# Patient Record
Sex: FEMALE | Race: BLACK OR AFRICAN AMERICAN | NOT HISPANIC OR LATINO | Employment: FULL TIME | URBAN - METROPOLITAN AREA
[De-identification: names, ages, dates, MRNs, and addresses within clinical notes are randomized per-mention and may not be internally consistent; named-entity substitution may affect disease eponyms.]

---

## 2017-01-10 ENCOUNTER — PATIENT MESSAGE (OUTPATIENT)
Dept: FAMILY MEDICINE | Facility: CLINIC | Age: 32
End: 2017-01-10

## 2017-01-19 ENCOUNTER — PATIENT MESSAGE (OUTPATIENT)
Dept: OBSTETRICS AND GYNECOLOGY | Facility: CLINIC | Age: 32
End: 2017-01-19

## 2017-04-25 ENCOUNTER — PATIENT MESSAGE (OUTPATIENT)
Dept: FAMILY MEDICINE | Facility: CLINIC | Age: 32
End: 2017-04-25

## 2017-04-26 DIAGNOSIS — Z23 NEED FOR VACCINATION: Primary | ICD-10-CM

## 2017-04-26 NOTE — TELEPHONE ENCOUNTER
Please enter orders and I will call patient to go over to main campus to the injection clinic. thanks

## 2017-04-27 ENCOUNTER — IMMUNIZATION (OUTPATIENT)
Dept: FAMILY MEDICINE | Facility: CLINIC | Age: 32
End: 2017-04-27
Payer: COMMERCIAL

## 2017-04-27 ENCOUNTER — LAB VISIT (OUTPATIENT)
Dept: LAB | Facility: HOSPITAL | Age: 32
End: 2017-04-27
Attending: FAMILY MEDICINE
Payer: COMMERCIAL

## 2017-04-27 DIAGNOSIS — Z23 NEED FOR VACCINATION: ICD-10-CM

## 2017-04-27 DIAGNOSIS — Z23 NEED FOR VACCINATION: Primary | ICD-10-CM

## 2017-04-27 LAB — HBV SURFACE AB SER-ACNC: NEGATIVE M[IU]/ML

## 2017-04-27 PROCEDURE — 86762 RUBELLA ANTIBODY: CPT

## 2017-04-27 PROCEDURE — 86787 VARICELLA-ZOSTER ANTIBODY: CPT

## 2017-04-27 PROCEDURE — 86735 MUMPS ANTIBODY: CPT

## 2017-04-27 PROCEDURE — 86706 HEP B SURFACE ANTIBODY: CPT

## 2017-04-27 PROCEDURE — 90686 IIV4 VACC NO PRSV 0.5 ML IM: CPT | Mod: S$GLB,,, | Performed by: FAMILY MEDICINE

## 2017-04-27 PROCEDURE — 36415 COLL VENOUS BLD VENIPUNCTURE: CPT | Mod: PO

## 2017-04-27 PROCEDURE — 86765 RUBEOLA ANTIBODY: CPT

## 2017-04-27 PROCEDURE — 90471 IMMUNIZATION ADMIN: CPT | Mod: S$GLB,,, | Performed by: FAMILY MEDICINE

## 2017-04-28 ENCOUNTER — CLINICAL SUPPORT (OUTPATIENT)
Dept: FAMILY MEDICINE | Facility: CLINIC | Age: 32
End: 2017-04-28
Payer: COMMERCIAL

## 2017-04-28 DIAGNOSIS — Z11.1 SCREENING FOR TUBERCULOSIS: Primary | ICD-10-CM

## 2017-04-28 DIAGNOSIS — Z11.1 SCREENING-PULMONARY TB: Primary | ICD-10-CM

## 2017-04-28 LAB
RUBV IGG SER-ACNC: 185 IU/ML
RUBV IGG SER-IMP: REACTIVE

## 2017-04-28 PROCEDURE — 86580 TB INTRADERMAL TEST: CPT | Mod: S$GLB,,, | Performed by: FAMILY MEDICINE

## 2017-05-01 LAB
MUMPS IGG INTERPRETATION: POSITIVE
MUMPS IGG SCREEN: 2.68 ISR
RUBEOLA IGG ANTIBODY: 2.29 ISR
RUBEOLA INTERPRETATION: POSITIVE
TB INDURATION - 48 HR READ: NORMAL MM
TB INDURATION - 72 HR READ: NORMAL MM
TB SKIN TEST - 48 HR READ: NORMAL
TB SKIN TEST - 72 HR READ: NEGATIVE
VARICELLA INTERPRETATION: POSITIVE
VARICELLA ZOSTER IGG: 3.94 ISR

## 2017-05-01 NOTE — PROGRESS NOTES
Two patient identifiers verified. Allergies reviewed.  PPD was placed to right arm per MD order. Patient tolerated injection well: no redness, bleeding, or bruising noted to injection site. Patient instructed to remain in clinic setting for 15 minutes. Patient is here today for PPD read.  PPD read was negative.

## 2017-05-02 ENCOUNTER — TELEPHONE (OUTPATIENT)
Dept: FAMILY MEDICINE | Facility: CLINIC | Age: 32
End: 2017-05-02

## 2017-05-02 ENCOUNTER — CLINICAL SUPPORT (OUTPATIENT)
Dept: INFECTIOUS DISEASES | Facility: CLINIC | Age: 32
End: 2017-05-02
Payer: COMMERCIAL

## 2017-05-02 DIAGNOSIS — Z23 NEED FOR VACCINATION: ICD-10-CM

## 2017-05-02 PROCEDURE — 90746 HEPB VACCINE 3 DOSE ADULT IM: CPT | Mod: S$GLB,,, | Performed by: INTERNAL MEDICINE

## 2017-05-02 PROCEDURE — 99999 PR PBB SHADOW E&M-EST. PATIENT-LVL I: CPT | Mod: PBBFAC,,,

## 2017-05-02 PROCEDURE — 90471 IMMUNIZATION ADMIN: CPT | Mod: S$GLB,,, | Performed by: INTERNAL MEDICINE

## 2017-05-02 NOTE — TELEPHONE ENCOUNTER
Infectious Disease called stating patient was Igg for Rubella and Varicella were both positive. Therefore patient does not need the injections.

## 2017-05-29 ENCOUNTER — CLINICAL SUPPORT (OUTPATIENT)
Dept: INFECTIOUS DISEASES | Facility: CLINIC | Age: 32
End: 2017-05-29
Payer: COMMERCIAL

## 2017-05-29 DIAGNOSIS — Z23 NEED FOR VACCINATION: Primary | ICD-10-CM

## 2017-05-29 PROCEDURE — 90746 HEPB VACCINE 3 DOSE ADULT IM: CPT | Mod: PBBFAC

## 2017-05-29 PROCEDURE — 90471 IMMUNIZATION ADMIN: CPT | Mod: PBBFAC

## 2017-08-21 ENCOUNTER — OFFICE VISIT (OUTPATIENT)
Dept: OBSTETRICS AND GYNECOLOGY | Facility: CLINIC | Age: 32
End: 2017-08-21
Payer: COMMERCIAL

## 2017-08-21 VITALS
DIASTOLIC BLOOD PRESSURE: 70 MMHG | SYSTOLIC BLOOD PRESSURE: 116 MMHG | BODY MASS INDEX: 34.84 KG/M2 | WEIGHT: 216.81 LBS | HEIGHT: 66 IN

## 2017-08-21 DIAGNOSIS — Z01.419 ENCOUNTER FOR GYNECOLOGICAL EXAMINATION: Primary | ICD-10-CM

## 2017-08-21 PROCEDURE — 99395 PREV VISIT EST AGE 18-39: CPT | Mod: S$GLB,,, | Performed by: OBSTETRICS & GYNECOLOGY

## 2017-08-21 PROCEDURE — 99999 PR PBB SHADOW E&M-EST. PATIENT-LVL III: CPT | Mod: PBBFAC,,, | Performed by: OBSTETRICS & GYNECOLOGY

## 2017-08-21 NOTE — PROGRESS NOTES
HPI: Pt is a 31 y.o.  female who presents for routine annual exam. She uses patch for contraception. She does not want STD screening. No complaints.     Pap smear in 2015 NORMAL      ROS:  GENERAL: Feeling well overall. Denies fever or chills.   SKIN: Denies rash or lesions.   HEAD: Denies head injury or headache.   NODES: Denies enlarged lymph nodes.   CHEST: Denies chest pain or shortness of breath.   CARDIOVASCULAR: Denies palpitations or left sided chest pain.   ABDOMEN: No abdominal pain, constipation, diarrhea, nausea, vomiting or rectal bleeding.   URINARY: No dysuria, hematuria, or burning on urination.  REPRODUCTIVE: See HPI.   BREASTS: Denies pain, lumps, or nipple discharge.   HEMATOLOGIC: No easy bruisability or excessive bleeding.   MUSCULOSKELETAL: Denies joint pain or swelling.   NEUROLOGIC: Denies syncope or weakness.   PSYCHIATRIC: Denies depression, anxiety or mood swings.    PE:   APPEARANCE: Well nourished, well developed, Black or  female in no acute distress.  NODES: no cervical, supraclavicular, or inguinal lymphadenopathy  BREASTS: Symmetrical, no skin changes or visible lesions. No palpable masses, nipple discharge or adenopathy bilaterally.  ABDOMEN: Soft. No tenderness or masses. No distention. No hernias palpated. No CVA tenderness.  VULVA: No lesions. Normal external female genitalia.  URETHRAL MEATUS: Normal size and location, no lesions, no prolapse.  URETHRA: No masses, tenderness, or prolapse.  VAGINA: Moist. No lesions or lacerations noted. No abnormal discharge present. No odor present.   CERVIX: No lesions or discharge. No cervical motion tenderness.   UTERUS: Normal size, regular shape, mobile, non-tender.  ADNEXA: No tenderness. No fullness or masses palpated in the adnexal regions.   ANUS PERINEUM: Normal.      Diagnosis:  1. Encounter for gynecological examination        Plan:          Patient was counseled today on the new ACS guidelines for cervical cytology  screening as well as the current recommendations for breast cancer screening. She was counseled to follow up with her PCP for other routine health maintenance. Counseling session lasted approximately 10 minutes, and all her questions were answered.    Follow-up with me in 1 year for routine exam; pap next year.

## 2017-09-20 ENCOUNTER — PATIENT MESSAGE (OUTPATIENT)
Dept: OBSTETRICS AND GYNECOLOGY | Facility: CLINIC | Age: 32
End: 2017-09-20

## 2017-11-14 ENCOUNTER — CLINICAL SUPPORT (OUTPATIENT)
Dept: INFECTIOUS DISEASES | Facility: CLINIC | Age: 32
End: 2017-11-14
Payer: COMMERCIAL

## 2017-11-14 DIAGNOSIS — Z23 NEED FOR VACCINATION: Primary | ICD-10-CM

## 2017-11-14 PROCEDURE — 90471 IMMUNIZATION ADMIN: CPT | Mod: S$GLB,,, | Performed by: INTERNAL MEDICINE

## 2017-11-14 PROCEDURE — 90746 HEPB VACCINE 3 DOSE ADULT IM: CPT | Mod: S$GLB,,, | Performed by: INTERNAL MEDICINE

## 2017-11-14 NOTE — PROGRESS NOTES
Pt received the final dose of her Hepatitis B vaccination. Pt tolerated injection well. Pt left the unit in NAD.

## 2018-01-09 ENCOUNTER — PATIENT MESSAGE (OUTPATIENT)
Dept: OBSTETRICS AND GYNECOLOGY | Facility: CLINIC | Age: 33
End: 2018-01-09

## 2018-01-10 ENCOUNTER — OFFICE VISIT (OUTPATIENT)
Dept: URGENT CARE | Facility: CLINIC | Age: 33
End: 2018-01-10
Payer: COMMERCIAL

## 2018-01-10 VITALS
WEIGHT: 226 LBS | TEMPERATURE: 98 F | HEIGHT: 66 IN | SYSTOLIC BLOOD PRESSURE: 180 MMHG | BODY MASS INDEX: 36.32 KG/M2 | RESPIRATION RATE: 16 BRPM | DIASTOLIC BLOOD PRESSURE: 120 MMHG | OXYGEN SATURATION: 98 % | HEART RATE: 97 BPM

## 2018-01-10 DIAGNOSIS — R42 VERTIGO: ICD-10-CM

## 2018-01-10 DIAGNOSIS — J10.1 INFLUENZA A: ICD-10-CM

## 2018-01-10 DIAGNOSIS — R05.9 COUGH: Primary | ICD-10-CM

## 2018-01-10 DIAGNOSIS — I10 HYPERTENSION, UNSPECIFIED TYPE: ICD-10-CM

## 2018-01-10 LAB
CTP QC/QA: YES
FLUAV AG NPH QL: POSITIVE
FLUBV AG NPH QL: NEGATIVE

## 2018-01-10 PROCEDURE — 87804 INFLUENZA ASSAY W/OPTIC: CPT | Mod: QW,S$GLB,, | Performed by: FAMILY MEDICINE

## 2018-01-10 PROCEDURE — 99203 OFFICE O/P NEW LOW 30 MIN: CPT | Mod: S$GLB,,, | Performed by: FAMILY MEDICINE

## 2018-01-10 RX ORDER — OSELTAMIVIR PHOSPHATE 75 MG/1
75 CAPSULE ORAL 2 TIMES DAILY
Qty: 10 CAPSULE | Refills: 0 | Status: SHIPPED | OUTPATIENT
Start: 2018-01-10 | End: 2018-01-15

## 2018-01-10 RX ORDER — MECLIZINE HYDROCHLORIDE 25 MG/1
25 TABLET ORAL 3 TIMES DAILY PRN
Qty: 60 TABLET | Refills: 0 | Status: SHIPPED | OUTPATIENT
Start: 2018-01-10 | End: 2018-07-10

## 2018-01-10 RX ORDER — NORELGESTROMIN AND ETHINYL ESTRADIOL 35; 150 UG/MG; UG/MG
PATCH TRANSDERMAL
Qty: 3 PATCH | Refills: 11 | Status: SHIPPED | OUTPATIENT
Start: 2018-01-10 | End: 2018-11-26 | Stop reason: SDUPTHER

## 2018-01-10 NOTE — PATIENT INSTRUCTIONS
Influenza (Adult)    Influenza is also called the flu. It is a viral illness that affects the air passages of your lungs. It is different from the common cold. The flu can easily be passed from one to person to another. It may be spread through the air by coughing and sneezing. Or it can be spread by touching the sick person and then touching your own eyes, nose, or mouth.  The flu starts 1 to 3 days after you are exposed to the flu virus. It may last for 1 to 2 weeks but many people feel tired or fatigued for many weeks afterward. You usually dont need to take antibiotics unless you have a complication. This might be an ear or sinus infection or pneumonia.  Symptoms of the flu may be mild or severe. They can include extreme tiredness (wanting to stay in bed all day), chills, fevers, muscle aches, soreness with eye movement, headache, and a dry, hacking cough.  Home care  Follow these guidelines when caring for yourself at home:  · Avoid being around cigarette smoke, whether yours or other peoples.  · Acetaminophen or ibuprofen will help ease your fever, muscle aches, and headache. Dont give aspirin to anyone younger than 18 who has the flu. Aspirin can harm the liver.  · Nausea and loss of appetite are common with the flu. Eat light meals. Drink 6 to 8 glasses of liquids every day. Good choices are water, sport drinks, soft drinks without caffeine, juices, tea, and soup. Extra fluids will also help loosen secretions in your nose and lungs.  · Over-the-counter cold medicines will not make the flu go away faster. But the medicines may help with coughing, sore throat, and congestion in your nose and sinuses. Dont use a decongestant if you have high blood pressure.  · Stay home until your fever has been gone for at least 24 hours without using medicine to reduce fever.  Follow-up care  Follow up with your healthcare provider, or as advised, if you are not getting better over the next week.  If you are age 65 or  older, talk with your provider about getting a pneumococcal vaccine every 5 years. You should also get this vaccine if you have chronic asthma or COPD. All adults should get a flu vaccine every fall. Ask your provider about this.  When to seek medical advice  Call your healthcare provider right away if any of these occur:  · Cough with lots of colored mucus (sputum) or blood in your mucus  · Chest pain, shortness of breath, wheezing, or trouble breathing  · Severe headache, or face, neck, or ear pain  · New rash with fever  · Fever of 100.4°F (38°C) or higher, or as directed by your healthcare provider  · Confusion, behavior change, or seizure  · Severe weakness or dizziness  · You get a new fever or cough after getting better for a few days  Date Last Reviewed: 1/1/2017  © 2664-1745 [employer]. [address] [place] [address]. All rights reserved. This information is not intended as a substitute for professional medical care. Always follow your healthcare professional's instructions.        Dizziness (Vertigo) and Balance Problems: Staying Safe     Replace burned-out lightbulbs to keep your home safe and well lit.   Falls or accidents can lead to pain, broken bones, and fear of future falls. Protect yourself and others by preparing for episodes. Simple steps can help you stay safe at home and wherever you go.  Lighting  Keep all areas well lit. This helps your eyes send the right signals to the brain. It also makes you less likely to trip and fall. If bright lights make symptoms worse, dim the lights or lie in a dark room until the dizziness passes. Then turn the lights back to their normal level.  Tips:  · Keep a flashlight by the bed.  · Place nightlights in bathrooms and hallways.  · Replace burned-out bulbs, or have someone replace them for you.  Preventing falls  To reduce your risk of falling:  · Get out of bed or up from a chair slowly.  · Wear low-heeled shoes that fit properly and  have slip-resistant soles.  · Remove throw rugs. Clear clutter from walkways.  · Use handrails on stairs. Have handrails installed or adjusted if needed.  · Install grab bars in the bathroom. Don't use towel racks for balance.  · Use a shower stool. Also put adhesive strips in the shower or on the tub floor.  Going out  With a little time and preparation, you can get around safely.  Tips:  · Bring a cane or walking aid if needed.  · Give yourself plenty of time in case you start to get dizzy.  · Ask your healthcare provider what type of exercise is safe for your condition.  · Be patient. If an activity such as walking through a crowded shop causes you stress, you may not be ready for it yet.  Driving  If you become dizzy or disoriented while driving, you could hurt yourself and others. That's why it's best to not drive until symptoms have gone away. In some cases, your license may be temporarily held until it's safe for you to drive again.  For safety:  · Ask a friend to drive for you.  · Take public transportation.  · Walk to stores and other places when you can.  Asking for help  Don't be afraid to ask for help running errands, cooking meals, and doing exercise. Whether it's a friend, loved one, neighbor, or stranger on the street, a little help can make a world of difference.   Date Last Reviewed: 11/1/2016  © 9396-1331 [employer]. [address] [place] [address], [place] [address]. All rights reserved. This information is not intended as a substitute for professional medical care. Always follow your healthcare professional's instructions.        Possible Causes of Dizziness or Fainting    Dizziness and fainting can have many causes. Below are some examples of possible causes your healthcare provider will look to rule out.  Benign paroxysmal positional vertigo (BPPV)  BPPV results when calcium crystals inside the inner ear shift into the wrong position. BPPV causes episodes of vertigo (a spinning  sensation). Episodes most often happen when the head is moved in a certain way. This is more common in people 65 and older.   Infection or inflammation  The semicircular canals of the ear may become infected or inflamed. In this case, they can send the wrong balance signals. This can cause vertigo.  Meniere disease  Meniere disease happens when there is too much fluid in the semicircular canals. This can cause vertigo. It also can cause hearing problems and buzzing or ringing in the ears (called tinnitus). You may also have a feeling of pressure or fullness in the ear.  Syncope  Syncope is fainting that happens when the brain doesnt get enough oxygen-rich blood. It can be caused by low heart rate or low blood pressure. This is called vasovagal syncope. It can also be caused by sitting or standing up too quickly. This is called orthostatic hypotension. Syncope may also be due to a heart valve problem, an abnormal heart rhythm, or other heart problems. Dizziness can also happen from stroke, hemorrhage in the brain, or other problems in the brain. Your healthcare provider may do certain tests to rule out these conditions.  Other causes  Other causes include:  · Medicines. Certain medicines can cause dizziness and even fainting. In some cases, stopping a medicine too quickly can lead to withdrawal symptoms, including dizziness and fainting.  · Anxiety. Being anxious can lead to breathing changes, such as hyperventilation. These can lead to dizziness and fainting.  Additional causes for dizziness and fainting also exist. Talk to your healthcare provider for more information.     Date Last Reviewed: 10/6/2015  © 3436-6706 [employer]. [address] [place] [address], [place] [address]. All rights reserved. This information is not intended as a substitute for professional medical care. Always follow your healthcare professional's instructions.

## 2018-01-10 NOTE — PROGRESS NOTES
Subjective:       Patient ID: Joann Banuelos is a 32 y.o. female.    Vitals:  vitals were not taken for this visit.    Chief Complaint: Cough (productive cough since yesterday)    Pt has had a productive cough with congestion and lightheadedness. Pt says she has body aches and chills. Pt has taken mucinex two days ago. Pt has had a history of high blood pressure when she was pregnant and her blood pressure is still elevated from time to time. padmini      Cough   This is a new problem. The current episode started yesterday. The problem has been unchanged. The problem occurs constantly. The cough is productive of sputum. Associated symptoms include headaches, myalgias, nasal congestion, postnasal drip, a sore throat and shortness of breath. Nothing aggravates the symptoms. She has tried OTC cough suppressant for the symptoms. The treatment provided no relief. There is no history of asthma, bronchitis, COPD or pneumonia.     Review of Systems   Constitution: Positive for decreased appetite.   HENT: Positive for congestion, postnasal drip and sore throat.    Respiratory: Positive for cough, shortness of breath and sputum production.    Musculoskeletal: Positive for myalgias.   Gastrointestinal: Positive for nausea. Negative for abdominal pain and vomiting.   Neurological: Positive for disturbances in coordination, dizziness, headaches and loss of balance.       Objective:      Physical Exam    Assessment:       No diagnosis found.    Plan:        There are no diagnoses linked to this encounter.  ***

## 2018-01-10 NOTE — PROGRESS NOTES
"Subjective:       Patient ID: Joann Banuelos is a 32 y.o. female.    Vitals:  height is 5' 6" (1.676 m) and weight is 102.5 kg (226 lb). Her temperature is 98.4 °F (36.9 °C). Her blood pressure is 180/120 (abnormal) and her pulse is 97. Her respiration is 16 and oxygen saturation is 98%.     Chief Complaint: Cough (productive cough since yesterday) and Dizziness (py says the dizziness has been the worst today and began yesterday.)    Pt has had a productive cough with congestion and lightheadedness. Pt says she has body aches and chills. Pt has taken mucinex two days ago.sharan  Pt has had a productive cough with congestion and lightheadedness. Pt says she has body aches and chills. Pt has taken mucinex two days ago. Pt has had a history of high blood pressure when she was pregnant and her blood pressure is still elevated from time to time. Sharan she started this morning feelin light headed and dizzy, and when she moves feels like the room is moving. Has had this before and is worried it is her pressure. She also feels fatigued.      Cough   This is a new problem. The current episode started yesterday. The problem has been unchanged. The problem occurs constantly. The cough is productive of sputum. Associated symptoms include myalgias, nasal congestion, postnasal drip, a sore throat and shortness of breath. Nothing aggravates the symptoms. She has tried OTC cough suppressant for the symptoms. The treatment provided no relief. There is no history of asthma, bronchitis, COPD or pneumonia.     Review of Systems   Constitution: Positive for decreased appetite.   HENT: Positive for congestion, postnasal drip and sore throat.    Respiratory: Positive for cough, shortness of breath and sputum production.    Musculoskeletal: Positive for myalgias.   Gastrointestinal: Positive for nausea. Negative for abdominal pain and vomiting.       Objective:      Physical Exam   Constitutional: She is oriented to person, place, and time. She " appears well-developed and well-nourished. She is cooperative.  Non-toxic appearance. She does not appear ill. No distress.   HENT:   Head: Normocephalic and atraumatic.   Right Ear: Hearing, tympanic membrane, external ear and ear canal normal.   Left Ear: Hearing, tympanic membrane, external ear and ear canal normal.   Nose: Nose normal. No mucosal edema, rhinorrhea or nasal deformity. No epistaxis. Right sinus exhibits no maxillary sinus tenderness and no frontal sinus tenderness. Left sinus exhibits no maxillary sinus tenderness and no frontal sinus tenderness.   Mouth/Throat: Uvula is midline, oropharynx is clear and moist and mucous membranes are normal. No trismus in the jaw. Normal dentition. No uvula swelling. No posterior oropharyngeal erythema.   Eyes: Conjunctivae and lids are normal. No scleral icterus.   Sclera clear bilat   Neck: Trachea normal, full passive range of motion without pain and phonation normal. Neck supple.   Cardiovascular: Normal rate, regular rhythm, normal heart sounds, intact distal pulses and normal pulses.    Pulmonary/Chest: Effort normal and breath sounds normal. No respiratory distress.   Abdominal: Soft. Normal appearance and bowel sounds are normal. She exhibits no distension. There is no tenderness.   Musculoskeletal: Normal range of motion. She exhibits no edema or deformity.   Neurological: She is alert and oriented to person, place, and time. She displays normal reflexes. No cranial nerve deficit (non reproducible vertigo) or sensory deficit. She exhibits normal muscle tone. Coordination normal.   Skin: Skin is warm, dry and intact. She is not diaphoretic. No pallor.   Psychiatric: She has a normal mood and affect. Her speech is normal and behavior is normal. Judgment and thought content normal. Cognition and memory are normal.   Nursing note and vitals reviewed.      Assessment:       1. Cough    2. Influenza A    3. Vertigo    4. Hypertension, unspecified type         Plan:        Cough  -     POCT Influenza A/B    Influenza A  -     oseltamivir (TAMIFLU) 75 MG capsule; Take 1 capsule (75 mg total) by mouth 2 (two) times daily.  Dispense: 10 capsule; Refill: 0    Vertigo  -     meclizine (ANTIVERT) 25 mg tablet; Take 1 tablet (25 mg total) by mouth 3 (three) times daily as needed.  Dispense: 60 tablet; Refill: 0    Hypertension, unspecified type      Recent Results (from the past 48 hour(s))   POCT Influenza A/B    Collection Time: 01/10/18 12:47 PM   Result Value Ref Range    Rapid Influenza A Ag Positive (A) Negative    Rapid Influenza B Ag Negative Negative     Acceptable Yes    ]

## 2018-01-11 ENCOUNTER — OFFICE VISIT (OUTPATIENT)
Dept: INTERNAL MEDICINE | Facility: CLINIC | Age: 33
End: 2018-01-11
Payer: COMMERCIAL

## 2018-01-11 VITALS
HEART RATE: 100 BPM | BODY MASS INDEX: 35.95 KG/M2 | SYSTOLIC BLOOD PRESSURE: 140 MMHG | HEIGHT: 67 IN | WEIGHT: 229.06 LBS | DIASTOLIC BLOOD PRESSURE: 78 MMHG | TEMPERATURE: 99 F

## 2018-01-11 DIAGNOSIS — J11.1 FLU SYNDROME: ICD-10-CM

## 2018-01-11 DIAGNOSIS — R03.0 ELEVATED BLOOD PRESSURE, SITUATIONAL: Primary | ICD-10-CM

## 2018-01-11 PROCEDURE — 99214 OFFICE O/P EST MOD 30 MIN: CPT | Mod: S$GLB,,, | Performed by: INTERNAL MEDICINE

## 2018-01-11 PROCEDURE — 99999 PR PBB SHADOW E&M-EST. PATIENT-LVL III: CPT | Mod: PBBFAC,,, | Performed by: INTERNAL MEDICINE

## 2018-01-11 RX ORDER — LEVOCETIRIZINE DIHYDROCHLORIDE 5 MG/1
5 TABLET, FILM COATED ORAL NIGHTLY
Qty: 30 TABLET | Refills: 3 | Status: SHIPPED | OUTPATIENT
Start: 2018-01-11 | End: 2018-07-10

## 2018-01-11 RX ORDER — CODEINE PHOSPHATE AND GUAIFENESIN 10; 100 MG/5ML; MG/5ML
5 SOLUTION ORAL 3 TIMES DAILY PRN
Qty: 236 ML | Refills: 0 | Status: SHIPPED | OUTPATIENT
Start: 2018-01-11 | End: 2018-01-21

## 2018-01-11 RX ORDER — FLUTICASONE PROPIONATE 50 MCG
2 SPRAY, SUSPENSION (ML) NASAL DAILY
Qty: 16 G | Refills: 2 | Status: SHIPPED | OUTPATIENT
Start: 2018-01-11 | End: 2018-02-10

## 2018-01-11 NOTE — PROGRESS NOTES
Subjective:       Patient ID: Joann Banuelos is a 32 y.o. female.    Chief Complaint: Hypertension (check up per )    HPI   Pt diagnosed with Influenza A yesterday at urgent care is here for evaluation of elevated BP of 180/120 yesterday at the clinic. She has not taken her BP at home. Today it is 140/78. Regarding her flu pt is experiencing persistent cough with sinus drainage, chills and body aches.   Review of Systems   Constitutional: Positive for chills. Negative for activity change, appetite change, diaphoresis, fatigue, fever and unexpected weight change.   HENT: Positive for congestion, postnasal drip, rhinorrhea and sinus pain. Negative for sinus pressure, sneezing, sore throat, trouble swallowing and voice change.    Respiratory: Positive for cough. Negative for shortness of breath and wheezing.    Cardiovascular: Negative for chest pain, palpitations and leg swelling.   Gastrointestinal: Negative for abdominal pain, blood in stool, constipation, diarrhea, nausea and vomiting.   Genitourinary: Negative for dysuria.   Musculoskeletal: Positive for myalgias. Negative for arthralgias.   Skin: Negative for rash and wound.   Allergic/Immunologic: Negative for environmental allergies and food allergies.   Hematological: Negative for adenopathy. Does not bruise/bleed easily.       Objective:      Physical Exam   Constitutional: She is oriented to person, place, and time. She appears well-developed and well-nourished. No distress.   HENT:   Head: Normocephalic and atraumatic.   Right Ear: External ear normal.   Left Ear: External ear normal.   Nose: Mucosal edema and rhinorrhea present.   Mouth/Throat: Oropharynx is clear and moist. No oropharyngeal exudate.   Eyes: Conjunctivae and EOM are normal. Pupils are equal, round, and reactive to light. Right eye exhibits no discharge. Left eye exhibits no discharge. No scleral icterus.   Neck: Neck supple. No JVD present.   Cardiovascular: Normal rate, regular rhythm,  normal heart sounds and intact distal pulses.    Pulmonary/Chest: Effort normal and breath sounds normal. No respiratory distress. She has no wheezes. She has no rales.   Abdominal: Soft. Bowel sounds are normal. There is no tenderness.   Musculoskeletal: She exhibits no edema.   Lymphadenopathy:     She has no cervical adenopathy.   Neurological: She is alert and oriented to person, place, and time.   Skin: Skin is warm and dry. No rash noted. She is not diaphoretic. No pallor.       Assessment:       1. Elevated blood pressure, situational    2. Flu syndrome        Plan:    1. Pt advised to monitor BP and notify PCP if consistently > 140/90   2. Rx Cheratussin AC TID PRN, Xyzal/Flonase

## 2018-02-19 ENCOUNTER — OFFICE VISIT (OUTPATIENT)
Dept: INTERNAL MEDICINE | Facility: CLINIC | Age: 33
End: 2018-02-19
Payer: COMMERCIAL

## 2018-02-19 VITALS
DIASTOLIC BLOOD PRESSURE: 92 MMHG | HEART RATE: 76 BPM | WEIGHT: 239.19 LBS | SYSTOLIC BLOOD PRESSURE: 128 MMHG | RESPIRATION RATE: 18 BRPM | BODY MASS INDEX: 37.54 KG/M2 | HEIGHT: 67 IN | TEMPERATURE: 98 F

## 2018-02-19 DIAGNOSIS — R03.0 ELEVATED BLOOD PRESSURE READING: ICD-10-CM

## 2018-02-19 DIAGNOSIS — E66.9 OBESITY, CLASS II, BMI 35-39.9, NO COMORBIDITY: Primary | ICD-10-CM

## 2018-02-19 PROCEDURE — 99214 OFFICE O/P EST MOD 30 MIN: CPT | Mod: S$GLB,,, | Performed by: INTERNAL MEDICINE

## 2018-02-19 PROCEDURE — 99999 PR PBB SHADOW E&M-EST. PATIENT-LVL III: CPT | Mod: PBBFAC,,, | Performed by: INTERNAL MEDICINE

## 2018-02-19 PROCEDURE — 3008F BODY MASS INDEX DOCD: CPT | Mod: S$GLB,,, | Performed by: INTERNAL MEDICINE

## 2018-02-19 NOTE — PROGRESS NOTES
Subjective:       Patient ID: Joann Banuelos is a 32 y.o. female.    Chief Complaint: Follow-up (BP) and Weight Loss (want info for wt loss program)    HPI   Pt with Obesity is here for f/u regarding elevated BP readings. She has been checking them at home since last visit with the numbers running slight above normal at times. She has started to diet and exercise.   Review of Systems   Constitutional: Positive for activity change. Negative for appetite change, chills, diaphoresis, fatigue, fever and unexpected weight change.   HENT: Negative for hearing loss, postnasal drip, rhinorrhea, sinus pressure, sneezing, sore throat, trouble swallowing and voice change.    Eyes: Negative for discharge and visual disturbance.   Respiratory: Negative for cough, chest tightness, shortness of breath and wheezing.    Cardiovascular: Negative for chest pain, palpitations and leg swelling.   Gastrointestinal: Negative for abdominal pain, blood in stool, constipation, diarrhea, nausea and vomiting.   Endocrine: Negative for polydipsia and polyuria.   Genitourinary: Negative for difficulty urinating, dysuria, hematuria and menstrual problem.   Musculoskeletal: Negative for arthralgias, joint swelling, myalgias and neck pain.   Skin: Negative for rash and wound.   Allergic/Immunologic: Negative for environmental allergies and food allergies.   Neurological: Positive for headaches. Negative for weakness.   Hematological: Negative for adenopathy. Does not bruise/bleed easily.   Psychiatric/Behavioral: Negative for confusion and dysphoric mood.       Objective:      Physical Exam   Constitutional: She is oriented to person, place, and time. She appears well-developed and well-nourished. No distress.   HENT:   Head: Normocephalic and atraumatic.   Eyes: Conjunctivae and EOM are normal. Pupils are equal, round, and reactive to light. Right eye exhibits no discharge. Left eye exhibits no discharge. No scleral icterus.   Neck: Neck supple. No  JVD present.   Cardiovascular: Normal rate, regular rhythm, normal heart sounds and intact distal pulses.    Pulmonary/Chest: Effort normal and breath sounds normal. No respiratory distress. She has no wheezes. She has no rales.   Abdominal: Soft. Bowel sounds are normal. There is no tenderness.   Musculoskeletal: She exhibits no edema.   Lymphadenopathy:     She has no cervical adenopathy.   Neurological: She is alert and oriented to person, place, and time.   Skin: Skin is warm and dry. No rash noted. She is not diaphoretic. No pallor.       Assessment:       1. Obesity, Class II, BMI 35-39.9, no comorbidity    2. Elevated blood pressure reading        Plan:    1. Referral to bariatric medicine       Pt advised on proper diet/exercise for weight loss   2. Low sodium diet and needs to lose weight

## 2018-05-01 ENCOUNTER — OFFICE VISIT (OUTPATIENT)
Dept: INTERNAL MEDICINE | Facility: CLINIC | Age: 33
End: 2018-05-01
Payer: COMMERCIAL

## 2018-05-01 VITALS
SYSTOLIC BLOOD PRESSURE: 117 MMHG | TEMPERATURE: 98 F | WEIGHT: 239.63 LBS | BODY MASS INDEX: 37.61 KG/M2 | RESPIRATION RATE: 18 BRPM | HEIGHT: 67 IN | HEART RATE: 92 BPM | DIASTOLIC BLOOD PRESSURE: 85 MMHG

## 2018-05-01 DIAGNOSIS — B35.9 RINGWORM: Primary | ICD-10-CM

## 2018-05-01 PROCEDURE — 99214 OFFICE O/P EST MOD 30 MIN: CPT | Mod: S$GLB,,, | Performed by: INTERNAL MEDICINE

## 2018-05-01 PROCEDURE — 99999 PR PBB SHADOW E&M-EST. PATIENT-LVL III: CPT | Mod: PBBFAC,,, | Performed by: INTERNAL MEDICINE

## 2018-05-01 RX ORDER — FLUCONAZOLE 150 MG/1
150 TABLET ORAL DAILY
Qty: 3 TABLET | Refills: 0 | Status: SHIPPED | OUTPATIENT
Start: 2018-05-01 | End: 2018-05-02

## 2018-05-01 RX ORDER — CLOTRIMAZOLE AND BETAMETHASONE DIPROPIONATE 10; .64 MG/G; MG/G
CREAM TOPICAL 2 TIMES DAILY
Qty: 45 G | Refills: 0 | Status: SHIPPED | OUTPATIENT
Start: 2018-05-01 | End: 2019-12-04

## 2018-05-01 RX ORDER — KETOCONAZOLE 20 MG/G
CREAM TOPICAL 2 TIMES DAILY
Qty: 30 G | Refills: 0 | Status: SHIPPED | OUTPATIENT
Start: 2018-05-01 | End: 2018-05-01

## 2018-05-01 NOTE — PROGRESS NOTES
Subjective:       Patient ID: Joann Banuelos is a 32 y.o. female.    Chief Complaint: Rash (left cheek and neck)    HPI   Pt here for evaluation of an itchy slightly erythematous rash located on her left neck/cheek area dn left upper arm which has been persistent since then. She has not tried any OTC medications for relief.   Review of Systems   Constitutional: Negative for activity change, appetite change, chills, diaphoresis, fatigue, fever and unexpected weight change.   HENT: Negative for congestion, postnasal drip, rhinorrhea, sinus pressure, sneezing, sore throat, trouble swallowing and voice change.    Eyes: Negative for pain.   Respiratory: Negative for cough, shortness of breath and wheezing.    Cardiovascular: Negative for chest pain, palpitations and leg swelling.   Gastrointestinal: Negative for abdominal pain, blood in stool, constipation, diarrhea, nausea and vomiting.   Genitourinary: Negative for dysuria.   Musculoskeletal: Negative for arthralgias and myalgias.   Skin: Positive for rash. Negative for wound.   Allergic/Immunologic: Negative for environmental allergies and food allergies.   Hematological: Negative for adenopathy. Does not bruise/bleed easily.       Objective:      Physical Exam   Constitutional: She is oriented to person, place, and time. She appears well-developed and well-nourished. No distress.   HENT:   Head: Normocephalic and atraumatic.   Eyes: Conjunctivae and EOM are normal. Pupils are equal, round, and reactive to light. Right eye exhibits no discharge. Left eye exhibits no discharge. No scleral icterus.   Neck: Neck supple. No JVD present.   Cardiovascular: Normal rate, regular rhythm, normal heart sounds and intact distal pulses.    Pulmonary/Chest: Effort normal and breath sounds normal. No respiratory distress. She has no wheezes. She has no rales.   Musculoskeletal: She exhibits no edema.   Lymphadenopathy:     She has no cervical adenopathy.   Neurological: She is alert  and oriented to person, place, and time.   Skin: Skin is warm and dry. Rash noted. She is not diaphoretic. There is erythema. No pallor.            Assessment:       1. Ringworm        Plan:   1. Rx Diflucan and Lotrisone cream BID

## 2018-05-14 ENCOUNTER — INITIAL CONSULT (OUTPATIENT)
Dept: BARIATRICS | Facility: CLINIC | Age: 33
End: 2018-05-14
Payer: COMMERCIAL

## 2018-05-14 ENCOUNTER — PATIENT MESSAGE (OUTPATIENT)
Dept: BARIATRICS | Facility: CLINIC | Age: 33
End: 2018-05-14

## 2018-05-14 VITALS
HEIGHT: 67 IN | SYSTOLIC BLOOD PRESSURE: 130 MMHG | DIASTOLIC BLOOD PRESSURE: 70 MMHG | BODY MASS INDEX: 38.17 KG/M2 | HEART RATE: 92 BPM | WEIGHT: 243.19 LBS

## 2018-05-14 DIAGNOSIS — E66.01 SEVERE OBESITY (BMI 35.0-39.9) WITH COMORBIDITY: Primary | ICD-10-CM

## 2018-05-14 DIAGNOSIS — R03.0 ELEVATED BLOOD PRESSURE READING: ICD-10-CM

## 2018-05-14 PROCEDURE — 99999 PR PBB SHADOW E&M-EST. PATIENT-LVL III: CPT | Mod: PBBFAC,,, | Performed by: INTERNAL MEDICINE

## 2018-05-14 PROCEDURE — 99244 OFF/OP CNSLTJ NEW/EST MOD 40: CPT | Mod: S$GLB,,, | Performed by: INTERNAL MEDICINE

## 2018-05-14 RX ORDER — TOPIRAMATE 50 MG/1
50 CAPSULE, EXTENDED RELEASE ORAL DAILY
Qty: 30 CAPSULE | Refills: 3 | Status: SHIPPED | OUTPATIENT
Start: 2018-05-14 | End: 2018-06-21

## 2018-05-14 NOTE — PATIENT INSTRUCTIONS
Patient was informed that topiramate is used for migraine prevention and seizures. Weight loss is a common side effect that is well documented. She understands this. She was informed of the potential side effects such as serious and possibly fatal rash in which case the medication should be discontinued immediately. Paresthesias, forgetfulness, fatigue, kidney stones, GI symptoms, and changes in lab values such as electrolytes, blood counts and kidney function.      Limit alcohol to 4 drinks per week.         3 meals a day made up of the following:  Unlimited green vegetables, tomatoes, mushrooms, spaghetti squash, cauliflower, meat, poultry, seafood, eggs and hard cheeses.   Milk and plain yogurt  Dressings, seasonings, condiments, etc should have less than 2 g sugars.   Beans (1-1.5 cups) or nuts (1/4 cup) can have 1 x a day.   1-2 servings of citrus fruits, berries, pineapple or melon a day (1/2 cup)  Avoid fried foods    No grains, rice, pasta, potatoes, bread, corn, peas, oatmeal, grits, tortillas, crackers, chips    No soda, sweet tea, juices or lemonade    [email or web address] for recipes. Moderate carb intake      Meal Ideas for Regular Bariatric Diet  *Recipes and products available at www.bariatriceating.com  *You can substitute regular dairy and/or dressings, and whole eggs for egg whites in the ideas below.       Breakfast: (15-20g protein)    - Egg white omelet: 2 egg whites or ½ cup Egg Beaters. (Optional proteins: cheese, shrimp, black beans, chicken, sliced turkey) (Optional veggies: tomatoes, salsa, spinach, mushrooms, onions, green peppers, or small slice avocado)     - Egg and sausage: 1 egg or ¼ cup Egg Beaters (any variety), with 1 pita or 2 links of Turkey sausage or Veggie breakfast sausage ([employer] or Everloop)    - Crust-less breakfast quiche: To make a glass pie dish, mix 4oz part skim Ricotta, 1 cup skim milk, and 2 eggs as your base. Add protein: shredded cheese, sliced lean ham or  turkey, turkey benitez/sausage. Add veggies: tomato, onion, green onion, mushroom, green pepper, spinach, etc.    - Yogurt parfait: Mix 1 - 6oz container Dannon Light N Fit vanilla yogurt, with ¼ cup Kashi Go Lean cereal    - Cottage cheese and fruit: ½ cup part-skim cottage cheese or ricotta cheese topped with fresh fruit or sugar free preserves     - Michaelle Swain's Vanilla Egg custard* (add 2 Tbsp instant coffee granules to make Cappuccino Custard*)    - Hi-Protein café latte (skim milk, decaf coffee, 1 scoop protein powder). Optional to add Sugar free syrup or extract flavoring.    Lunch: (20-30g protein)    - ½ cup Black bean soup (Homemade or Progresso), with ¼ cup shredded low-fat cheese. Top with chopped tomato or fresh salsa.     - Lean deli turkey breast and low-fat sliced cheese, mustard or light jiang to moisten, rolled up together, or wrapped in a Long lettuce leaf    - Chicken salad made from dinner leftovers, moisten with low-fat salad dressing or light jiang. Also try leftover salmon, shrimp, tuna or boiled eggs. Serve ½ cup over dark green salad    - Fat-free canned refried beans, topped with ¼ cup shredded low-fat cheese. Top with chopped tomato or fresh salsa.     - Greek salad: Top mixed greens with 1-2oz grilled chicken, tomatoes, red onions, 2-3 kalamata olives, and sprinkle lightly with feta cheese. Spritz with Balsamic vinegar to taste.     - Crust-less lunch quiche: To make a glass pie dish, mix 4oz part skim Ricotta, 1 cup skim milk, and 2 eggs as your base. Add protein: shredded cheese, sliced lean ham or turkey, shrimp, chicken. Add veggies: tomato, onion, green onion, mushroom, green pepper, spinach, artichoke, broccoli, etc.    - Pizza bake: tomato sauce, low-fat shredded mozzarella and turkey pepperoni or Bahraini benitez. Add any veggies.    - Cucumber crab bites: Spread ¼ cup crab dip (lump crabmeat + light cream cheese and green onions) over sliced cucumber.     - Chicken with light  spinach and artichoke dip*: Puree in : 6oz cooked and drained spinach, 2 cloves garlic, 1 can cannelloni beans, ½ cup chopped green onions, 1 can drained artichoke hearts (not marinated in oil), lemon juice and basil. Mix in 2oz chopped up chicken.    Supper: (20-30g protein)    - Serve grilled fish over dark green salad tossed with low-fat dressing, served with grilled asparagus fitzgerald     - Rotisserie chicken salad: served with sliced strawberries, walnuts, fat-free feta cheese crumbles and 1 tbsp Donaldsons Own Light Raspberry [place]ifton Vinaigrette    - Shrimp cocktail: Dip cold boiled shrimp in homemade low-sugar cocktail sauce (1/2 cup Marciano One Carb ketchup, 2 tbsp horseradish, 1/4 tsp hot sauce, 1 tsp Worcestershire sauce, 1 tbsp freshly-squeezed lemon juice). Serve with dark green salad, walnuts, and crumbled blue cheese drizzled with olive oil and Balsamic vinegar    - Tuna Melt: Spread tuna salad onto 2 thick slices of tomato. Top with low-fat cheese and broil until cheese is melted. May also be made with chicken salad of shrimp salad. [place] with different types of cheeses.    - Homemade low-fat Chili using extra lean ground beef or ground turkey. Top with shredded cheese and salsa as desired. May add dollop fat-free sour cream if desired    - Dinner Omelet with shrimp or chicken and onion, green peppers and chives.    - No noodle lasagna: Use sliced zucchini or eggplant in place of noodles.  Layer with part skim ricotta cheese and low sugar meat sauce (use very lean ground beef or ground turkey).    - Mexican chicken bake: Bake chunks of chicken breast or thigh with taco seasoning, Pace brand enchilada sauce, green onions and low-fat cheese. Serve with ¼ cup black beans or fat free refried beans topped with chopped tomatoes or salsa.    - Karlene frozen meatballs, simmered in Classico Marinara sauce. Different flavors of salsa or spaghetti sauce create different dishes! Sprinkle with  parmesan cheese. Serve with grilled or steamed veggies, or a dark green salad.    - Simmer boneless skinless chicken thigh chunks in Classico Marinara sauce or roasted salsa until tender with chopped onion, bell pepper, garlic, mushrooms, spinach, etc.     - Hamburger, without the bun, dressed the way you like. Served with grilled or steamed veggies.    - Eggplant parmesan: Bake slices of eggplant at 350 degrees for 15 minutes. Layer tomato sauce, sliced eggplant and low-fat mozzarella cheese in a baking dish and cover with foil. Bake 30-40 more minutes or until bubbly. Uncover and bake at 400 degrees for about 15 more minutes, or until top is slightly crisp.    - Fish tacos: grilled/baked white fish, wrapped in Long lettuce leaf, topped with salsa, shredded low-fat cheese, and light coleslaw.    Snacks: (100-200 calories; >5g protein)    - 1 low-fat cheese stick with 8 cherry tomatoes or 1 serving fresh fruit  - 4 thin slices fat-free turkey breast and 1 slice low-fat cheese  - 4 thin slices fat-free honey ham with wedge of melon  - 1/4 cup unsalted nuts with ½ cup fruit  - 6-oz container Dannon Light n Fit vanilla yogurt, topped with 1oz unsalted nuts         - apple, celery or baby carrots spread with 2 Tbsp natural peanut butter or almond butter   - apple slices with 1 oz slice low-fat cheese  - celery, cucumber, bell pepper or baby carrots dipped in ¼ cup hummus bean spread or light spinach and artichoke dip (*recipe in lunch section)  - 100 calorie bag microwave light popcorn with 3 tbsp grated parmesan cheese  - Luis Miguel Links Beef Steak - 14g protein! (similar to beef jerky)  - 2 wedges Laughing Cow - Light Herb & Garlic Cheese with sliced cucumber or green bell pepper  - 1/2 cup low-fat cottage cheese with ¼ cup fruit or ¼ cup salsa  - RTD Protein drinks: Atkins, Low Carb Slim Fast, EAS light, Muscle Milk Light, etc.  - Homemade Protein drinks: GNC Soy95, Isopure, Nectar, UNJURY, Whey Gourmet, etc. Mix 1  scoop powder with 8oz skim/1% milk or light soymilk.  - Protein bars: Atkins, EAS, Pure Protein, Think Thin, Detour, etc. Must have 0-4 grams sugar - Read the label.    Takeout Options: No more than twice/week  Deli - Salads (no pasta or rice), meats, cheeses. Roasted chicken. Lox (salmon)    Mexican - Platters which don't include tortillas, chips, or rice. Go easy on the beans. Example: Fajitas without the tortillas. Ask the  not to bring chips to the table if they are too tempting.    Greek - Meat or fish and vegetable, but no bread or rice. Including hummus, baba ganoush, etc, is OK. Most sit-down Greek restaurants can provide you with cucumber slices for dipping instead of lex bread.    Fast Food (Avoid as much as possible) - Salads (no croutons and limit salad dressing to 2 tbsp), grilled chicken sandwich without the bun and ask for no jiang. Leticias low fat chili or Taco Bell pintos and cheese.    BBQ - The meats are fine if you ask for sauces on the side, but most of the traditional side dishes are loaded with carbs. Larry slaw, baked beans and BBQ sauce are typically made with sugar.    Chinese - Nothing deep-fried, no rice or noodles. Many Chinese sauces have starch and sugar in them, so you'll have to use your judgement. If you find that these sauces trigger cravings, or cause Dumping, you can ask for the sauce to be made without sugar or just use soy sauce.        Eating well to be healthy and lose weight does not have to be hard. It also does not have to be time consuming or expensive. There a lots of ways you can work in healthy choices into your day. Many of these are easy, quick and even family friendly!    Homemade hazelnut au lait  Brew your favorite brand of hazelnut flavored coffee (Community makes a good one). Microwave 1/2 cup of milk that fits your eating plan (whole, skim or sugar-free almond milk can all work). Add half to 1 oz sugar free hazelnut syrup.     Quick and easy breakfast  1-2  boiled eggs or mini-frittatas with a tangerine. The boiled eggs and mini-frittatas can both be made ahead and last for up to 4 days in the refrigerator. Bonus if you portion them out in ready to go containers or zipper bags.     Breakfast Egg Muffins with Benitez and Spinach  Makes 12 muffins  Ingredients    6 eggs  ¼ cup milk  ¼ teaspoon salt  2 cups grated cheddar cheese  3/4 cup spinach, cooked and drained (about 8 oz fresh spinach)  6 benitez slices, cooked, drained of fat, and chopped  1/2 cup grated Parmesan cheese (optional)    Instructions      Preheat oven to 350 degrees. Use a regular 12-cup muffin pan. Spray the muffin pan with non-stick cooking spray.  In a large bowl, beat eggs until smooth. Add milk, salt, Cheddar cheese and mix. Stir spinach, cooked benitez into the egg mixture. Ladle the egg mixture into greased muffin cups ¾ full.  Top each muffin cup with grated Parmesan cheese.  Bake for 25 minutes. Remove from the oven, let the muffins cool for 30 minutes before removing them from the pan.      Be a brown bagger! When you make dinner, plan for an extra helping. When you serve your plate for dinner, serve an additional helping into a container that you can take with you the next day. If you don't have a refrigerator available during the day, an insulated lunch bag and ice packs will help you safely store you lunch.     Cold Brewed Iced tea. Fill a pitcher with 64 oz filtered water. Add either 4 regular tea bags of your choice or a large iced tea bag. Refrigerate over night then remove the tea bags. The tea will not be bitter and is super flavorful. Get creative! Try combinations like green tea and hibiscus tea or black tea with lemon zinger. Add orange or lemon slices for even more flavor.     Snack wisely. Protein filled snacks will fill you up, allowing you to get by with fewer calories. String cheese, pork skins (chicharrones), turkey pepperoni, or celery with cream cheese will all fit the bill.        Ditch the take out. Turkey tacos (with or without a low carb tortilla), burgers (without the bun), or fun stir fries are all quick and easy. The whole family will be happy, and you can save calories and money.      Orange Chicken Stir go with asparagus   Makes 6 servings  Ingredients:    1.5 lbs boneless skinless chicken breast/tenders, diced into 1-inch pieces  1 Tbsp extra virgin olive or avocado oil, divided  2 lb asparagus, end portions trimmed and remainder diced into 1 1/2-inch pieces  1 small yellow onion, sliced into thin strips  8 oz button mushrooms, sliced  1 Tbsp peeled and finely grated fresh rhea  4 cloves garlic, minced  1/2 cup low-sodium chicken broth  Juice of 2 fresh oranges  2 Tbsp low sodium soy sauce  2 Tbsp cornstarch  Sea salt and freshly ground black pepper    Directions:    In a 12-inch non-stick wok, heat 1/2 oil over moderately high heat. Once oil is hot, add diced chicken and season lightly with salt and pepper. Sauté until cooked through, tossing occasionally, about 5-6 minutes.  Place chicken on a large plate and set aside. Return wok, reduce to medium-high heat, add remaining oil.  Once oil is hot, add asparagus, yellow onion and mushrooms, and sauté until tender-crisp, about 4 - 5 minutes, adding in garlic and rhea during the last 1 minute of sautéing.  Meanwhile, in a mixing bowl whisk together chicken broth, orange juice, soy sauce and cornstarch until well blended.  Pour chicken broth mixture into skillet with veggies, season with salt and pepper to taste, and bring mixture to a light boil, stirring constantly. Allow mixture to gently boil, stirring constantly, until thickened, about 1 minute.  Toss chicken into mixture and serve immediately over cauliflower rice or Shirataki noodles.      Skinny Chicken Tortilla Soup  Makes 7 servings    2 teaspoons olive oil  1 cup onion, chopped (about 1 small)  2 cups celery, sliced (about 4 medium stalks)  4 garlic cloves,  minced  4 medium tomatoes, chopped  2 cups water  4 cups low-sodium organic chicken broth  3 cups chopped and/or shredded rotisserie chicken, skinless  2 cups sliced carrots (about 3 medium)  1 teaspoon dried oregano leaves  2 teaspoons chili powder  1 teaspoon garlic powder  2 teaspoons cumin  ½ teaspoon cayenne pepper (add less or omit, if you don't want a spicy soup)  ½ teaspoon sea salt + more to taste  ½ teaspoon pepper + more to taste    Directions:   Put all ingredients into a large crock pot. Cook on low for 5-6 hours.     Optional garnish with chopped avocado, chopped fresh cilantro, crumbled Cotija cheese, sour cream, Greek yogurt, your favorite hot sauce.           Vegan Avocado Banana Chocolate Pudding  Makes 4 servings  Ingredients    1 1/2 ripe avocados  2 ripe bananas  6 tbsp raw cacao powder or unsweetened cocoa powder  2-3 tbsp maple syrup (or calorie free sweetener)  1/4 cup almond milk  Instructions    Blend everything together in a  until the consistency is smooth and velvety. Taste and see if more sweetener is needed and stir to make sure everything is evenly mixed. Blend a second time if needed.  Top with banana slices, raw cacao nibs, almond butter, or any other toppings and enjoy!

## 2018-05-14 NOTE — LETTER
May 14, 2018      Rogelio Moreau, DO  [address] [facility] [place] [address]           [facility] - Bariatric Surgery  [address]  [place] [address]  Phone: [phone]  Fax: [phone]          Patient: Joann Banuelos   MR Number: [id number]   YOB: 1985   Date of Visit: 5/14/2018       Dear Dr. Rogelio Moreau:    Thank you for referring Joann Banuelos to me for evaluation. Attached you will find relevant portions of my assessment and plan of care.    If you have questions, please do not hesitate to call me. I look forward to following Joann Banuelos along with you.    Sincerely,    Radha Shea MD    Enclosure  CC:  No Recipients    If you would like to receive this communication electronically, please contact [email or web address] or [phone] to request more information on [employer] Link access.    For providers and/or their staff who would like to refer a patient to [facility], please contact us through our one-stop-shop provider referral line, [facility], at [phone].    If you feel you have received this communication in error or would no longer like to receive these types of communications, please e-mail [email or web address]

## 2018-05-14 NOTE — LETTER
[facility] - [facility]ric [facility]  [address]  [facility] [address]  Phone: [phone]  Fax: [phone] May 14, 2018      Rogelio Moreau,   [address] [facility] [place] [address]    Patient: Joann Banuelos   MR Number: [id number]   YOB: 1985   Date of Visit: 5/14/2018     Dear Dr. Moreau:    Thank you for referring Joann Banuelos to me for evaluation. Below are the relevant portions of my assessment and plan of care.    ASSESSMENT:    1. Severe obesity (BMI 35.0-39.9) with comorbidity    2. Elevated blood pressure reading      PLAN: 1. Severe obesity (BMI 35.0-39.9) with comorbidity - Patient was informed that Topiramate is used for migraine prevention and seizures. Weight loss is a common side effect that is well documented. She understands this. She was informed of the potential side effects such as serious and possibly fatal rash in which case the medication should be discontinued immediately. Paresthesias, forgetfulness, fatigue, kidney stones, GI symptoms, and changes in lab values such as electrolytes, blood counts and kidney function.    Limit alcohol to 4 drinks per week.     3 meals a day made up of the following:  Unlimited green vegetables, tomatoes, mushrooms, spaghetti squash, cauliflower, meat, poultry, seafood, eggs and hard cheeses.   Milk and plain yogurt  Dressings, seasonings, condiments, etc should have less than 2 g sugars.   Beans (1-1.5 cups) or nuts (1/4 cup) can have 1 x a day.   1-2 servings of citrus fruits, berries, pineapple or melon a day (1/2 cup)  Avoid fried foods     No grains, rice, pasta, potatoes, bread, corn, peas, oatmeal, grits, tortillas, crackers, chips,  No soda, sweet tea, juices or lemonade     [email or web address] for recipes. Moderate carb intake     Meal ideas and spring recipes given.   2. Elevated blood pressure reading - Expect improvement with weight loss. Avoid stimulant anorectics.    If you have questions, please do not  hesitate to call me. I look forward to following Joann along with you.    Sincerely,      Radha Shea MD   Medical Weight Loss   [facility]     OPAL/raegan

## 2018-05-14 NOTE — PROGRESS NOTES
Subjective:       Patient ID: Joann Banuelos is a 32 y.o. female.    Chief Complaint: Consult    CC: Weight    Current attempts at weight loss: New pt to me, referred by Rogelio Moreau, DO  [address] [facility]  [place] [id number] , with Patient Active Problem List:     Obesity, Class II, BMI 35-39.9, no comorbidity     Supervision of other high-risk pregnancy(V23.89)     Elevated blood pressure complicating pregnancy in third trimester, antepartum     Elevated blood pressure     2 years ago had a miscarriage and has not been able to lose weight. Running and weights 3-4 days a week. Tries to eat healthy. Has decreased salt and fried foods 2/2 to BP. Her BP has gone up post=partumin the past, but decreased soon after. Currently using patch for contraception.                Previous diet attempts:CARMEN, marquita, , Bethany,     History of medication for loss: Phentermine. Last taken in Feb. Sates she did not feel it was helping. Had taken it on and off over the years.     Heaviest weight: 243#    Lightest weight: 147#    Goal weight: 170#      Last eye exam:     2 weeks ago.     Provider:    Typical eating patterns:  Works in pulmonary sales. Lives with  and 2 kids. Pt cooks, sometimes her mom cooks.   Breakfast: 2 eggs and turkey or benitez. Belvita cookie or kind bar. Low fat turkey benitez sandwich for SB. Weekends: same.     Lunch: subway- chop salad or sandwich. [employer]- chipolte SW half sandwich and cup of chili. Weekends: barahona salad or sandwich for chikfila.   Dinner: Similar to lunch. Tuna steak with rice and veg. Rot chicken. BBQ chicken with rice and veg. Leftovers.     Snacks: Protein shake-vegan protein with almond milk, honey, kind bar, fruit, cheetos    Beverages: Coffee with creamer or coconut milk and honey. Wine- 4+ glasses/week. Daiquiri on weekends.     Willingness to change:  10/10        BMR: 7009        Review of Systems   Constitutional: Negative for chills and  "fever.   Respiratory: Negative for shortness of breath.         + snores badly. No hx psg.    Cardiovascular: Positive for leg swelling. Negative for chest pain.   Gastrointestinal: Negative for constipation and diarrhea.        + GERD   Genitourinary: Negative for difficulty urinating and menstrual problem.   Musculoskeletal: Positive for arthralgias and back pain.        Knees   Neurological: Positive for dizziness. Negative for light-headedness.   Psychiatric/Behavioral: Negative for dysphoric mood. The patient is not nervous/anxious.        Objective:     /70   Pulse 92   Ht 5' 6.5" (1.689 m)   Wt 110.3 kg (243 lb 2.7 oz)   LMP 04/24/2018 (Exact Date)   BMI 38.66 kg/m²     Physical Exam   Constitutional: She is oriented to person, place, and time. She appears well-developed. No distress.   Obese     HENT:   Head: Normocephalic and atraumatic.   Eyes: EOM are normal. Pupils are equal, round, and reactive to light. No scleral icterus.   Neck: Normal range of motion. Neck supple. No thyromegaly present.   Cardiovascular: Normal rate and normal heart sounds.  Exam reveals no gallop and no friction rub.    No murmur heard.  Pulmonary/Chest: Effort normal and breath sounds normal. No respiratory distress. She has no wheezes.   Abdominal: Soft. Bowel sounds are normal. She exhibits no distension. There is no tenderness.   Musculoskeletal: Normal range of motion. She exhibits no edema.   Neurological: She is alert and oriented to person, place, and time. No cranial nerve deficit.   Skin: Skin is warm and dry. No erythema.   Psychiatric: She has a normal mood and affect. Her behavior is normal. Judgment normal.   Vitals reviewed.      Assessment:       1. Severe obesity (BMI 35.0-39.9) with comorbidity    2. Elevated blood pressure reading        Plan:         1. Severe obesity (BMI 35.0-39.9) with comorbidity  Patient was informed that topiramate is used for migraine prevention and seizures. Weight loss is a " common side effect that is well documented. She understands this. She was informed of the potential side effects such as serious and possibly fatal rash in which case the medication should be discontinued immediately. Paresthesias, forgetfulness, fatigue, kidney stones, GI symptoms, and changes in lab values such as electrolytes, blood counts and kidney function.      Limit alcohol to 4 drinks per week.         3 meals a day made up of the following:  Unlimited green vegetables, tomatoes, mushrooms, spaghetti squash, cauliflower, meat, poultry, seafood, eggs and hard cheeses.   Milk and plain yogurt  Dressings, seasonings, condiments, etc should have less than 2 g sugars.   Beans (1-1.5 cups) or nuts (1/4 cup) can have 1 x a day.   1-2 servings of citrus fruits, berries, pineapple or melon a day (1/2 cup)  Avoid fried foods    No grains, rice, pasta, potatoes, bread, corn, peas, oatmeal, grits, tortillas, crackers, chips    No soda, sweet tea, juices or lemonade    [email or web address] for recipes. Moderate carb intake    Meal ideas and spring recipes given.     2. Elevated blood pressure reading  Expect improvement with weight loss. Avoid stimulant anorectics

## 2018-06-20 ENCOUNTER — PATIENT MESSAGE (OUTPATIENT)
Dept: BARIATRICS | Facility: CLINIC | Age: 33
End: 2018-06-20

## 2018-06-21 ENCOUNTER — PATIENT MESSAGE (OUTPATIENT)
Dept: BARIATRICS | Facility: CLINIC | Age: 33
End: 2018-06-21

## 2018-06-21 RX ORDER — TOPIRAMATE 100 MG/1
100 CAPSULE, EXTENDED RELEASE ORAL DAILY
Qty: 30 CAPSULE | Refills: 3 | Status: SHIPPED | OUTPATIENT
Start: 2018-06-21 | End: 2018-06-22

## 2018-06-22 RX ORDER — TOPIRAMATE 50 MG/1
50 TABLET, FILM COATED ORAL 2 TIMES DAILY
Qty: 60 TABLET | Refills: 2 | Status: SHIPPED | OUTPATIENT
Start: 2018-06-22 | End: 2018-07-23 | Stop reason: SDUPTHER

## 2018-07-10 ENCOUNTER — OFFICE VISIT (OUTPATIENT)
Dept: URGENT CARE | Facility: CLINIC | Age: 33
End: 2018-07-10
Payer: COMMERCIAL

## 2018-07-10 VITALS
TEMPERATURE: 98 F | RESPIRATION RATE: 12 BRPM | DIASTOLIC BLOOD PRESSURE: 92 MMHG | HEART RATE: 89 BPM | SYSTOLIC BLOOD PRESSURE: 148 MMHG | HEIGHT: 66 IN | OXYGEN SATURATION: 99 % | WEIGHT: 235 LBS | BODY MASS INDEX: 37.77 KG/M2

## 2018-07-10 DIAGNOSIS — J06.9 VIRAL URI WITH COUGH: Primary | ICD-10-CM

## 2018-07-10 PROCEDURE — 99214 OFFICE O/P EST MOD 30 MIN: CPT | Mod: 25,S$GLB,, | Performed by: NURSE PRACTITIONER

## 2018-07-10 PROCEDURE — 3008F BODY MASS INDEX DOCD: CPT | Mod: CPTII,S$GLB,, | Performed by: NURSE PRACTITIONER

## 2018-07-10 PROCEDURE — 96372 THER/PROPH/DIAG INJ SC/IM: CPT | Mod: S$GLB,,, | Performed by: EMERGENCY MEDICINE

## 2018-07-10 RX ORDER — CODEINE PHOSPHATE AND GUAIFENESIN 10; 100 MG/5ML; MG/5ML
5 SOLUTION ORAL 3 TIMES DAILY PRN
Qty: 118 ML | Refills: 0 | Status: SHIPPED | OUTPATIENT
Start: 2018-07-10 | End: 2018-07-20

## 2018-07-10 RX ORDER — BETAMETHASONE SODIUM PHOSPHATE AND BETAMETHASONE ACETATE 3; 3 MG/ML; MG/ML
6 INJECTION, SUSPENSION INTRA-ARTICULAR; INTRALESIONAL; INTRAMUSCULAR; SOFT TISSUE
Status: COMPLETED | OUTPATIENT
Start: 2018-07-10 | End: 2018-07-10

## 2018-07-10 RX ORDER — BENZONATATE 100 MG/1
100 CAPSULE ORAL EVERY 6 HOURS PRN
Qty: 30 CAPSULE | Refills: 0 | Status: SHIPPED | OUTPATIENT
Start: 2018-07-10 | End: 2018-11-03 | Stop reason: ALTCHOICE

## 2018-07-10 RX ADMIN — BETAMETHASONE SODIUM PHOSPHATE AND BETAMETHASONE ACETATE 6 MG: 3; 3 INJECTION, SUSPENSION INTRA-ARTICULAR; INTRALESIONAL; INTRAMUSCULAR; SOFT TISSUE at 10:07

## 2018-07-10 NOTE — PATIENT INSTRUCTIONS
You may continue to use Flonase and Claritin daily if they have been helpful.    What Is Acute Bronchitis?  Acute bronchitis is when the airways in your lungs (bronchial tubes) become red and swollen (inflamed). It is usually caused by a viral infection. But it can also occur because of a bacteria or allergen. Symptoms include a cough that produces yellow or greenish mucus and can last for days or sometimes weeks.  Inside healthy lungs    Air travels in and out of the lungs through the airways. The linings of these airways produce sticky mucus. This mucus traps particles that enter the lungs. Tiny structures called cilia then sweep the particles out of the airways.     Healthy airway: Airways are normally open. Air moves in and out easily.      Healthy cilia: Tiny, hairlike cilia sweep mucus and particles up and out of the airways.   Lungs with bronchitis  Bronchitis often occurs with a cold or the flu virus. The airways become inflamed (red and swollen). There is a deep hacking cough from the extra mucus. Other symptoms may include:  · Wheezing  · Chest discomfort  · Shortness of breath  · Mild fever  A second infection, this time due to bacteria, may then occur. And airways irritated by allergens or smoke are more likely to get infected.        Inflamed airway: Inflammation and extra mucus narrow the airway, causing shortness of breath.      Impaired cilia: Extra mucus impairs cilia, causing congestion and wheezing. Smoking makes the problem worse.   Making a diagnosis  A physical exam, health history, and certain tests help your healthcare provider make the diagnosis.  Health history  Your healthcare provider will ask you about your symptoms.  The exam  Your provider listens to your chest for signs of congestion. He or she may also check your ears, nose, and throat.  Possible tests  · A sputum test for bacteria. This requires a sample of mucus from your lungs.  · A nasal or throat swab. This tests to see if  you have a bacterial infection.  · A chest X-ray. This is done if your healthcare provider thinks you have pneumonia.  · Tests to check for an underlying condition. Other tests may be done to check for things such as allergies, asthma, or COPD (chronic obstructive pulmonary disease). You may need to see a specialist for more lung function testing.  Treating a cough  The main treatment for bronchitis is easing symptoms. Avoiding smoke, allergens, and other things that trigger coughing can often help. If the infection is bacterial, you may be given antibiotics. During the illness, it's important to get plenty of sleep. To ease symptoms:  · Dont smoke. Also avoid secondhand smoke.  · Use a humidifier. Or try breathing in steam from a hot shower. This may help loosen mucus.  · Drink a lot of water and juice. They can soothe the throat and may help thin mucus.  · Sit up or use extra pillows when in bed. This helps to lessen coughing and congestion.  · Ask your provider about using medicine. Ask about using cough medicine, pain and fever medicine, or a decongestant.  Antibiotics  Most cases of bronchitis are caused by cold or flu viruses. They dont need antibiotics to treat them, even if your mucus is thick and green or yellow. Antibiotics dont treat viral illness and antibiotics have not been shown to have any benefit in cases of acute bronchitis. Taking antibiotics when they are not needed increases your risk of getting an infection later that is antibiotic-resistant. Antibiotics can also cause severe cases of diarrhea that require other antibiotics to treat.  It is important that you accept your healthcare provider's opinion to not use antibiotics. Your provider will prescribe antibiotics if the infection is caused by bacteria. If they are prescribed:  · Take all of the medicine. Take the medicine until it is used up, even if symptoms have improved. If you dont, the bronchitis may come back.  · Take the medicines  as directed. For instance, some medicines should be taken with food.  · Ask about side effects. Ask your provider or pharmacist what side effects are common, and what to do about them.  Follow-up care  You should see your provider again in 2 to 3 weeks. By this time, symptoms should have improved. An infection that lasts longer may mean you have a more serious problem.  Prevention  · Avoid tobacco smoke. If you smoke, quit. Stay away from smoky places. Ask friends and family not to smoke around you, or in your home or car.  · Get checked for allergies.  · Ask your provider about getting a yearly flu shot. Also ask about pneumococcal or pneumonia shots.  · Wash your hands often. This helps reduce the chance of picking up viruses that cause colds and flu.  Call your healthcare provider if:  · Symptoms worsen, or you have new symptoms  · Breathing problems worsen or  become severe  · Symptoms dont get better within a week, or within 3 days of taking antibiotics   Date Last Reviewed: 2/1/2017  © 7770-3300 [employer]. [address] [place] [address]. All rights reserved. This information is not intended as a substitute for professional medical care. Always follow your healthcare professional's instructions.        Viral Upper Respiratory Illness (Adult)   You have a viral upper respiratory illness (URI), which is another term for the common cold. This illness is contagious during the first few days. It is spread through the air by coughing and sneezing. It may also be spread by direct contact (touching the sick person and then touching your own eyes, nose, or mouth). Frequent handwashing will decrease risk of spread. Most viral illnesses go away within 7 to 10 days with rest and simple home remedies. Sometimes the illness may last for several weeks. Antibiotics will not kill a virus, and they are generally not prescribed for this condition.    Home care  · If symptoms are severe, rest at home  for the first 2 to 3 days. When you resume activity, don't let yourself get too tired.  · Avoid being exposed to cigarette smoke (yours or others).  · You may use acetaminophen or ibuprofen to control pain and fever, unless another medicine was prescribed. (Note: If you have chronic liver or kidney disease, have ever had a stomach ulcer or gastrointestinal bleeding, or are taking blood-thinning medicines, talk with your healthcare provider before using these medicines.) Aspirin should never be given to anyone under 18 years of age who is ill with a viral infection or fever. It may cause severe liver or brain damage.  · Your appetite may be poor, so a light diet is fine. Avoid dehydration by drinking 6 to 8 glasses of fluids per day (water, soft drinks, juices, tea, or soup). Extra fluids will help loosen secretions in the nose and lungs.  · Over-the-counter cold medicines will not shorten the length of time youre sick, but they may be helpful for the following symptoms: cough, sore throat, and nasal and sinus congestion. (Note: Do not use decongestants if you have high blood pressure.)  Follow-up care  Follow up with your healthcare provider, or as advised.  When to seek medical advice  Call your healthcare provider right away if any of these occur:  · Cough with lots of colored sputum (mucus)  · Severe headache; face, neck, or ear pain  · Difficulty swallowing due to throat pain  · Fever of 100.4°F (38°C)  Call 911, or get immediate medical care  Call emergency services right away if any of these occur:  · Chest pain, shortness of breath, wheezing, or difficulty breathing  · Coughing up blood  · Inability to swallow due to throat pain  Date Last Reviewed: 9/13/2015  © 2021-6274 [employer]. [address] [place] [address], [place] [address]. All rights reserved. This information is not intended as a substitute for professional medical care. Always follow your healthcare professional's instructions.

## 2018-07-10 NOTE — PROGRESS NOTES
"Subjective:       Patient ID: Joann Banuelos is a 32 y.o. female.    Vitals:  height is 5' 6" (1.676 m) and weight is 106.6 kg (235 lb). Her oral temperature is 98.1 °F (36.7 °C). Her blood pressure is 148/92 (abnormal) and her pulse is 89. Her respiration is 12 and oxygen saturation is 99%.     Chief Complaint: URI    Pt here for mildly productive cough (worst symptoms), congestion, malaise and chills x 5 days.  She has tried Flonase, Claritin and Mucinex DM to minimal effect.  She denies fever (though endorses chills), N/V/D or known sick contact, though she works in .  She states she is traveling to the [place] in 2 days, and would like something for her cough (requests steroid). I reviewed the Pros/Cons of steroid use and patient still requests.      URI    This is a new problem. The current episode started in the past 7 days. The problem has been gradually worsening. There has been no fever. Associated symptoms include congestion, coughing, headaches, rhinorrhea, sneezing and a sore throat. Pertinent negatives include no abdominal pain, chest pain, diarrhea, dysuria, ear pain, joint pain, joint swelling, nausea, neck pain, plugged ear sensation, rash, sinus pain, swollen glands, vomiting or wheezing. She has tried decongestant and antihistamine for the symptoms. The treatment provided mild relief.     Review of Systems   Constitution: Positive for malaise/fatigue. Negative for chills and fever.   HENT: Positive for congestion, hoarse voice, rhinorrhea, sneezing and sore throat. Negative for ear pain and sinus pain.    Eyes: Negative for discharge and redness.   Cardiovascular: Negative for chest pain, dyspnea on exertion and leg swelling.   Respiratory: Positive for cough and sputum production. Negative for shortness of breath and wheezing.    Skin: Negative for rash.   Musculoskeletal: Negative for joint pain, myalgias and neck pain.   Gastrointestinal: Negative for abdominal pain, " diarrhea, nausea and vomiting.   Genitourinary: Negative for dysuria.   Neurological: Positive for headaches.   All other systems reviewed and are negative.      Objective:      Physical Exam   Constitutional: She is oriented to person, place, and time. She appears well-developed and well-nourished. She is cooperative.  Non-toxic appearance. She does not appear ill. No distress.   HENT:   Head: Normocephalic and atraumatic.   Right Ear: Hearing, tympanic membrane, external ear and ear canal normal.   Left Ear: Hearing, tympanic membrane, external ear and ear canal normal.   Nose: Nose normal. No mucosal edema, rhinorrhea or nasal deformity. No epistaxis. Right sinus exhibits no maxillary sinus tenderness and no frontal sinus tenderness. Left sinus exhibits no maxillary sinus tenderness and no frontal sinus tenderness.   Mouth/Throat: Uvula is midline and mucous membranes are normal. No trismus in the jaw. Normal dentition. No uvula swelling. Posterior oropharyngeal erythema present.   Eyes: Conjunctivae and lids are normal. No scleral icterus.   Sclera clear bilat   Neck: Trachea normal, full passive range of motion without pain and phonation normal. Neck supple.   Cardiovascular: Normal rate, regular rhythm, normal heart sounds, intact distal pulses and normal pulses.    Pulmonary/Chest: Effort normal and breath sounds normal. No respiratory distress. She has no wheezes. She has no rhonchi. She has no rales.   Productive cough during exam   Abdominal: Soft. Normal appearance and bowel sounds are normal. She exhibits no distension. There is no tenderness.   Musculoskeletal: Normal range of motion. She exhibits no edema or deformity.   Neurological: She is alert and oriented to person, place, and time. She exhibits normal muscle tone. Coordination normal.   Skin: Skin is warm, dry and intact. She is not diaphoretic. No pallor.   Psychiatric: She has a normal mood and affect. Her speech is normal and behavior is  normal. Judgment and thought content normal. Cognition and memory are normal.   Nursing note and vitals reviewed.      Assessment:       1. Viral URI with cough        Plan:         Viral URI with cough  -     benzonatate (TESSALON PERLES) 100 MG capsule; Take 1 capsule (100 mg total) by mouth every 6 (six) hours as needed.  Dispense: 30 capsule; Refill: 0  -     betamethasone acetate-betamethasone sodium phosphate injection 6 mg; Inject 1 mL (6 mg total) into the muscle one time.  -     guaifenesin-codeine 100-10 mg/5 ml (CHERATUSSIN AC)  mg/5 mL syrup; Take 5 mLs by mouth 3 (three) times daily as needed.  Dispense: 118 mL; Refill: 0      Patient Instructions         You may continue to use Flonase and Claritin daily if they have been helpful.    What Is Acute Bronchitis?  Acute bronchitis is when the airways in your lungs (bronchial tubes) become red and swollen (inflamed). It is usually caused by a viral infection. But it can also occur because of a bacteria or allergen. Symptoms include a cough that produces yellow or greenish mucus and can last for days or sometimes weeks.  Inside healthy lungs    Air travels in and out of the lungs through the airways. The linings of these airways produce sticky mucus. This mucus traps particles that enter the lungs. Tiny structures called cilia then sweep the particles out of the airways.     Healthy airway: Airways are normally open. Air moves in and out easily.      Healthy cilia: Tiny, hairlike cilia sweep mucus and particles up and out of the airways.   Lungs with bronchitis  Bronchitis often occurs with a cold or the flu virus. The airways become inflamed (red and swollen). There is a deep hacking cough from the extra mucus. Other symptoms may include:  · Wheezing  · Chest discomfort  · Shortness of breath  · Mild fever  A second infection, this time due to bacteria, may then occur. And airways irritated by allergens or smoke are more likely to get infected.         Inflamed airway: Inflammation and extra mucus narrow the airway, causing shortness of breath.      Impaired cilia: Extra mucus impairs cilia, causing congestion and wheezing. Smoking makes the problem worse.   Making a diagnosis  A physical exam, health history, and certain tests help your healthcare provider make the diagnosis.  Health history  Your healthcare provider will ask you about your symptoms.  The exam  Your provider listens to your chest for signs of congestion. He or she may also check your ears, nose, and throat.  Possible tests  · A sputum test for bacteria. This requires a sample of mucus from your lungs.  · A nasal or throat swab. This tests to see if you have a bacterial infection.  · A chest X-ray. This is done if your healthcare provider thinks you have pneumonia.  · Tests to check for an underlying condition. Other tests may be done to check for things such as allergies, asthma, or COPD (chronic obstructive pulmonary disease). You may need to see a specialist for more lung function testing.  Treating a cough  The main treatment for bronchitis is easing symptoms. Avoiding smoke, allergens, and other things that trigger coughing can often help. If the infection is bacterial, you may be given antibiotics. During the illness, it's important to get plenty of sleep. To ease symptoms:  · Dont smoke. Also avoid secondhand smoke.  · Use a humidifier. Or try breathing in steam from a hot shower. This may help loosen mucus.  · Drink a lot of water and juice. They can soothe the throat and may help thin mucus.  · Sit up or use extra pillows when in bed. This helps to lessen coughing and congestion.  · Ask your provider about using medicine. Ask about using cough medicine, pain and fever medicine, or a decongestant.  Antibiotics  Most cases of bronchitis are caused by cold or flu viruses. They dont need antibiotics to treat them, even if your mucus is thick and green or yellow. Antibiotics dont treat  viral illness and antibiotics have not been shown to have any benefit in cases of acute bronchitis. Taking antibiotics when they are not needed increases your risk of getting an infection later that is antibiotic-resistant. Antibiotics can also cause severe cases of diarrhea that require other antibiotics to treat.  It is important that you accept your healthcare provider's opinion to not use antibiotics. Your provider will prescribe antibiotics if the infection is caused by bacteria. If they are prescribed:  · Take all of the medicine. Take the medicine until it is used up, even if symptoms have improved. If you dont, the bronchitis may come back.  · Take the medicines as directed. For instance, some medicines should be taken with food.  · Ask about side effects. Ask your provider or pharmacist what side effects are common, and what to do about them.  Follow-up care  You should see your provider again in 2 to 3 weeks. By this time, symptoms should have improved. An infection that lasts longer may mean you have a more serious problem.  Prevention  · Avoid tobacco smoke. If you smoke, quit. Stay away from smoky places. Ask friends and family not to smoke around you, or in your home or car.  · Get checked for allergies.  · Ask your provider about getting a yearly flu shot. Also ask about pneumococcal or pneumonia shots.  · Wash your hands often. This helps reduce the chance of picking up viruses that cause colds and flu.  Call your healthcare provider if:  · Symptoms worsen, or you have new symptoms  · Breathing problems worsen or  become severe  · Symptoms dont get better within a week, or within 3 days of taking antibiotics   Date Last Reviewed: 2/1/2017  © 4828-4617 [employer], [employer]. [address] [place] [address], [place] [address]. All rights reserved. This information is not intended as a substitute for professional medical care. Always follow your healthcare professional's instructions.        Viral Upper  Respiratory Illness (Adult)   You have a viral upper respiratory illness (URI), which is another term for the common cold. This illness is contagious during the first few days. It is spread through the air by coughing and sneezing. It may also be spread by direct contact (touching the sick person and then touching your own eyes, nose, or mouth). Frequent handwashing will decrease risk of spread. Most viral illnesses go away within 7 to 10 days with rest and simple home remedies. Sometimes the illness may last for several weeks. Antibiotics will not kill a virus, and they are generally not prescribed for this condition.    Home care  · If symptoms are severe, rest at home for the first 2 to 3 days. When you resume activity, don't let yourself get too tired.  · Avoid being exposed to cigarette smoke (yours or others).  · You may use acetaminophen or ibuprofen to control pain and fever, unless another medicine was prescribed. (Note: If you have chronic liver or kidney disease, have ever had a stomach ulcer or gastrointestinal bleeding, or are taking blood-thinning medicines, talk with your healthcare provider before using these medicines.) Aspirin should never be given to anyone under 18 years of age who is ill with a viral infection or fever. It may cause severe liver or brain damage.  · Your appetite may be poor, so a light diet is fine. Avoid dehydration by drinking 6 to 8 glasses of fluids per day (water, soft drinks, juices, tea, or soup). Extra fluids will help loosen secretions in the nose and lungs.  · Over-the-counter cold medicines will not shorten the length of time youre sick, but they may be helpful for the following symptoms: cough, sore throat, and nasal and sinus congestion. (Note: Do not use decongestants if you have high blood pressure.)  Follow-up care  Follow up with your healthcare provider, or as advised.  When to seek medical advice  Call your healthcare provider right away if any of these  occur:  · Cough with lots of colored sputum (mucus)  · Severe headache; face, neck, or ear pain  · Difficulty swallowing due to throat pain  · Fever of 100.4°F (38°C)  Call 911, or get immediate medical care  Call emergency services right away if any of these occur:  · Chest pain, shortness of breath, wheezing, or difficulty breathing  · Coughing up blood  · Inability to swallow due to throat pain  Date Last Reviewed: 9/13/2015 © 2000-2017 [employer]. [address] [place] [address]. All rights reserved. This information is not intended as a substitute for professional medical care. Always follow your healthcare professional's instructions.

## 2018-07-13 ENCOUNTER — TELEPHONE (OUTPATIENT)
Dept: URGENT CARE | Facility: CLINIC | Age: 33
End: 2018-07-13

## 2018-07-19 ENCOUNTER — OFFICE VISIT (OUTPATIENT)
Dept: PODIATRY | Facility: CLINIC | Age: 33
End: 2018-07-19
Payer: COMMERCIAL

## 2018-07-19 ENCOUNTER — HOSPITAL ENCOUNTER (OUTPATIENT)
Dept: RADIOLOGY | Facility: HOSPITAL | Age: 33
Discharge: HOME | End: 2018-07-19
Attending: PODIATRIST
Payer: COMMERCIAL

## 2018-07-19 VITALS
WEIGHT: 239 LBS | HEIGHT: 67 IN | HEART RATE: 70 BPM | DIASTOLIC BLOOD PRESSURE: 88 MMHG | SYSTOLIC BLOOD PRESSURE: 145 MMHG | BODY MASS INDEX: 37.51 KG/M2

## 2018-07-19 DIAGNOSIS — M24.573 EQUINUS CONTRACTURE OF ANKLE: ICD-10-CM

## 2018-07-19 DIAGNOSIS — G89.29 OTHER CHRONIC PAIN: ICD-10-CM

## 2018-07-19 DIAGNOSIS — M79.671 FOOT PAIN, RIGHT: ICD-10-CM

## 2018-07-19 DIAGNOSIS — M72.2 PLANTAR FASCIITIS: Primary | ICD-10-CM

## 2018-07-19 DIAGNOSIS — M72.2 PLANTAR FASCIITIS: ICD-10-CM

## 2018-07-19 PROCEDURE — 73630 X-RAY EXAM OF FOOT: CPT | Mod: 26,LT,, | Performed by: RADIOLOGY

## 2018-07-19 PROCEDURE — 99203 OFFICE O/P NEW LOW 30 MIN: CPT | Mod: 25,S$GLB,, | Performed by: PODIATRIST

## 2018-07-19 PROCEDURE — 3008F BODY MASS INDEX DOCD: CPT | Mod: CPTII,S$GLB,, | Performed by: PODIATRIST

## 2018-07-19 PROCEDURE — 99999 PR PBB SHADOW E&M-EST. PATIENT-LVL III: CPT | Mod: PBBFAC,,, | Performed by: PODIATRIST

## 2018-07-19 PROCEDURE — 73630 X-RAY EXAM OF FOOT: CPT | Mod: TC,LT

## 2018-07-19 PROCEDURE — 29540 STRAPPING ANKLE &/FOOT: CPT | Mod: LT,S$GLB,, | Performed by: PODIATRIST

## 2018-07-19 RX ORDER — DICLOFENAC SODIUM 10 MG/G
2 GEL TOPICAL 4 TIMES DAILY
Qty: 100 G | Refills: 2 | Status: SHIPPED | OUTPATIENT
Start: 2018-07-19 | End: 2018-07-19

## 2018-07-19 RX ORDER — DICLOFENAC SODIUM 10 MG/G
2 GEL TOPICAL 4 TIMES DAILY
Qty: 100 G | Refills: 2 | Status: SHIPPED | OUTPATIENT
Start: 2018-07-19 | End: 2019-12-04

## 2018-07-19 NOTE — PROGRESS NOTES
Subjective:      Patient ID: Joann Banuelos is a 32 y.o. female.    Chief Complaint: Foot Pain (dr conroy/05/01/2018)    Sharp deep pain bottom apex of heel left.  Rapid onset with stingray sting last year, modest improvement then lingering past 6 months, aggravated by increased weight bearing -particularly after rest, shoe gear, pressure.  No previous medical treatment.  OTC pain med not helping.   Denies repeat trauma, surgery left heel.    Review of Systems   Constitution: Negative for chills, diaphoresis, fever, malaise/fatigue and night sweats.   Cardiovascular: Negative for claudication, cyanosis, leg swelling and syncope.   Skin: Negative for color change, dry skin, nail changes, rash, suspicious lesions and unusual hair distribution.   Musculoskeletal: Negative for falls, joint pain, joint swelling, muscle cramps, muscle weakness and stiffness.   Gastrointestinal: Negative for constipation, diarrhea, nausea and vomiting.   Neurological: Negative for brief paralysis, disturbances in coordination, focal weakness, numbness, paresthesias, sensory change and tremors.           Objective:      Physical Exam   Constitutional: She is oriented to person, place, and time. She appears well-developed and well-nourished. She is cooperative. No distress.   Cardiovascular:   Pulses:       Popliteal pulses are 2+ on the right side, and 2+ on the left side.        Dorsalis pedis pulses are 2+ on the right side, and 2+ on the left side.        Posterior tibial pulses are 2+ on the right side, and 2+ on the left side.   Capillary refill 3 seconds all toes/distal feet, all toes/both feet warm to touch.      Negative lymphadenopathy bilateral popliteal fossa and tarsal tunnel.      Negavie lower extremity edema bilateral.     Musculoskeletal:        Right ankle: She exhibits normal range of motion, no swelling, no ecchymosis, no deformity, no laceration and normal pulse. Achilles tendon normal. Achilles tendon exhibits no  pain, no defect and normal Amos's test results.   Sharp deep pain to palpation inferior heel left at medial calcaneal tubercle and posterior inferior heel apex without ecchymosis, erythema, edema, or cardinal signs infection, and no signs of trauma.    Ankle dorsiflexion decreased at <10 degrees bilateral with moderate increase with knee flexion bilateral.    Otherwise, Normal angle, base, station of gait. All ten toes without clubbing, cyanosis, or signs of ischemia.  No pain to palpation bilateral lower extremities.  Range of motion, stability, muscle strength, and muscle tone normal bilateral feet and legs.     Lymphadenopathy:   Negative lymphadenopathy bilateral popliteal fossa and tarsal tunnel.    Negative lymphangitic streaking bilateral feet/ankles/legs.   Neurological: She is alert and oriented to person, place, and time. She has normal strength. She displays no atrophy and no tremor. No sensory deficit. She exhibits normal muscle tone. Gait normal.   Reflex Scores:       Patellar reflexes are 2+ on the right side and 2+ on the left side.       Achilles reflexes are 2+ on the right side and 2+ on the left side.  Negative tinel sign to percussion sural, superficial peroneal, deep peroneal, saphenous, and posterior tibial nerves right and left ankles and feet.     Skin: Skin is warm, dry and intact. Capillary refill takes 2 to 3 seconds. No abrasion, no bruising, no burn, no ecchymosis, no laceration, no lesion and no rash noted. She is not diaphoretic. No cyanosis or erythema. No pallor. Nails show no clubbing.     Skin is normal age and health appropriate color, turgor, texture, and temperature bilateral lower extremities without ulceration, hyperpigmentation, discoloration, masses nodules or cords palpated.  No ecchymosis, erythema, edema, or cardinal signs of infection bilateral lower extremities.     Psychiatric: She has a normal mood and affect.             Assessment:       Encounter Diagnoses    Name Primary?    Plantar fasciitis Yes    Foot pain, right     Equinus contracture of ankle     Other chronic pain          Plan:       Joann was seen today for foot pain.    Diagnoses and all orders for this visit:    Plantar fasciitis  -     X-Ray Foot Complete Left; Future    Foot pain, right  -     X-Ray Foot Complete Left; Future    Equinus contracture of ankle  -     X-Ray Foot Complete Left; Future    Other chronic pain  -     X-Ray Foot Complete Left; Future    Other orders  -     Discontinue: diclofenac sodium 1 % Gel; Apply 2 g topically 4 (four) times daily.  -     diclofenac sodium 1 % Gel; Apply 2 g topically 4 (four) times daily.      I counseled the patient on her conditions, their implications and medical management.        Patient will stretch the tendo achilles complex three times daily as demonstrated in the office.  Literature was dispensed illustrating proper stretching technique.    I applied a plantar rest strapping to the patient's left foot to offload symptomatic area, support the arch, and relieve pain.    Patient will obtain over the counter arch supports and wear them in shoes whenever possible.  Athletic shoes intended for walking or running are usually best.    The patient was advised that NSAID-type medications have two very important potential side effects: gastrointestinal irritation including hemorrhage and renal injuries. She was asked to take the medication with food and to stop if she experiences any GI upset. I asked her to call for vomiting, abdominal pain or black/bloody stools. The patient expresses understanding of these issues and questions were answered.    Discussed conservative treatment with shoes of adequate dimensions, material, and style to alleviate symptoms and delay or prevent surgical intervention.    Rx xrays, voltaren gel.          Follow-up in about 1 month (around 8/19/2018).

## 2018-07-23 RX ORDER — TOPIRAMATE 50 MG/1
50 TABLET, FILM COATED ORAL 2 TIMES DAILY
Qty: 180 TABLET | Refills: 0 | Status: SHIPPED | OUTPATIENT
Start: 2018-07-23 | End: 2018-08-20 | Stop reason: SDUPTHER

## 2018-08-20 ENCOUNTER — OFFICE VISIT (OUTPATIENT)
Dept: BARIATRICS | Facility: CLINIC | Age: 33
End: 2018-08-20
Payer: COMMERCIAL

## 2018-08-20 VITALS
WEIGHT: 235.25 LBS | HEART RATE: 82 BPM | BODY MASS INDEX: 36.92 KG/M2 | SYSTOLIC BLOOD PRESSURE: 110 MMHG | DIASTOLIC BLOOD PRESSURE: 72 MMHG | HEIGHT: 67 IN

## 2018-08-20 PROCEDURE — 99213 OFFICE O/P EST LOW 20 MIN: CPT | Mod: S$GLB,,, | Performed by: INTERNAL MEDICINE

## 2018-08-20 PROCEDURE — 99999 PR PBB SHADOW E&M-EST. PATIENT-LVL III: CPT | Mod: PBBFAC,,, | Performed by: INTERNAL MEDICINE

## 2018-08-20 PROCEDURE — 3008F BODY MASS INDEX DOCD: CPT | Mod: CPTII,S$GLB,, | Performed by: INTERNAL MEDICINE

## 2018-08-20 RX ORDER — TOPIRAMATE 50 MG/1
50 TABLET, FILM COATED ORAL 2 TIMES DAILY
Qty: 180 TABLET | Refills: 0 | Status: SHIPPED | OUTPATIENT
Start: 2018-08-20 | End: 2018-12-17 | Stop reason: SDUPTHER

## 2018-08-20 NOTE — PATIENT INSTRUCTIONS
Patient was informed that topiramate is used for migraine prevention and seizures. Weight loss is a common side effect that is well documented. She understands this. She was informed of the potential side effects such as serious and possibly fatal rash in which case the medication should be discontinued immediately. Paresthesias, forgetfulness, fatigue, kidney stones, GI symptoms, and changes in lab values such as electrolytes, blood counts and kidney function.      Limit alcohol to 4 drinks per week.         3 meals a day made up of the following:  Unlimited green vegetables, tomatoes, mushrooms, spaghetti squash, cauliflower, meat, poultry, seafood, eggs and hard cheeses.   Milk and plain yogurt  Dressings, seasonings, condiments, etc should have less than 2 g sugars.   Beans (1-1.5 cups) or nuts (1/4 cup) can have 1 x a day.   1-2 servings of citrus fruits, berries, pineapple or melon a day (1/2 cup)  Avoid fried foods    No grains, rice, pasta, potatoes, bread, corn, peas, oatmeal, grits, tortillas, crackers, chips    No soda, sweet tea, juices or lemonade    [email or web address] for recipes. Moderate carb intake      Lower Carb Comfort Food Dupes      Skinny Bell Pepper Johnnie Boats  Yields: 18 boats  Servin boats  Calories: 145  Total Fat: 9g  Saturated Fat: 4g  Trans Fat: 0g  Cholesterol: 50mg  Sodium: 293mg  Carbohydrates: 4g  Fiber: 1g  Sugars: 2g  Protein: 13g  SmartPoints: 4     Ingredients   1 pound lean ground turkey   1 teaspoons chili powder   1 teaspoon cumin   1/2 teaspoon black pepper   1/4 teaspoon kosher or sea salt   3/4 cup salsa, no sugar added   1 cup grated cheddar cheese, reduced-fat   3 bell peppers  Directions  Remove seeds, core, and membrane from bell peppers then slice each one into 6 verticle pieces where they dip down. Set sliced bell peppers aside.  Cook ground turkey over medium-high heat, breaking up as it cooks. Cook until the turkey loses it's pink color  and is cooked through. Drain off any fat.  Preheat oven to 375 degrees.  Combine cooked turkey with spices and salsa. Evenly distribute mixture into the bell pepper boats, top with cheese. Bake on a parchment lined baking sheet for 10 minutes or until cheese is melted and peppers are hot.  NOTE: If you prefer much softer bell peppers, add a few tablespoons water to the bottom of a large casserole dish, add filled nachos, cover tightly with foil and bake 15 minutes.  Remove from the oven and add additional toppings, If desired.  Optional ingredients: sliced Jalepeno peppers, diced avocado, fat-free Greek yogurt or sour cream, or sliced green onions.    [place] Chicken Spaghetti Squash  Yields: 4 servings  Calories: 457  Total Fat: 23g  Saturated Fat: 8g  Trans Fat: 0g  Cholesterol: 201mg  Sodium: 1146mg  Carbohydrates: 19g  Fiber: 4g  Sugar: 9g  Protein: 44g  SmartPoints: 13    Ingredients   1 large spaghetti squash   1 small onion, diced   2 medium carrots, diced   2 celery stalks, diced   1 pound cooked chicken, shredded   1/2 cup hot sauce   1/4 cup Homemade Ranch dressing   1/2 teaspoon garlic powder   salt and pepper to taste   1 cup low-fat shredded cheddar cheese   2 eggs   1/4 cup green onion, chopped  Directions  Preheat oven to 400 degrees F and spray a baking sheet with cooking spray.  Slice your spaghetti squash in half lengthwise and scoop out the seeds, then spray the cut side of the squash with a little olive oil cooking spray and place cut side down on the baking pan.  Roast spaghetti squash for 30-45 minutes or until it is tender.  While the squash is cooking, sauté the onion, celery, and carrots until softened and mostly cooked through.  In a large bowl, combine the sauteed vegetables, chicken, hot sauce, ranch dressing, garlic powder, salt, pepper, eggs, and cheese.  Once the squash is cooked through, allow to cool slightly then use a fork to scrape the insides into your  chicken mixture, making sure not to tear the skins.  Make sure that the spaghetti squash is well incorporated with the other ingredients, then divide the mixture between the spaghetti squash halves.  Bake at 350 degrees for 30-35 minutes, or until hot and bubbly.  Remove from the oven and garnish with the green onions and additional ranch or hot sauce if desired.    Lasagna Zucchini Boats  Servings: 8 zucchini boats  Ingredients   Report this ad    4 medium zucchini (2 1/2 lbs), sliced into halves through the length*   1 cup (8.6 oz) part-skim ricotta cheese   1 large egg   1 1/2 Tbsp chopped fresh parsley , plus more for garnish   1 1/4 cups (5 oz) shredded mozzarella cheese   1/2 cup (2 oz) finely shredded parmesan cheese   8 oz 93% lean ground beef or lean ground turkey   4 tsp olive oil , divided   Salt and freshly ground black pepper   1 3/4 cup roasted garlic marinara sauce (low sugar)   1 Tbsp chopped fresh basil , plus more for garnish  Instructions  1. Preheat oven to 400 degrees. Using a spoon, scoop centers from zucchini while leaving a 1/4-inch rim to create boats. Set aside.  2. In a mixing bowl stir together ricotta cheese, egg and 1 1/2 Tbsp of the parsley. Season lightly with salt and pepper. Stir in 1/2 cup of the mozzarella cheese and the parmesan cheese. Set aside.  3. Heat 2 tsp of the olive oil in a large non-stick skillet over medium-high heat. Crumble beef into pan, season with salt and pepper and cook, stirring occasionally and breaking up beef when stirring, until browned (there shouldn't be any excess fat but if you happened to use a fattier beef then just drain excess rendered fat). Stir in marinara sauce and 1 Tbsp of the basil, remove from heat.  4. To assemble boats, brush both sides of of zucchini lightly with remaining 2 tsp olive oil and place in two baking pans (I used a 13 by 9 and a 9 by 9). Divide cheese mixture among zucchini spooning about 2 1/2 Tbsp into each, then  spread cheese mixture into and even layer. Divide sauce among zucchini adding a few heaping spoonfuls to each. Cover baking dishes with foil and place in oven side by side and bake in preheated oven 30 minutes. Remove from oven, sprinkle tops with remaining 3/4 cup mozzarella, return to oven and bake until cheese has melted and zucchini is tender, about 5 minutes. Sprinkle tops with with fresh basil and parsley and serve warm.  5. *Look for zucchini that is wider and more uniform in width. The skinnier zucchini won't fit much filling.  6. Recipe source: [employer]ing Classy    Chicken Avocado Lime Soup  Prep Time: 15 minutes  Cook Time: 20 minutes  Ingredients   Report this ad    1 1/2 lbs boneless skinless chicken breasts*   1 Tbsp olive oil   1 cup chopped green onions (including whites, mince the whites)   2 jalapeños , seeded and minced (leave seeds if you want soup spicy, omit if you don't like heat)   2 cloves garlic , minced   4 (14.5 oz) cans low-sodium chicken broth   2 [place] tomatoes , seeded and diced   1/2 tsp ground cumin   Salt and freshly ground black pepper   1/3 cup chopped cilantro   3 Tbsp fresh lime juice   3 medium avocados , peeled, cored and diced   Tortilla chips , carolina tegan cheese, sour cream for serving (optional)    Instructions  1. In a large pot heat 1 Tbsp olive oil over medium heat. Once hot, add green onions and jalapenos and saute until tender, about 2 minutes, adding garlic during last 30 seconds of sauteing. Add chicken broth, tomatoes, cumin, season with salt and pepper to taste and add chicken breasts. Bring mixture to a boil over medium-high heat. Then reduce heat to medium, cover with lid and allow to cook, stirring occasionally, until chicken has cooked through 10 - 15 minutes (cook time will vary based on thickness of chicken breasts). Reduce burner to warm heat, remove chicken from pan and let rest on a cutting board 5 minutes, then shred chicken and return to  "soup. Stir in cilantro and lime juice. Add avocados to soup just before serving (if you don't plan on serving the soup right away, I would recommend adding the avocados to each bowl individually, about 1/2 an avocado per serving). Serve with tortilla chips, cheese and sour cream if desired.  2. *For thicker chicken breasts, cut breasts in half through the length (thickness) of the breasts, they will cook faster and more evenly.  3. Recipe Source: adapted slightly from [facility]        CAULIFLOWER "MAC" AND CHEESE    INGREDIENTS   1 small head cauliflower cut into small florets about 5-6 cups   1/2 small onion, diced   1 teaspoon olive oil   Kosher salt   Freshly ground black pepper   2 tablespoons parsley   1 teaspoon paprika   2 tablespoons butter   2 tablespoons flour   1 1/4 cups milk, (whole milk or coconut is best)   1/2 teaspoon granulated garlic   1 teaspoon mustard (optional)   1/2 teaspoon paprika   2 1/2 cups grated extra sharp cheddar cheese (reserve 1/2 cup)     PREPARATION  1. Preheat oven to 400°F. Place cauliflower florets on a baking sheet, mix with diced onion and oil, sprinkle with salt and pepper. Roast for 20-25 minutes tossing half way through until browned.  2. Warm the milk in the microwave, so it is just heated through (this helps prevent the cheese sauce from clumping). Heat a medium saucepan over medium med-high heat. Add 2 tablespoons butter and flour then whisk for 2 minutes (until a smooth naye is made), add milk and continue whisking until sauce thickens. Once smooth add salt and pepper and other spices. Then add the cheese reserving 1/2 cup. Mix with spatula and add cauliflower, stir gently. Now add the reserved cheese, mix just enough to evenly distribute.   4. Place mixture into a 8x8 inch greased casserole dish and cover with paprika and parsley. Bake in oven for 20 minutes until toasty and bubbly.      "

## 2018-08-20 NOTE — PROGRESS NOTES
"Subjective:       Patient ID: Joann Banuelos is a 32 y.o. female.    Chief Complaint: Follow-up    Pt here today for follow-up. Has lost 8 lbs. She has been on LCHF diet with topiramate, which we increased to 50 mg BID. She denies SE. Has found the medication really helps. She has cut the carbs and ETOH. . Her BP is totally normal. HAs been getting tx for a foot injury so she can increase her exercise.       Review of Systems   Constitutional: Negative for chills and fever.   Respiratory: Negative for shortness of breath.         + snores badly. No hx psg.    Cardiovascular: Positive for leg swelling. Negative for chest pain.   Gastrointestinal: Negative for constipation and diarrhea.        + GERD   Genitourinary: Negative for difficulty urinating and menstrual problem.   Musculoskeletal: Positive for arthralgias and back pain.        Knees   Neurological: Positive for dizziness. Negative for light-headedness.   Psychiatric/Behavioral: Negative for dysphoric mood. The patient is not nervous/anxious.        Objective:     /72   Pulse 82   Ht 5' 7.2" (1.707 m)   Wt 106.7 kg (235 lb 3.7 oz)   LMP 07/30/2018   BMI 36.62 kg/m²     Physical Exam   Constitutional: She is oriented to person, place, and time. She appears well-developed. No distress.   Obese     HENT:   Head: Normocephalic and atraumatic.   Eyes: EOM are normal. Pupils are equal, round, and reactive to light. No scleral icterus.   Neck: Normal range of motion. Neck supple.   Cardiovascular: Normal rate and normal heart sounds.   Pulmonary/Chest: Effort normal and breath sounds normal.   Musculoskeletal: Normal range of motion. She exhibits no edema.   Neurological: She is alert and oriented to person, place, and time. No cranial nerve deficit.   Skin: Skin is warm and dry. No erythema.   Psychiatric: She has a normal mood and affect. Her behavior is normal. Judgment normal.   Vitals reviewed.      Assessment:       1. Obesity, Class II, BMI " 35-39.9, with comorbidity        Plan:         Joann was seen today for follow-up.    Diagnoses and all orders for this visit:    Obesity, Class II, BMI 35-39.9, with comorbidity        Patient was informed that topiramate is used for migraine prevention and seizures. Weight loss is a common side effect that is well documented. She understands this. She was informed of the potential side effects such as serious and possibly fatal rash in which case the medication should be discontinued immediately. Paresthesias, forgetfulness, fatigue, kidney stones, GI symptoms, and changes in lab values such as electrolytes, blood counts and kidney function.      Limit alcohol to 4 drinks per week.         3 meals a day made up of the following:  Unlimited green vegetables, tomatoes, mushrooms, spaghetti squash, cauliflower, meat, poultry, seafood, eggs and hard cheeses.   Milk and plain yogurt  Dressings, seasonings, condiments, etc should have less than 2 g sugars.   Beans (1-1.5 cups) or nuts (1/4 cup) can have 1 x a day.   1-2 servings of citrus fruits, berries, pineapple or melon a day (1/2 cup)  Avoid fried foods    No grains, rice, pasta, potatoes, bread, corn, peas, oatmeal, grits, tortillas, crackers, chips    No soda, sweet tea, juices or lemonade    [email or web address]! Stuff for recipes. Moderate carb intake    Low carb comfort recipes given.

## 2018-08-27 ENCOUNTER — OFFICE VISIT (OUTPATIENT)
Dept: OBSTETRICS AND GYNECOLOGY | Facility: CLINIC | Age: 33
End: 2018-08-27
Payer: COMMERCIAL

## 2018-08-27 VITALS
SYSTOLIC BLOOD PRESSURE: 122 MMHG | BODY MASS INDEX: 37.37 KG/M2 | DIASTOLIC BLOOD PRESSURE: 70 MMHG | WEIGHT: 238.13 LBS | HEIGHT: 67 IN

## 2018-08-27 DIAGNOSIS — Z12.4 PAP SMEAR FOR CERVICAL CANCER SCREENING: ICD-10-CM

## 2018-08-27 DIAGNOSIS — Z01.419 ENCOUNTER FOR GYNECOLOGICAL EXAMINATION: Primary | ICD-10-CM

## 2018-08-27 PROCEDURE — 88142 CYTOPATH C/V THIN LAYER: CPT

## 2018-08-27 PROCEDURE — 99395 PREV VISIT EST AGE 18-39: CPT | Mod: S$GLB,,, | Performed by: OBSTETRICS & GYNECOLOGY

## 2018-08-27 PROCEDURE — 99999 PR PBB SHADOW E&M-EST. PATIENT-LVL III: CPT | Mod: PBBFAC,,, | Performed by: OBSTETRICS & GYNECOLOGY

## 2018-08-27 NOTE — PROGRESS NOTES
HPI: Pt is a 32 y.o.  female who presents for routine annual exam. She does not want STD screening. No complaints.       ROS:  GENERAL: Feeling well overall. Denies fever or chills.   SKIN: Denies rash or lesions.   HEAD: Denies head injury or headache.   NODES: Denies enlarged lymph nodes.   CHEST: Denies chest pain or shortness of breath.   CARDIOVASCULAR: Denies palpitations or left sided chest pain.   ABDOMEN: No abdominal pain, constipation, diarrhea, nausea, vomiting or rectal bleeding.   URINARY: No dysuria, hematuria, or burning on urination.  REPRODUCTIVE: See HPI.   BREASTS: Denies pain, lumps, or nipple discharge.   HEMATOLOGIC: No easy bruisability or excessive bleeding.   MUSCULOSKELETAL: Denies joint pain or swelling.   NEUROLOGIC: Denies syncope or weakness.   PSYCHIATRIC: Denies depression, anxiety or mood swings.    PE:   APPEARANCE: Well nourished, well developed, Black or  female in no acute distress.  NODES: no cervical, supraclavicular, or inguinal lymphadenopathy  BREASTS: Symmetrical, no skin changes or visible lesions. No palpable masses, nipple discharge or adenopathy bilaterally.  ABDOMEN: Soft. No tenderness or masses. No distention. No hernias palpated. No CVA tenderness.  VULVA: No lesions. Normal external female genitalia.  URETHRAL MEATUS: Normal size and location, no lesions, no prolapse.  URETHRA: No masses, tenderness, or prolapse.  VAGINA: Moist. No lesions or lacerations noted. No abnormal discharge present. No odor present.   CERVIX: No lesions or discharge. No cervical motion tenderness.   UTERUS: Normal size, regular shape, mobile, non-tender.  ADNEXA: No tenderness. No fullness or masses palpated in the adnexal regions.   ANUS PERINEUM: Normal.      Diagnosis:  1. Encounter for gynecological examination    2. Pap smear for cervical cancer screening        Plan:     Orders Placed This Encounter    Liquid-based pap smear, screening       Patient was counseled  today on the new ACS guidelines for cervical cytology screening as well as the current recommendations for breast cancer screening. She was counseled to follow up with her PCP for other routine health maintenance. Counseling session lasted approximately 10 minutes, and all her questions were answered.    Follow-up with me in 1 year for routine exam; pap in 3 years.

## 2018-09-23 ENCOUNTER — PATIENT MESSAGE (OUTPATIENT)
Dept: BARIATRICS | Facility: CLINIC | Age: 33
End: 2018-09-23

## 2018-09-23 ENCOUNTER — PATIENT MESSAGE (OUTPATIENT)
Dept: INTERNAL MEDICINE | Facility: CLINIC | Age: 33
End: 2018-09-23

## 2018-11-03 ENCOUNTER — OFFICE VISIT (OUTPATIENT)
Dept: URGENT CARE | Facility: CLINIC | Age: 33
End: 2018-11-03
Payer: COMMERCIAL

## 2018-11-03 VITALS
SYSTOLIC BLOOD PRESSURE: 136 MMHG | HEART RATE: 82 BPM | OXYGEN SATURATION: 98 % | TEMPERATURE: 98 F | DIASTOLIC BLOOD PRESSURE: 74 MMHG | RESPIRATION RATE: 18 BRPM | BODY MASS INDEX: 41.85 KG/M2 | WEIGHT: 266.63 LBS | HEIGHT: 67 IN

## 2018-11-03 DIAGNOSIS — J06.9 VIRAL URI WITH COUGH: Primary | ICD-10-CM

## 2018-11-03 PROCEDURE — 99214 OFFICE O/P EST MOD 30 MIN: CPT | Mod: S$GLB,,, | Performed by: NURSE PRACTITIONER

## 2018-11-03 PROCEDURE — 3008F BODY MASS INDEX DOCD: CPT | Mod: CPTII,S$GLB,, | Performed by: NURSE PRACTITIONER

## 2018-11-03 RX ORDER — FLUTICASONE PROPIONATE 50 MCG
1 SPRAY, SUSPENSION (ML) NASAL DAILY
Qty: 1 BOTTLE | Refills: 0 | Status: SHIPPED | OUTPATIENT
Start: 2018-11-03 | End: 2019-04-06 | Stop reason: ALTCHOICE

## 2018-11-03 RX ORDER — BENZONATATE 100 MG/1
100 CAPSULE ORAL EVERY 6 HOURS PRN
Qty: 30 CAPSULE | Refills: 0 | Status: SHIPPED | OUTPATIENT
Start: 2018-11-03 | End: 2018-12-06 | Stop reason: ALTCHOICE

## 2018-11-03 NOTE — LETTER
November 3, 2018      [facility] [address]  Phone: [phone]  Fax: [phone]       Patient: Joann Banuelos   YOB: 1985  Date of Visit: 11/03/2018    To Whom It May Concern:    Isabelle Banuelos  was at [facility] on 11/03/2018. She may return to work/school on 11/06/2018 with no restrictions. If you have any questions or concerns, or if I can be of further assistance, please do not hesitate to contact me.    Sincerely,    Tasha Cardenas RT

## 2018-11-03 NOTE — PROGRESS NOTES
"Subjective:       Patient ID: Joann Banuelos is a 32 y.o. female.    Vitals:  height is 5' 7" (1.702 m) and weight is 120.9 kg (266 lb 9.6 oz). Her temperature is 98.1 °F (36.7 °C). Her blood pressure is 136/74 and her pulse is 82. Her respiration is 18 and oxygen saturation is 98%.     Chief Complaint: URI    Patient lives local,she has URI symptoms for 2 days (congestion,cough,sore throat,and headaches).  Patient woke up this morning feeling worse and took tessalon (last night) for this problem.    Encountered sick child at child's school. No fever, chills. Some nausea, no diarrhea.      URI    This is a new problem. Episode onset: 2DAYS. There has been no fever. Associated symptoms include congestion, coughing, ear pain, headaches, a plugged ear sensation, sneezing, a sore throat and swollen glands. Pertinent negatives include no abdominal pain, chest pain, diarrhea, dysuria, joint pain, joint swelling, nausea, neck pain, rash, rhinorrhea, sinus pain, vomiting or wheezing.     Review of Systems   Constitution: Negative for chills, fever and malaise/fatigue.   HENT: Positive for congestion, ear pain, sneezing and sore throat. Negative for hoarse voice, rhinorrhea and sinus pain.    Eyes: Negative for discharge and redness.   Cardiovascular: Negative for chest pain, dyspnea on exertion and leg swelling.   Respiratory: Positive for cough. Negative for shortness of breath, sputum production and wheezing.    Skin: Negative for rash.   Musculoskeletal: Negative for joint pain, myalgias and neck pain.   Gastrointestinal: Negative for abdominal pain, diarrhea, nausea and vomiting.   Genitourinary: Negative for dysuria.   Neurological: Positive for headaches.   All other systems reviewed and are negative.      Objective:      Physical Exam   Constitutional: She is oriented to person, place, and time. She appears well-developed and well-nourished. She is cooperative.  Non-toxic appearance. She does not appear ill. No " distress.   HENT:   Head: Normocephalic and atraumatic.   Right Ear: Hearing, external ear and ear canal normal. Tympanic membrane is injected.   Left Ear: Hearing, tympanic membrane, external ear and ear canal normal.   Nose: Rhinorrhea present. No mucosal edema or nasal deformity. No epistaxis. Right sinus exhibits no maxillary sinus tenderness and no frontal sinus tenderness. Left sinus exhibits no maxillary sinus tenderness and no frontal sinus tenderness.   Mouth/Throat: Uvula is midline and mucous membranes are normal. No trismus in the jaw. Normal dentition. No uvula swelling. Posterior oropharyngeal erythema present.       Eyes: Conjunctivae and lids are normal. No scleral icterus.   Sclera clear bilat   Neck: Trachea normal, full passive range of motion without pain and phonation normal. Neck supple.   Cardiovascular: Normal rate, regular rhythm, normal heart sounds, intact distal pulses and normal pulses.   Pulmonary/Chest: Effort normal and breath sounds normal. No stridor. No respiratory distress. She has no decreased breath sounds. She has no wheezes.   Abdominal: Soft. Normal appearance and bowel sounds are normal. She exhibits no distension. There is no tenderness.   Musculoskeletal: Normal range of motion. She exhibits no edema or deformity.   Neurological: She is alert and oriented to person, place, and time. She exhibits normal muscle tone. Coordination normal.   Skin: Skin is warm, dry and intact. She is not diaphoretic. No pallor.   Psychiatric: She has a normal mood and affect. Her speech is normal and behavior is normal. Judgment and thought content normal. Cognition and memory are normal.   Nursing note and vitals reviewed.      Assessment:       1. Viral URI with cough        Plan:         Viral URI with cough  -     fluticasone (FLONASE) 50 mcg/actuation nasal spray; 1 spray (50 mcg total) by Each Nare route once daily.  Dispense: 1 Bottle; Refill: 0  -     benzonatate (TESSALON PERLES) 100  MG capsule; Take 1 capsule (100 mg total) by mouth every 6 (six) hours as needed for Cough.  Dispense: 30 capsule; Refill: 0      Patient Instructions   You may continue taking Tylenol (acetaminophen) or ibuprofen for pain and fever.      Please drink plenty of fluids.  Please get plenty of rest.  Please return here or go to the Emergency Department for any concerns or worsening of condition.  If you do not have Hypertension or any history of palpitations, it is ok to take over the counter Sudafed or Mucinex D or Allegra-D or Claritin-D or Zyrtec-D.  If you do take one of the above, it is ok to combine that with plain over the counter Mucinex or Allegra or Claritin or Zyrtec.  If for example you are taking Zyrtec -D, you can combine that with Mucinex, but not Mucinex-D.  If you are taking Mucinex-D, you can combine that with plain Allegra or Claritin or Zyrtec.   If you do have Hypertension or palpitations, it is safe to take Coricidin HBP for relief of sinus symptoms.  If not allergic, please take over the counter Tylenol (Acetaminophen) and/or Motrin (Ibuprofen) as directed for control of pain and/or fever.  Please follow up with your primary care doctor or specialist as needed.    If you  smoke, please stop smoking.    Viral Upper Respiratory Illness (Adult)   You have a viral upper respiratory illness (URI), which is another term for the common cold. This illness is contagious during the first few days. It is spread through the air by coughing and sneezing. It may also be spread by direct contact (touching the sick person and then touching your own eyes, nose, or mouth). Frequent handwashing will decrease risk of spread. Most viral illnesses go away within 7 to 10 days with rest and simple home remedies. Sometimes the illness may last for several weeks. Antibiotics will not kill a virus, and they are generally not prescribed for this condition.    Home care  · If symptoms are severe, rest at home for the first 2  to 3 days. When you resume activity, don't let yourself get too tired.  · Avoid being exposed to cigarette smoke (yours or others).  · You may use acetaminophen or ibuprofen to control pain and fever, unless another medicine was prescribed. (Note: If you have chronic liver or kidney disease, have ever had a stomach ulcer or gastrointestinal bleeding, or are taking blood-thinning medicines, talk with your healthcare provider before using these medicines.) Aspirin should never be given to anyone under 18 years of age who is ill with a viral infection or fever. It may cause severe liver or brain damage.  · Your appetite may be poor, so a light diet is fine. Avoid dehydration by drinking 6 to 8 glasses of fluids per day (water, soft drinks, juices, tea, or soup). Extra fluids will help loosen secretions in the nose and lungs.  · Over-the-counter cold medicines will not shorten the length of time youre sick, but they may be helpful for the following symptoms: cough, sore throat, and nasal and sinus congestion. (Note: Do not use decongestants if you have high blood pressure.)  Follow-up care  Follow up with your healthcare provider, or as advised.  When to seek medical advice  Call your healthcare provider right away if any of these occur:  · Cough with lots of colored sputum (mucus)  · Severe headache; face, neck, or ear pain  · Difficulty swallowing due to throat pain  · Fever of 100.4°F (38°C)  Call 911, or get immediate medical care  Call emergency services right away if any of these occur:  · Chest pain, shortness of breath, wheezing, or difficulty breathing  · Coughing up blood  · Inability to swallow due to throat pain  Date Last Reviewed: 9/13/2015  © 3437-6308 [email or web address]. [address] [place] [address], [place] [address]. All rights reserved. This information is not intended as a substitute for professional medical care. Always follow your healthcare professional's instructions.

## 2018-11-03 NOTE — PATIENT INSTRUCTIONS
You may continue taking Tylenol (acetaminophen) or ibuprofen for pain and fever.      Please drink plenty of fluids.  Please get plenty of rest.  Please return here or go to the Emergency Department for any concerns or worsening of condition.  If you do not have Hypertension or any history of palpitations, it is ok to take over the counter Sudafed or Mucinex D or Allegra-D or Claritin-D or Zyrtec-D.  If you do take one of the above, it is ok to combine that with plain over the counter Mucinex or Allegra or Claritin or Zyrtec.  If for example you are taking Zyrtec -D, you can combine that with Mucinex, but not Mucinex-D.  If you are taking Mucinex-D, you can combine that with plain Allegra or Claritin or Zyrtec.   If you do have Hypertension or palpitations, it is safe to take Coricidin HBP for relief of sinus symptoms.  If not allergic, please take over the counter Tylenol (Acetaminophen) and/or Motrin (Ibuprofen) as directed for control of pain and/or fever.  Please follow up with your primary care doctor or specialist as needed.    If you  smoke, please stop smoking.    Viral Upper Respiratory Illness (Adult)   You have a viral upper respiratory illness (URI), which is another term for the common cold. This illness is contagious during the first few days. It is spread through the air by coughing and sneezing. It may also be spread by direct contact (touching the sick person and then touching your own eyes, nose, or mouth). Frequent handwashing will decrease risk of spread. Most viral illnesses go away within 7 to 10 days with rest and simple home remedies. Sometimes the illness may last for several weeks. Antibiotics will not kill a virus, and they are generally not prescribed for this condition.    Home care  · If symptoms are severe, rest at home for the first 2 to 3 days. When you resume activity, don't let yourself get too tired.  · Avoid being exposed to cigarette smoke (yours or others).  · You may use  acetaminophen or ibuprofen to control pain and fever, unless another medicine was prescribed. (Note: If you have chronic liver or kidney disease, have ever had a stomach ulcer or gastrointestinal bleeding, or are taking blood-thinning medicines, talk with your healthcare provider before using these medicines.) Aspirin should never be given to anyone under 18 years of age who is ill with a viral infection or fever. It may cause severe liver or brain damage.  · Your appetite may be poor, so a light diet is fine. Avoid dehydration by drinking 6 to 8 glasses of fluids per day (water, soft drinks, juices, tea, or soup). Extra fluids will help loosen secretions in the nose and lungs.  · Over-the-counter cold medicines will not shorten the length of time youre sick, but they may be helpful for the following symptoms: cough, sore throat, and nasal and sinus congestion. (Note: Do not use decongestants if you have high blood pressure.)  Follow-up care  Follow up with your healthcare provider, or as advised.  When to seek medical advice  Call your healthcare provider right away if any of these occur:  · Cough with lots of colored sputum (mucus)  · Severe headache; face, neck, or ear pain  · Difficulty swallowing due to throat pain  · Fever of 100.4°F (38°C)  Call 911, or get immediate medical care  Call emergency services right away if any of these occur:  · Chest pain, shortness of breath, wheezing, or difficulty breathing  · Coughing up blood  · Inability to swallow due to throat pain  Date Last Reviewed: 9/13/2015  © 1925-5805 [employer]. [address] [place] [address], [place] [address]. All rights reserved. This information is not intended as a substitute for professional medical care. Always follow your healthcare professional's instructions.

## 2018-11-06 ENCOUNTER — TELEPHONE (OUTPATIENT)
Dept: URGENT CARE | Facility: CLINIC | Age: 33
End: 2018-11-06

## 2018-11-26 ENCOUNTER — PATIENT MESSAGE (OUTPATIENT)
Dept: OBSTETRICS AND GYNECOLOGY | Facility: CLINIC | Age: 33
End: 2018-11-26

## 2018-11-26 RX ORDER — NORELGESTROMIN AND ETHINYL ESTRADIOL 35; 150 UG/MG; UG/MG
PATCH TRANSDERMAL
Qty: 3 PATCH | Refills: 11 | Status: SHIPPED | OUTPATIENT
Start: 2018-11-26 | End: 2019-10-07 | Stop reason: SDUPTHER

## 2018-11-28 ENCOUNTER — TELEPHONE (OUTPATIENT)
Dept: OBSTETRICS AND GYNECOLOGY | Facility: CLINIC | Age: 33
End: 2018-11-28

## 2018-12-06 ENCOUNTER — OFFICE VISIT (OUTPATIENT)
Dept: INTERNAL MEDICINE | Facility: CLINIC | Age: 33
End: 2018-12-06
Payer: COMMERCIAL

## 2018-12-06 VITALS
WEIGHT: 233 LBS | DIASTOLIC BLOOD PRESSURE: 80 MMHG | HEART RATE: 60 BPM | HEIGHT: 67 IN | BODY MASS INDEX: 36.57 KG/M2 | RESPIRATION RATE: 16 BRPM | TEMPERATURE: 98 F | SYSTOLIC BLOOD PRESSURE: 118 MMHG

## 2018-12-06 DIAGNOSIS — L30.9 ECZEMA, UNSPECIFIED TYPE: Primary | ICD-10-CM

## 2018-12-06 PROCEDURE — 99999 PR PBB SHADOW E&M-EST. PATIENT-LVL III: CPT | Mod: PBBFAC,,, | Performed by: INTERNAL MEDICINE

## 2018-12-06 PROCEDURE — 3008F BODY MASS INDEX DOCD: CPT | Mod: CPTII,S$GLB,, | Performed by: INTERNAL MEDICINE

## 2018-12-06 PROCEDURE — 96372 THER/PROPH/DIAG INJ SC/IM: CPT | Mod: S$GLB,,, | Performed by: INTERNAL MEDICINE

## 2018-12-06 PROCEDURE — 99214 OFFICE O/P EST MOD 30 MIN: CPT | Mod: 25,S$GLB,, | Performed by: INTERNAL MEDICINE

## 2018-12-06 RX ORDER — TRIAMCINOLONE ACETONIDE 1 MG/G
CREAM TOPICAL 2 TIMES DAILY
Qty: 453 G | Refills: 0 | Status: SHIPPED | OUTPATIENT
Start: 2018-12-06 | End: 2019-12-04

## 2018-12-06 RX ORDER — METHYLPREDNISOLONE 4 MG/1
TABLET ORAL
Qty: 1 PACKAGE | Refills: 0 | Status: SHIPPED | OUTPATIENT
Start: 2018-12-06 | End: 2018-12-17

## 2018-12-06 RX ORDER — TRIAMCINOLONE ACETONIDE 40 MG/ML
40 INJECTION, SUSPENSION INTRA-ARTICULAR; INTRAMUSCULAR
Status: COMPLETED | OUTPATIENT
Start: 2018-12-06 | End: 2018-12-06

## 2018-12-06 RX ADMIN — TRIAMCINOLONE ACETONIDE 40 MG: 40 INJECTION, SUSPENSION INTRA-ARTICULAR; INTRAMUSCULAR at 10:12

## 2018-12-06 NOTE — PROGRESS NOTES
Subjective:       Patient ID: Joann Banuelos is a 33 y.o. female.    Chief Complaint: Rash (on arm and chest and back of shoulders.  just back from [facility], son had rash too.)    HPI   Pt here for evaluation of an itchy erythematous rash located on her arms, upper chest, top of back and shoulders which has been getting slowly worse x 4 days. No rash on her hands/feet. Some relief with Benadryl. Pt just returned from [place] from 11/28-12/2. Pt did not go into the woods/jungle. She stayed in the beach or pool. Pt denies any fevers/chills, open wounds, fatigue, new meds, detergents/soaps.    Review of Systems   Constitutional: Negative for activity change, appetite change, chills, diaphoresis, fatigue, fever and unexpected weight change.   HENT: Negative for postnasal drip, rhinorrhea, sinus pressure, sneezing, sore throat, trouble swallowing and voice change.    Respiratory: Negative for cough, shortness of breath and wheezing.    Cardiovascular: Negative for chest pain, palpitations and leg swelling.   Gastrointestinal: Negative for abdominal pain, blood in stool, constipation, diarrhea, nausea and vomiting.   Genitourinary: Negative for dysuria.   Musculoskeletal: Negative for arthralgias and myalgias.   Skin: Positive for rash. Negative for wound.   Allergic/Immunologic: Negative for environmental allergies and food allergies.   Hematological: Negative for adenopathy. Does not bruise/bleed easily.       Objective:      Physical Exam   Constitutional: She is oriented to person, place, and time. She appears well-developed and well-nourished. No distress.   HENT:   Head: Normocephalic and atraumatic.   Eyes: Conjunctivae and EOM are normal. Pupils are equal, round, and reactive to light. Right eye exhibits no discharge. Left eye exhibits no discharge. No scleral icterus.   Neck: Neck supple. No JVD present.   Cardiovascular: Normal rate, regular rhythm, normal heart sounds and intact distal pulses.    Pulmonary/Chest: Effort normal and breath sounds normal. No respiratory distress. She has no wheezes. She has no rales.   Musculoskeletal: She exhibits no edema.   Lymphadenopathy:     She has no cervical adenopathy.   Neurological: She is alert and oriented to person, place, and time.   Skin: Skin is warm and dry. Rash (mild rash on arms, neck, upper chest/back) noted. She is not diaphoretic. No pallor.       Assessment:       1. Eczema, unspecified type        Plan:    1. Rx Medrol pack, Triamcinolone cream BID PRN and may continue Benadryl PRN

## 2018-12-17 ENCOUNTER — OFFICE VISIT (OUTPATIENT)
Dept: BARIATRICS | Facility: CLINIC | Age: 33
End: 2018-12-17
Payer: COMMERCIAL

## 2018-12-17 VITALS
SYSTOLIC BLOOD PRESSURE: 120 MMHG | HEART RATE: 82 BPM | DIASTOLIC BLOOD PRESSURE: 80 MMHG | HEIGHT: 67 IN | WEIGHT: 228.81 LBS | BODY MASS INDEX: 35.91 KG/M2

## 2018-12-17 DIAGNOSIS — E66.9 OBESITY, CLASS II, BMI 35-39.9, NO COMORBIDITY: Primary | ICD-10-CM

## 2018-12-17 PROCEDURE — 99213 OFFICE O/P EST LOW 20 MIN: CPT | Mod: S$GLB,,, | Performed by: INTERNAL MEDICINE

## 2018-12-17 PROCEDURE — 99999 PR PBB SHADOW E&M-EST. PATIENT-LVL III: CPT | Mod: PBBFAC,,, | Performed by: INTERNAL MEDICINE

## 2018-12-17 PROCEDURE — 3008F BODY MASS INDEX DOCD: CPT | Mod: CPTII,S$GLB,, | Performed by: INTERNAL MEDICINE

## 2018-12-17 RX ORDER — TOPIRAMATE 50 MG/1
50 TABLET, FILM COATED ORAL 2 TIMES DAILY
Qty: 180 TABLET | Refills: 1 | Status: SHIPPED | OUTPATIENT
Start: 2018-12-17 | End: 2019-01-16

## 2018-12-17 NOTE — PROGRESS NOTES
"Subjective:       Patient ID: Joann Banuelos is a 33 y.o. female.    Chief Complaint: Follow-up    Pt here today for follow-up. Has lost 7 more lbs, net neg 15 lbs. She has been on LCHF diet with topiramate, which we increased to 50 mg BID. She denies SE. Has found the medication really helps. She has cut the carbs and ETOH. . Her BP is totally normal. HAs been getting tx for a foot injury so she can increase her exercise. Has been exercising more. Doid have some diet set back with vacation recently( did not take medication at that time). States she gained some weight back.       Review of Systems   Constitutional: Negative for chills and fever.   Respiratory: Negative for shortness of breath.         + snores badly. No hx psg.    Cardiovascular: Positive for leg swelling. Negative for chest pain.   Gastrointestinal: Negative for constipation and diarrhea.        + GERD   Genitourinary: Negative for difficulty urinating and menstrual problem.   Musculoskeletal: Positive for arthralgias and back pain.        Knees   Neurological: Positive for dizziness. Negative for light-headedness.   Psychiatric/Behavioral: Negative for dysphoric mood. The patient is not nervous/anxious.        Objective:     /80   Pulse 82   Ht 5' 7" (1.702 m)   Wt 103.8 kg (228 lb 13.4 oz)   BMI 35.84 kg/m²     Physical Exam   Constitutional: She is oriented to person, place, and time. She appears well-developed. No distress.   Obese     HENT:   Head: Normocephalic and atraumatic.   Eyes: EOM are normal. Pupils are equal, round, and reactive to light. No scleral icterus.   Neck: Normal range of motion. Neck supple.   Cardiovascular: Normal rate and normal heart sounds.   Pulmonary/Chest: Effort normal and breath sounds normal.   Musculoskeletal: Normal range of motion. She exhibits no edema.   Neurological: She is alert and oriented to person, place, and time. No cranial nerve deficit.   Skin: Skin is warm and dry. No erythema. "   Psychiatric: She has a normal mood and affect. Her behavior is normal. Judgment normal.   Vitals reviewed.      Assessment:       1. Obesity, Class II, BMI 35-39.9, no comorbidity        Plan:         Joann was seen today for follow-up.    Diagnoses and all orders for this visit:    Obesity, Class II, BMI 35-39.9, no comorbidity        Patient was informed that topiramate is used for migraine prevention and seizures. Weight loss is a common side effect that is well documented. She understands this. She was informed of the potential side effects such as serious and possibly fatal rash in which case the medication should be discontinued immediately. Paresthesias, forgetfulness, fatigue, kidney stones, GI symptoms, and changes in lab values such as electrolytes, blood counts and kidney function.      Limit alcohol to 4 drinks per week.         3 meals a day made up of the following:  Unlimited green vegetables, tomatoes, mushrooms, spaghetti squash, cauliflower, meat, poultry, seafood, eggs and hard cheeses.   Milk and plain yogurt  Dressings, seasonings, condiments, etc should have less than 2 g sugars.   Beans (1-1.5 cups) or nuts (1/4 cup) can have 1 x a day.   1-2 servings of citrus fruits, berries, pineapple or melon a day (1/2 cup)  Avoid fried foods    No grains, rice, pasta, potatoes, bread, corn, peas, oatmeal, grits, tortillas, crackers, chips    No soda, sweet tea, juices or lemonade    Www.[email or web address] for recipes. Moderate carb intake    Holiday tips given.

## 2018-12-17 NOTE — PATIENT INSTRUCTIONS
Patient was informed that topiramate is used for migraine prevention and seizures. Weight loss is a common side effect that is well documented. She understands this. She was informed of the potential side effects such as serious and possibly fatal rash in which case the medication should be discontinued immediately. Paresthesias, forgetfulness, fatigue, kidney stones, GI symptoms, and changes in lab values such as electrolytes, blood counts and kidney function.      Limit alcohol to 4 drinks per week.         3 meals a day made up of the following:  Unlimited green vegetables, tomatoes, mushrooms, spaghetti squash, cauliflower, meat, poultry, seafood, eggs and hard cheeses.   Milk and plain yogurt  Dressings, seasonings, condiments, etc should have less than 2 g sugars.   Beans (1-1.5 cups) or nuts (1/4 cup) can have 1 x a day.   1-2 servings of citrus fruits, berries, pineapple or melon a day (1/2 cup)  Avoid fried foods    No grains, rice, pasta, potatoes, bread, corn, peas, oatmeal, grits, tortillas, crackers, chips    No soda, sweet tea, juices or lemonade    [email or web address] for recipes. Moderate carb intake      Helpful tips to survive the holidays:  - Dont save yourself for the meal: Make sure you continue to eat high protein small meals every 3-4 hours to ensure to do not become over-hungry. Avoid temptation by not showing up to a holiday party or gathering hungry.   - Plan ahead. Bring a dish to a party if you know there may not be an appropriate option.   - Choose sugar-free drinks: Stick to water or other sugar-free beverages and make sure you are getting 6-8 cups of fluid each day (but not with meals!). Avoid alcohol, carbonated beverages, and high-fat/high-sugar beverages like hot chocolate and eggnog. Try sugar-free hot cocoa made with skim milk or water, or sugar-free spiced tea to add some holiday flair to your beverage (see sugar-free mulled cider recipe below)  - Take your time: Eat mindfully.  Dont graze on food throughout the day. Sit down to enjoy your small meals. Chew slowly and thoroughly. Cut your food into small pieces before eating.  - Listen to your body: Stop eating as soon as you feel full. Do not feel pressured to try certain (or all) foods or to eat all of the food on your plate. Listen to your hunger cues.   - REMEMBER: Make your holidays about spending time with family and friends instead of focusing gatherings around food.  - Keep up your exercise routine: Make sure you continue to get regular exercise throughout the holiday season. Encourage friends and family to be active by taking a walk together after a meal, to look at holiday lights, or to window-shop.    Good Holiday Meal Options:  - Roasted Turkey, NO skin. Use low sodium broth instead of gravy.   - Stuffed Bell Peppers made WITHOUT bread crumbs or Rice. Try using parmesan cheese instead  - Gumbo, NO rice. Try picking out mostly the meat/seafood and vegetables with little broth.   - Green Bean Casserole made with 98% fat free cream of mushroom soup and crushed almonds/pecans instead of fried onions  - Side salad w/ low fat dressing. Try a different kind of salad maybe use Kale or spinach.   - Roasted non-starchy vegetables like brussel sprouts, broccoli, green beans, zucchini, butternut squash, cauliflower  - Cauliflower Mash (steam or roast cauliflower, puree w/ low fat cheese, dash of fat free milk and 2-3 sprays of I cant believe its not butter spray. Add garlic powder and black pepper to season). Use Low sodium broth instead of gravy.   - Try Loaded Cauliflower Mash (Make cauliflower like above cauliflower mash. Top with diced turkey benitez, ¼ cup low fat cheddar cheese and bake @ 350* F for 5-10 minutes, until cheese is melted. Top with minced chives, black pepper and garlic to taste).   - Homemade cranberry sauce using Splenda or another alternative sweetener. Boil fresh cranberries and add splenda to taste. Boil until  cranberries break open and then simmer until it reaches the consistency you want (less time for more watery sauce and simmer for longer to create a thicker sauce).   - Deviled eggs: make using low fat jiang, mustard, DILL relish (not sweet relish).   - Vegetable tray w/ Greek yogurt Ranch Dip. Mix 1 packet of hidden [place] ranch dip mix w/ 16 oz low fat plain greek yogurt.     Good Holiday Dessert Options:  - High protein Pumpkin Cheesecake (see recipe below)  - Pumpkin Whip (see recipe below)  - Quest Apple Pie or Cinnamon Roll flavored protein bar (warm in microwave for 10-15 seconds)  - Eggnog Protein shake (see recipe below)  - Fresh fruit w/ low fat cheese  - Sugar-free Jello Parfaits. Layer Red and Green sugar-free jello in cups and top w/ 2 tbsp Sugar-free cool-whip    Pumpkin Cheesecake    8 ounces fat free cream cheese, softened   2 scoops of vanilla protein powder (<4 g sugar per serving)   ¼ tsp Fine salt   2 eggs, at room temperature   1/3 cup fat free sour cream  1/3 cup fat free half and half  1 15 -ounce can pure pumpkin puree   1 tablespoon pumpkin pie spice, plus more for dusting   Unsalted nuts, crushed  *Add splenda to taste    Directions     1. Preheat the oven to 300 degrees F. Line 18 muffin cups with paper liners. Sprinkle 1 tsp crushed unsalted nuts at the bottom of each of muffin cup liner.     2. In a large bowl, beat the cream cheese, vanilla protein powder and 1/4 teaspoon fine salt on medium-high speed until smooth and creamy, 2 to 3 minutes. Scrape down the sides, reduce speed to low and beat in the eggs, 1 at a time, until combined. Beat in 1/3 cup fat free sour cream and fat free half and half. Stir in the pumpkin puree and pumpkin pie spice until smooth. Divide evenly among cookie-lined paper cups, filling almost all the way to the top.     3. Bake until the filling is just set, 40 to 45 minutes. A sharp knife inserted into the center will come out moist, but clean. Cool completely in  tins on a wire rack. Refrigerate until cold, 4 hours, or overnight. Top with a dusting of pumpkin pie spice.    Recipe altered from the following recipe: http://www.[employer].com/recipes/food-network-reece/mini-pumpkin-cheesecakes-recipe.print.html?oc=linkback    Pumpkin Whip    Box of sugar-free vanilla pudding  Can of pumpkin puree  Pumpkin Pie spice (sprinkle to taste)  ½ cup of sugar-free Cool Whip    Directions:  Make sugar-free pudding according to package directions using fat free or 1% milk. Stir in pumpkin and cool whip. Add pumpkin pie spice to taste.     Egg Nog Protein shake    8 oz skim or 1% milk  1 scoop vanilla protein powder  1 tbsp sugar-free vanilla pudding mix  ½ tsp butter flavor extract  ½ tsp rum extract  ½ tsp cinnamon     Shake together or blend with ice and serve.     Sugar-Free Mulled Cider    3 oz diet cran-apple juice  6 oz water  1 packet sugar-free apple cider mix  ½ tsp apple pie spice  ½ tsp butter flavor extract  1 tbsp Sugar-free Syrup    Mix together. Warm if needed and serve w/ orange wedge and cinnamon stick.

## 2019-02-14 DIAGNOSIS — J06.9 VIRAL URI WITH COUGH: ICD-10-CM

## 2019-02-14 RX ORDER — FLUTICASONE PROPIONATE 50 MCG
1 SPRAY, SUSPENSION (ML) NASAL DAILY
Qty: 16 ML | Refills: 0 | OUTPATIENT
Start: 2019-02-14

## 2019-03-29 ENCOUNTER — PATIENT MESSAGE (OUTPATIENT)
Dept: BARIATRICS | Facility: CLINIC | Age: 34
End: 2019-03-29

## 2019-04-06 ENCOUNTER — OFFICE VISIT (OUTPATIENT)
Dept: URGENT CARE | Facility: CLINIC | Age: 34
End: 2019-04-06
Payer: COMMERCIAL

## 2019-04-06 VITALS
RESPIRATION RATE: 18 BRPM | SYSTOLIC BLOOD PRESSURE: 130 MMHG | HEIGHT: 65 IN | HEART RATE: 76 BPM | TEMPERATURE: 98 F | DIASTOLIC BLOOD PRESSURE: 84 MMHG | BODY MASS INDEX: 38.49 KG/M2 | WEIGHT: 231 LBS | OXYGEN SATURATION: 98 %

## 2019-04-06 DIAGNOSIS — J06.9 VIRAL URI WITH COUGH: Primary | ICD-10-CM

## 2019-04-06 PROCEDURE — 3079F DIAST BP 80-89 MM HG: CPT | Mod: CPTII,S$GLB,, | Performed by: NURSE PRACTITIONER

## 2019-04-06 PROCEDURE — 3008F PR BODY MASS INDEX (BMI) DOCUMENTED: ICD-10-PCS | Mod: CPTII,S$GLB,, | Performed by: NURSE PRACTITIONER

## 2019-04-06 PROCEDURE — 3075F PR MOST RECENT SYSTOLIC BLOOD PRESS GE 130-139MM HG: ICD-10-PCS | Mod: CPTII,S$GLB,, | Performed by: NURSE PRACTITIONER

## 2019-04-06 PROCEDURE — 99214 OFFICE O/P EST MOD 30 MIN: CPT | Mod: S$GLB,,, | Performed by: NURSE PRACTITIONER

## 2019-04-06 PROCEDURE — 99214 PR OFFICE/OUTPT VISIT, EST, LEVL IV, 30-39 MIN: ICD-10-PCS | Mod: S$GLB,,, | Performed by: NURSE PRACTITIONER

## 2019-04-06 PROCEDURE — 3079F PR MOST RECENT DIASTOLIC BLOOD PRESSURE 80-89 MM HG: ICD-10-PCS | Mod: CPTII,S$GLB,, | Performed by: NURSE PRACTITIONER

## 2019-04-06 PROCEDURE — 3075F SYST BP GE 130 - 139MM HG: CPT | Mod: CPTII,S$GLB,, | Performed by: NURSE PRACTITIONER

## 2019-04-06 PROCEDURE — 3008F BODY MASS INDEX DOCD: CPT | Mod: CPTII,S$GLB,, | Performed by: NURSE PRACTITIONER

## 2019-04-06 RX ORDER — PREDNISONE 20 MG/1
20 TABLET ORAL DAILY
Qty: 3 TABLET | Refills: 0 | Status: SHIPPED | OUTPATIENT
Start: 2019-04-06 | End: 2019-04-09

## 2019-04-06 RX ORDER — TOPIRAMATE 50 MG/1
50 TABLET, FILM COATED ORAL 2 TIMES DAILY
Refills: 1 | COMMUNITY
Start: 2019-03-23 | End: 2019-11-18

## 2019-04-06 RX ORDER — PROMETHAZINE HYDROCHLORIDE AND DEXTROMETHORPHAN HYDROBROMIDE 6.25; 15 MG/5ML; MG/5ML
5 SYRUP ORAL NIGHTLY PRN
Qty: 118 ML | Refills: 0 | Status: SHIPPED | OUTPATIENT
Start: 2019-04-06 | End: 2019-04-16

## 2019-04-06 RX ORDER — FLUTICASONE PROPIONATE 50 MCG
1 SPRAY, SUSPENSION (ML) NASAL 2 TIMES DAILY
Qty: 1 BOTTLE | Refills: 0 | Status: SHIPPED | OUTPATIENT
Start: 2019-04-06 | End: 2019-04-20

## 2019-04-06 RX ORDER — TOPIRAMATE SPINKLE 15 MG/1
15 CAPSULE ORAL 2 TIMES DAILY
COMMUNITY
End: 2019-04-06 | Stop reason: CLARIF

## 2019-04-06 NOTE — PATIENT INSTRUCTIONS
You may continue taking Tylenol (acetaminophen) or ibuprofen for pain and fever.      Viral Upper Respiratory Illness (Adult)   You have a viral upper respiratory illness (URI), which is another term for the common cold. This illness is contagious during the first few days. It is spread through the air by coughing and sneezing. It may also be spread by direct contact (touching the sick person and then touching your own eyes, nose, or mouth). Frequent handwashing will decrease risk of spread. Most viral illnesses go away within 7 to 10 days with rest and simple home remedies. Sometimes the illness may last for several weeks. Antibiotics will not kill a virus, and they are generally not prescribed for this condition.    Home care  · If symptoms are severe, rest at home for the first 2 to 3 days. When you resume activity, don't let yourself get too tired.  · Avoid being exposed to cigarette smoke (yours or others).  · You may use acetaminophen or ibuprofen to control pain and fever, unless another medicine was prescribed. (Note: If you have chronic liver or kidney disease, have ever had a stomach ulcer or gastrointestinal bleeding, or are taking blood-thinning medicines, talk with your healthcare provider before using these medicines.) Aspirin should never be given to anyone under 18 years of age who is ill with a viral infection or fever. It may cause severe liver or brain damage.  · Your appetite may be poor, so a light diet is fine. Avoid dehydration by drinking 6 to 8 glasses of fluids per day (water, soft drinks, juices, tea, or soup). Extra fluids will help loosen secretions in the nose and lungs.  · Over-the-counter cold medicines will not shorten the length of time youre sick, but they may be helpful for the following symptoms: cough, sore throat, and nasal and sinus congestion. (Note: Do not use decongestants if you have high blood pressure.)  Follow-up care  Follow up with your healthcare provider, or as  advised.  When to seek medical advice  Call your healthcare provider right away if any of these occur:  · Cough with lots of colored sputum (mucus)  · Severe headache; face, neck, or ear pain  · Difficulty swallowing due to throat pain  · Fever of 100.4°F (38°C)  Call 911, or get immediate medical care  Call emergency services right away if any of these occur:  · Chest pain, shortness of breath, wheezing, or difficulty breathing  · Coughing up blood  · Inability to swallow due to throat pain  Date Last Reviewed: 9/13/2015  © 5941-2204 [employer]. [address] [place] [address]. All rights reserved. This information is not intended as a substitute for professional medical care. Always follow your healthcare professional's instructions.

## 2019-04-06 NOTE — PROGRESS NOTES
"Subjective:       Patient ID: Joann Banuelos is a 33 y.o. female.    Vitals:  height is 5' 5" (1.651 m) and weight is 104.8 kg (231 lb). Her temperature is 98.2 °F (36.8 °C). Her blood pressure is 130/84 and her pulse is 76. Her respiration is 18 and oxygen saturation is 98%.     Chief Complaint: Sore Throat and Cough    Pt lives here and is leaving Tuesday for [place] for vacation and started two days ago dry cough worse at night and sore throat "postnasal drip", does not c/o fever, chills or body aches.    Patient endorses her son was recently ill with same, and is now better. Now pt and her  and daughter all ill with same.  No fever but some fatigue.  Mostly dry cough and congestion.  No vomiting but several bouts of diarrhea.    Sore Throat    This is a new problem. The current episode started yesterday. The problem has been unchanged. There has been no fever. The pain is at a severity of 8/10. Associated symptoms include coughing and trouble swallowing. Pertinent negatives include no congestion, diarrhea, headaches, shortness of breath or vomiting. She has tried acetaminophen for the symptoms. The treatment provided mild relief.   Cough   This is a new problem. The current episode started yesterday. The problem has been unchanged. The cough is non-productive. Associated symptoms include postnasal drip and a sore throat. Pertinent negatives include no chest pain, chills, fever, headaches, myalgias, rash or shortness of breath.       Constitution: Negative for chills, fatigue and fever.   HENT: Positive for postnasal drip, sinus pressure, sore throat and trouble swallowing. Negative for congestion and sinus pain.    Neck: Negative for painful lymph nodes.   Cardiovascular: Negative for chest pain and leg swelling.   Eyes: Negative for double vision and blurred vision.   Respiratory: Positive for cough. Negative for shortness of breath.    Gastrointestinal: Negative for nausea, vomiting and diarrhea. "   Genitourinary: Negative for dysuria, frequency, urgency and history of kidney stones.   Musculoskeletal: Negative for joint pain, joint swelling, muscle cramps and muscle ache.   Skin: Negative for color change, pale, rash and bruising.   Allergic/Immunologic: Negative for seasonal allergies.   Neurological: Negative for dizziness, history of vertigo, light-headedness, passing out and headaches.   Hematologic/Lymphatic: Negative for swollen lymph nodes.   Psychiatric/Behavioral: Negative for nervous/anxious, sleep disturbance and depression. The patient is not nervous/anxious.        Objective:      Physical Exam   Constitutional: She is oriented to person, place, and time. She appears well-developed and well-nourished. She is cooperative.  Non-toxic appearance. She does not appear ill. No distress.   HENT:   Head: Normocephalic and atraumatic.   Right Ear: Hearing, tympanic membrane, external ear and ear canal normal.   Left Ear: Hearing, tympanic membrane, external ear and ear canal normal.   Nose: Rhinorrhea present. No mucosal edema or nasal deformity. No epistaxis. Right sinus exhibits no maxillary sinus tenderness and no frontal sinus tenderness. Left sinus exhibits no maxillary sinus tenderness and no frontal sinus tenderness.   Mouth/Throat: Uvula is midline and mucous membranes are normal. No trismus in the jaw. Normal dentition. No uvula swelling. Posterior oropharyngeal edema and posterior oropharyngeal erythema present. Tonsils are 1+ on the right. Tonsils are 1+ on the left. No tonsillar exudate.   Eyes: Conjunctivae and lids are normal. No scleral icterus.   Sclera clear bilat   Neck: Trachea normal, full passive range of motion without pain and phonation normal. Neck supple.   Cardiovascular: Normal rate, regular rhythm, normal heart sounds, intact distal pulses and normal pulses.   Pulmonary/Chest: Effort normal and breath sounds normal. No stridor. No respiratory distress. She has no decreased  breath sounds. She has no wheezes.   Abdominal: Soft. Normal appearance and bowel sounds are normal. She exhibits no distension. There is no tenderness.   Musculoskeletal: Normal range of motion. She exhibits no edema or deformity.   Lymphadenopathy:     She has cervical adenopathy.   Superficial cervical nodes soft, mobile and < 1 cm     Neurological: She is alert and oriented to person, place, and time. She exhibits normal muscle tone. Coordination normal.   Skin: Skin is warm, dry and intact. She is not diaphoretic. No pallor.   Psychiatric: She has a normal mood and affect. Her speech is normal and behavior is normal. Judgment and thought content normal. Cognition and memory are normal.   Nursing note and vitals reviewed.      Assessment:       1. Viral URI with cough        Plan:         Viral URI with cough  -     fluticasone (FLONASE) 50 mcg/actuation nasal spray; 1 spray (50 mcg total) by Each Nare route 2 (two) times daily. for 14 days  Dispense: 1 Bottle; Refill: 0  -     predniSONE (DELTASONE) 20 MG tablet; Take 1 tablet (20 mg total) by mouth once daily. for 3 days  Dispense: 3 tablet; Refill: 0  -     promethazine-dextromethorphan (PROMETHAZINE-DM) 6.25-15 mg/5 mL Syrp; Take 5 mLs by mouth nightly as needed.  Dispense: 118 mL; Refill: 0      Patient Instructions     You may continue taking Tylenol (acetaminophen) or ibuprofen for pain and fever.      Viral Upper Respiratory Illness (Adult)   You have a viral upper respiratory illness (URI), which is another term for the common cold. This illness is contagious during the first few days. It is spread through the air by coughing and sneezing. It may also be spread by direct contact (touching the sick person and then touching your own eyes, nose, or mouth). Frequent handwashing will decrease risk of spread. Most viral illnesses go away within 7 to 10 days with rest and simple home remedies. Sometimes the illness may last for several weeks. Antibiotics will  not kill a virus, and they are generally not prescribed for this condition.    Home care  · If symptoms are severe, rest at home for the first 2 to 3 days. When you resume activity, don't let yourself get too tired.  · Avoid being exposed to cigarette smoke (yours or others).  · You may use acetaminophen or ibuprofen to control pain and fever, unless another medicine was prescribed. (Note: If you have chronic liver or kidney disease, have ever had a stomach ulcer or gastrointestinal bleeding, or are taking blood-thinning medicines, talk with your healthcare provider before using these medicines.) Aspirin should never be given to anyone under 18 years of age who is ill with a viral infection or fever. It may cause severe liver or brain damage.  · Your appetite may be poor, so a light diet is fine. Avoid dehydration by drinking 6 to 8 glasses of fluids per day (water, soft drinks, juices, tea, or soup). Extra fluids will help loosen secretions in the nose and lungs.  · Over-the-counter cold medicines will not shorten the length of time youre sick, but they may be helpful for the following symptoms: cough, sore throat, and nasal and sinus congestion. (Note: Do not use decongestants if you have high blood pressure.)  Follow-up care  Follow up with your healthcare provider, or as advised.  When to seek medical advice  Call your healthcare provider right away if any of these occur:  · Cough with lots of colored sputum (mucus)  · Severe headache; face, neck, or ear pain  · Difficulty swallowing due to throat pain  · Fever of 100.4°F (38°C)  Call 911, or get immediate medical care  Call emergency services right away if any of these occur:  · Chest pain, shortness of breath, wheezing, or difficulty breathing  · Coughing up blood  · Inability to swallow due to throat pain  Date Last Reviewed: 9/13/2015  © 2667-6390 [employer]. [address] [place] [address], [place] [address]. All rights reserved. This information is  not intended as a substitute for professional medical care. Always follow your healthcare professional's instructions.

## 2019-04-10 ENCOUNTER — TELEPHONE (OUTPATIENT)
Dept: URGENT CARE | Facility: CLINIC | Age: 34
End: 2019-04-10

## 2019-04-10 NOTE — TELEPHONE ENCOUNTER
Called to follow up with patient. Left a VM for her to give us a call if she has any questions or concerns.

## 2019-10-07 RX ORDER — NORELGESTROMIN AND ETHINYL ESTRADIOL 150; 35 UG/D; UG/D
PATCH TRANSDERMAL
Qty: 9 PATCH | Refills: 3 | Status: SHIPPED | OUTPATIENT
Start: 2019-10-07 | End: 2019-11-18 | Stop reason: SDUPTHER

## 2019-11-18 ENCOUNTER — OFFICE VISIT (OUTPATIENT)
Dept: OBSTETRICS AND GYNECOLOGY | Facility: CLINIC | Age: 34
End: 2019-11-18
Payer: COMMERCIAL

## 2019-11-18 VITALS
HEIGHT: 65 IN | BODY MASS INDEX: 41.62 KG/M2 | DIASTOLIC BLOOD PRESSURE: 86 MMHG | SYSTOLIC BLOOD PRESSURE: 128 MMHG | WEIGHT: 249.81 LBS

## 2019-11-18 DIAGNOSIS — Z30.9 ENCOUNTER FOR CONTRACEPTIVE MANAGEMENT, UNSPECIFIED TYPE: ICD-10-CM

## 2019-11-18 DIAGNOSIS — Z30.09 BIRTH CONTROL COUNSELING: ICD-10-CM

## 2019-11-18 DIAGNOSIS — Z01.419 ENCOUNTER FOR ANNUAL ROUTINE GYNECOLOGICAL EXAMINATION: Primary | ICD-10-CM

## 2019-11-18 LAB
B-HCG UR QL: NEGATIVE
CTP QC/QA: YES

## 2019-11-18 PROCEDURE — 99395 PR PREVENTIVE VISIT,EST,18-39: ICD-10-PCS | Mod: S$GLB,,, | Performed by: NURSE PRACTITIONER

## 2019-11-18 PROCEDURE — 99999 PR PBB SHADOW E&M-EST. PATIENT-LVL III: ICD-10-PCS | Mod: PBBFAC,,, | Performed by: NURSE PRACTITIONER

## 2019-11-18 PROCEDURE — 81025 POCT URINE PREGNANCY: ICD-10-PCS | Mod: S$GLB,,, | Performed by: NURSE PRACTITIONER

## 2019-11-18 PROCEDURE — 99395 PREV VISIT EST AGE 18-39: CPT | Mod: S$GLB,,, | Performed by: NURSE PRACTITIONER

## 2019-11-18 PROCEDURE — 99999 PR PBB SHADOW E&M-EST. PATIENT-LVL III: CPT | Mod: PBBFAC,,, | Performed by: NURSE PRACTITIONER

## 2019-11-18 PROCEDURE — 81025 URINE PREGNANCY TEST: CPT | Mod: S$GLB,,, | Performed by: NURSE PRACTITIONER

## 2019-11-18 RX ORDER — NORELGESTROMIN AND ETHINYL ESTRADIOL 35; 150 UG/MG; UG/MG
PATCH TRANSDERMAL
Qty: 9 PATCH | Refills: 3 | Status: SHIPPED | OUTPATIENT
Start: 2019-11-18 | End: 2021-02-15

## 2019-11-18 NOTE — PROGRESS NOTES
CC: Annual  HPI: Pt is a 33 y.o.  female who presents for routine annual exam. She uses the patch for contraception. She does not want STD screening.  Denies any GYN complaints.  Pt denies history of HTN, VTE, or migraine with aura. The patient participates in regular exercise: yes-just started to exercise again.  The patient does not smoke.  The patient wears seatbelts.   Pt denies any domestic violence. Pt is considering trying for pregnancy again in 2020.      FH:  Breast cancer: none  Colon cancer: none  Ovarian cancer: none  Endometrial cancer: none    ROS:  GENERAL: Feeling well overall. Denies fever or chills.   SKIN: Denies rash or lesions.   HEAD: Denies head injury or headache.   NODES: Denies enlarged lymph nodes.   CHEST: Denies chest pain or shortness of breath.   CARDIOVASCULAR: Denies palpitations or left sided chest pain.   ABDOMEN: No abdominal pain, constipation, diarrhea, nausea, vomiting or rectal bleeding.   URINARY: No dysuria, hematuria, or burning on urination.  REPRODUCTIVE: See HPI.   BREASTS: Denies pain, lumps, or nipple discharge.   HEMATOLOGIC: No easy bruisability or excessive bleeding.   MUSCULOSKELETAL: Denies joint pain or swelling.   NEUROLOGIC: Denies syncope or weakness.   PSYCHIATRIC: Denies depression, anxiety or mood swings.    PE:   APPEARANCE: Well nourished, well developed, Black or  female in no acute distress.  NODES: no cervical, supraclavicular, or inguinal lymphadenopathy  BREASTS: Symmetrical, no skin changes or visible lesions. No palpable masses, nipple discharge or adenopathy bilaterally.  ABDOMEN: Soft. No tenderness or masses. No distention. No hernias palpated. No CVA tenderness.  VULVA: No lesions. Normal external female genitalia.  URETHRAL MEATUS: Normal size and location, no lesions, no prolapse.  URETHRA: No masses, tenderness, or prolapse.  VAGINA: Moist. No lesions or lacerations noted. No abnormal discharge present. No odor present.    CERVIX: No lesions or discharge. No cervical motion tenderness.   UTERUS: Normal size, regular shape, mobile, non-tender.  ADNEXA: No tenderness. No fullness or masses palpated in the adnexal regions.   ANUS PERINEUM: Normal.      Diagnosis:  1. Encounter for annual routine gynecological examination    2. Birth control counseling    3. Encounter for contraceptive management, unspecified type        Plan:     Orders Placed This Encounter    POCT urine pregnancy    norelgestromin-ethinyl estradiol (XULANE) 150-35 mcg/24 hr       Patient was counseled today on the new ACS guidelines for cervical cytology screening as well as the current recommendations for breast cancer screening. Last pap done 8/27/18-WNL. Will be due again 2/2021. Xulane patch refilled today. She was counseled to follow up with her PCP for other routine health maintenance. Counseling session lasted approximately 10 minutes, and all her questions were answered.    Follow-up with me in 1 year for routine exam      LISA Amaral

## 2019-12-04 ENCOUNTER — OFFICE VISIT (OUTPATIENT)
Dept: DERMATOLOGY | Facility: CLINIC | Age: 34
End: 2019-12-04
Payer: COMMERCIAL

## 2019-12-04 VITALS — WEIGHT: 249 LBS | BODY MASS INDEX: 41.48 KG/M2 | HEIGHT: 65 IN

## 2019-12-04 DIAGNOSIS — L28.0 LICHEN SIMPLEX CHRONICUS: Primary | ICD-10-CM

## 2019-12-04 PROCEDURE — 99999 PR PBB SHADOW E&M-EST. PATIENT-LVL III: CPT | Mod: PBBFAC,,, | Performed by: DERMATOLOGY

## 2019-12-04 PROCEDURE — 3008F BODY MASS INDEX DOCD: CPT | Mod: CPTII,S$GLB,, | Performed by: DERMATOLOGY

## 2019-12-04 PROCEDURE — 99202 PR OFFICE/OUTPT VISIT, NEW, LEVL II, 15-29 MIN: ICD-10-PCS | Mod: S$GLB,,, | Performed by: DERMATOLOGY

## 2019-12-04 PROCEDURE — 99202 OFFICE O/P NEW SF 15 MIN: CPT | Mod: S$GLB,,, | Performed by: DERMATOLOGY

## 2019-12-04 PROCEDURE — 3008F PR BODY MASS INDEX (BMI) DOCUMENTED: ICD-10-PCS | Mod: CPTII,S$GLB,, | Performed by: DERMATOLOGY

## 2019-12-04 PROCEDURE — 99999 PR PBB SHADOW E&M-EST. PATIENT-LVL III: ICD-10-PCS | Mod: PBBFAC,,, | Performed by: DERMATOLOGY

## 2019-12-04 RX ORDER — MOMETASONE FUROATE 1 MG/G
OINTMENT TOPICAL
Qty: 45 G | Refills: 1 | Status: SHIPPED | OUTPATIENT
Start: 2019-12-04 | End: 2021-02-15

## 2019-12-04 NOTE — PROGRESS NOTES
Subjective:       Patient ID:  Joann Banuelos is a 34 y.o. female who presents for   Chief Complaint   Patient presents with    Rash     L foot, 1 year, very itchy and peeling, tx with Nystatin with some improvement     Initial visit  Previously seen by Dr. Medina 6-11-13    Here today for rash on L big toe for over 1 year.  Constantly itchy, nonhealing, and peeling. Previously tx with 1 week course Lotrisone 2 months ago, no improvement.  Currently tx with Nystatin, daily for past few months with some improvement with itch.   Receives pedicures at nail salon for over 1 year, switched salons since onset of rash.    Phx  Childhood eczema, tinea versicolor, flares during summer manages with topical steroid  Fhx severe eczema sister  Pt denies Phx NMSC  Pt denies Fhx MM      Review of Systems   Constitutional: Negative for fever, chills and fatigue.   Skin: Positive for activity-related sunscreen use. Negative for daily sunscreen use and wears hat.   Hematologic/Lymphatic: Does not bruise/bleed easily.        Objective:    Physical Exam   Constitutional: She appears well-developed and well-nourished. No distress.   Neurological: She is alert and oriented to person, place, and time. She is not disoriented.   Psychiatric: She has a normal mood and affect.   Skin:   Areas Examined (abnormalities noted in diagram):   Head / Face Inspection Performed  RLE Inspected  LLE Inspection Performed  Nails and Digits Inspection Performed                 Diagram Legend     Erythematous scaling macule/papule c/w actinic keratosis       Vascular papule c/w angioma      Pigmented verrucoid papule/plaque c/w seborrheic keratosis      Yellow umbilicated papule c/w sebaceous hyperplasia      Irregularly shaped tan macule c/w lentigo     1-2 mm smooth white papules consistent with Milia      Movable subcutaneous cyst with punctum c/w epidermal inclusion cyst      Subcutaneous movable cyst c/w pilar cyst      Firm pink to brown papule  c/w dermatofibroma      Pedunculated fleshy papule(s) c/w skin tag(s)      Evenly pigmented macule c/w junctional nevus     Mildly variegated pigmented, slightly irregular-bordered macule c/w mildly atypical nevus      Flesh colored to evenly pigmented papule c/w intradermal nevus       Pink pearly papule/plaque c/w basal cell carcinoma      Erythematous hyperkeratotic cursted plaque c/w SCC      Surgical scar with no sign of skin cancer recurrence      Open and closed comedones      Inflammatory papules and pustules      Verrucoid papule consistent consistent with wart     Erythematous eczematous patches and plaques     Dystrophic onycholytic nail with subungual debris c/w onychomycosis     Umbilicated papule    Erythematous-base heme-crusted tan verrucoid plaque consistent with inflamed seborrheic keratosis     Erythematous Silvery Scaling Plaque c/w Psoriasis     See annotation      Assessment / Plan:        Lichen simplex chronicus  -     mometasone (ELOCON) 0.1 % ointment; aaa bid only prn , avoid chronic use  Dispense: 45 g; Refill: 1    primarily secondary changes today  No obvious tinea/dermatophyte  Trial high potency topical steroids  Discussed with patient the importance of not manipulating skin lesions. Trauma often exacerbates condition. Trimming nails is recommended to avoid puncturing skin.     Call if flares, consider biopsy at that time         Follow up if symptoms worsen or fail to improve.

## 2019-12-29 ENCOUNTER — OFFICE VISIT (OUTPATIENT)
Dept: URGENT CARE | Facility: CLINIC | Age: 34
End: 2019-12-29
Payer: COMMERCIAL

## 2019-12-29 VITALS
WEIGHT: 246 LBS | HEIGHT: 67 IN | OXYGEN SATURATION: 100 % | HEART RATE: 89 BPM | DIASTOLIC BLOOD PRESSURE: 71 MMHG | SYSTOLIC BLOOD PRESSURE: 127 MMHG | BODY MASS INDEX: 38.61 KG/M2 | RESPIRATION RATE: 18 BRPM | TEMPERATURE: 99 F

## 2019-12-29 DIAGNOSIS — H65.06 RECURRENT ACUTE SEROUS OTITIS MEDIA OF BOTH EARS: Primary | ICD-10-CM

## 2019-12-29 DIAGNOSIS — R09.81 SINUS CONGESTION: ICD-10-CM

## 2019-12-29 DIAGNOSIS — R05.9 COUGH: ICD-10-CM

## 2019-12-29 PROCEDURE — 99214 PR OFFICE/OUTPT VISIT, EST, LEVL IV, 30-39 MIN: ICD-10-PCS | Mod: S$GLB,,, | Performed by: NURSE PRACTITIONER

## 2019-12-29 PROCEDURE — 99214 OFFICE O/P EST MOD 30 MIN: CPT | Mod: S$GLB,,, | Performed by: NURSE PRACTITIONER

## 2019-12-29 RX ORDER — PROMETHAZINE HYDROCHLORIDE 6.25 MG/5ML
6.25 SYRUP ORAL 4 TIMES DAILY PRN
Qty: 118 ML | Refills: 0 | Status: SHIPPED | OUTPATIENT
Start: 2019-12-29 | End: 2020-02-08

## 2019-12-29 RX ORDER — AMOXICILLIN AND CLAVULANATE POTASSIUM 875; 125 MG/1; MG/1
1 TABLET, FILM COATED ORAL EVERY 12 HOURS
Qty: 20 TABLET | Refills: 0 | Status: SHIPPED | OUTPATIENT
Start: 2019-12-29 | End: 2020-01-08

## 2019-12-29 RX ORDER — BENZONATATE 100 MG/1
100 CAPSULE ORAL EVERY 6 HOURS PRN
Qty: 30 CAPSULE | Refills: 0 | Status: SHIPPED | OUTPATIENT
Start: 2019-12-29 | End: 2020-02-08

## 2019-12-29 RX ORDER — IPRATROPIUM BROMIDE 21 UG/1
2 SPRAY, METERED NASAL 2 TIMES DAILY
Qty: 30 ML | Refills: 0 | Status: SHIPPED | OUTPATIENT
Start: 2019-12-29 | End: 2020-01-05

## 2019-12-29 NOTE — PROGRESS NOTES
"Subjective:       Patient ID: Joann Banuelos is a 34 y.o. female.    Vitals:  height is 5' 6.5" (1.689 m) and weight is 111.6 kg (246 lb). Her oral temperature is 98.9 °F (37.2 °C). Her blood pressure is 127/71 and her pulse is 89. Her respiration is 18 and oxygen saturation is 100%.     Chief Complaint: URI    Patient c/o cough, congestion, & sore throat that started 2 days ago.  She has been taking OTC Mucinex for her symptoms.    Provider note begins below:    Children ill with similar. Patient with sinus congestion, subjective fever (chills, etc), nighttime cough and ear fullness. Chronic Hx of ear effusion. Also endorses fluid "leaking" from right ear.    Discussed visit to ENT due to ear symptoms and frequency of sinus issues and patient will visit her regular ENT.    URI    This is a new problem. The current episode started in the past 7 days. The problem has been gradually worsening. There has been no fever. Associated symptoms include congestion, coughing, ear pain, rhinorrhea, sneezing and a sore throat. Pertinent negatives include no abdominal pain, chest pain, diarrhea, dysuria, headaches, joint pain, joint swelling, nausea, neck pain, plugged ear sensation, rash, sinus pain, swollen glands, vomiting or wheezing. She has tried decongestant for the symptoms.       Constitution: Positive for chills and fatigue. Negative for sweating and fever.   HENT: Positive for ear pain, congestion, postnasal drip, sore throat and voice change. Negative for sinus pain and sinus pressure.    Neck: Negative for neck pain and painful lymph nodes.   Cardiovascular: Negative for chest pain.   Eyes: Negative for eye redness.   Respiratory: Positive for cough and sputum production. Negative for chest tightness, bloody sputum, COPD, shortness of breath, stridor, wheezing and asthma.    Gastrointestinal: Negative for abdominal pain, nausea, vomiting and diarrhea.   Genitourinary: Negative for dysuria.   Musculoskeletal: Negative " for muscle ache.   Skin: Negative for rash.   Allergic/Immunologic: Positive for sneezing. Negative for seasonal allergies and asthma.   Neurological: Negative for headaches.   Hematologic/Lymphatic: Negative for swollen lymph nodes.       Objective:      Physical Exam   Constitutional: She is oriented to person, place, and time. She appears well-developed and well-nourished. She is cooperative.  Non-toxic appearance. She does not have a sickly appearance. She does not appear ill. No distress.   HENT:   Head: Normocephalic and atraumatic.   Right Ear: Hearing, external ear and ear canal normal. Tympanic membrane is injected, erythematous and bulging. Tympanic membrane is not perforated.   Left Ear: Hearing, external ear and ear canal normal. Tympanic membrane is injected, erythematous and bulging. Tympanic membrane is not perforated.   Nose: Mucosal edema and rhinorrhea present. No nasal deformity. No epistaxis. Right sinus exhibits no maxillary sinus tenderness and no frontal sinus tenderness. Left sinus exhibits no maxillary sinus tenderness and no frontal sinus tenderness.   Mouth/Throat: Uvula is midline and mucous membranes are normal. No trismus in the jaw. Normal dentition. No uvula swelling. Posterior oropharyngeal erythema and cobblestoning present. No oropharyngeal exudate or posterior oropharyngeal edema.   Eyes: Conjunctivae and lids are normal. No scleral icterus.   Neck: Trachea normal, full passive range of motion without pain and phonation normal. Neck supple. No neck rigidity. No edema and no erythema present.   Cardiovascular: Normal rate, regular rhythm, normal heart sounds, intact distal pulses and normal pulses.   Pulmonary/Chest: Effort normal and breath sounds normal. No stridor. No respiratory distress. She has no decreased breath sounds. She has no wheezes. She has no rhonchi.   Abdominal: Normal appearance.   Musculoskeletal: Normal range of motion. She exhibits no edema or deformity.    Neurological: She is alert and oriented to person, place, and time. She exhibits normal muscle tone. Coordination normal.   Skin: Skin is warm, dry, intact, not diaphoretic and not pale.   Psychiatric: She has a normal mood and affect. Her speech is normal and behavior is normal. Judgment and thought content normal. Cognition and memory are normal.   Nursing note and vitals reviewed.        Assessment:       1. Recurrent acute serous otitis media of both ears    2. Sinus congestion    3. Cough        Plan:         Recurrent acute serous otitis media of both ears  -     amoxicillin-clavulanate 875-125mg (AUGMENTIN) 875-125 mg per tablet; Take 1 tablet by mouth every 12 (twelve) hours. for 10 days  Dispense: 20 tablet; Refill: 0    Sinus congestion  -     ipratropium (ATROVENT) 0.03 % nasal spray; 2 sprays by Nasal route 2 (two) times daily. for 7 days  Dispense: 30 mL; Refill: 0    Cough  -     benzonatate (TESSALON PERLES) 100 MG capsule; Take 1 capsule (100 mg total) by mouth every 6 (six) hours as needed for Cough.  Dispense: 30 capsule; Refill: 0  -     promethazine (PHENERGAN) 6.25 mg/5 mL syrup; Take 5 mLs (6.25 mg total) by mouth 4 (four) times daily as needed for Nausea.  Dispense: 118 mL; Refill: 0      Patient Instructions     You make take dextromethorphan (ie Delsym) concurrently with the prescribed Promethazine.    Please only take one cough suppressant at a time.      Middle Ear Infection (Adult)   You have an infection of the middle ear, the space behind the eardrum. This is also called acute otitis media (AOM). Sometimes it is caused by the common cold. This is because congestion can block the internal passage (eustachian tube) that drains fluid from the middle ear. When the middle ear fills with fluid, bacteria can grow there and cause an infection. Oral antibiotics are used to treat this illness, not ear drops. Symptoms usually start to improve within 1 to 2 days of treatment.    Home care  The  following are general care guidelines:  · Finish all of the antibiotic medicine given, even though you may feel better after the first few days.  · You may use over-the-counter medicine, such as acetaminophen or ibuprofen, to control pain and fever, unless something else was prescribed. If you have chronic liver or kidney disease or have ever had a stomach ulcer or gastrointestinal bleeding, talk with your healthcare provider before using these medicines. Do not give aspirin to anyone under 18 years of age who has a fever. It may cause severe illness or death.  Follow-up care  Follow up with your healthcare provider, or as advised, in 2 weeks if all symptoms have not gotten better, or if hearing doesn't go back to normal within 1 month.  When to seek medical advice  Call your healthcare provider right away if any of these occur:  · Ear pain gets worse or does not improve after 3 days of treatment  · Unusual drowsiness or confusion  · Neck pain, stiff neck, or headache  · Fluid or blood draining from the ear canal  · Fever of 100.4°F (38°C) or as advised   · Seizure  Date Last Reviewed: 6/1/2016  © 3707-1945 [employer]. [address] [place] [id number]. All rights reserved. This information is not intended as a substitute for professional medical care. Always follow your healthcare professional's instructions.

## 2019-12-29 NOTE — PATIENT INSTRUCTIONS
You make take dextromethorphan (ie Delsym) concurrently with the prescribed Promethazine.    Please only take one cough suppressant at a time.      Middle Ear Infection (Adult)   You have an infection of the middle ear, the space behind the eardrum. This is also called acute otitis media (AOM). Sometimes it is caused by the common cold. This is because congestion can block the internal passage (eustachian tube) that drains fluid from the middle ear. When the middle ear fills with fluid, bacteria can grow there and cause an infection. Oral antibiotics are used to treat this illness, not ear drops. Symptoms usually start to improve within 1 to 2 days of treatment.    Home care  The following are general care guidelines:  · Finish all of the antibiotic medicine given, even though you may feel better after the first few days.  · You may use over-the-counter medicine, such as acetaminophen or ibuprofen, to control pain and fever, unless something else was prescribed. If you have chronic liver or kidney disease or have ever had a stomach ulcer or gastrointestinal bleeding, talk with your healthcare provider before using these medicines. Do not give aspirin to anyone under 18 years of age who has a fever. It may cause severe illness or death.  Follow-up care  Follow up with your healthcare provider, or as advised, in 2 weeks if all symptoms have not gotten better, or if hearing doesn't go back to normal within 1 month.  When to seek medical advice  Call your healthcare provider right away if any of these occur:  · Ear pain gets worse or does not improve after 3 days of treatment  · Unusual drowsiness or confusion  · Neck pain, stiff neck, or headache  · Fluid or blood draining from the ear canal  · Fever of 100.4°F (38°C) or as advised   · Seizure  Date Last Reviewed: 6/1/2016  © 1070-4232 [employer]. [address] [place] [address], [place] [address]. All rights reserved. This information is not intended as a  substitute for professional medical care. Always follow your healthcare professional's instructions.

## 2019-12-31 ENCOUNTER — TELEPHONE (OUTPATIENT)
Dept: URGENT CARE | Facility: CLINIC | Age: 34
End: 2019-12-31

## 2020-01-08 ENCOUNTER — PATIENT MESSAGE (OUTPATIENT)
Dept: DERMATOLOGY | Facility: CLINIC | Age: 35
End: 2020-01-08

## 2020-01-08 NOTE — TELEPHONE ENCOUNTER
Spoke with patient, offered appointment today but she said that was her son's appointment she had to cancel as she is out of town.  Advised that the soonest appointment is on Feb 5th but she is on the wait list should a sooner appointment open up.

## 2020-02-08 ENCOUNTER — OFFICE VISIT (OUTPATIENT)
Dept: URGENT CARE | Facility: CLINIC | Age: 35
End: 2020-02-08
Payer: COMMERCIAL

## 2020-02-08 VITALS
OXYGEN SATURATION: 99 % | TEMPERATURE: 98 F | HEIGHT: 67 IN | RESPIRATION RATE: 16 BRPM | WEIGHT: 246 LBS | HEART RATE: 81 BPM | DIASTOLIC BLOOD PRESSURE: 82 MMHG | SYSTOLIC BLOOD PRESSURE: 127 MMHG | BODY MASS INDEX: 38.61 KG/M2

## 2020-02-08 DIAGNOSIS — J06.9 VIRAL URI WITH COUGH: Primary | ICD-10-CM

## 2020-02-08 PROCEDURE — 99214 OFFICE O/P EST MOD 30 MIN: CPT | Mod: S$GLB,,, | Performed by: NURSE PRACTITIONER

## 2020-02-08 PROCEDURE — 99214 PR OFFICE/OUTPT VISIT, EST, LEVL IV, 30-39 MIN: ICD-10-PCS | Mod: S$GLB,,, | Performed by: NURSE PRACTITIONER

## 2020-02-08 RX ORDER — IPRATROPIUM BROMIDE 21 UG/1
2 SPRAY, METERED NASAL 2 TIMES DAILY
Qty: 30 ML | Refills: 0 | Status: SHIPPED | OUTPATIENT
Start: 2020-02-08 | End: 2020-02-15

## 2020-02-08 RX ORDER — BENZONATATE 100 MG/1
100 CAPSULE ORAL EVERY 6 HOURS PRN
Qty: 30 CAPSULE | Refills: 0 | Status: SHIPPED | OUTPATIENT
Start: 2020-02-08 | End: 2021-02-07

## 2020-02-08 RX ORDER — PROMETHAZINE HYDROCHLORIDE AND DEXTROMETHORPHAN HYDROBROMIDE 6.25; 15 MG/5ML; MG/5ML
5 SYRUP ORAL NIGHTLY PRN
Qty: 118 ML | Refills: 0 | Status: SHIPPED | OUTPATIENT
Start: 2020-02-08 | End: 2020-02-18

## 2020-02-08 NOTE — PROGRESS NOTES
"Subjective:       Patient ID: Joann Banuelos is a 34 y.o. female.    Vitals:  height is 5' 6.5" (1.689 m) and weight is 111.6 kg (246 lb). Her oral temperature is 97.9 °F (36.6 °C). Her blood pressure is 127/82 and her pulse is 81. Her respiration is 16 and oxygen saturation is 99%.     Chief Complaint: URI    Patient c/o cough, runny nose, congestion, & sore throat started 5 days ago.  She has been taking OTC Mucinex for her symptoms, and states no relief.    Provider note begins below:    No fever or chills. Recent contact with sick persons via [facility].  No N/V/D.  Taken OTC Cough and Cold with minimal relief.    URI    This is a new problem. The current episode started in the past 7 days. The problem has been gradually worsening. There has been no fever. Associated symptoms include congestion, coughing, headaches, a rash, rhinorrhea, sneezing and a sore throat. Pertinent negatives include no abdominal pain, chest pain, diarrhea, dysuria, ear pain, joint pain, joint swelling, nausea, neck pain, plugged ear sensation, sinus pain, swollen glands, vomiting or wheezing. She has tried decongestant for the symptoms. The treatment provided no relief.       Constitution: Negative for chills, sweating, fatigue and fever.   HENT: Positive for congestion, postnasal drip and sore throat. Negative for ear pain, sinus pain, sinus pressure and voice change.    Neck: Negative for neck pain and painful lymph nodes.   Cardiovascular: Negative for chest pain.   Eyes: Negative for eye redness.   Respiratory: Positive for cough. Negative for chest tightness, sputum production, bloody sputum, COPD, shortness of breath, stridor, wheezing and asthma.    Gastrointestinal: Negative for abdominal pain, nausea, vomiting and diarrhea.   Genitourinary: Negative for dysuria.   Musculoskeletal: Negative for muscle ache.   Skin: Positive for rash.   Allergic/Immunologic: Positive for sneezing. Negative for seasonal allergies and " asthma.   Neurological: Positive for headaches.   Hematologic/Lymphatic: Negative for swollen lymph nodes.       Objective:      Physical Exam   Constitutional: She is oriented to person, place, and time. She appears well-developed and well-nourished. She is cooperative.  Non-toxic appearance. She does not have a sickly appearance. She does not appear ill. No distress.   HENT:   Head: Normocephalic and atraumatic.   Right Ear: Hearing, external ear and ear canal normal. Tympanic membrane is injected.   Left Ear: Hearing, tympanic membrane, external ear and ear canal normal.   Nose: Mucosal edema and rhinorrhea present. No nasal deformity. No epistaxis. Right sinus exhibits no maxillary sinus tenderness and no frontal sinus tenderness. Left sinus exhibits no maxillary sinus tenderness and no frontal sinus tenderness.   Mouth/Throat: Uvula is midline and mucous membranes are normal. No trismus in the jaw. Normal dentition. No uvula swelling. Posterior oropharyngeal erythema and cobblestoning present. No oropharyngeal exudate or posterior oropharyngeal edema. Tonsils are 0 on the right. Tonsils are 0 on the left. No tonsillar exudate.   Eyes: Conjunctivae and lids are normal. No scleral icterus.   Neck: Trachea normal, full passive range of motion without pain and phonation normal. Neck supple. No neck rigidity. No edema and no erythema present.   Cardiovascular: Normal rate, regular rhythm, normal heart sounds, intact distal pulses and normal pulses.   Pulmonary/Chest: Effort normal and breath sounds normal. No stridor. No respiratory distress. She has no decreased breath sounds. She has no wheezes. She has no rhonchi.   Abdominal: Normal appearance.   Musculoskeletal: Normal range of motion. She exhibits no edema or deformity.   Neurological: She is alert and oriented to person, place, and time. She exhibits normal muscle tone. Coordination normal.   Skin: Skin is warm, dry, intact, not diaphoretic and not pale.    Psychiatric: She has a normal mood and affect. Her speech is normal and behavior is normal. Judgment and thought content normal. Cognition and memory are normal.   Nursing note and vitals reviewed.        Assessment:       1. Viral URI with cough        Plan:         Viral URI with cough  -     benzonatate (TESSALON PERLES) 100 MG capsule; Take 1 capsule (100 mg total) by mouth every 6 (six) hours as needed for Cough.  Dispense: 30 capsule; Refill: 0  -     ipratropium (ATROVENT) 0.03 % nasal spray; 2 sprays by Nasal route 2 (two) times daily. for 7 days  Dispense: 30 mL; Refill: 0  -     promethazine-dextromethorphan (PROMETHAZINE-DM) 6.25-15 mg/5 mL Syrp; Take 5 mLs by mouth nightly as needed.  Dispense: 118 mL; Refill: 0      Patient Instructions     You may continue taking Tylenol (acetaminophen) or ibuprofen for pain and fever.      Viral Upper Respiratory Illness (Adult)   You have a viral upper respiratory illness (URI), which is another term for the common cold. This illness is contagious during the first few days. It is spread through the air by coughing and sneezing. It may also be spread by direct contact (touching the sick person and then touching your own eyes, nose, or mouth). Frequent handwashing will decrease risk of spread. Most viral illnesses go away within 7 to 14 days with rest and simple home remedies. Sometimes the illness may last for several weeks. Antibiotics will not kill a virus, and they are generally not prescribed for this condition.    Home care  · If symptoms are severe, rest at home for the first 2 to 3 days. When you resume activity, don't let yourself get too tired.  · Avoid being exposed to cigarette smoke (yours or others).  · You may use acetaminophen or ibuprofen to control pain and fever, unless another medicine was prescribed. (Note: If you have chronic liver or kidney disease, have ever had a stomach ulcer or gastrointestinal bleeding, or are taking blood-thinning  medicines, talk with your healthcare provider before using these medicines.) Aspirin should never be given to anyone under 18 years of age who is ill with a viral infection or fever. It may cause severe liver or brain damage.  · Your appetite may be poor, so a light diet is fine. Avoid dehydration by drinking 6 to 8 glasses of fluids per day (water, soft drinks, juices, tea, or soup). Extra fluids will help loosen secretions in the nose and lungs.  · Over-the-counter cold medicines will not shorten the length of time youre sick, but they may be helpful for the following symptoms: cough, sore throat, and nasal and sinus congestion. (Note: Do not use decongestants if you have high blood pressure.)  Follow-up care  Follow up with your healthcare provider, or as advised.  When to seek medical advice  Call your healthcare provider right away if any of these occur:  · Cough with lots of colored sputum (mucus)  · Severe headache; face, neck, or ear pain  · Difficulty swallowing due to throat pain  · Fever of 100.4°F (38°C)  Call 911, or get immediate medical care  Call emergency services right away if any of these occur:  · Chest pain, shortness of breath, wheezing, or difficulty breathing  · Coughing up blood  · Inability to swallow due to throat pain  Date Last Reviewed: 9/13/2015  © 3499-5869 [employer]. [address] [place] [address], [place] [address]. All rights reserved. This information is not intended as a substitute for professional medical care. Always follow your healthcare professional's instructions.

## 2020-02-08 NOTE — PATIENT INSTRUCTIONS
You may continue taking Tylenol (acetaminophen) or ibuprofen for pain and fever.      Viral Upper Respiratory Illness (Adult)   You have a viral upper respiratory illness (URI), which is another term for the common cold. This illness is contagious during the first few days. It is spread through the air by coughing and sneezing. It may also be spread by direct contact (touching the sick person and then touching your own eyes, nose, or mouth). Frequent handwashing will decrease risk of spread. Most viral illnesses go away within 7 to 14 days with rest and simple home remedies. Sometimes the illness may last for several weeks. Antibiotics will not kill a virus, and they are generally not prescribed for this condition.    Home care  · If symptoms are severe, rest at home for the first 2 to 3 days. When you resume activity, don't let yourself get too tired.  · Avoid being exposed to cigarette smoke (yours or others).  · You may use acetaminophen or ibuprofen to control pain and fever, unless another medicine was prescribed. (Note: If you have chronic liver or kidney disease, have ever had a stomach ulcer or gastrointestinal bleeding, or are taking blood-thinning medicines, talk with your healthcare provider before using these medicines.) Aspirin should never be given to anyone under 18 years of age who is ill with a viral infection or fever. It may cause severe liver or brain damage.  · Your appetite may be poor, so a light diet is fine. Avoid dehydration by drinking 6 to 8 glasses of fluids per day (water, soft drinks, juices, tea, or soup). Extra fluids will help loosen secretions in the nose and lungs.  · Over-the-counter cold medicines will not shorten the length of time youre sick, but they may be helpful for the following symptoms: cough, sore throat, and nasal and sinus congestion. (Note: Do not use decongestants if you have high blood pressure.)  Follow-up care  Follow up with your healthcare provider, or as  advised.  When to seek medical advice  Call your healthcare provider right away if any of these occur:  · Cough with lots of colored sputum (mucus)  · Severe headache; face, neck, or ear pain  · Difficulty swallowing due to throat pain  · Fever of 100.4°F (38°C)  Call 911, or get immediate medical care  Call emergency services right away if any of these occur:  · Chest pain, shortness of breath, wheezing, or difficulty breathing  · Coughing up blood  · Inability to swallow due to throat pain  Date Last Reviewed: 9/13/2015  © 9584-3328 [employer]. [address] [place] [address]. All rights reserved. This information is not intended as a substitute for professional medical care. Always follow your healthcare professional's instructions.

## 2020-02-11 ENCOUNTER — TELEPHONE (OUTPATIENT)
Dept: URGENT CARE | Facility: CLINIC | Age: 35
End: 2020-02-11

## 2020-04-08 ENCOUNTER — PATIENT MESSAGE (OUTPATIENT)
Dept: INTERNAL MEDICINE | Facility: CLINIC | Age: 35
End: 2020-04-08

## 2020-04-09 ENCOUNTER — PATIENT MESSAGE (OUTPATIENT)
Dept: INTERNAL MEDICINE | Facility: CLINIC | Age: 35
End: 2020-04-09

## 2020-04-14 ENCOUNTER — PATIENT MESSAGE (OUTPATIENT)
Dept: INTERNAL MEDICINE | Facility: CLINIC | Age: 35
End: 2020-04-14

## 2020-04-14 ENCOUNTER — OFFICE VISIT (OUTPATIENT)
Dept: INTERNAL MEDICINE | Facility: CLINIC | Age: 35
End: 2020-04-14
Payer: COMMERCIAL

## 2020-04-14 ENCOUNTER — TELEPHONE (OUTPATIENT)
Dept: INTERNAL MEDICINE | Facility: CLINIC | Age: 35
End: 2020-04-14

## 2020-04-14 DIAGNOSIS — I10 HYPERTENSION, UNSPECIFIED TYPE: Primary | ICD-10-CM

## 2020-04-14 DIAGNOSIS — I10 HTN, GOAL BELOW 130/80: Primary | ICD-10-CM

## 2020-04-14 PROCEDURE — 99213 PR OFFICE/OUTPT VISIT, EST, LEVL III, 20-29 MIN: ICD-10-PCS | Mod: GT,,, | Performed by: INTERNAL MEDICINE

## 2020-04-14 PROCEDURE — 99213 OFFICE O/P EST LOW 20 MIN: CPT | Mod: GT,,, | Performed by: INTERNAL MEDICINE

## 2020-04-14 RX ORDER — AMLODIPINE BESYLATE 5 MG/1
5 TABLET ORAL DAILY
Qty: 30 TABLET | Refills: 0 | Status: SHIPPED | OUTPATIENT
Start: 2020-04-14 | End: 2020-05-07

## 2020-04-14 NOTE — PROGRESS NOTES
Subjective:       Patient ID: Joann Banuelos is a 34 y.o. female.    Chief Complaint: No chief complaint on file.    HPI   The patient location is: [place], [place]  The chief complaint leading to consultation is: HTN  Visit type: audiovisual  Total time spent with patient: 7 minutes  Each patient to whom he or she provides medical services by telemedicine is:  (1) informed of the relationship between the physician and patient and the respective role of any other health care provider with respect to management of the patient; and (2) notified that he or she may decline to receive medical services by telemedicine and may withdraw from such care at any time.    Pt here for evaluation of elevated BP readings over the last few weeks. She has never been on BP medication before. + FH    Review of Systems   Constitutional: Negative for activity change, appetite change, chills, diaphoresis, fatigue, fever and unexpected weight change.   HENT: Negative for postnasal drip, rhinorrhea, sinus pressure, sneezing, sore throat, trouble swallowing and voice change.    Respiratory: Negative for cough, shortness of breath and wheezing.    Cardiovascular: Negative for chest pain, palpitations and leg swelling.   Gastrointestinal: Negative for abdominal pain, blood in stool, constipation, diarrhea, nausea and vomiting.   Genitourinary: Negative for dysuria.   Musculoskeletal: Negative for arthralgias and myalgias.   Skin: Negative for rash and wound.   Allergic/Immunologic: Negative for environmental allergies and food allergies.   Hematological: Negative for adenopathy. Does not bruise/bleed easily.       Objective:      Physical Exam   Constitutional: She is oriented to person, place, and time. She appears well-developed and well-nourished. No distress.   Pulmonary/Chest: No respiratory distress.   Neurological: She is alert and oriented to person, place, and time.   Skin: She is not diaphoretic.       Assessment:       1.  Hypertension, unspecified type        Plan:    1. Rx Norvasc 5 mg daily       Document BP BID   2. F/u in 2 wks for HTN

## 2020-04-14 NOTE — TELEPHONE ENCOUNTER
Called pt to schedule a lab appt ,pt didn't answer. Left a message advising pt to give the office a call to schedule.

## 2020-04-30 ENCOUNTER — LAB VISIT (OUTPATIENT)
Dept: LAB | Facility: HOSPITAL | Age: 35
End: 2020-04-30
Attending: INTERNAL MEDICINE
Payer: COMMERCIAL

## 2020-04-30 DIAGNOSIS — I10 HYPERTENSION, UNSPECIFIED TYPE: ICD-10-CM

## 2020-04-30 LAB
ANION GAP SERPL CALC-SCNC: 8 MMOL/L (ref 8–16)
BASOPHILS # BLD AUTO: 0.06 K/UL (ref 0–0.2)
BASOPHILS NFR BLD: 0.7 % (ref 0–1.9)
BUN SERPL-MCNC: 12 MG/DL (ref 6–20)
CALCIUM SERPL-MCNC: 9.1 MG/DL (ref 8.7–10.5)
CHLORIDE SERPL-SCNC: 110 MMOL/L (ref 95–110)
CO2 SERPL-SCNC: 22 MMOL/L (ref 23–29)
CREAT SERPL-MCNC: 0.8 MG/DL (ref 0.5–1.4)
DIFFERENTIAL METHOD: ABNORMAL
EOSINOPHIL # BLD AUTO: 0.2 K/UL (ref 0–0.5)
EOSINOPHIL NFR BLD: 2.5 % (ref 0–8)
ERYTHROCYTE [DISTWIDTH] IN BLOOD BY AUTOMATED COUNT: 13 % (ref 11.5–14.5)
EST. GFR  (AFRICAN AMERICAN): >60 ML/MIN/1.73 M^2
EST. GFR  (NON AFRICAN AMERICAN): >60 ML/MIN/1.73 M^2
GLUCOSE SERPL-MCNC: 98 MG/DL (ref 70–110)
HCT VFR BLD AUTO: 41.3 % (ref 37–48.5)
HGB BLD-MCNC: 13.1 G/DL (ref 12–16)
IMM GRANULOCYTES # BLD AUTO: 0.02 K/UL (ref 0–0.04)
IMM GRANULOCYTES NFR BLD AUTO: 0.2 % (ref 0–0.5)
LYMPHOCYTES # BLD AUTO: 4 K/UL (ref 1–4.8)
LYMPHOCYTES NFR BLD: 48.7 % (ref 18–48)
MCH RBC QN AUTO: 28.1 PG (ref 27–31)
MCHC RBC AUTO-ENTMCNC: 31.7 G/DL (ref 32–36)
MCV RBC AUTO: 89 FL (ref 82–98)
MONOCYTES # BLD AUTO: 0.7 K/UL (ref 0.3–1)
MONOCYTES NFR BLD: 7.9 % (ref 4–15)
NEUTROPHILS # BLD AUTO: 3.3 K/UL (ref 1.8–7.7)
NEUTROPHILS NFR BLD: 40 % (ref 38–73)
NRBC BLD-RTO: 0 /100 WBC
PLATELET # BLD AUTO: 247 K/UL (ref 150–350)
PMV BLD AUTO: 10.7 FL (ref 9.2–12.9)
POTASSIUM SERPL-SCNC: 4.4 MMOL/L (ref 3.5–5.1)
RBC # BLD AUTO: 4.66 M/UL (ref 4–5.4)
SODIUM SERPL-SCNC: 140 MMOL/L (ref 136–145)
TSH SERPL DL<=0.005 MIU/L-ACNC: 1.58 UIU/ML (ref 0.4–4)
WBC # BLD AUTO: 8.28 K/UL (ref 3.9–12.7)

## 2020-04-30 PROCEDURE — 80048 BASIC METABOLIC PNL TOTAL CA: CPT

## 2020-04-30 PROCEDURE — 85025 COMPLETE CBC W/AUTO DIFF WBC: CPT

## 2020-04-30 PROCEDURE — 36415 COLL VENOUS BLD VENIPUNCTURE: CPT | Mod: PO

## 2020-04-30 PROCEDURE — 84443 ASSAY THYROID STIM HORMONE: CPT

## 2020-05-07 ENCOUNTER — OFFICE VISIT (OUTPATIENT)
Dept: INTERNAL MEDICINE | Facility: CLINIC | Age: 35
End: 2020-05-07
Payer: COMMERCIAL

## 2020-05-07 DIAGNOSIS — I10 HYPERTENSION, UNSPECIFIED TYPE: Primary | ICD-10-CM

## 2020-05-07 PROCEDURE — 99213 OFFICE O/P EST LOW 20 MIN: CPT | Mod: 95,,, | Performed by: INTERNAL MEDICINE

## 2020-05-07 PROCEDURE — 99213 PR OFFICE/OUTPT VISIT, EST, LEVL III, 20-29 MIN: ICD-10-PCS | Mod: 95,,, | Performed by: INTERNAL MEDICINE

## 2020-05-07 RX ORDER — HYDROCHLOROTHIAZIDE 25 MG/1
25 TABLET ORAL DAILY
Qty: 90 TABLET | Refills: 1 | Status: SHIPPED | OUTPATIENT
Start: 2020-05-07 | End: 2020-11-05 | Stop reason: SDUPTHER

## 2020-05-07 RX ORDER — AMLODIPINE BESYLATE 5 MG/1
5 TABLET ORAL DAILY
Qty: 90 TABLET | Refills: 1 | Status: SHIPPED | OUTPATIENT
Start: 2020-05-07 | End: 2020-11-05 | Stop reason: SDUPTHER

## 2020-05-07 NOTE — PROGRESS NOTES
Subjective:       Patient ID: Joann Banuelos is a 34 y.o. female.    Chief Complaint: No chief complaint on file.    HPI   The patient location is: [place]  The chief complaint leading to consultation is: HTN f/u  Visit type: audiovisual  Total time spent with patient: 5 minutes  Each patient to whom he or she provides medical services by telemedicine is:  (1) informed of the relationship between the physician and patient and the respective role of any other health care provider with respect to management of the patient; and (2) notified that he or she may decline to receive medical services by telemedicine and may withdraw from such care at any time.    Pt here for 2 wk f/u regarding HTN. She was started on Norvasc at last visit. Her BP is still running in the 150s/90s. No SE's from the medication.     Review of Systems   Constitutional: Negative for activity change, appetite change, chills, diaphoresis, fatigue, fever and unexpected weight change.   HENT: Negative for postnasal drip, rhinorrhea, sinus pressure, sneezing, sore throat, trouble swallowing and voice change.    Respiratory: Negative for cough, shortness of breath and wheezing.    Cardiovascular: Negative for chest pain, palpitations and leg swelling.   Gastrointestinal: Negative for abdominal pain, blood in stool, constipation, diarrhea, nausea and vomiting.   Genitourinary: Negative for dysuria.   Musculoskeletal: Negative for arthralgias and myalgias.   Skin: Negative for rash and wound.   Allergic/Immunologic: Negative for environmental allergies and food allergies.   Hematological: Negative for adenopathy. Does not bruise/bleed easily.       Objective:      Physical Exam   Constitutional: She is oriented to person, place, and time. She appears well-developed and well-nourished. No distress.   Pulmonary/Chest: No respiratory distress.   Neurological: She is alert and oriented to person, place, and time.   Skin: She is not diaphoretic.        Assessment:       1. Hypertension, unspecified type        Plan:    1. Rx HCTZ 25 mg daily and continue Norvasc 5 mg daily    2. F/u in 2 wks

## 2020-07-16 ENCOUNTER — OFFICE VISIT (OUTPATIENT)
Dept: DERMATOLOGY | Facility: CLINIC | Age: 35
End: 2020-07-16
Payer: COMMERCIAL

## 2020-07-16 DIAGNOSIS — L65.8 TRACTION ALOPECIA: ICD-10-CM

## 2020-07-16 DIAGNOSIS — L21.9 SEBORRHEIC DERMATITIS: Primary | ICD-10-CM

## 2020-07-16 PROCEDURE — 99214 OFFICE O/P EST MOD 30 MIN: CPT | Mod: 95,,, | Performed by: DERMATOLOGY

## 2020-07-16 PROCEDURE — 99214 PR OFFICE/OUTPT VISIT, EST, LEVL IV, 30-39 MIN: ICD-10-PCS | Mod: 95,,, | Performed by: DERMATOLOGY

## 2020-07-16 RX ORDER — FLUOCINOLONE ACETONIDE 0.11 MG/ML
OIL TOPICAL
Qty: 1 BOTTLE | Refills: 5 | Status: SHIPPED | OUTPATIENT
Start: 2020-07-16 | End: 2021-09-17

## 2020-07-16 RX ORDER — KETOCONAZOLE 20 MG/G
CREAM TOPICAL
Qty: 60 G | Refills: 3 | Status: SHIPPED | OUTPATIENT
Start: 2020-07-16 | End: 2021-09-17

## 2020-07-16 RX ORDER — TRIAMCINOLONE ACETONIDE 0.25 MG/G
CREAM TOPICAL 2 TIMES DAILY PRN
Qty: 30 G | Refills: 1 | Status: SHIPPED | OUTPATIENT
Start: 2020-07-16 | End: 2021-06-16

## 2020-07-16 NOTE — PROGRESS NOTES
Subjective:       Patient ID:  Joann Banuelos is a 34 y.o. female who presents for No chief complaint on file.    The patient location is: home  The chief complaint leading to consultation is: rash    Visit type: audiovisual    Face to Face time with patient: 15 min  20 minutes of total time spent on the encounter, which includes face to face time and non-face to face time preparing to see the patient (eg, review of tests), Obtaining and/or reviewing separately obtained history, Documenting clinical information in the electronic or other health record, Independently interpreting results (not separately reported) and communicating results to the patient/family/caregiver, or Care coordination (not separately reported).     Each patient to whom he or she provides medical services by telemedicine is:  (1) informed of the relationship between the physician and patient and the respective role of any other health care provider with respect to management of the patient; and (2) notified that he or she may decline to receive medical services by telemedicine and may withdraw from such care at any time.    Notes:   Hx of tinea versicolor    History of Present Illness: The patient presents with chief complaint of rash.  Location: [place]  Duration: several months  Signs/Symptoms: hypopigmentation    Prior treatments: sunscreen          Review of Systems   Constitutional: Negative for fever and chills.   Gastrointestinal: Negative for nausea and vomiting.   Skin: Positive for rash and activity-related sunscreen use. Negative for itching, dry skin, daily sunscreen use and recent sunburn.   Hematologic/Lymphatic: Does not bruise/bleed easily.        Objective:    Physical Exam   Constitutional: She appears well-developed and well-nourished. No distress.   Neurological: She is alert and oriented to person, place, and time. She is not disoriented.   Psychiatric: She has a normal mood and affect.   Skin:   Areas Examined  (abnormalities noted in diagram):   Scalp / Hair Palpated and Inspected  Head / Face Inspection Performed  Neck Inspection Performed  RUE Inspected  LUE Inspection Performed  Nails and Digits Inspection Performed               Assessment / Plan:        Seborrheic dermatitis  -     triamcinolone acetonide 0.025% (KENALOG) 0.025 % cream; Apply topically 2 (two) times daily as needed. Mild steroid. For rash on scalp  Dispense: 30 g; Refill: 1  -     ketoconazole (NIZORAL) 2 % cream; AAA bid for rash on face  Dispense: 60 g; Refill: 3  -     Limited exam by video conference.  Pt denies scaling, pruritus or erythema.  Possible seb derm vs. Early vitiligo vs. Other.  Will start above meds.    Traction alopecia  -     fluocinolone (DERMA-SMOOTHE) 0.01 % external oil; Apply oil to scalp once a day  Dispense: 1 Bottle; Refill: 5  -     Moderate. Discussed pt should consider PRP with Dr. Khushboo Brownlee, discussed procedure would be cosmetic. Recommend biotin and above med.             Follow up in about 6 weeks (around 8/27/2020).     **20 minutes spent in counseling and coordination of care**

## 2020-09-01 ENCOUNTER — CLINICAL SUPPORT (OUTPATIENT)
Dept: URGENT CARE | Facility: CLINIC | Age: 35
End: 2020-09-01

## 2020-11-05 RX ORDER — AMLODIPINE BESYLATE 5 MG/1
5 TABLET ORAL DAILY
Qty: 90 TABLET | Refills: 1 | Status: SHIPPED | OUTPATIENT
Start: 2020-11-05 | End: 2021-05-06

## 2020-11-05 RX ORDER — HYDROCHLOROTHIAZIDE 25 MG/1
25 TABLET ORAL DAILY
Qty: 90 TABLET | Refills: 1 | Status: SHIPPED | OUTPATIENT
Start: 2020-11-05 | End: 2021-05-06

## 2020-11-16 ENCOUNTER — PATIENT MESSAGE (OUTPATIENT)
Dept: OBSTETRICS AND GYNECOLOGY | Facility: CLINIC | Age: 35
End: 2020-11-16

## 2020-11-16 DIAGNOSIS — N93.9 VAGINAL BLEEDING, ABNORMAL: Primary | ICD-10-CM

## 2020-11-17 ENCOUNTER — HOSPITAL ENCOUNTER (OUTPATIENT)
Dept: RADIOLOGY | Facility: OTHER | Age: 35
Discharge: HOME | End: 2020-11-17
Attending: OBSTETRICS & GYNECOLOGY
Payer: COMMERCIAL

## 2020-11-17 DIAGNOSIS — N93.9 VAGINAL BLEEDING, ABNORMAL: ICD-10-CM

## 2020-11-17 PROCEDURE — 76830 TRANSVAGINAL US NON-OB: CPT | Mod: TC

## 2020-11-17 PROCEDURE — 76856 US PELVIS COMP WITH TRANSVAG NON-OB (XPD): ICD-10-PCS | Mod: 26,,, | Performed by: RADIOLOGY

## 2020-11-17 PROCEDURE — 76830 TRANSVAGINAL US NON-OB: CPT | Mod: 26,,, | Performed by: RADIOLOGY

## 2020-11-17 PROCEDURE — 76830 US PELVIS COMP WITH TRANSVAG NON-OB (XPD): ICD-10-PCS | Mod: 26,,, | Performed by: RADIOLOGY

## 2020-11-17 PROCEDURE — 76856 US EXAM PELVIC COMPLETE: CPT | Mod: 26,,, | Performed by: RADIOLOGY

## 2020-11-18 ENCOUNTER — PATIENT MESSAGE (OUTPATIENT)
Dept: OBSTETRICS AND GYNECOLOGY | Facility: CLINIC | Age: 35
End: 2020-11-18

## 2020-11-23 ENCOUNTER — PATIENT MESSAGE (OUTPATIENT)
Dept: OBSTETRICS AND GYNECOLOGY | Facility: CLINIC | Age: 35
End: 2020-11-23

## 2021-01-11 ENCOUNTER — HOSPITAL ENCOUNTER (OUTPATIENT)
Dept: RADIOLOGY | Facility: HOSPITAL | Age: 36
Discharge: HOME | End: 2021-01-11
Attending: ORTHOPAEDIC SURGERY
Payer: COMMERCIAL

## 2021-01-11 ENCOUNTER — OFFICE VISIT (OUTPATIENT)
Dept: SPORTS MEDICINE | Facility: CLINIC | Age: 36
End: 2021-01-11
Payer: COMMERCIAL

## 2021-01-11 VITALS
SYSTOLIC BLOOD PRESSURE: 127 MMHG | BODY MASS INDEX: 38.61 KG/M2 | WEIGHT: 246 LBS | HEIGHT: 67 IN | HEART RATE: 81 BPM | DIASTOLIC BLOOD PRESSURE: 82 MMHG

## 2021-01-11 DIAGNOSIS — M25.552 LEFT HIP PAIN: ICD-10-CM

## 2021-01-11 DIAGNOSIS — M25.552 LEFT HIP PAIN: Primary | ICD-10-CM

## 2021-01-11 PROCEDURE — 3074F SYST BP LT 130 MM HG: CPT | Mod: CPTII,S$GLB,, | Performed by: ORTHOPAEDIC SURGERY

## 2021-01-11 PROCEDURE — 3008F BODY MASS INDEX DOCD: CPT | Mod: CPTII,S$GLB,, | Performed by: ORTHOPAEDIC SURGERY

## 2021-01-11 PROCEDURE — 1126F PR PAIN SEVERITY QUANTIFIED, NO PAIN PRESENT: ICD-10-PCS | Mod: S$GLB,,, | Performed by: ORTHOPAEDIC SURGERY

## 2021-01-11 PROCEDURE — 99999 PR PBB SHADOW E&M-EST. PATIENT-LVL IV: ICD-10-PCS | Mod: PBBFAC,,, | Performed by: ORTHOPAEDIC SURGERY

## 2021-01-11 PROCEDURE — 99203 PR OFFICE/OUTPT VISIT, NEW, LEVL III, 30-44 MIN: ICD-10-PCS | Mod: S$GLB,,, | Performed by: ORTHOPAEDIC SURGERY

## 2021-01-11 PROCEDURE — 99203 OFFICE O/P NEW LOW 30 MIN: CPT | Mod: S$GLB,,, | Performed by: ORTHOPAEDIC SURGERY

## 2021-01-11 PROCEDURE — 99999 PR PBB SHADOW E&M-EST. PATIENT-LVL IV: CPT | Mod: PBBFAC,,, | Performed by: ORTHOPAEDIC SURGERY

## 2021-01-11 PROCEDURE — 3079F PR MOST RECENT DIASTOLIC BLOOD PRESSURE 80-89 MM HG: ICD-10-PCS | Mod: CPTII,S$GLB,, | Performed by: ORTHOPAEDIC SURGERY

## 2021-01-11 PROCEDURE — 73503 X-RAY EXAM HIP UNI 4/> VIEWS: CPT | Mod: TC,LT

## 2021-01-11 PROCEDURE — 3079F DIAST BP 80-89 MM HG: CPT | Mod: CPTII,S$GLB,, | Performed by: ORTHOPAEDIC SURGERY

## 2021-01-11 PROCEDURE — 1126F AMNT PAIN NOTED NONE PRSNT: CPT | Mod: S$GLB,,, | Performed by: ORTHOPAEDIC SURGERY

## 2021-01-11 PROCEDURE — 73503 X-RAY EXAM HIP UNI 4/> VIEWS: CPT | Mod: 26,LT,, | Performed by: RADIOLOGY

## 2021-01-11 PROCEDURE — 3008F PR BODY MASS INDEX (BMI) DOCUMENTED: ICD-10-PCS | Mod: CPTII,S$GLB,, | Performed by: ORTHOPAEDIC SURGERY

## 2021-01-11 PROCEDURE — 73503 XR HIP 4 OR MORE VIEWS LEFT: ICD-10-PCS | Mod: 26,LT,, | Performed by: RADIOLOGY

## 2021-01-11 PROCEDURE — 3074F PR MOST RECENT SYSTOLIC BLOOD PRESSURE < 130 MM HG: ICD-10-PCS | Mod: CPTII,S$GLB,, | Performed by: ORTHOPAEDIC SURGERY

## 2021-02-02 ENCOUNTER — CLINICAL SUPPORT (OUTPATIENT)
Dept: REHABILITATION | Facility: HOSPITAL | Age: 36
End: 2021-02-02
Attending: ORTHOPAEDIC SURGERY
Payer: COMMERCIAL

## 2021-02-02 DIAGNOSIS — M25.552 LEFT HIP PAIN: ICD-10-CM

## 2021-02-02 PROCEDURE — 97161 PT EVAL LOW COMPLEX 20 MIN: CPT

## 2021-02-02 PROCEDURE — 97110 THERAPEUTIC EXERCISES: CPT

## 2021-02-13 ENCOUNTER — PATIENT MESSAGE (OUTPATIENT)
Dept: OBSTETRICS AND GYNECOLOGY | Facility: CLINIC | Age: 36
End: 2021-02-13

## 2021-02-14 ENCOUNTER — PATIENT MESSAGE (OUTPATIENT)
Dept: OBSTETRICS AND GYNECOLOGY | Facility: CLINIC | Age: 36
End: 2021-02-14

## 2021-02-15 ENCOUNTER — LAB VISIT (OUTPATIENT)
Dept: LAB | Facility: HOSPITAL | Age: 36
End: 2021-02-15
Attending: NURSE PRACTITIONER
Payer: COMMERCIAL

## 2021-02-15 ENCOUNTER — OFFICE VISIT (OUTPATIENT)
Dept: OBSTETRICS AND GYNECOLOGY | Facility: CLINIC | Age: 36
End: 2021-02-15
Payer: COMMERCIAL

## 2021-02-15 ENCOUNTER — PATIENT MESSAGE (OUTPATIENT)
Dept: OBSTETRICS AND GYNECOLOGY | Facility: CLINIC | Age: 36
End: 2021-02-15

## 2021-02-15 VITALS
SYSTOLIC BLOOD PRESSURE: 118 MMHG | HEIGHT: 66 IN | WEIGHT: 211.63 LBS | DIASTOLIC BLOOD PRESSURE: 74 MMHG | BODY MASS INDEX: 34.01 KG/M2

## 2021-02-15 DIAGNOSIS — O20.0 THREATENED ABORTION: ICD-10-CM

## 2021-02-15 DIAGNOSIS — O20.0 THREATENED ABORTION: Primary | ICD-10-CM

## 2021-02-15 LAB
ALBUMIN SERPL BCP-MCNC: 4.1 G/DL (ref 3.5–5.2)
ALP SERPL-CCNC: 79 U/L (ref 55–135)
ALT SERPL W/O P-5'-P-CCNC: 18 U/L (ref 10–44)
ANION GAP SERPL CALC-SCNC: 8 MMOL/L (ref 8–16)
AST SERPL-CCNC: 20 U/L (ref 10–40)
BASOPHILS # BLD AUTO: 0.04 K/UL (ref 0–0.2)
BASOPHILS NFR BLD: 0.5 % (ref 0–1.9)
BILIRUB SERPL-MCNC: 0.2 MG/DL (ref 0.1–1)
BUN SERPL-MCNC: 12 MG/DL (ref 6–20)
CALCIUM SERPL-MCNC: 9.3 MG/DL (ref 8.7–10.5)
CHLORIDE SERPL-SCNC: 105 MMOL/L (ref 95–110)
CO2 SERPL-SCNC: 25 MMOL/L (ref 23–29)
CREAT SERPL-MCNC: 0.8 MG/DL (ref 0.5–1.4)
DIFFERENTIAL METHOD: ABNORMAL
EOSINOPHIL # BLD AUTO: 0.2 K/UL (ref 0–0.5)
EOSINOPHIL NFR BLD: 2.2 % (ref 0–8)
ERYTHROCYTE [DISTWIDTH] IN BLOOD BY AUTOMATED COUNT: 12.3 % (ref 11.5–14.5)
EST. GFR  (AFRICAN AMERICAN): >60 ML/MIN/1.73 M^2
EST. GFR  (NON AFRICAN AMERICAN): >60 ML/MIN/1.73 M^2
GLUCOSE SERPL-MCNC: 86 MG/DL (ref 70–110)
HCG INTACT+B SERPL-ACNC: 45 MIU/ML
HCT VFR BLD AUTO: 41.4 % (ref 37–48.5)
HGB BLD-MCNC: 13.4 G/DL (ref 12–16)
IMM GRANULOCYTES # BLD AUTO: 0.01 K/UL (ref 0–0.04)
IMM GRANULOCYTES NFR BLD AUTO: 0.1 % (ref 0–0.5)
LYMPHOCYTES # BLD AUTO: 3.6 K/UL (ref 1–4.8)
LYMPHOCYTES NFR BLD: 48.7 % (ref 18–48)
MCH RBC QN AUTO: 28 PG (ref 27–31)
MCHC RBC AUTO-ENTMCNC: 32.4 G/DL (ref 32–36)
MCV RBC AUTO: 87 FL (ref 82–98)
MONOCYTES # BLD AUTO: 0.5 K/UL (ref 0.3–1)
MONOCYTES NFR BLD: 6.6 % (ref 4–15)
NEUTROPHILS # BLD AUTO: 3.1 K/UL (ref 1.8–7.7)
NEUTROPHILS NFR BLD: 41.9 % (ref 38–73)
NRBC BLD-RTO: 0 /100 WBC
PLATELET # BLD AUTO: 307 K/UL (ref 150–350)
PMV BLD AUTO: 10.4 FL (ref 9.2–12.9)
POTASSIUM SERPL-SCNC: 3.9 MMOL/L (ref 3.5–5.1)
PROT SERPL-MCNC: 7.4 G/DL (ref 6–8.4)
RBC # BLD AUTO: 4.78 M/UL (ref 4–5.4)
SODIUM SERPL-SCNC: 138 MMOL/L (ref 136–145)
WBC # BLD AUTO: 7.41 K/UL (ref 3.9–12.7)

## 2021-02-15 PROCEDURE — 3008F BODY MASS INDEX DOCD: CPT | Mod: CPTII,S$GLB,, | Performed by: NURSE PRACTITIONER

## 2021-02-15 PROCEDURE — 99999 PR PBB SHADOW E&M-EST. PATIENT-LVL III: CPT | Mod: PBBFAC,,, | Performed by: NURSE PRACTITIONER

## 2021-02-15 PROCEDURE — 99213 PR OFFICE/OUTPT VISIT, EST, LEVL III, 20-29 MIN: ICD-10-PCS | Mod: S$GLB,,, | Performed by: NURSE PRACTITIONER

## 2021-02-15 PROCEDURE — 3008F PR BODY MASS INDEX (BMI) DOCUMENTED: ICD-10-PCS | Mod: CPTII,S$GLB,, | Performed by: NURSE PRACTITIONER

## 2021-02-15 PROCEDURE — 3078F DIAST BP <80 MM HG: CPT | Mod: CPTII,S$GLB,, | Performed by: NURSE PRACTITIONER

## 2021-02-15 PROCEDURE — 3078F PR MOST RECENT DIASTOLIC BLOOD PRESSURE < 80 MM HG: ICD-10-PCS | Mod: CPTII,S$GLB,, | Performed by: NURSE PRACTITIONER

## 2021-02-15 PROCEDURE — 3074F PR MOST RECENT SYSTOLIC BLOOD PRESSURE < 130 MM HG: ICD-10-PCS | Mod: CPTII,S$GLB,, | Performed by: NURSE PRACTITIONER

## 2021-02-15 PROCEDURE — 84144 ASSAY OF PROGESTERONE: CPT

## 2021-02-15 PROCEDURE — 3074F SYST BP LT 130 MM HG: CPT | Mod: CPTII,S$GLB,, | Performed by: NURSE PRACTITIONER

## 2021-02-15 PROCEDURE — 99213 OFFICE O/P EST LOW 20 MIN: CPT | Mod: S$GLB,,, | Performed by: NURSE PRACTITIONER

## 2021-02-15 PROCEDURE — 84702 CHORIONIC GONADOTROPIN TEST: CPT

## 2021-02-15 PROCEDURE — 1126F AMNT PAIN NOTED NONE PRSNT: CPT | Mod: S$GLB,,, | Performed by: NURSE PRACTITIONER

## 2021-02-15 PROCEDURE — 80053 COMPREHEN METABOLIC PANEL: CPT

## 2021-02-15 PROCEDURE — 99999 PR PBB SHADOW E&M-EST. PATIENT-LVL III: ICD-10-PCS | Mod: PBBFAC,,, | Performed by: NURSE PRACTITIONER

## 2021-02-15 PROCEDURE — 1126F PR PAIN SEVERITY QUANTIFIED, NO PAIN PRESENT: ICD-10-PCS | Mod: S$GLB,,, | Performed by: NURSE PRACTITIONER

## 2021-02-15 PROCEDURE — 36415 COLL VENOUS BLD VENIPUNCTURE: CPT | Mod: PN

## 2021-02-15 PROCEDURE — 85025 COMPLETE CBC W/AUTO DIFF WBC: CPT

## 2021-02-16 ENCOUNTER — PATIENT MESSAGE (OUTPATIENT)
Dept: OBSTETRICS AND GYNECOLOGY | Facility: CLINIC | Age: 36
End: 2021-02-16

## 2021-02-16 LAB — PROGEST SERPL-MCNC: 7.5 NG/ML

## 2021-02-17 ENCOUNTER — CLINICAL SUPPORT (OUTPATIENT)
Dept: REHABILITATION | Facility: HOSPITAL | Age: 36
End: 2021-02-17
Attending: ORTHOPAEDIC SURGERY
Payer: COMMERCIAL

## 2021-02-17 ENCOUNTER — PATIENT MESSAGE (OUTPATIENT)
Dept: OBSTETRICS AND GYNECOLOGY | Facility: CLINIC | Age: 36
End: 2021-02-17

## 2021-02-17 ENCOUNTER — LAB VISIT (OUTPATIENT)
Dept: LAB | Facility: HOSPITAL | Age: 36
End: 2021-02-17
Attending: NURSE PRACTITIONER
Payer: COMMERCIAL

## 2021-02-17 ENCOUNTER — TELEPHONE (OUTPATIENT)
Dept: OBSTETRICS AND GYNECOLOGY | Facility: CLINIC | Age: 36
End: 2021-02-17

## 2021-02-17 DIAGNOSIS — O20.0 THREATENED ABORTION: ICD-10-CM

## 2021-02-17 DIAGNOSIS — M25.552 LEFT HIP PAIN: ICD-10-CM

## 2021-02-17 DIAGNOSIS — N91.2 AMENORRHEA: Primary | ICD-10-CM

## 2021-02-17 LAB
ABO + RH BLD: NORMAL
BLD GP AB SCN CELLS X3 SERPL QL: NORMAL
HCG INTACT+B SERPL-ACNC: 112 MIU/ML

## 2021-02-17 PROCEDURE — 97112 NEUROMUSCULAR REEDUCATION: CPT | Mod: CQ

## 2021-02-17 PROCEDURE — 86900 BLOOD TYPING SEROLOGIC ABO: CPT

## 2021-02-17 PROCEDURE — 84702 CHORIONIC GONADOTROPIN TEST: CPT

## 2021-02-17 PROCEDURE — 36415 COLL VENOUS BLD VENIPUNCTURE: CPT | Mod: PN

## 2021-02-17 PROCEDURE — 97110 THERAPEUTIC EXERCISES: CPT | Mod: CQ

## 2021-02-17 RX ORDER — PROGESTERONE 200 MG/1
200 CAPSULE ORAL NIGHTLY
Qty: 12 CAPSULE | Refills: 11 | Status: SHIPPED | OUTPATIENT
Start: 2021-02-17 | End: 2021-03-02

## 2021-02-19 LAB
C TRACH RRNA SPEC QL NAA+PROBE: NEGATIVE
N GONORRHOEA RRNA SPEC QL NAA+PROBE: NEGATIVE

## 2021-02-25 ENCOUNTER — CLINICAL SUPPORT (OUTPATIENT)
Dept: REHABILITATION | Facility: HOSPITAL | Age: 36
End: 2021-02-25
Attending: ORTHOPAEDIC SURGERY
Payer: COMMERCIAL

## 2021-02-25 DIAGNOSIS — M25.552 LEFT HIP PAIN: ICD-10-CM

## 2021-02-25 PROCEDURE — 97112 NEUROMUSCULAR REEDUCATION: CPT

## 2021-02-25 PROCEDURE — 97110 THERAPEUTIC EXERCISES: CPT

## 2021-03-02 ENCOUNTER — PATIENT MESSAGE (OUTPATIENT)
Dept: OBSTETRICS AND GYNECOLOGY | Facility: CLINIC | Age: 36
End: 2021-03-02

## 2021-03-02 ENCOUNTER — TELEPHONE (OUTPATIENT)
Dept: OBSTETRICS AND GYNECOLOGY | Facility: CLINIC | Age: 36
End: 2021-03-02

## 2021-03-02 ENCOUNTER — LAB VISIT (OUTPATIENT)
Dept: LAB | Facility: HOSPITAL | Age: 36
End: 2021-03-02
Attending: NURSE PRACTITIONER
Payer: COMMERCIAL

## 2021-03-02 DIAGNOSIS — O26.851 SPOTTING AFFECTING PREGNANCY IN FIRST TRIMESTER: Primary | ICD-10-CM

## 2021-03-02 DIAGNOSIS — O20.9 BLEEDING IN EARLY PREGNANCY: Primary | ICD-10-CM

## 2021-03-02 DIAGNOSIS — O26.851 SPOTTING AFFECTING PREGNANCY IN FIRST TRIMESTER: ICD-10-CM

## 2021-03-02 PROCEDURE — 84144 ASSAY OF PROGESTERONE: CPT

## 2021-03-02 PROCEDURE — 36415 COLL VENOUS BLD VENIPUNCTURE: CPT | Mod: PN

## 2021-03-02 PROCEDURE — 84702 CHORIONIC GONADOTROPIN TEST: CPT

## 2021-03-02 RX ORDER — PROGESTERONE 200 MG/1
200 CAPSULE ORAL NIGHTLY
Qty: 30 CAPSULE | Refills: 3 | Status: SHIPPED | OUTPATIENT
Start: 2021-03-02 | End: 2021-06-16

## 2021-03-03 ENCOUNTER — TELEPHONE (OUTPATIENT)
Dept: OBSTETRICS AND GYNECOLOGY | Facility: CLINIC | Age: 36
End: 2021-03-03

## 2021-03-03 ENCOUNTER — HOSPITAL ENCOUNTER (OUTPATIENT)
Dept: RADIOLOGY | Facility: OTHER | Age: 36
Discharge: HOME | End: 2021-03-03
Attending: NURSE PRACTITIONER
Payer: COMMERCIAL

## 2021-03-03 ENCOUNTER — CLINICAL SUPPORT (OUTPATIENT)
Dept: REHABILITATION | Facility: HOSPITAL | Age: 36
End: 2021-03-03
Attending: ORTHOPAEDIC SURGERY
Payer: COMMERCIAL

## 2021-03-03 ENCOUNTER — CLINICAL SUPPORT (OUTPATIENT)
Dept: OBSTETRICS AND GYNECOLOGY | Facility: CLINIC | Age: 36
End: 2021-03-03
Payer: COMMERCIAL

## 2021-03-03 DIAGNOSIS — Z34.90 PREGNANCY, UNSPECIFIED GESTATIONAL AGE: Primary | ICD-10-CM

## 2021-03-03 DIAGNOSIS — M25.552 LEFT HIP PAIN: ICD-10-CM

## 2021-03-03 DIAGNOSIS — O26.851 SPOTTING AFFECTING PREGNANCY IN FIRST TRIMESTER: ICD-10-CM

## 2021-03-03 DIAGNOSIS — O20.9 BLEEDING IN EARLY PREGNANCY: ICD-10-CM

## 2021-03-03 LAB
HCG INTACT+B SERPL-ACNC: NORMAL MIU/ML
PROGEST SERPL-MCNC: 7.7 NG/ML

## 2021-03-03 PROCEDURE — 97112 NEUROMUSCULAR REEDUCATION: CPT

## 2021-03-03 PROCEDURE — 76817 TRANSVAGINAL US OBSTETRIC: CPT | Mod: TC

## 2021-03-03 PROCEDURE — 76801 OB US < 14 WKS SINGLE FETUS: CPT | Mod: 26,,, | Performed by: RADIOLOGY

## 2021-03-03 PROCEDURE — 76817 US OB <14 WEEKS, TRANSABDOM & TRANSVAG, SINGLE GESTATION (XPD): ICD-10-PCS | Mod: 26,,, | Performed by: RADIOLOGY

## 2021-03-03 PROCEDURE — 96372 RHO (D) IMMUNE GLOBULIN: ICD-10-PCS | Mod: S$GLB,,, | Performed by: NURSE PRACTITIONER

## 2021-03-03 PROCEDURE — 97110 THERAPEUTIC EXERCISES: CPT

## 2021-03-03 PROCEDURE — 76801 US OB <14 WEEKS, TRANSABDOM & TRANSVAG, SINGLE GESTATION (XPD): ICD-10-PCS | Mod: 26,,, | Performed by: RADIOLOGY

## 2021-03-03 PROCEDURE — 76817 TRANSVAGINAL US OBSTETRIC: CPT | Mod: 26,,, | Performed by: RADIOLOGY

## 2021-03-03 PROCEDURE — 96372 THER/PROPH/DIAG INJ SC/IM: CPT | Mod: S$GLB,,, | Performed by: NURSE PRACTITIONER

## 2021-03-05 ENCOUNTER — TELEPHONE (OUTPATIENT)
Dept: OBSTETRICS AND GYNECOLOGY | Facility: CLINIC | Age: 36
End: 2021-03-05

## 2021-03-05 ENCOUNTER — PATIENT MESSAGE (OUTPATIENT)
Dept: OBSTETRICS AND GYNECOLOGY | Facility: CLINIC | Age: 36
End: 2021-03-05

## 2021-03-08 ENCOUNTER — TELEPHONE (OUTPATIENT)
Dept: OBSTETRICS AND GYNECOLOGY | Facility: CLINIC | Age: 36
End: 2021-03-08

## 2021-03-16 ENCOUNTER — PATIENT MESSAGE (OUTPATIENT)
Dept: OBSTETRICS AND GYNECOLOGY | Facility: CLINIC | Age: 36
End: 2021-03-16

## 2021-03-16 ENCOUNTER — PROCEDURE VISIT (OUTPATIENT)
Dept: OBSTETRICS AND GYNECOLOGY | Facility: CLINIC | Age: 36
End: 2021-03-16
Payer: COMMERCIAL

## 2021-03-16 DIAGNOSIS — Z36.89 ESTABLISH GESTATIONAL AGE, ULTRASOUND: ICD-10-CM

## 2021-03-16 DIAGNOSIS — N91.2 AMENORRHEA: ICD-10-CM

## 2021-03-16 PROCEDURE — 76801 PR US, OB <14WKS, TRANSABD, SINGLE GESTATION: ICD-10-PCS | Mod: S$GLB,,, | Performed by: OBSTETRICS & GYNECOLOGY

## 2021-03-16 PROCEDURE — 76801 OB US < 14 WKS SINGLE FETUS: CPT | Mod: S$GLB,,, | Performed by: OBSTETRICS & GYNECOLOGY

## 2021-03-22 ENCOUNTER — PATIENT OUTREACH (OUTPATIENT)
Dept: ADMINISTRATIVE | Facility: HOSPITAL | Age: 36
End: 2021-03-22

## 2021-03-22 ENCOUNTER — PATIENT MESSAGE (OUTPATIENT)
Dept: OBSTETRICS AND GYNECOLOGY | Facility: CLINIC | Age: 36
End: 2021-03-22

## 2021-03-26 ENCOUNTER — PATIENT MESSAGE (OUTPATIENT)
Dept: RESEARCH | Facility: HOSPITAL | Age: 36
End: 2021-03-26

## 2021-03-26 ENCOUNTER — TELEPHONE (OUTPATIENT)
Dept: INTERNAL MEDICINE | Facility: CLINIC | Age: 36
End: 2021-03-26

## 2021-03-26 ENCOUNTER — PATIENT MESSAGE (OUTPATIENT)
Dept: ADMINISTRATIVE | Facility: HOSPITAL | Age: 36
End: 2021-03-26

## 2021-03-30 ENCOUNTER — CLINICAL SUPPORT (OUTPATIENT)
Dept: REHABILITATION | Facility: HOSPITAL | Age: 36
End: 2021-03-30
Attending: ORTHOPAEDIC SURGERY
Payer: COMMERCIAL

## 2021-03-30 DIAGNOSIS — M25.552 LEFT HIP PAIN: ICD-10-CM

## 2021-03-30 PROCEDURE — 97112 NEUROMUSCULAR REEDUCATION: CPT

## 2021-03-30 PROCEDURE — 97110 THERAPEUTIC EXERCISES: CPT

## 2021-04-19 ENCOUNTER — PATIENT MESSAGE (OUTPATIENT)
Dept: ADMINISTRATIVE | Facility: OTHER | Age: 36
End: 2021-04-19

## 2021-04-19 ENCOUNTER — INITIAL PRENATAL (OUTPATIENT)
Dept: OBSTETRICS AND GYNECOLOGY | Facility: CLINIC | Age: 36
End: 2021-04-19
Payer: COMMERCIAL

## 2021-04-19 VITALS
BODY MASS INDEX: 36.76 KG/M2 | DIASTOLIC BLOOD PRESSURE: 70 MMHG | WEIGHT: 227.75 LBS | SYSTOLIC BLOOD PRESSURE: 130 MMHG

## 2021-04-19 DIAGNOSIS — Z34.80 SUPERVISION OF OTHER NORMAL PREGNANCY, ANTEPARTUM: Primary | ICD-10-CM

## 2021-04-19 DIAGNOSIS — O10.919 CHRONIC HYPERTENSION DURING PREGNANCY, ANTEPARTUM: ICD-10-CM

## 2021-04-19 DIAGNOSIS — Z36.9 ANTENATAL SCREENING ENCOUNTER: ICD-10-CM

## 2021-04-19 PROCEDURE — 99999 PR PBB SHADOW E&M-EST. PATIENT-LVL III: ICD-10-PCS | Mod: PBBFAC,,, | Performed by: OBSTETRICS & GYNECOLOGY

## 2021-04-19 PROCEDURE — 99999 PR PBB SHADOW E&M-EST. PATIENT-LVL III: CPT | Mod: PBBFAC,,, | Performed by: OBSTETRICS & GYNECOLOGY

## 2021-04-19 PROCEDURE — 0500F INITIAL PRENATAL CARE VISIT: CPT | Mod: S$GLB,,, | Performed by: OBSTETRICS & GYNECOLOGY

## 2021-04-19 PROCEDURE — 0500F PR INITIAL PRENATAL CARE VISIT: ICD-10-PCS | Mod: S$GLB,,, | Performed by: OBSTETRICS & GYNECOLOGY

## 2021-04-21 ENCOUNTER — PATIENT MESSAGE (OUTPATIENT)
Dept: ADMINISTRATIVE | Facility: OTHER | Age: 36
End: 2021-04-21

## 2021-05-03 ENCOUNTER — PATIENT MESSAGE (OUTPATIENT)
Dept: OBSTETRICS AND GYNECOLOGY | Facility: CLINIC | Age: 36
End: 2021-05-03

## 2021-05-06 RX ORDER — AMLODIPINE BESYLATE 5 MG/1
TABLET ORAL
Qty: 90 TABLET | Refills: 0 | Status: SHIPPED | OUTPATIENT
Start: 2021-05-06 | End: 2021-07-30

## 2021-05-06 RX ORDER — HYDROCHLOROTHIAZIDE 25 MG/1
TABLET ORAL
Qty: 90 TABLET | Refills: 0 | Status: SHIPPED | OUTPATIENT
Start: 2021-05-06 | End: 2021-09-17

## 2021-05-10 ENCOUNTER — PATIENT MESSAGE (OUTPATIENT)
Dept: OBSTETRICS AND GYNECOLOGY | Facility: CLINIC | Age: 36
End: 2021-05-10

## 2021-05-18 ENCOUNTER — PATIENT MESSAGE (OUTPATIENT)
Dept: OBSTETRICS AND GYNECOLOGY | Facility: CLINIC | Age: 36
End: 2021-05-18

## 2021-05-25 ENCOUNTER — ROUTINE PRENATAL (OUTPATIENT)
Dept: OBSTETRICS AND GYNECOLOGY | Facility: CLINIC | Age: 36
End: 2021-05-25
Payer: COMMERCIAL

## 2021-05-25 VITALS — SYSTOLIC BLOOD PRESSURE: 130 MMHG | DIASTOLIC BLOOD PRESSURE: 58 MMHG | BODY MASS INDEX: 38.5 KG/M2 | WEIGHT: 238.56 LBS

## 2021-05-25 DIAGNOSIS — Z34.80 SUPERVISION OF OTHER NORMAL PREGNANCY, ANTEPARTUM: Primary | ICD-10-CM

## 2021-05-25 PROCEDURE — 0502F PR SUBSEQUENT PRENATAL CARE: ICD-10-PCS | Mod: CPTII,S$GLB,, | Performed by: OBSTETRICS & GYNECOLOGY

## 2021-05-25 PROCEDURE — 99999 PR PBB SHADOW E&M-EST. PATIENT-LVL III: ICD-10-PCS | Mod: PBBFAC,,, | Performed by: OBSTETRICS & GYNECOLOGY

## 2021-05-25 PROCEDURE — 0502F SUBSEQUENT PRENATAL CARE: CPT | Mod: CPTII,S$GLB,, | Performed by: OBSTETRICS & GYNECOLOGY

## 2021-05-25 PROCEDURE — 99999 PR PBB SHADOW E&M-EST. PATIENT-LVL III: CPT | Mod: PBBFAC,,, | Performed by: OBSTETRICS & GYNECOLOGY

## 2021-06-02 ENCOUNTER — PROCEDURE VISIT (OUTPATIENT)
Dept: MATERNAL FETAL MEDICINE | Facility: CLINIC | Age: 36
End: 2021-06-02
Payer: COMMERCIAL

## 2021-06-02 DIAGNOSIS — O09.522 MULTIGRAVIDA OF ADVANCED MATERNAL AGE IN SECOND TRIMESTER: ICD-10-CM

## 2021-06-02 DIAGNOSIS — Z36.89 ENCOUNTER FOR FETAL ANATOMIC SURVEY: ICD-10-CM

## 2021-06-02 DIAGNOSIS — O99.210 MATERNAL OBESITY AFFECTING PREGNANCY, ANTEPARTUM: ICD-10-CM

## 2021-06-02 DIAGNOSIS — Z36.9 ANTENATAL SCREENING ENCOUNTER: ICD-10-CM

## 2021-06-02 PROCEDURE — 76811 OB US DETAILED SNGL FETUS: CPT | Mod: S$GLB,,, | Performed by: OBSTETRICS & GYNECOLOGY

## 2021-06-02 PROCEDURE — 76811 PR US, OB FETAL EVAL & EXAM, TRANSABDOM,FIRST GESTATION: ICD-10-PCS | Mod: S$GLB,,, | Performed by: OBSTETRICS & GYNECOLOGY

## 2021-06-15 ENCOUNTER — PATIENT MESSAGE (OUTPATIENT)
Dept: OBSTETRICS AND GYNECOLOGY | Facility: CLINIC | Age: 36
End: 2021-06-15

## 2021-06-16 ENCOUNTER — ROUTINE PRENATAL (OUTPATIENT)
Dept: OBSTETRICS AND GYNECOLOGY | Facility: CLINIC | Age: 36
End: 2021-06-16
Payer: COMMERCIAL

## 2021-06-16 VITALS — DIASTOLIC BLOOD PRESSURE: 74 MMHG | SYSTOLIC BLOOD PRESSURE: 122 MMHG | WEIGHT: 242.5 LBS | BODY MASS INDEX: 39.14 KG/M2

## 2021-06-16 DIAGNOSIS — Z3A.21 21 WEEKS GESTATION OF PREGNANCY: Primary | ICD-10-CM

## 2021-06-16 PROCEDURE — 0502F SUBSEQUENT PRENATAL CARE: CPT | Mod: CPTII,S$GLB,, | Performed by: REGISTERED NURSE

## 2021-06-16 PROCEDURE — 99999 PR PBB SHADOW E&M-EST. PATIENT-LVL II: CPT | Mod: PBBFAC,,,

## 2021-06-16 PROCEDURE — 99999 PR PBB SHADOW E&M-EST. PATIENT-LVL II: ICD-10-PCS | Mod: PBBFAC,,,

## 2021-06-16 PROCEDURE — 0502F PR SUBSEQUENT PRENATAL CARE: ICD-10-PCS | Mod: CPTII,S$GLB,, | Performed by: REGISTERED NURSE

## 2021-06-30 ENCOUNTER — PROCEDURE VISIT (OUTPATIENT)
Dept: MATERNAL FETAL MEDICINE | Facility: CLINIC | Age: 36
End: 2021-06-30
Payer: COMMERCIAL

## 2021-06-30 PROCEDURE — 76816 PR  US,PREGNANT UTERUS,F/U,TRANSABD APP: ICD-10-PCS | Mod: S$GLB,,, | Performed by: OBSTETRICS & GYNECOLOGY

## 2021-06-30 PROCEDURE — 76816 OB US FOLLOW-UP PER FETUS: CPT | Mod: S$GLB,,, | Performed by: OBSTETRICS & GYNECOLOGY

## 2021-07-06 ENCOUNTER — PATIENT MESSAGE (OUTPATIENT)
Dept: ADMINISTRATIVE | Facility: HOSPITAL | Age: 36
End: 2021-07-06

## 2021-07-09 ENCOUNTER — TELEPHONE (OUTPATIENT)
Dept: INTERNAL MEDICINE | Facility: CLINIC | Age: 36
End: 2021-07-09

## 2021-07-09 ENCOUNTER — OFFICE VISIT (OUTPATIENT)
Dept: URGENT CARE | Facility: CLINIC | Age: 36
End: 2021-07-09
Payer: COMMERCIAL

## 2021-07-09 VITALS
SYSTOLIC BLOOD PRESSURE: 142 MMHG | OXYGEN SATURATION: 100 % | HEIGHT: 66 IN | WEIGHT: 242 LBS | RESPIRATION RATE: 18 BRPM | BODY MASS INDEX: 38.89 KG/M2 | HEART RATE: 82 BPM | TEMPERATURE: 98 F | DIASTOLIC BLOOD PRESSURE: 88 MMHG

## 2021-07-09 DIAGNOSIS — Z00.00 ANNUAL PHYSICAL EXAM: Primary | ICD-10-CM

## 2021-07-09 DIAGNOSIS — M43.6 TORTICOLLIS: Primary | ICD-10-CM

## 2021-07-09 DIAGNOSIS — I10 HYPERTENSION, UNSPECIFIED TYPE: ICD-10-CM

## 2021-07-09 PROCEDURE — 99213 PR OFFICE/OUTPT VISIT, EST, LEVL III, 20-29 MIN: ICD-10-PCS | Mod: S$GLB,,, | Performed by: NURSE PRACTITIONER

## 2021-07-09 PROCEDURE — 3008F BODY MASS INDEX DOCD: CPT | Mod: CPTII,S$GLB,, | Performed by: NURSE PRACTITIONER

## 2021-07-09 PROCEDURE — 3008F PR BODY MASS INDEX (BMI) DOCUMENTED: ICD-10-PCS | Mod: CPTII,S$GLB,, | Performed by: NURSE PRACTITIONER

## 2021-07-09 PROCEDURE — 99213 OFFICE O/P EST LOW 20 MIN: CPT | Mod: S$GLB,,, | Performed by: NURSE PRACTITIONER

## 2021-07-09 RX ORDER — ACETAMINOPHEN 325 MG/1
650 TABLET ORAL
Status: COMPLETED | OUTPATIENT
Start: 2021-07-09 | End: 2021-07-09

## 2021-07-09 RX ORDER — CYCLOBENZAPRINE HCL 5 MG
5 TABLET ORAL NIGHTLY
Qty: 7 TABLET | Refills: 0 | Status: SHIPPED | OUTPATIENT
Start: 2021-07-09 | End: 2021-07-16

## 2021-07-09 RX ORDER — NAPROXEN SODIUM 220 MG/1
81 TABLET, FILM COATED ORAL DAILY
COMMUNITY

## 2021-07-09 RX ADMIN — ACETAMINOPHEN 650 MG: 325 TABLET ORAL at 11:07

## 2021-07-13 ENCOUNTER — LAB VISIT (OUTPATIENT)
Dept: LAB | Facility: OTHER | Age: 36
End: 2021-07-13
Attending: OBSTETRICS & GYNECOLOGY
Payer: COMMERCIAL

## 2021-07-13 ENCOUNTER — ROUTINE PRENATAL (OUTPATIENT)
Dept: OBSTETRICS AND GYNECOLOGY | Facility: CLINIC | Age: 36
End: 2021-07-13
Payer: COMMERCIAL

## 2021-07-13 VITALS
WEIGHT: 252.44 LBS | SYSTOLIC BLOOD PRESSURE: 137 MMHG | DIASTOLIC BLOOD PRESSURE: 78 MMHG | BODY MASS INDEX: 40.74 KG/M2

## 2021-07-13 DIAGNOSIS — O26.893 RH NEGATIVE STATE IN ANTEPARTUM PERIOD, THIRD TRIMESTER: ICD-10-CM

## 2021-07-13 DIAGNOSIS — Z67.91 RH NEGATIVE STATE IN ANTEPARTUM PERIOD, THIRD TRIMESTER: ICD-10-CM

## 2021-07-13 DIAGNOSIS — Z34.80 SUPERVISION OF OTHER NORMAL PREGNANCY, ANTEPARTUM: Primary | ICD-10-CM

## 2021-07-13 DIAGNOSIS — Z23 NEED FOR TDAP VACCINATION: ICD-10-CM

## 2021-07-13 DIAGNOSIS — Z34.80 SUPERVISION OF OTHER NORMAL PREGNANCY, ANTEPARTUM: ICD-10-CM

## 2021-07-13 LAB
BASOPHILS # BLD AUTO: 0.04 K/UL (ref 0–0.2)
BASOPHILS NFR BLD: 0.3 % (ref 0–1.9)
DIFFERENTIAL METHOD: ABNORMAL
EOSINOPHIL # BLD AUTO: 0.3 K/UL (ref 0–0.5)
EOSINOPHIL NFR BLD: 2.2 % (ref 0–8)
ERYTHROCYTE [DISTWIDTH] IN BLOOD BY AUTOMATED COUNT: 12.8 % (ref 11.5–14.5)
GLUCOSE SERPL-MCNC: 86 MG/DL (ref 70–140)
HCT VFR BLD AUTO: 35.9 % (ref 37–48.5)
HGB BLD-MCNC: 11.9 G/DL (ref 12–16)
IMM GRANULOCYTES # BLD AUTO: 0.19 K/UL (ref 0–0.04)
IMM GRANULOCYTES NFR BLD AUTO: 1.5 % (ref 0–0.5)
LYMPHOCYTES # BLD AUTO: 3.9 K/UL (ref 1–4.8)
LYMPHOCYTES NFR BLD: 30.5 % (ref 18–48)
MCH RBC QN AUTO: 27.9 PG (ref 27–31)
MCHC RBC AUTO-ENTMCNC: 33.1 G/DL (ref 32–36)
MCV RBC AUTO: 84 FL (ref 82–98)
MONOCYTES # BLD AUTO: 1 K/UL (ref 0.3–1)
MONOCYTES NFR BLD: 7.7 % (ref 4–15)
NEUTROPHILS # BLD AUTO: 7.4 K/UL (ref 1.8–7.7)
NEUTROPHILS NFR BLD: 57.8 % (ref 38–73)
NRBC BLD-RTO: 0 /100 WBC
PLATELET # BLD AUTO: 223 K/UL (ref 150–450)
PMV BLD AUTO: 10 FL (ref 9.2–12.9)
RBC # BLD AUTO: 4.27 M/UL (ref 4–5.4)
WBC # BLD AUTO: 12.81 K/UL (ref 3.9–12.7)

## 2021-07-13 PROCEDURE — 99999 PR PBB SHADOW E&M-EST. PATIENT-LVL II: CPT | Mod: PBBFAC,,, | Performed by: OBSTETRICS & GYNECOLOGY

## 2021-07-13 PROCEDURE — 82950 GLUCOSE TEST: CPT | Performed by: OBSTETRICS & GYNECOLOGY

## 2021-07-13 PROCEDURE — 36415 COLL VENOUS BLD VENIPUNCTURE: CPT | Performed by: OBSTETRICS & GYNECOLOGY

## 2021-07-13 PROCEDURE — 85025 COMPLETE CBC W/AUTO DIFF WBC: CPT | Performed by: OBSTETRICS & GYNECOLOGY

## 2021-07-13 PROCEDURE — 0502F SUBSEQUENT PRENATAL CARE: CPT | Mod: CPTII,S$GLB,, | Performed by: OBSTETRICS & GYNECOLOGY

## 2021-07-13 PROCEDURE — 0502F PR SUBSEQUENT PRENATAL CARE: ICD-10-PCS | Mod: CPTII,S$GLB,, | Performed by: OBSTETRICS & GYNECOLOGY

## 2021-07-13 PROCEDURE — 99999 PR PBB SHADOW E&M-EST. PATIENT-LVL II: ICD-10-PCS | Mod: PBBFAC,,, | Performed by: OBSTETRICS & GYNECOLOGY

## 2021-07-14 ENCOUNTER — PATIENT MESSAGE (OUTPATIENT)
Dept: ADMINISTRATIVE | Facility: OTHER | Age: 36
End: 2021-07-14

## 2021-07-29 ENCOUNTER — PATIENT MESSAGE (OUTPATIENT)
Dept: OBSTETRICS AND GYNECOLOGY | Facility: CLINIC | Age: 36
End: 2021-07-29

## 2021-07-29 DIAGNOSIS — O23.40 URINARY TRACT INFECTION IN MOTHER DURING PREGNANCY, ANTEPARTUM: Primary | ICD-10-CM

## 2021-07-30 ENCOUNTER — LAB VISIT (OUTPATIENT)
Dept: LAB | Facility: HOSPITAL | Age: 36
End: 2021-07-30
Attending: OBSTETRICS & GYNECOLOGY
Payer: COMMERCIAL

## 2021-07-30 DIAGNOSIS — O23.40 URINARY TRACT INFECTION IN MOTHER DURING PREGNANCY, ANTEPARTUM: ICD-10-CM

## 2021-07-30 LAB
BACTERIA #/AREA URNS AUTO: NORMAL /HPF
BILIRUB UR QL STRIP: NEGATIVE
CAOX CRY UR QL COMP ASSIST: NORMAL
CLARITY UR REFRACT.AUTO: ABNORMAL
COLOR UR AUTO: YELLOW
GLUCOSE UR QL STRIP: NEGATIVE
HGB UR QL STRIP: NEGATIVE
KETONES UR QL STRIP: NEGATIVE
LEUKOCYTE ESTERASE UR QL STRIP: ABNORMAL
MICROSCOPIC COMMENT: NORMAL
NITRITE UR QL STRIP: NEGATIVE
PH UR STRIP: 6 [PH] (ref 5–8)
PROT UR QL STRIP: NEGATIVE
RBC #/AREA URNS AUTO: 0 /HPF (ref 0–4)
SP GR UR STRIP: 1.02 (ref 1–1.03)
SQUAMOUS #/AREA URNS AUTO: 11 /HPF
URN SPEC COLLECT METH UR: ABNORMAL
WBC #/AREA URNS AUTO: 4 /HPF (ref 0–5)

## 2021-07-30 PROCEDURE — 87086 URINE CULTURE/COLONY COUNT: CPT | Performed by: OBSTETRICS & GYNECOLOGY

## 2021-07-30 PROCEDURE — 81001 URINALYSIS AUTO W/SCOPE: CPT | Performed by: OBSTETRICS & GYNECOLOGY

## 2021-07-30 RX ORDER — AMLODIPINE BESYLATE 5 MG/1
TABLET ORAL
Qty: 90 TABLET | Refills: 0 | Status: ON HOLD | OUTPATIENT
Start: 2021-07-30 | End: 2021-10-12 | Stop reason: SDUPTHER

## 2021-08-01 LAB — BACTERIA UR CULT: NORMAL

## 2021-08-04 ENCOUNTER — PATIENT MESSAGE (OUTPATIENT)
Dept: ADMINISTRATIVE | Facility: OTHER | Age: 36
End: 2021-08-04

## 2021-08-10 ENCOUNTER — CLINICAL SUPPORT (OUTPATIENT)
Dept: OBSTETRICS AND GYNECOLOGY | Facility: CLINIC | Age: 36
End: 2021-08-10
Payer: COMMERCIAL

## 2021-08-10 ENCOUNTER — ROUTINE PRENATAL (OUTPATIENT)
Dept: OBSTETRICS AND GYNECOLOGY | Facility: CLINIC | Age: 36
End: 2021-08-10
Payer: COMMERCIAL

## 2021-08-10 VITALS
WEIGHT: 257.25 LBS | DIASTOLIC BLOOD PRESSURE: 74 MMHG | BODY MASS INDEX: 41.53 KG/M2 | SYSTOLIC BLOOD PRESSURE: 116 MMHG

## 2021-08-10 DIAGNOSIS — Z67.91 RH NEGATIVE STATE IN ANTEPARTUM PERIOD, THIRD TRIMESTER: ICD-10-CM

## 2021-08-10 DIAGNOSIS — O26.893 RH NEGATIVE STATE IN ANTEPARTUM PERIOD, THIRD TRIMESTER: ICD-10-CM

## 2021-08-10 DIAGNOSIS — Z23 NEED FOR TDAP VACCINATION: ICD-10-CM

## 2021-08-10 DIAGNOSIS — Z34.80 SUPERVISION OF OTHER NORMAL PREGNANCY, ANTEPARTUM: Primary | ICD-10-CM

## 2021-08-10 DIAGNOSIS — O10.913 CHRONIC HYPERTENSION COMPLICATING OR REASON FOR CARE DURING PREGNANCY, THIRD TRIMESTER: ICD-10-CM

## 2021-08-10 PROCEDURE — 99999 PR PBB SHADOW E&M-EST. PATIENT-LVL I: ICD-10-PCS | Mod: PBBFAC,,, | Performed by: OBSTETRICS & GYNECOLOGY

## 2021-08-10 PROCEDURE — 99999 PR PBB SHADOW E&M-EST. PATIENT-LVL I: CPT | Mod: PBBFAC,,,

## 2021-08-10 PROCEDURE — 90471 IMMUNIZATION ADMIN: CPT | Mod: S$GLB,,, | Performed by: OBSTETRICS & GYNECOLOGY

## 2021-08-10 PROCEDURE — 90715 TDAP VACCINE 7 YRS/> IM: CPT | Mod: S$GLB,,, | Performed by: OBSTETRICS & GYNECOLOGY

## 2021-08-10 PROCEDURE — 0502F SUBSEQUENT PRENATAL CARE: CPT | Mod: CPTII,S$GLB,, | Performed by: OBSTETRICS & GYNECOLOGY

## 2021-08-10 PROCEDURE — 99999 PR PBB SHADOW E&M-EST. PATIENT-LVL I: CPT | Mod: PBBFAC,,, | Performed by: OBSTETRICS & GYNECOLOGY

## 2021-08-10 PROCEDURE — 90471 TDAP VACCINE GREATER THAN OR EQUAL TO 7YO IM: ICD-10-PCS | Mod: S$GLB,,, | Performed by: OBSTETRICS & GYNECOLOGY

## 2021-08-10 PROCEDURE — 99999 PR PBB SHADOW E&M-EST. PATIENT-LVL I: ICD-10-PCS | Mod: PBBFAC,,,

## 2021-08-10 PROCEDURE — 96372 RHO (D) IMMUNE GLOBULIN: ICD-10-PCS | Mod: 59,S$GLB,, | Performed by: OBSTETRICS & GYNECOLOGY

## 2021-08-10 PROCEDURE — 90715 TDAP VACCINE GREATER THAN OR EQUAL TO 7YO IM: ICD-10-PCS | Mod: S$GLB,,, | Performed by: OBSTETRICS & GYNECOLOGY

## 2021-08-10 PROCEDURE — 0502F PR SUBSEQUENT PRENATAL CARE: ICD-10-PCS | Mod: CPTII,S$GLB,, | Performed by: OBSTETRICS & GYNECOLOGY

## 2021-08-10 PROCEDURE — 96372 THER/PROPH/DIAG INJ SC/IM: CPT | Mod: 59,S$GLB,, | Performed by: OBSTETRICS & GYNECOLOGY

## 2021-08-13 ENCOUNTER — PATIENT MESSAGE (OUTPATIENT)
Dept: OBSTETRICS AND GYNECOLOGY | Facility: CLINIC | Age: 36
End: 2021-08-13

## 2021-08-30 ENCOUNTER — PATIENT MESSAGE (OUTPATIENT)
Dept: OBSTETRICS AND GYNECOLOGY | Facility: CLINIC | Age: 36
End: 2021-08-30

## 2021-09-01 ENCOUNTER — PATIENT MESSAGE (OUTPATIENT)
Dept: ADMINISTRATIVE | Facility: OTHER | Age: 36
End: 2021-09-01

## 2021-09-05 ENCOUNTER — TELEPHONE (OUTPATIENT)
Dept: OBSTETRICS AND GYNECOLOGY | Facility: CLINIC | Age: 36
End: 2021-09-05

## 2021-09-07 ENCOUNTER — PROCEDURE VISIT (OUTPATIENT)
Dept: MATERNAL FETAL MEDICINE | Facility: CLINIC | Age: 36
End: 2021-09-07
Payer: COMMERCIAL

## 2021-09-07 DIAGNOSIS — O10.919 CHRONIC HYPERTENSION DURING PREGNANCY, ANTEPARTUM: ICD-10-CM

## 2021-09-07 DIAGNOSIS — Z36.89 ENCOUNTER FOR ULTRASOUND TO CHECK FETAL GROWTH: Primary | ICD-10-CM

## 2021-09-07 PROCEDURE — 76816 PR  US,PREGNANT UTERUS,F/U,TRANSABD APP: ICD-10-PCS | Mod: S$GLB,,, | Performed by: OBSTETRICS & GYNECOLOGY

## 2021-09-07 PROCEDURE — 76819 FETAL BIOPHYS PROFIL W/O NST: CPT | Mod: S$GLB,,, | Performed by: OBSTETRICS & GYNECOLOGY

## 2021-09-07 PROCEDURE — 76816 OB US FOLLOW-UP PER FETUS: CPT | Mod: S$GLB,,, | Performed by: OBSTETRICS & GYNECOLOGY

## 2021-09-07 PROCEDURE — 76819 PR US, OB, FETAL BIOPHYSICAL, W/O NST: ICD-10-PCS | Mod: S$GLB,,, | Performed by: OBSTETRICS & GYNECOLOGY

## 2021-09-09 ENCOUNTER — OFFICE VISIT (OUTPATIENT)
Dept: OBSTETRICS AND GYNECOLOGY | Facility: CLINIC | Age: 36
End: 2021-09-09
Payer: COMMERCIAL

## 2021-09-09 VITALS — DIASTOLIC BLOOD PRESSURE: 59 MMHG | SYSTOLIC BLOOD PRESSURE: 135 MMHG

## 2021-09-09 DIAGNOSIS — O10.913 CHRONIC HYPERTENSION COMPLICATING OR REASON FOR CARE DURING PREGNANCY, THIRD TRIMESTER: ICD-10-CM

## 2021-09-09 DIAGNOSIS — Z34.80 SUPERVISION OF OTHER NORMAL PREGNANCY, ANTEPARTUM: Primary | ICD-10-CM

## 2021-09-09 PROCEDURE — 0502F PR SUBSEQUENT PRENATAL CARE: ICD-10-PCS | Mod: CPTII,95,, | Performed by: OBSTETRICS & GYNECOLOGY

## 2021-09-09 PROCEDURE — 3075F PR MOST RECENT SYSTOLIC BLOOD PRESS GE 130-139MM HG: ICD-10-PCS | Mod: CPTII,95,, | Performed by: OBSTETRICS & GYNECOLOGY

## 2021-09-09 PROCEDURE — 3078F DIAST BP <80 MM HG: CPT | Mod: CPTII,95,, | Performed by: OBSTETRICS & GYNECOLOGY

## 2021-09-09 PROCEDURE — 3075F SYST BP GE 130 - 139MM HG: CPT | Mod: CPTII,95,, | Performed by: OBSTETRICS & GYNECOLOGY

## 2021-09-09 PROCEDURE — 0502F SUBSEQUENT PRENATAL CARE: CPT | Mod: CPTII,95,, | Performed by: OBSTETRICS & GYNECOLOGY

## 2021-09-09 PROCEDURE — 3078F PR MOST RECENT DIASTOLIC BLOOD PRESSURE < 80 MM HG: ICD-10-PCS | Mod: CPTII,95,, | Performed by: OBSTETRICS & GYNECOLOGY

## 2021-09-13 ENCOUNTER — PATIENT MESSAGE (OUTPATIENT)
Dept: OBSTETRICS AND GYNECOLOGY | Facility: CLINIC | Age: 36
End: 2021-09-13

## 2021-09-14 ENCOUNTER — HOSPITAL ENCOUNTER (OUTPATIENT)
Dept: PERINATAL CARE | Facility: OTHER | Age: 36
Discharge: HOME | End: 2021-09-14
Attending: OBSTETRICS & GYNECOLOGY
Payer: COMMERCIAL

## 2021-09-14 DIAGNOSIS — O10.913 CHRONIC HYPERTENSION COMPLICATING OR REASON FOR CARE DURING PREGNANCY, THIRD TRIMESTER: ICD-10-CM

## 2021-09-14 PROCEDURE — 59025 FETAL NON-STRESS TEST: CPT | Mod: 26,,, | Performed by: OBSTETRICS & GYNECOLOGY

## 2021-09-14 PROCEDURE — 59025 PR FETAL 2N-STRESS TEST: ICD-10-PCS | Mod: 26,,, | Performed by: OBSTETRICS & GYNECOLOGY

## 2021-09-14 PROCEDURE — 76815 OB US LIMITED FETUS(S): CPT

## 2021-09-14 PROCEDURE — 76815 OB US LIMITED FETUS(S): CPT | Mod: 26,,, | Performed by: OBSTETRICS & GYNECOLOGY

## 2021-09-14 PROCEDURE — 59025 FETAL NON-STRESS TEST: CPT

## 2021-09-14 PROCEDURE — 76815 PR  US,PREGNANT UTERUS,LIMITED, 1/> FETUSES: ICD-10-PCS | Mod: 26,,, | Performed by: OBSTETRICS & GYNECOLOGY

## 2021-09-17 ENCOUNTER — OFFICE VISIT (OUTPATIENT)
Dept: INTERNAL MEDICINE | Facility: CLINIC | Age: 36
End: 2021-09-17
Payer: COMMERCIAL

## 2021-09-17 VITALS
SYSTOLIC BLOOD PRESSURE: 138 MMHG | RESPIRATION RATE: 18 BRPM | TEMPERATURE: 98 F | HEART RATE: 95 BPM | BODY MASS INDEX: 42.66 KG/M2 | DIASTOLIC BLOOD PRESSURE: 60 MMHG | HEIGHT: 66 IN | WEIGHT: 265.44 LBS | OXYGEN SATURATION: 99 %

## 2021-09-17 DIAGNOSIS — Z00.00 ANNUAL PHYSICAL EXAM: Primary | ICD-10-CM

## 2021-09-17 DIAGNOSIS — I10 HYPERTENSION, UNSPECIFIED TYPE: ICD-10-CM

## 2021-09-17 PROCEDURE — 99999 PR PBB SHADOW E&M-EST. PATIENT-LVL III: ICD-10-PCS | Mod: PBBFAC,,, | Performed by: INTERNAL MEDICINE

## 2021-09-17 PROCEDURE — 3075F SYST BP GE 130 - 139MM HG: CPT | Mod: CPTII,S$GLB,, | Performed by: INTERNAL MEDICINE

## 2021-09-17 PROCEDURE — 1160F RVW MEDS BY RX/DR IN RCRD: CPT | Mod: CPTII,S$GLB,, | Performed by: INTERNAL MEDICINE

## 2021-09-17 PROCEDURE — 99395 PR PREVENTIVE VISIT,EST,18-39: ICD-10-PCS | Mod: S$GLB,,, | Performed by: INTERNAL MEDICINE

## 2021-09-17 PROCEDURE — 1159F MED LIST DOCD IN RCRD: CPT | Mod: CPTII,S$GLB,, | Performed by: INTERNAL MEDICINE

## 2021-09-17 PROCEDURE — 3078F PR MOST RECENT DIASTOLIC BLOOD PRESSURE < 80 MM HG: ICD-10-PCS | Mod: CPTII,S$GLB,, | Performed by: INTERNAL MEDICINE

## 2021-09-17 PROCEDURE — 3008F PR BODY MASS INDEX (BMI) DOCUMENTED: ICD-10-PCS | Mod: CPTII,S$GLB,, | Performed by: INTERNAL MEDICINE

## 2021-09-17 PROCEDURE — 3078F DIAST BP <80 MM HG: CPT | Mod: CPTII,S$GLB,, | Performed by: INTERNAL MEDICINE

## 2021-09-17 PROCEDURE — 1160F PR REVIEW ALL MEDS BY PRESCRIBER/CLIN PHARMACIST DOCUMENTED: ICD-10-PCS | Mod: CPTII,S$GLB,, | Performed by: INTERNAL MEDICINE

## 2021-09-17 PROCEDURE — 99999 PR PBB SHADOW E&M-EST. PATIENT-LVL III: CPT | Mod: PBBFAC,,, | Performed by: INTERNAL MEDICINE

## 2021-09-17 PROCEDURE — 1159F PR MEDICATION LIST DOCUMENTED IN MEDICAL RECORD: ICD-10-PCS | Mod: CPTII,S$GLB,, | Performed by: INTERNAL MEDICINE

## 2021-09-17 PROCEDURE — 3075F PR MOST RECENT SYSTOLIC BLOOD PRESS GE 130-139MM HG: ICD-10-PCS | Mod: CPTII,S$GLB,, | Performed by: INTERNAL MEDICINE

## 2021-09-17 PROCEDURE — 99395 PREV VISIT EST AGE 18-39: CPT | Mod: S$GLB,,, | Performed by: INTERNAL MEDICINE

## 2021-09-17 PROCEDURE — 3008F BODY MASS INDEX DOCD: CPT | Mod: CPTII,S$GLB,, | Performed by: INTERNAL MEDICINE

## 2021-09-21 ENCOUNTER — PATIENT MESSAGE (OUTPATIENT)
Dept: OBSTETRICS AND GYNECOLOGY | Facility: CLINIC | Age: 36
End: 2021-09-21

## 2021-09-21 ENCOUNTER — HOSPITAL ENCOUNTER (OUTPATIENT)
Dept: PERINATAL CARE | Facility: OTHER | Age: 36
Discharge: HOME | End: 2021-09-21
Attending: OBSTETRICS & GYNECOLOGY
Payer: COMMERCIAL

## 2021-09-21 ENCOUNTER — ROUTINE PRENATAL (OUTPATIENT)
Dept: OBSTETRICS AND GYNECOLOGY | Facility: CLINIC | Age: 36
End: 2021-09-21
Payer: COMMERCIAL

## 2021-09-21 VITALS
SYSTOLIC BLOOD PRESSURE: 136 MMHG | BODY MASS INDEX: 43.06 KG/M2 | WEIGHT: 266.75 LBS | DIASTOLIC BLOOD PRESSURE: 64 MMHG

## 2021-09-21 DIAGNOSIS — O10.913 CHRONIC HYPERTENSION COMPLICATING OR REASON FOR CARE DURING PREGNANCY, THIRD TRIMESTER: Primary | ICD-10-CM

## 2021-09-21 DIAGNOSIS — Z34.80 SUPERVISION OF OTHER NORMAL PREGNANCY, ANTEPARTUM: Primary | ICD-10-CM

## 2021-09-21 DIAGNOSIS — Z34.80 SUPERVISION OF OTHER NORMAL PREGNANCY, ANTEPARTUM: ICD-10-CM

## 2021-09-21 PROCEDURE — 99999 PR PBB SHADOW E&M-EST. PATIENT-LVL I: ICD-10-PCS | Mod: PBBFAC,,, | Performed by: OBSTETRICS & GYNECOLOGY

## 2021-09-21 PROCEDURE — 0502F PR SUBSEQUENT PRENATAL CARE: ICD-10-PCS | Mod: CPTII,S$GLB,, | Performed by: OBSTETRICS & GYNECOLOGY

## 2021-09-21 PROCEDURE — 99999 PR PBB SHADOW E&M-EST. PATIENT-LVL I: CPT | Mod: PBBFAC,,, | Performed by: OBSTETRICS & GYNECOLOGY

## 2021-09-21 PROCEDURE — 76818 FETAL BIOPHYS PROFILE W/NST: CPT | Mod: 26,,, | Performed by: OBSTETRICS & GYNECOLOGY

## 2021-09-21 PROCEDURE — 0502F SUBSEQUENT PRENATAL CARE: CPT | Mod: CPTII,S$GLB,, | Performed by: OBSTETRICS & GYNECOLOGY

## 2021-09-21 PROCEDURE — 76818 PR US, OB, FETAL BIOPHYSICAL, W/NST: ICD-10-PCS | Mod: 26,,, | Performed by: OBSTETRICS & GYNECOLOGY

## 2021-09-21 PROCEDURE — 76818 FETAL BIOPHYS PROFILE W/NST: CPT

## 2021-09-28 ENCOUNTER — HOSPITAL ENCOUNTER (OUTPATIENT)
Dept: PERINATAL CARE | Facility: OTHER | Age: 36
Discharge: HOME | End: 2021-09-28
Attending: OBSTETRICS & GYNECOLOGY
Payer: COMMERCIAL

## 2021-09-28 DIAGNOSIS — O10.913 CHRONIC HYPERTENSION COMPLICATING OR REASON FOR CARE DURING PREGNANCY, THIRD TRIMESTER: ICD-10-CM

## 2021-09-28 PROCEDURE — 59025 PR FETAL 2N-STRESS TEST: ICD-10-PCS | Mod: 26,,, | Performed by: OBSTETRICS & GYNECOLOGY

## 2021-09-28 PROCEDURE — 76815 OB US LIMITED FETUS(S): CPT

## 2021-09-28 PROCEDURE — 76815 PR  US,PREGNANT UTERUS,LIMITED, 1/> FETUSES: ICD-10-PCS | Mod: 26,,, | Performed by: OBSTETRICS & GYNECOLOGY

## 2021-09-28 PROCEDURE — 59025 FETAL NON-STRESS TEST: CPT | Mod: 26,,, | Performed by: OBSTETRICS & GYNECOLOGY

## 2021-09-28 PROCEDURE — 59025 FETAL NON-STRESS TEST: CPT

## 2021-09-28 PROCEDURE — 76815 OB US LIMITED FETUS(S): CPT | Mod: 26,,, | Performed by: OBSTETRICS & GYNECOLOGY

## 2021-10-04 ENCOUNTER — PATIENT MESSAGE (OUTPATIENT)
Dept: OBSTETRICS AND GYNECOLOGY | Facility: CLINIC | Age: 36
End: 2021-10-04

## 2021-10-05 ENCOUNTER — CLINICAL SUPPORT (OUTPATIENT)
Dept: OBSTETRICS AND GYNECOLOGY | Facility: CLINIC | Age: 36
End: 2021-10-05
Payer: COMMERCIAL

## 2021-10-05 ENCOUNTER — PATIENT MESSAGE (OUTPATIENT)
Dept: OBSTETRICS AND GYNECOLOGY | Facility: CLINIC | Age: 36
End: 2021-10-05

## 2021-10-05 ENCOUNTER — ROUTINE PRENATAL (OUTPATIENT)
Dept: OBSTETRICS AND GYNECOLOGY | Facility: CLINIC | Age: 36
End: 2021-10-05
Payer: COMMERCIAL

## 2021-10-05 ENCOUNTER — HOSPITAL ENCOUNTER (OUTPATIENT)
Dept: PERINATAL CARE | Facility: OTHER | Age: 36
Discharge: HOME | End: 2021-10-05
Attending: OBSTETRICS & GYNECOLOGY
Payer: COMMERCIAL

## 2021-10-05 ENCOUNTER — IMMUNIZATION (OUTPATIENT)
Dept: INTERNAL MEDICINE | Facility: CLINIC | Age: 36
End: 2021-10-05
Payer: COMMERCIAL

## 2021-10-05 VITALS
SYSTOLIC BLOOD PRESSURE: 158 MMHG | DIASTOLIC BLOOD PRESSURE: 94 MMHG | BODY MASS INDEX: 43.38 KG/M2 | WEIGHT: 268.75 LBS

## 2021-10-05 DIAGNOSIS — Z34.80 SUPERVISION OF OTHER NORMAL PREGNANCY, ANTEPARTUM: Primary | ICD-10-CM

## 2021-10-05 DIAGNOSIS — Z23 FLU VACCINE NEED: Primary | ICD-10-CM

## 2021-10-05 DIAGNOSIS — O10.913 CHRONIC HYPERTENSION COMPLICATING OR REASON FOR CARE DURING PREGNANCY, THIRD TRIMESTER: ICD-10-CM

## 2021-10-05 DIAGNOSIS — Z23 NEED FOR VACCINATION: Primary | ICD-10-CM

## 2021-10-05 LAB
CREAT UR-MCNC: 112 MG/DL (ref 15–325)
PROT UR-MCNC: 9 MG/DL (ref 0–15)
PROT/CREAT UR: 0.08 MG/G{CREAT} (ref 0–0.2)

## 2021-10-05 PROCEDURE — 76815 OB US LIMITED FETUS(S): CPT

## 2021-10-05 PROCEDURE — 59025 FETAL NON-STRESS TEST: CPT | Mod: 26,,, | Performed by: OBSTETRICS & GYNECOLOGY

## 2021-10-05 PROCEDURE — 76815 OB US LIMITED FETUS(S): CPT | Mod: 26,,, | Performed by: OBSTETRICS & GYNECOLOGY

## 2021-10-05 PROCEDURE — 90471 FLU VACCINE (QUAD) GREATER THAN OR EQUAL TO 3YO PRESERVATIVE FREE IM: ICD-10-PCS | Mod: S$GLB,,, | Performed by: OBSTETRICS & GYNECOLOGY

## 2021-10-05 PROCEDURE — 0502F PR SUBSEQUENT PRENATAL CARE: ICD-10-PCS | Mod: CPTII,S$GLB,, | Performed by: OBSTETRICS & GYNECOLOGY

## 2021-10-05 PROCEDURE — 59025 PR FETAL 2N-STRESS TEST: ICD-10-PCS | Mod: 26,,, | Performed by: OBSTETRICS & GYNECOLOGY

## 2021-10-05 PROCEDURE — 90686 FLU VACCINE (QUAD) GREATER THAN OR EQUAL TO 3YO PRESERVATIVE FREE IM: ICD-10-PCS | Mod: S$GLB,,, | Performed by: OBSTETRICS & GYNECOLOGY

## 2021-10-05 PROCEDURE — 91300 COVID-19, MRNA, LNP-S, PF, 30 MCG/0.3 ML DOSE VACCINE: CPT | Mod: PBBFAC | Performed by: INTERNAL MEDICINE

## 2021-10-05 PROCEDURE — 90471 IMMUNIZATION ADMIN: CPT | Mod: S$GLB,,, | Performed by: OBSTETRICS & GYNECOLOGY

## 2021-10-05 PROCEDURE — 87081 CULTURE SCREEN ONLY: CPT | Performed by: OBSTETRICS & GYNECOLOGY

## 2021-10-05 PROCEDURE — 99999 PR PBB SHADOW E&M-EST. PATIENT-LVL I: CPT | Mod: PBBFAC,,, | Performed by: OBSTETRICS & GYNECOLOGY

## 2021-10-05 PROCEDURE — 90686 IIV4 VACC NO PRSV 0.5 ML IM: CPT | Mod: S$GLB,,, | Performed by: OBSTETRICS & GYNECOLOGY

## 2021-10-05 PROCEDURE — 76815 PR  US,PREGNANT UTERUS,LIMITED, 1/> FETUSES: ICD-10-PCS | Mod: 26,,, | Performed by: OBSTETRICS & GYNECOLOGY

## 2021-10-05 PROCEDURE — 99999 PR PBB SHADOW E&M-EST. PATIENT-LVL I: ICD-10-PCS | Mod: PBBFAC,,, | Performed by: OBSTETRICS & GYNECOLOGY

## 2021-10-05 PROCEDURE — 0003A COVID-19, MRNA, LNP-S, PF, 30 MCG/0.3 ML DOSE VACCINE: CPT | Mod: PBBFAC | Performed by: INTERNAL MEDICINE

## 2021-10-05 PROCEDURE — 82570 ASSAY OF URINE CREATININE: CPT | Performed by: OBSTETRICS & GYNECOLOGY

## 2021-10-05 PROCEDURE — 59025 FETAL NON-STRESS TEST: CPT

## 2021-10-05 PROCEDURE — 0502F SUBSEQUENT PRENATAL CARE: CPT | Mod: CPTII,S$GLB,, | Performed by: OBSTETRICS & GYNECOLOGY

## 2021-10-07 ENCOUNTER — PATIENT MESSAGE (OUTPATIENT)
Dept: OBSTETRICS AND GYNECOLOGY | Facility: CLINIC | Age: 36
End: 2021-10-07

## 2021-10-08 LAB — BACTERIA SPEC AEROBE CULT: NORMAL

## 2021-10-10 ENCOUNTER — HOSPITAL ENCOUNTER (INPATIENT)
Facility: OTHER | Age: 36
LOS: 2 days | Discharge: HOME | End: 2021-10-12
Attending: OBSTETRICS & GYNECOLOGY | Admitting: OBSTETRICS & GYNECOLOGY
Payer: COMMERCIAL

## 2021-10-10 ENCOUNTER — ANESTHESIA (OUTPATIENT)
Dept: OBSTETRICS AND GYNECOLOGY | Facility: OTHER | Age: 36
End: 2021-10-10
Payer: COMMERCIAL

## 2021-10-10 ENCOUNTER — ANESTHESIA EVENT (OUTPATIENT)
Dept: OBSTETRICS AND GYNECOLOGY | Facility: OTHER | Age: 36
End: 2021-10-10
Payer: COMMERCIAL

## 2021-10-10 DIAGNOSIS — R51.9 ACUTE NONINTRACTABLE HEADACHE, UNSPECIFIED HEADACHE TYPE: ICD-10-CM

## 2021-10-10 DIAGNOSIS — Z3A.37 37 WEEKS GESTATION OF PREGNANCY: ICD-10-CM

## 2021-10-10 DIAGNOSIS — O10.913 CHRONIC HYPERTENSION COMPLICATING OR REASON FOR CARE DURING PREGNANCY, THIRD TRIMESTER: ICD-10-CM

## 2021-10-10 LAB
ABO + RH BLD: NORMAL
ALBUMIN SERPL BCP-MCNC: 3.1 G/DL (ref 3.5–5.2)
ALP SERPL-CCNC: 158 U/L (ref 55–135)
ALT SERPL W/O P-5'-P-CCNC: 9 U/L (ref 10–44)
ANION GAP SERPL CALC-SCNC: 12 MMOL/L (ref 8–16)
AST SERPL-CCNC: 14 U/L (ref 10–40)
BASOPHILS # BLD AUTO: 0.04 K/UL (ref 0–0.2)
BASOPHILS NFR BLD: 0.4 % (ref 0–1.9)
BILIRUB SERPL-MCNC: 0.2 MG/DL (ref 0.1–1)
BLD GP AB SCN CELLS X3 SERPL QL: NORMAL
BLOOD GROUP ANTIBODIES SERPL: NORMAL
BUN SERPL-MCNC: 4 MG/DL (ref 6–20)
CALCIUM SERPL-MCNC: 10 MG/DL (ref 8.7–10.5)
CHLORIDE SERPL-SCNC: 107 MMOL/L (ref 95–110)
CO2 SERPL-SCNC: 18 MMOL/L (ref 23–29)
CREAT SERPL-MCNC: 0.6 MG/DL (ref 0.5–1.4)
CREAT UR-MCNC: 75.7 MG/DL (ref 15–325)
DIFFERENTIAL METHOD: ABNORMAL
EOSINOPHIL # BLD AUTO: 0.2 K/UL (ref 0–0.5)
EOSINOPHIL NFR BLD: 1.4 % (ref 0–8)
ERYTHROCYTE [DISTWIDTH] IN BLOOD BY AUTOMATED COUNT: 13.4 % (ref 11.5–14.5)
EST. GFR  (AFRICAN AMERICAN): >60 ML/MIN/1.73 M^2
EST. GFR  (NON AFRICAN AMERICAN): >60 ML/MIN/1.73 M^2
GLUCOSE SERPL-MCNC: 104 MG/DL (ref 70–110)
HCT VFR BLD AUTO: 35.8 % (ref 37–48.5)
HGB BLD-MCNC: 12.1 G/DL (ref 12–16)
IMM GRANULOCYTES # BLD AUTO: 0.19 K/UL (ref 0–0.04)
IMM GRANULOCYTES NFR BLD AUTO: 1.8 % (ref 0–0.5)
LYMPHOCYTES # BLD AUTO: 2.9 K/UL (ref 1–4.8)
LYMPHOCYTES NFR BLD: 27.6 % (ref 18–48)
MCH RBC QN AUTO: 28.4 PG (ref 27–31)
MCHC RBC AUTO-ENTMCNC: 33.8 G/DL (ref 32–36)
MCV RBC AUTO: 84 FL (ref 82–98)
MONOCYTES # BLD AUTO: 0.8 K/UL (ref 0.3–1)
MONOCYTES NFR BLD: 7.6 % (ref 4–15)
NEUTROPHILS # BLD AUTO: 6.5 K/UL (ref 1.8–7.7)
NEUTROPHILS NFR BLD: 61.2 % (ref 38–73)
NRBC BLD-RTO: 0 /100 WBC
PLATELET # BLD AUTO: 195 K/UL (ref 150–450)
PMV BLD AUTO: 9.9 FL (ref 9.2–12.9)
POTASSIUM SERPL-SCNC: 4 MMOL/L (ref 3.5–5.1)
PROT SERPL-MCNC: 6.7 G/DL (ref 6–8.4)
PROT UR-MCNC: 12 MG/DL (ref 0–15)
PROT/CREAT UR: 0.16 MG/G{CREAT} (ref 0–0.2)
RBC # BLD AUTO: 4.26 M/UL (ref 4–5.4)
SARS-COV-2 RDRP RESP QL NAA+PROBE: NEGATIVE
SODIUM SERPL-SCNC: 137 MMOL/L (ref 136–145)
WBC # BLD AUTO: 10.66 K/UL (ref 3.9–12.7)

## 2021-10-10 PROCEDURE — 25000003 PHARM REV CODE 250: Performed by: STUDENT IN AN ORGANIZED HEALTH CARE EDUCATION/TRAINING PROGRAM

## 2021-10-10 PROCEDURE — 99499 NO LOS: ICD-10-PCS | Mod: ,,, | Performed by: OBSTETRICS & GYNECOLOGY

## 2021-10-10 PROCEDURE — 96375 TX/PRO/DX INJ NEW DRUG ADDON: CPT

## 2021-10-10 PROCEDURE — 59409 OBSTETRICAL CARE: CPT | Mod: QY,,, | Performed by: ANESTHESIOLOGY

## 2021-10-10 PROCEDURE — 25000003 PHARM REV CODE 250

## 2021-10-10 PROCEDURE — 59025 PR FETAL 2N-STRESS TEST: ICD-10-PCS | Mod: 26,,, | Performed by: OBSTETRICS & GYNECOLOGY

## 2021-10-10 PROCEDURE — 59400 PR FULL ROUT OBSTE CARE,VAGINAL DELIV: ICD-10-PCS | Mod: ,,, | Performed by: OBSTETRICS & GYNECOLOGY

## 2021-10-10 PROCEDURE — 59025 FETAL NON-STRESS TEST: CPT

## 2021-10-10 PROCEDURE — 96374 THER/PROPH/DIAG INJ IV PUSH: CPT

## 2021-10-10 PROCEDURE — 85025 COMPLETE CBC W/AUTO DIFF WBC: CPT | Performed by: STUDENT IN AN ORGANIZED HEALTH CARE EDUCATION/TRAINING PROGRAM

## 2021-10-10 PROCEDURE — 99285 EMERGENCY DEPT VISIT HI MDM: CPT | Mod: 25

## 2021-10-10 PROCEDURE — 62326 NJX INTERLAMINAR LMBR/SAC: CPT | Performed by: STUDENT IN AN ORGANIZED HEALTH CARE EDUCATION/TRAINING PROGRAM

## 2021-10-10 PROCEDURE — U0002 COVID-19 LAB TEST NON-CDC: HCPCS | Performed by: STUDENT IN AN ORGANIZED HEALTH CARE EDUCATION/TRAINING PROGRAM

## 2021-10-10 PROCEDURE — 59200 INSERT CERVICAL DILATOR: CPT

## 2021-10-10 PROCEDURE — 51702 INSERT TEMP BLADDER CATH: CPT

## 2021-10-10 PROCEDURE — 80053 COMPREHEN METABOLIC PANEL: CPT | Performed by: STUDENT IN AN ORGANIZED HEALTH CARE EDUCATION/TRAINING PROGRAM

## 2021-10-10 PROCEDURE — 59025 FETAL NON-STRESS TEST: CPT | Mod: 26,,, | Performed by: OBSTETRICS & GYNECOLOGY

## 2021-10-10 PROCEDURE — C1751 CATH, INF, PER/CENT/MIDLINE: HCPCS | Performed by: ANESTHESIOLOGY

## 2021-10-10 PROCEDURE — 86870 RBC ANTIBODY IDENTIFICATION: CPT | Performed by: STUDENT IN AN ORGANIZED HEALTH CARE EDUCATION/TRAINING PROGRAM

## 2021-10-10 PROCEDURE — 63600175 PHARM REV CODE 636 W HCPCS: Performed by: STUDENT IN AN ORGANIZED HEALTH CARE EDUCATION/TRAINING PROGRAM

## 2021-10-10 PROCEDURE — 72100002 HC LABOR CARE, 1ST 8 HOURS

## 2021-10-10 PROCEDURE — 59400 OBSTETRICAL CARE: CPT | Mod: ,,, | Performed by: OBSTETRICS & GYNECOLOGY

## 2021-10-10 PROCEDURE — 82570 ASSAY OF URINE CREATININE: CPT | Performed by: STUDENT IN AN ORGANIZED HEALTH CARE EDUCATION/TRAINING PROGRAM

## 2021-10-10 PROCEDURE — 11000001 HC ACUTE MED/SURG PRIVATE ROOM

## 2021-10-10 PROCEDURE — 27200710 HC EPIDURAL INFUSION PUMP SET: Performed by: ANESTHESIOLOGY

## 2021-10-10 PROCEDURE — 86900 BLOOD TYPING SEROLOGIC ABO: CPT | Performed by: STUDENT IN AN ORGANIZED HEALTH CARE EDUCATION/TRAINING PROGRAM

## 2021-10-10 PROCEDURE — 99499 UNLISTED E&M SERVICE: CPT | Mod: ,,, | Performed by: OBSTETRICS & GYNECOLOGY

## 2021-10-10 PROCEDURE — 59409 PRA ETRICAL CARE,VAG DELIV ONLY: ICD-10-PCS | Mod: QY,,, | Performed by: ANESTHESIOLOGY

## 2021-10-10 PROCEDURE — 72200005 HC VAGINAL DELIVERY LEVEL II

## 2021-10-10 RX ORDER — AMLODIPINE BESYLATE 5 MG/1
5 TABLET ORAL ONCE
Status: COMPLETED | OUTPATIENT
Start: 2021-10-10 | End: 2021-10-10

## 2021-10-10 RX ORDER — BUPIVACAINE HYDROCHLORIDE 2.5 MG/ML
INJECTION, SOLUTION EPIDURAL; INFILTRATION; INTRACAUDAL
Status: DISPENSED
Start: 2021-10-10 | End: 2021-10-10

## 2021-10-10 RX ORDER — ACETAMINOPHEN 500 MG
1000 TABLET ORAL ONCE
Status: COMPLETED | OUTPATIENT
Start: 2021-10-10 | End: 2021-10-10

## 2021-10-10 RX ORDER — TRANEXAMIC ACID 100 MG/ML
1000 INJECTION, SOLUTION INTRAVENOUS ONCE AS NEEDED
Status: DISCONTINUED | OUTPATIENT
Start: 2021-10-10 | End: 2021-10-11

## 2021-10-10 RX ORDER — PROCHLORPERAZINE EDISYLATE 5 MG/ML
5 INJECTION INTRAMUSCULAR; INTRAVENOUS EVERY 6 HOURS PRN
Status: DISCONTINUED | OUTPATIENT
Start: 2021-10-10 | End: 2021-10-11

## 2021-10-10 RX ORDER — CALCIUM CARBONATE 200(500)MG
500 TABLET,CHEWABLE ORAL 3 TIMES DAILY PRN
Status: DISCONTINUED | OUTPATIENT
Start: 2021-10-10 | End: 2021-10-11

## 2021-10-10 RX ORDER — SODIUM CHLORIDE 9 MG/ML
INJECTION, SOLUTION INTRAVENOUS
Status: DISCONTINUED | OUTPATIENT
Start: 2021-10-10 | End: 2021-10-11

## 2021-10-10 RX ORDER — ONDANSETRON 8 MG/1
8 TABLET, ORALLY DISINTEGRATING ORAL EVERY 8 HOURS PRN
Status: DISCONTINUED | OUTPATIENT
Start: 2021-10-10 | End: 2021-10-11

## 2021-10-10 RX ORDER — MISOPROSTOL 200 UG/1
800 TABLET ORAL
Status: DISCONTINUED | OUTPATIENT
Start: 2021-10-10 | End: 2021-10-11

## 2021-10-10 RX ORDER — FENTANYL CITRATE 50 UG/ML
INJECTION, SOLUTION INTRAMUSCULAR; INTRAVENOUS
Status: COMPLETED
Start: 2021-10-10 | End: 2021-10-10

## 2021-10-10 RX ORDER — BUPIVACAINE HYDROCHLORIDE 2.5 MG/ML
INJECTION, SOLUTION EPIDURAL; INFILTRATION; INTRACAUDAL
Status: DISPENSED
Start: 2021-10-10 | End: 2021-10-11

## 2021-10-10 RX ORDER — OXYTOCIN/RINGER'S LACTATE 30/500 ML
95 PLASTIC BAG, INJECTION (ML) INTRAVENOUS ONCE
Status: COMPLETED | OUTPATIENT
Start: 2021-10-10 | End: 2021-10-10

## 2021-10-10 RX ORDER — OXYTOCIN/RINGER'S LACTATE 30/500 ML
334 PLASTIC BAG, INJECTION (ML) INTRAVENOUS ONCE
Status: COMPLETED | OUTPATIENT
Start: 2021-10-10 | End: 2021-10-10

## 2021-10-10 RX ORDER — BUPIVACAINE HYDROCHLORIDE 2.5 MG/ML
INJECTION, SOLUTION INFILTRATION; PERINEURAL
Status: DISCONTINUED | OUTPATIENT
Start: 2021-10-10 | End: 2021-10-10

## 2021-10-10 RX ORDER — LIDOCAINE HYDROCHLORIDE AND EPINEPHRINE 15; 5 MG/ML; UG/ML
INJECTION, SOLUTION EPIDURAL
Status: DISCONTINUED | OUTPATIENT
Start: 2021-10-10 | End: 2021-10-10

## 2021-10-10 RX ORDER — FAMOTIDINE 10 MG/ML
20 INJECTION INTRAVENOUS ONCE
Status: COMPLETED | OUTPATIENT
Start: 2021-10-10 | End: 2021-10-10

## 2021-10-10 RX ORDER — FENTANYL/BUPIVACAINE/NS/PF 2MCG/ML-.1
PLASTIC BAG, INJECTION (ML) INJECTION
Status: DISPENSED
Start: 2021-10-10 | End: 2021-10-10

## 2021-10-10 RX ORDER — DIPHENOXYLATE HYDROCHLORIDE AND ATROPINE SULFATE 2.5; .025 MG/1; MG/1
1 TABLET ORAL 4 TIMES DAILY PRN
Status: DISCONTINUED | OUTPATIENT
Start: 2021-10-10 | End: 2021-10-11

## 2021-10-10 RX ORDER — LABETALOL HYDROCHLORIDE 5 MG/ML
20 INJECTION, SOLUTION INTRAVENOUS ONCE
Status: COMPLETED | OUTPATIENT
Start: 2021-10-10 | End: 2021-10-10

## 2021-10-10 RX ORDER — METHYLERGONOVINE MALEATE 0.2 MG/ML
200 INJECTION INTRAVENOUS
Status: DISCONTINUED | OUTPATIENT
Start: 2021-10-10 | End: 2021-10-11

## 2021-10-10 RX ORDER — FAMOTIDINE 10 MG/ML
20 INJECTION INTRAVENOUS ONCE
Status: DISCONTINUED | OUTPATIENT
Start: 2021-10-10 | End: 2021-10-11

## 2021-10-10 RX ORDER — SODIUM CITRATE AND CITRIC ACID MONOHYDRATE 334; 500 MG/5ML; MG/5ML
30 SOLUTION ORAL ONCE
Status: DISCONTINUED | OUTPATIENT
Start: 2021-10-10 | End: 2021-10-11

## 2021-10-10 RX ORDER — ONDANSETRON 2 MG/ML
INJECTION INTRAMUSCULAR; INTRAVENOUS
Status: COMPLETED
Start: 2021-10-10 | End: 2021-10-10

## 2021-10-10 RX ORDER — OXYTOCIN/RINGER'S LACTATE 30/500 ML
0-30 PLASTIC BAG, INJECTION (ML) INTRAVENOUS CONTINUOUS
Status: DISCONTINUED | OUTPATIENT
Start: 2021-10-10 | End: 2021-10-11

## 2021-10-10 RX ORDER — FENTANYL CITRATE 50 UG/ML
INJECTION, SOLUTION INTRAMUSCULAR; INTRAVENOUS
Status: DISCONTINUED | OUTPATIENT
Start: 2021-10-10 | End: 2021-10-10

## 2021-10-10 RX ORDER — FENTANYL/BUPIVACAINE/NS/PF 2MCG/ML-.1
PLASTIC BAG, INJECTION (ML) INJECTION CONTINUOUS
Status: DISCONTINUED | OUTPATIENT
Start: 2021-10-10 | End: 2021-10-11

## 2021-10-10 RX ORDER — ACETAMINOPHEN 500 MG
TABLET ORAL
Status: COMPLETED
Start: 2021-10-10 | End: 2021-10-10

## 2021-10-10 RX ORDER — AMLODIPINE BESYLATE 5 MG/1
5 TABLET ORAL DAILY
Status: DISCONTINUED | OUTPATIENT
Start: 2021-10-11 | End: 2021-10-12 | Stop reason: HOSPADM

## 2021-10-10 RX ORDER — CARBOPROST TROMETHAMINE 250 UG/ML
250 INJECTION, SOLUTION INTRAMUSCULAR
Status: DISCONTINUED | OUTPATIENT
Start: 2021-10-10 | End: 2021-10-11

## 2021-10-10 RX ORDER — SIMETHICONE 80 MG
1 TABLET,CHEWABLE ORAL 4 TIMES DAILY PRN
Status: DISCONTINUED | OUTPATIENT
Start: 2021-10-10 | End: 2021-10-11

## 2021-10-10 RX ORDER — SODIUM CHLORIDE, SODIUM LACTATE, POTASSIUM CHLORIDE, CALCIUM CHLORIDE 600; 310; 30; 20 MG/100ML; MG/100ML; MG/100ML; MG/100ML
INJECTION, SOLUTION INTRAVENOUS CONTINUOUS
Status: DISCONTINUED | OUTPATIENT
Start: 2021-10-10 | End: 2021-10-11

## 2021-10-10 RX ADMIN — LIDOCAINE HYDROCHLORIDE,EPINEPHRINE BITARTRATE 3 ML: 15; .005 INJECTION, SOLUTION EPIDURAL; INFILTRATION; INTRACAUDAL; PERINEURAL at 09:10

## 2021-10-10 RX ADMIN — AMLODIPINE BESYLATE 5 MG: 5 TABLET ORAL at 06:10

## 2021-10-10 RX ADMIN — Medication 10 ML: at 10:10

## 2021-10-10 RX ADMIN — Medication 2 MILLI-UNITS/MIN: at 10:10

## 2021-10-10 RX ADMIN — FENTANYL CITRATE 100 MCG: 50 INJECTION, SOLUTION INTRAMUSCULAR; INTRAVENOUS at 05:10

## 2021-10-10 RX ADMIN — BUPIVACAINE HYDROCHLORIDE 5 ML: 2.5 INJECTION, SOLUTION INFILTRATION; PERINEURAL at 05:10

## 2021-10-10 RX ADMIN — FENTANYL CITRATE 100 MCG: 50 INJECTION, SOLUTION INTRAMUSCULAR; INTRAVENOUS at 10:10

## 2021-10-10 RX ADMIN — Medication 9 ML: at 03:10

## 2021-10-10 RX ADMIN — Medication 9 ML: at 04:10

## 2021-10-10 RX ADMIN — ACETAMINOPHEN 1000 MG: 500 TABLET, FILM COATED ORAL at 07:10

## 2021-10-10 RX ADMIN — SODIUM CHLORIDE, SODIUM LACTATE, POTASSIUM CHLORIDE, AND CALCIUM CHLORIDE: .6; .31; .03; .02 INJECTION, SOLUTION INTRAVENOUS at 09:10

## 2021-10-10 RX ADMIN — LABETALOL HYDROCHLORIDE 20 MG: 5 INJECTION INTRAVENOUS at 06:10

## 2021-10-10 RX ADMIN — Medication 95 MILLI-UNITS/MIN: at 10:10

## 2021-10-10 RX ADMIN — Medication 1000 MG: at 07:10

## 2021-10-10 RX ADMIN — Medication 334 MILLI-UNITS/MIN: at 07:10

## 2021-10-10 RX ADMIN — FAMOTIDINE 20 MG: 10 INJECTION INTRAVENOUS at 08:10

## 2021-10-11 LAB
ABO + RH BLD: NORMAL
BASOPHILS # BLD AUTO: 0.04 K/UL (ref 0–0.2)
BASOPHILS NFR BLD: 0.3 % (ref 0–1.9)
BLD GP AB SCN CELLS X3 SERPL QL: NORMAL
DIFFERENTIAL METHOD: ABNORMAL
EOSINOPHIL # BLD AUTO: 0.1 K/UL (ref 0–0.5)
EOSINOPHIL NFR BLD: 0.3 % (ref 0–8)
ERYTHROCYTE [DISTWIDTH] IN BLOOD BY AUTOMATED COUNT: 13.3 % (ref 11.5–14.5)
FETAL CELL SCN BLD QL ROSETTE: NORMAL
HCT VFR BLD AUTO: 35.8 % (ref 37–48.5)
HGB BLD-MCNC: 11.8 G/DL (ref 12–16)
IMM GRANULOCYTES # BLD AUTO: 0.14 K/UL (ref 0–0.04)
IMM GRANULOCYTES NFR BLD AUTO: 0.9 % (ref 0–0.5)
INJECT RH IG VOL PATIENT: NORMAL ML
LYMPHOCYTES # BLD AUTO: 2.5 K/UL (ref 1–4.8)
LYMPHOCYTES NFR BLD: 16.5 % (ref 18–48)
MCH RBC QN AUTO: 28.2 PG (ref 27–31)
MCHC RBC AUTO-ENTMCNC: 33 G/DL (ref 32–36)
MCV RBC AUTO: 86 FL (ref 82–98)
MONOCYTES # BLD AUTO: 1 K/UL (ref 0.3–1)
MONOCYTES NFR BLD: 6.3 % (ref 4–15)
NEUTROPHILS # BLD AUTO: 11.4 K/UL (ref 1.8–7.7)
NEUTROPHILS NFR BLD: 75.7 % (ref 38–73)
NRBC BLD-RTO: 0 /100 WBC
PLATELET # BLD AUTO: 172 K/UL (ref 150–450)
PMV BLD AUTO: 10 FL (ref 9.2–12.9)
RBC # BLD AUTO: 4.18 M/UL (ref 4–5.4)
WBC # BLD AUTO: 15.11 K/UL (ref 3.9–12.7)

## 2021-10-11 PROCEDURE — 85025 COMPLETE CBC W/AUTO DIFF WBC: CPT | Performed by: STUDENT IN AN ORGANIZED HEALTH CARE EDUCATION/TRAINING PROGRAM

## 2021-10-11 PROCEDURE — 36415 COLL VENOUS BLD VENIPUNCTURE: CPT | Performed by: STUDENT IN AN ORGANIZED HEALTH CARE EDUCATION/TRAINING PROGRAM

## 2021-10-11 PROCEDURE — 86900 BLOOD TYPING SEROLOGIC ABO: CPT | Performed by: STUDENT IN AN ORGANIZED HEALTH CARE EDUCATION/TRAINING PROGRAM

## 2021-10-11 PROCEDURE — 85461 HEMOGLOBIN FETAL: CPT | Performed by: STUDENT IN AN ORGANIZED HEALTH CARE EDUCATION/TRAINING PROGRAM

## 2021-10-11 PROCEDURE — 63600519 RHOGAM PHARM REV CODE 636 ALT 250 W HCPCS: Performed by: STUDENT IN AN ORGANIZED HEALTH CARE EDUCATION/TRAINING PROGRAM

## 2021-10-11 PROCEDURE — 25000003 PHARM REV CODE 250: Performed by: OBSTETRICS & GYNECOLOGY

## 2021-10-11 PROCEDURE — 25000003 PHARM REV CODE 250: Performed by: STUDENT IN AN ORGANIZED HEALTH CARE EDUCATION/TRAINING PROGRAM

## 2021-10-11 PROCEDURE — 11000001 HC ACUTE MED/SURG PRIVATE ROOM

## 2021-10-11 RX ORDER — HYDROCORTISONE 25 MG/G
CREAM TOPICAL 3 TIMES DAILY PRN
Status: DISCONTINUED | OUTPATIENT
Start: 2021-10-11 | End: 2021-10-12 | Stop reason: HOSPADM

## 2021-10-11 RX ORDER — HYDROCODONE BITARTRATE AND ACETAMINOPHEN 10; 325 MG/1; MG/1
1 TABLET ORAL EVERY 4 HOURS PRN
Status: DISCONTINUED | OUTPATIENT
Start: 2021-10-11 | End: 2021-10-12 | Stop reason: HOSPADM

## 2021-10-11 RX ORDER — ACETAMINOPHEN 325 MG/1
650 TABLET ORAL EVERY 6 HOURS PRN
Status: DISCONTINUED | OUTPATIENT
Start: 2021-10-11 | End: 2021-10-12 | Stop reason: HOSPADM

## 2021-10-11 RX ORDER — OXYTOCIN/RINGER'S LACTATE 30/500 ML
95 PLASTIC BAG, INJECTION (ML) INTRAVENOUS ONCE
Status: DISCONTINUED | OUTPATIENT
Start: 2021-10-11 | End: 2021-10-11

## 2021-10-11 RX ORDER — DIPHENHYDRAMINE HCL 25 MG
25 CAPSULE ORAL EVERY 4 HOURS PRN
Status: DISCONTINUED | OUTPATIENT
Start: 2021-10-11 | End: 2021-10-12 | Stop reason: HOSPADM

## 2021-10-11 RX ORDER — DOCUSATE SODIUM 100 MG/1
200 CAPSULE, LIQUID FILLED ORAL 2 TIMES DAILY PRN
Status: DISCONTINUED | OUTPATIENT
Start: 2021-10-11 | End: 2021-10-12 | Stop reason: HOSPADM

## 2021-10-11 RX ORDER — ONDANSETRON 8 MG/1
8 TABLET, ORALLY DISINTEGRATING ORAL EVERY 8 HOURS PRN
Status: DISCONTINUED | OUTPATIENT
Start: 2021-10-11 | End: 2021-10-12 | Stop reason: HOSPADM

## 2021-10-11 RX ORDER — DIPHENHYDRAMINE HYDROCHLORIDE 50 MG/ML
25 INJECTION INTRAMUSCULAR; INTRAVENOUS EVERY 4 HOURS PRN
Status: DISCONTINUED | OUTPATIENT
Start: 2021-10-11 | End: 2021-10-12 | Stop reason: HOSPADM

## 2021-10-11 RX ORDER — IBUPROFEN 600 MG/1
600 TABLET ORAL EVERY 6 HOURS
Status: DISCONTINUED | OUTPATIENT
Start: 2021-10-11 | End: 2021-10-11

## 2021-10-11 RX ORDER — IBUPROFEN 600 MG/1
600 TABLET ORAL EVERY 6 HOURS SCHEDULED
Status: DISCONTINUED | OUTPATIENT
Start: 2021-10-11 | End: 2021-10-12 | Stop reason: HOSPADM

## 2021-10-11 RX ORDER — SIMETHICONE 80 MG
1 TABLET,CHEWABLE ORAL EVERY 6 HOURS PRN
Status: DISCONTINUED | OUTPATIENT
Start: 2021-10-11 | End: 2021-10-12 | Stop reason: HOSPADM

## 2021-10-11 RX ORDER — PRENATAL WITH FERROUS FUM AND FOLIC ACID 3080; 920; 120; 400; 22; 1.84; 3; 20; 10; 1; 12; 200; 27; 25; 2 [IU]/1; [IU]/1; MG/1; [IU]/1; MG/1; MG/1; MG/1; MG/1; MG/1; MG/1; UG/1; MG/1; MG/1; MG/1; MG/1
1 TABLET ORAL DAILY
Status: DISCONTINUED | OUTPATIENT
Start: 2021-10-11 | End: 2021-10-12 | Stop reason: HOSPADM

## 2021-10-11 RX ORDER — SODIUM CHLORIDE 0.9 % (FLUSH) 0.9 %
10 SYRINGE (ML) INJECTION
Status: DISCONTINUED | OUTPATIENT
Start: 2021-10-11 | End: 2021-10-12 | Stop reason: HOSPADM

## 2021-10-11 RX ORDER — HYDROCODONE BITARTRATE AND ACETAMINOPHEN 5; 325 MG/1; MG/1
1 TABLET ORAL EVERY 4 HOURS PRN
Status: DISCONTINUED | OUTPATIENT
Start: 2021-10-11 | End: 2021-10-12 | Stop reason: HOSPADM

## 2021-10-11 RX ORDER — PROCHLORPERAZINE EDISYLATE 5 MG/ML
5 INJECTION INTRAMUSCULAR; INTRAVENOUS EVERY 6 HOURS PRN
Status: DISCONTINUED | OUTPATIENT
Start: 2021-10-11 | End: 2021-10-12 | Stop reason: HOSPADM

## 2021-10-11 RX ADMIN — AMLODIPINE BESYLATE 5 MG: 5 TABLET ORAL at 09:10

## 2021-10-11 RX ADMIN — PRENATAL VIT W/ FE FUMARATE-FA TAB 27-0.8 MG 1 TABLET: 27-0.8 TAB at 09:10

## 2021-10-11 RX ADMIN — IBUPROFEN 600 MG: 600 TABLET ORAL at 09:10

## 2021-10-11 RX ADMIN — HYDROCODONE BITARTRATE AND ACETAMINOPHEN 1 TABLET: 10; 325 TABLET ORAL at 11:10

## 2021-10-11 RX ADMIN — DOCUSATE SODIUM 200 MG: 100 CAPSULE, LIQUID FILLED ORAL at 09:10

## 2021-10-11 RX ADMIN — IBUPROFEN 600 MG: 600 TABLET ORAL at 03:10

## 2021-10-11 RX ADMIN — HYDROCODONE BITARTRATE AND ACETAMINOPHEN 1 TABLET: 10; 325 TABLET ORAL at 03:10

## 2021-10-11 RX ADMIN — Medication: at 09:10

## 2021-10-11 RX ADMIN — IBUPROFEN 600 MG: 600 TABLET ORAL at 02:10

## 2021-10-11 RX ADMIN — HYDROCODONE BITARTRATE AND ACETAMINOPHEN 1 TABLET: 10; 325 TABLET ORAL at 07:10

## 2021-10-11 RX ADMIN — HUMAN RHO(D) IMMUNE GLOBULIN 300 MCG: 300 INJECTION, SOLUTION INTRAMUSCULAR at 05:10

## 2021-10-12 VITALS
WEIGHT: 269 LBS | DIASTOLIC BLOOD PRESSURE: 79 MMHG | OXYGEN SATURATION: 97 % | TEMPERATURE: 99 F | BODY MASS INDEX: 43.23 KG/M2 | HEART RATE: 83 BPM | SYSTOLIC BLOOD PRESSURE: 151 MMHG | HEIGHT: 66 IN | RESPIRATION RATE: 20 BRPM

## 2021-10-12 PROCEDURE — 99024 PR POST-OP FOLLOW-UP VISIT: ICD-10-PCS | Mod: ,,, | Performed by: OBSTETRICS & GYNECOLOGY

## 2021-10-12 PROCEDURE — 99024 POSTOP FOLLOW-UP VISIT: CPT | Mod: ,,, | Performed by: OBSTETRICS & GYNECOLOGY

## 2021-10-12 PROCEDURE — 25000003 PHARM REV CODE 250: Performed by: STUDENT IN AN ORGANIZED HEALTH CARE EDUCATION/TRAINING PROGRAM

## 2021-10-12 PROCEDURE — 25000003 PHARM REV CODE 250: Performed by: OBSTETRICS & GYNECOLOGY

## 2021-10-12 RX ORDER — IBUPROFEN 600 MG/1
600 TABLET ORAL EVERY 6 HOURS
Qty: 30 TABLET | Refills: 0 | Status: SHIPPED | OUTPATIENT
Start: 2021-10-12 | End: 2022-01-05

## 2021-10-12 RX ORDER — DOCUSATE SODIUM 100 MG/1
200 CAPSULE, LIQUID FILLED ORAL 2 TIMES DAILY PRN
Qty: 60 CAPSULE | Refills: 2 | Status: SHIPPED | OUTPATIENT
Start: 2021-10-12 | End: 2022-01-05

## 2021-10-12 RX ORDER — AMLODIPINE BESYLATE 10 MG/1
10 TABLET ORAL DAILY
Qty: 30 TABLET | Refills: 3 | Status: SHIPPED | OUTPATIENT
Start: 2021-10-12 | End: 2022-05-11

## 2021-10-12 RX ORDER — HYDROCODONE BITARTRATE AND ACETAMINOPHEN 5; 325 MG/1; MG/1
1 TABLET ORAL EVERY 4 HOURS PRN
Qty: 10 TABLET | Refills: 0 | Status: SHIPPED | OUTPATIENT
Start: 2021-10-12 | End: 2022-01-05

## 2021-10-12 RX ADMIN — IBUPROFEN 600 MG: 600 TABLET ORAL at 03:10

## 2021-10-12 RX ADMIN — IBUPROFEN 600 MG: 600 TABLET ORAL at 09:10

## 2021-10-12 RX ADMIN — AMLODIPINE BESYLATE 5 MG: 5 TABLET ORAL at 09:10

## 2021-10-12 RX ADMIN — PRENATAL VIT W/ FE FUMARATE-FA TAB 27-0.8 MG 1 TABLET: 27-0.8 TAB at 09:10

## 2021-10-12 RX ADMIN — HYDROCODONE BITARTRATE AND ACETAMINOPHEN 1 TABLET: 10; 325 TABLET ORAL at 01:10

## 2021-10-14 ENCOUNTER — TELEPHONE (OUTPATIENT)
Dept: LACTATION | Facility: CLINIC | Age: 36
End: 2021-10-14

## 2021-10-15 ENCOUNTER — PATIENT MESSAGE (OUTPATIENT)
Dept: OBSTETRICS AND GYNECOLOGY | Facility: CLINIC | Age: 36
End: 2021-10-15
Payer: COMMERCIAL

## 2021-10-18 ENCOUNTER — TELEPHONE (OUTPATIENT)
Dept: OBSTETRICS AND GYNECOLOGY | Facility: CLINIC | Age: 36
End: 2021-10-18

## 2021-10-20 ENCOUNTER — POSTPARTUM VISIT (OUTPATIENT)
Dept: OBSTETRICS AND GYNECOLOGY | Facility: CLINIC | Age: 36
End: 2021-10-20
Payer: COMMERCIAL

## 2021-10-20 VITALS
HEIGHT: 66 IN | SYSTOLIC BLOOD PRESSURE: 118 MMHG | DIASTOLIC BLOOD PRESSURE: 80 MMHG | WEIGHT: 255.75 LBS | BODY MASS INDEX: 41.1 KG/M2

## 2021-10-20 DIAGNOSIS — Z01.30 BP CHECK: Primary | ICD-10-CM

## 2021-10-20 PROCEDURE — 99212 PR OFFICE/OUTPT VISIT, EST, LEVL II, 10-19 MIN: ICD-10-PCS | Mod: 24,S$GLB,, | Performed by: REGISTERED NURSE

## 2021-10-20 PROCEDURE — 99999 PR PBB SHADOW E&M-EST. PATIENT-LVL III: CPT | Mod: PBBFAC,,,

## 2021-10-20 PROCEDURE — 99999 PR PBB SHADOW E&M-EST. PATIENT-LVL III: ICD-10-PCS | Mod: PBBFAC,,,

## 2021-10-20 PROCEDURE — 99212 OFFICE O/P EST SF 10 MIN: CPT | Mod: 24,S$GLB,, | Performed by: REGISTERED NURSE

## 2021-10-22 ENCOUNTER — TELEPHONE (OUTPATIENT)
Dept: ADMINISTRATIVE | Facility: HOSPITAL | Age: 36
End: 2021-10-22

## 2021-11-03 ENCOUNTER — PATIENT MESSAGE (OUTPATIENT)
Dept: OBSTETRICS AND GYNECOLOGY | Facility: CLINIC | Age: 36
End: 2021-11-03
Payer: COMMERCIAL

## 2021-11-03 DIAGNOSIS — Z30.014 ENCOUNTER FOR INITIAL PRESCRIPTION OF INTRAUTERINE CONTRACEPTIVE DEVICE (IUD): Primary | ICD-10-CM

## 2021-11-03 RX ORDER — AMLODIPINE BESYLATE 5 MG/1
TABLET ORAL
Qty: 90 TABLET | Refills: 0 | OUTPATIENT
Start: 2021-11-03

## 2021-11-16 ENCOUNTER — PATIENT MESSAGE (OUTPATIENT)
Dept: OBSTETRICS AND GYNECOLOGY | Facility: CLINIC | Age: 36
End: 2021-11-16
Payer: COMMERCIAL

## 2021-12-03 ENCOUNTER — PATIENT MESSAGE (OUTPATIENT)
Dept: OBSTETRICS AND GYNECOLOGY | Facility: CLINIC | Age: 36
End: 2021-12-03
Payer: COMMERCIAL

## 2021-12-06 ENCOUNTER — POSTPARTUM VISIT (OUTPATIENT)
Dept: OBSTETRICS AND GYNECOLOGY | Facility: CLINIC | Age: 36
End: 2021-12-06
Payer: COMMERCIAL

## 2021-12-06 VITALS
HEIGHT: 66 IN | WEIGHT: 238.13 LBS | SYSTOLIC BLOOD PRESSURE: 126 MMHG | BODY MASS INDEX: 38.27 KG/M2 | DIASTOLIC BLOOD PRESSURE: 78 MMHG

## 2021-12-06 DIAGNOSIS — Z11.51 ENCOUNTER FOR SCREENING FOR HUMAN PAPILLOMAVIRUS (HPV): ICD-10-CM

## 2021-12-06 DIAGNOSIS — Z30.430 ENCOUNTER FOR IUD INSERTION: ICD-10-CM

## 2021-12-06 DIAGNOSIS — Z30.431 ENCOUNTER FOR ROUTINE CHECKING OF INTRAUTERINE CONTRACEPTIVE DEVICE (IUD): ICD-10-CM

## 2021-12-06 DIAGNOSIS — Z12.4 PAP SMEAR FOR CERVICAL CANCER SCREENING: ICD-10-CM

## 2021-12-06 PROCEDURE — 88141 PR  CYTOPATH CERV/VAG INTERPRET: ICD-10-PCS | Mod: ,,, | Performed by: PATHOLOGY

## 2021-12-06 PROCEDURE — 0503F POSTPARTUM CARE VISIT: CPT | Mod: CPTII,S$GLB,, | Performed by: OBSTETRICS & GYNECOLOGY

## 2021-12-06 PROCEDURE — 99999 PR PBB SHADOW E&M-EST. PATIENT-LVL III: CPT | Mod: PBBFAC,,, | Performed by: OBSTETRICS & GYNECOLOGY

## 2021-12-06 PROCEDURE — 58300 INSERT INTRAUTERINE DEVICE: CPT | Mod: S$GLB,,, | Performed by: OBSTETRICS & GYNECOLOGY

## 2021-12-06 PROCEDURE — 0503F PR POSTPARTUM CARE VISIT: ICD-10-PCS | Mod: CPTII,S$GLB,, | Performed by: OBSTETRICS & GYNECOLOGY

## 2021-12-06 PROCEDURE — 58300 PR INSERT INTRAUTERINE DEVICE: ICD-10-PCS | Mod: S$GLB,,, | Performed by: OBSTETRICS & GYNECOLOGY

## 2021-12-06 PROCEDURE — 88141 CYTOPATH C/V INTERPRET: CPT | Mod: ,,, | Performed by: PATHOLOGY

## 2021-12-06 PROCEDURE — 87624 HPV HI-RISK TYP POOLED RSLT: CPT | Performed by: OBSTETRICS & GYNECOLOGY

## 2021-12-06 PROCEDURE — 99999 PR PBB SHADOW E&M-EST. PATIENT-LVL III: ICD-10-PCS | Mod: PBBFAC,,, | Performed by: OBSTETRICS & GYNECOLOGY

## 2021-12-06 PROCEDURE — 88175 CYTOPATH C/V AUTO FLUID REDO: CPT | Performed by: PATHOLOGY

## 2021-12-13 ENCOUNTER — PATIENT MESSAGE (OUTPATIENT)
Dept: OBSTETRICS AND GYNECOLOGY | Facility: CLINIC | Age: 36
End: 2021-12-13
Payer: COMMERCIAL

## 2021-12-13 LAB
FINAL PATHOLOGIC DIAGNOSIS: ABNORMAL
Lab: ABNORMAL

## 2021-12-14 LAB
HPV HR 12 DNA SPEC QL NAA+PROBE: NEGATIVE
HPV16 AG SPEC QL: NEGATIVE
HPV18 DNA SPEC QL NAA+PROBE: NEGATIVE

## 2021-12-15 ENCOUNTER — PATIENT MESSAGE (OUTPATIENT)
Dept: OBSTETRICS AND GYNECOLOGY | Facility: CLINIC | Age: 36
End: 2021-12-15
Payer: COMMERCIAL

## 2022-01-05 ENCOUNTER — OFFICE VISIT (OUTPATIENT)
Dept: INTERNAL MEDICINE | Facility: CLINIC | Age: 37
End: 2022-01-05
Payer: COMMERCIAL

## 2022-01-05 DIAGNOSIS — B96.89 ACUTE BACTERIAL SINUSITIS: Primary | ICD-10-CM

## 2022-01-05 DIAGNOSIS — J01.90 ACUTE BACTERIAL SINUSITIS: Primary | ICD-10-CM

## 2022-01-05 PROCEDURE — 1159F MED LIST DOCD IN RCRD: CPT | Mod: CPTII,95,, | Performed by: INTERNAL MEDICINE

## 2022-01-05 PROCEDURE — 1159F PR MEDICATION LIST DOCUMENTED IN MEDICAL RECORD: ICD-10-PCS | Mod: CPTII,95,, | Performed by: INTERNAL MEDICINE

## 2022-01-05 PROCEDURE — 99213 OFFICE O/P EST LOW 20 MIN: CPT | Mod: 95,,, | Performed by: INTERNAL MEDICINE

## 2022-01-05 PROCEDURE — 1160F PR REVIEW ALL MEDS BY PRESCRIBER/CLIN PHARMACIST DOCUMENTED: ICD-10-PCS | Mod: CPTII,95,, | Performed by: INTERNAL MEDICINE

## 2022-01-05 PROCEDURE — 99213 PR OFFICE/OUTPT VISIT, EST, LEVL III, 20-29 MIN: ICD-10-PCS | Mod: 95,,, | Performed by: INTERNAL MEDICINE

## 2022-01-05 PROCEDURE — 1160F RVW MEDS BY RX/DR IN RCRD: CPT | Mod: CPTII,95,, | Performed by: INTERNAL MEDICINE

## 2022-01-05 RX ORDER — BENZONATATE 200 MG/1
200 CAPSULE ORAL 3 TIMES DAILY PRN
Qty: 30 CAPSULE | Refills: 1 | Status: SHIPPED | OUTPATIENT
Start: 2022-01-05 | End: 2022-01-15

## 2022-01-05 RX ORDER — AMOXICILLIN AND CLAVULANATE POTASSIUM 875; 125 MG/1; MG/1
1 TABLET, FILM COATED ORAL 2 TIMES DAILY
Qty: 20 TABLET | Refills: 0 | Status: SHIPPED | OUTPATIENT
Start: 2022-01-05 | End: 2022-01-15

## 2022-01-05 NOTE — PROGRESS NOTES
Subjective:       Patient ID: Joann Banuelos is a 36 y.o. female.    Chief Complaint: No chief complaint on file.    HPI  Review of Systems   Constitutional: Negative.    HENT: Positive for nasal congestion, sinus pressure/congestion and sore throat. Negative for hearing loss, sneezing and voice change.    Eyes: Negative for visual disturbance.   Respiratory: Negative for cough, chest tightness and shortness of breath.    Cardiovascular: Negative.  Negative for chest pain, palpitations and leg swelling.   Gastrointestinal: Negative.    Genitourinary: Negative for difficulty urinating, dysuria, flank pain, frequency, hematuria, menstrual problem, pelvic pain, urgency, vaginal bleeding and vaginal discharge.   Musculoskeletal: Negative.  Negative for neck pain and neck stiffness.   Integumentary:  Negative.   Neurological: Negative.  Negative for dizziness, seizures, light-headedness, numbness and headaches.   Psychiatric/Behavioral: Negative for agitation, behavioral problems, confusion and sleep disturbance. The patient is not nervous/anxious.        The patient location is: home  The chief complaint leading to consultation is: sinusitis    Visit type: audiovisual    Face to Face time with patient: 10 min   minutes of total time spent on the encounter, which includes face to face time and non-face to face time preparing to see the patient (eg, review of tests), Obtaining and/or reviewing separately obtained history, Documenting clinical information in the electronic or other health record, Independently interpreting results (not separately reported) and communicating results to the patient/family/caregiver, or Care coordination (not separately reported).         Each patient to whom he or she provides medical services by telemedicine is:  (1) informed of the relationship between the physician and patient and the respective role of any other health care provider with respect to management of the patient; and (2)  notified that he or she may decline to receive medical services by telemedicine and may withdraw from such care at any time.    Notes:  36-year-old female patient who has 3 kids at home with her 1 of which is a very young child and being breast fed.  One of her children was sick 1 week ago with very similar illness.  She has had 3 days of coughing and nasal congestion for 1 day.  She had a negative COVID test 1 day ago.  Patient is not feeling short of breath, change in taste or smell, fever, chills.  Her children are now all otherwise healthy.  Patient has had her COVID vaccination including booster and August.    Physical exam:  Not done since is virtual visit    Impression:  1. Probable acute bacterial sinusitis  2. Acute bacterial bronchitis    Plan:  1. Patient was encouraged to use nasal spray with saline nightly and when she has to during the day 2. Patient was begun on amoxicillin since she likely causes from 1 of the children 3. Patient needed something for cough suppression and I mention what was available she was used Tessalon Perles which were ordered.  Also advised her if the secretions gets sick to get on Mucinex twice a day.      Objective:        Assessment:       Problem List Items Addressed This Visit    None     Visit Diagnoses     Acute bacterial sinusitis    -  Primary    Relevant Medications    amoxicillin-clavulanate 875-125mg (AUGMENTIN) 875-125 mg per tablet    benzonatate (TESSALON) 200 MG capsule          Plan:

## 2022-02-07 ENCOUNTER — HOSPITAL ENCOUNTER (OUTPATIENT)
Dept: RADIOLOGY | Facility: OTHER | Age: 37
Discharge: HOME | End: 2022-02-07
Attending: OBSTETRICS & GYNECOLOGY
Payer: COMMERCIAL

## 2022-02-07 DIAGNOSIS — Z30.431 ENCOUNTER FOR ROUTINE CHECKING OF INTRAUTERINE CONTRACEPTIVE DEVICE (IUD): ICD-10-CM

## 2022-02-07 PROCEDURE — 76830 US PELVIS COMP WITH TRANSVAG NON-OB (XPD): ICD-10-PCS | Mod: 26,,, | Performed by: INTERNAL MEDICINE

## 2022-02-07 PROCEDURE — 76830 TRANSVAGINAL US NON-OB: CPT | Mod: 26,,, | Performed by: INTERNAL MEDICINE

## 2022-02-07 PROCEDURE — 76856 US PELVIS COMP WITH TRANSVAG NON-OB (XPD): ICD-10-PCS | Mod: 26,,, | Performed by: INTERNAL MEDICINE

## 2022-02-07 PROCEDURE — 76856 US EXAM PELVIC COMPLETE: CPT | Mod: 26,,, | Performed by: INTERNAL MEDICINE

## 2022-02-07 PROCEDURE — 76830 TRANSVAGINAL US NON-OB: CPT | Mod: TC

## 2022-02-08 ENCOUNTER — OFFICE VISIT (OUTPATIENT)
Dept: OBSTETRICS AND GYNECOLOGY | Facility: CLINIC | Age: 37
End: 2022-02-08
Payer: COMMERCIAL

## 2022-02-08 VITALS
BODY MASS INDEX: 38.05 KG/M2 | HEIGHT: 66 IN | SYSTOLIC BLOOD PRESSURE: 120 MMHG | DIASTOLIC BLOOD PRESSURE: 68 MMHG | WEIGHT: 236.75 LBS

## 2022-02-08 DIAGNOSIS — Z97.5 IUD (INTRAUTERINE DEVICE) IN PLACE: Primary | ICD-10-CM

## 2022-02-08 PROCEDURE — 3008F PR BODY MASS INDEX (BMI) DOCUMENTED: ICD-10-PCS | Mod: CPTII,S$GLB,, | Performed by: STUDENT IN AN ORGANIZED HEALTH CARE EDUCATION/TRAINING PROGRAM

## 2022-02-08 PROCEDURE — 99213 PR OFFICE/OUTPT VISIT, EST, LEVL III, 20-29 MIN: ICD-10-PCS | Mod: S$GLB,,, | Performed by: STUDENT IN AN ORGANIZED HEALTH CARE EDUCATION/TRAINING PROGRAM

## 2022-02-08 PROCEDURE — 3074F SYST BP LT 130 MM HG: CPT | Mod: CPTII,S$GLB,, | Performed by: STUDENT IN AN ORGANIZED HEALTH CARE EDUCATION/TRAINING PROGRAM

## 2022-02-08 PROCEDURE — 1159F MED LIST DOCD IN RCRD: CPT | Mod: CPTII,S$GLB,, | Performed by: STUDENT IN AN ORGANIZED HEALTH CARE EDUCATION/TRAINING PROGRAM

## 2022-02-08 PROCEDURE — 99999 PR PBB SHADOW E&M-EST. PATIENT-LVL III: ICD-10-PCS | Mod: PBBFAC,,, | Performed by: STUDENT IN AN ORGANIZED HEALTH CARE EDUCATION/TRAINING PROGRAM

## 2022-02-08 PROCEDURE — 3078F PR MOST RECENT DIASTOLIC BLOOD PRESSURE < 80 MM HG: ICD-10-PCS | Mod: CPTII,S$GLB,, | Performed by: STUDENT IN AN ORGANIZED HEALTH CARE EDUCATION/TRAINING PROGRAM

## 2022-02-08 PROCEDURE — 99999 PR PBB SHADOW E&M-EST. PATIENT-LVL III: CPT | Mod: PBBFAC,,, | Performed by: STUDENT IN AN ORGANIZED HEALTH CARE EDUCATION/TRAINING PROGRAM

## 2022-02-08 PROCEDURE — 3078F DIAST BP <80 MM HG: CPT | Mod: CPTII,S$GLB,, | Performed by: STUDENT IN AN ORGANIZED HEALTH CARE EDUCATION/TRAINING PROGRAM

## 2022-02-08 PROCEDURE — 1159F PR MEDICATION LIST DOCUMENTED IN MEDICAL RECORD: ICD-10-PCS | Mod: CPTII,S$GLB,, | Performed by: STUDENT IN AN ORGANIZED HEALTH CARE EDUCATION/TRAINING PROGRAM

## 2022-02-08 PROCEDURE — 3074F PR MOST RECENT SYSTOLIC BLOOD PRESSURE < 130 MM HG: ICD-10-PCS | Mod: CPTII,S$GLB,, | Performed by: STUDENT IN AN ORGANIZED HEALTH CARE EDUCATION/TRAINING PROGRAM

## 2022-02-08 PROCEDURE — 99213 OFFICE O/P EST LOW 20 MIN: CPT | Mod: S$GLB,,, | Performed by: STUDENT IN AN ORGANIZED HEALTH CARE EDUCATION/TRAINING PROGRAM

## 2022-02-08 PROCEDURE — 3008F BODY MASS INDEX DOCD: CPT | Mod: CPTII,S$GLB,, | Performed by: STUDENT IN AN ORGANIZED HEALTH CARE EDUCATION/TRAINING PROGRAM

## 2022-02-08 NOTE — PROGRESS NOTES
History & Physical  Gynecology      SUBJECTIVE:     Chief Complaint: IUD Check       History of Present Illness:  [place] presents for IUD String trim. Pt and partner feeling it with intercourse. US shows in proper position.   Desires trimming of strings.  Had to get one surgically removed in the past    Review of patient's allergies indicates:  No Known Allergies    Past Medical History:   Diagnosis Date    Abnormal Pap smear     Abnormal Pap smear of cervix     Allergy     HPV (human papilloma virus) anogenital infection     Hypertension     Skin disease     eczema    Skin disease     tinea versicolor     Past Surgical History:   Procedure Laterality Date    CERVICAL BIOPSY  W/ LOOP ELECTRODE EXCISION      DILATION AND CURETTAGE OF UTERUS      retained IUD/Endometrial polyp    HYSTEROSCOPY W/ POLYPECTOMY       OB History        6    Para   4    Term   3       1    AB   2    Living   3       SAB   2    IAB        Ectopic        Multiple   0    Live Births   3               Family History   Problem Relation Age of Onset    Diabetes Paternal Grandfather     Diabetes Paternal Grandmother     Diabetes Maternal Grandmother     Diabetes Maternal Grandfather     Hypertension Father     Eczema Sister     Eczema Sister     Diabetes Mother     Hypertension Mother     Breast cancer Neg Hx     Colon cancer Neg Hx     Ovarian cancer Neg Hx     Melanoma Neg Hx     Lupus Neg Hx     Psoriasis Neg Hx      Social History     Tobacco Use    Smoking status: Never Smoker    Smokeless tobacco: Never Used   Substance Use Topics    Alcohol use: Not Currently     Alcohol/week: 7.0 standard drinks     Types: 7 Glasses of wine per week     Comment: Occasionally.    Drug use: No       Current Outpatient Medications   Medication Sig    amLODIPine (NORVASC) 10 MG tablet Take 1 tablet (10 mg total) by mouth once daily.    aspirin 81 MG Chew Take 81 mg by mouth once daily.    prenatal  vits62/FA/om3/dha/epa (PRENATAL GUMMY ORAL) Take by mouth.     Current Facility-Administered Medications   Medication    rho(D) immune globulin injection 300 mcg         Review of Systems:  Review of Systems   Constitutional: Negative for fever and unexpected weight change.   Respiratory: Negative for cough and shortness of breath.    Cardiovascular: Negative for chest pain.   Genitourinary: Negative for dyspareunia, pelvic pain, vaginal bleeding and postcoital bleeding.        OBJECTIVE:     Physical Exam:  Physical Exam  Vitals reviewed.   Constitutional:       General: She is not in acute distress.     Appearance: She is well-developed and well-nourished. She is not diaphoretic.   HENT:      Head: Normocephalic and atraumatic.   Eyes:      Conjunctiva/sclera: Conjunctivae normal.   Cardiovascular:      Rate and Rhythm: Normal rate.   Pulmonary:      Effort: Pulmonary effort is normal.   Abdominal:      General: There is no distension.      Palpations: Abdomen is soft. There is no mass.      Tenderness: There is no abdominal tenderness. There is no guarding or rebound.   Genitourinary:     Labia:         Right: No rash, tenderness, lesion or injury.         Left: No rash, tenderness, lesion or injury.       Vagina: No signs of injury and foreign body. No vaginal discharge, erythema, tenderness or bleeding.      Cervix: No cervical motion tenderness, discharge or friability.      Uterus: Not deviated, not enlarged, not fixed and not tender.       Adnexa:         Right: No mass, tenderness or fullness.          Left: No mass, tenderness or fullness.        Comments: IUD strings trimmed to 2mm out of os  Musculoskeletal:         General: Normal range of motion.      Cervical back: Normal range of motion.   Skin:     General: Skin is warm and dry.   Neurological:      Mental Status: She is alert.   Psychiatric:         Mood and Affect: Mood and affect normal.           ASSESSMENT:       ICD-10-CM ICD-9-CM    1. IUD  (intrauterine device) in place  Z97.5 V45.51           Plan:      Reviewed with patient that trimming any further could lead to loss of strings and/or continued discomfort of the partner. She states her understanding and desires trim. Done without difficulty    Face to Face time with patient: 15    20 minutes of total time spent on the encounter, which includes face to face time and non-face to face time preparing to see the patient (eg, review of tests), Obtaining and/or reviewing separately obtained history, Documenting clinical information in the electronic or other health record, Independently interpreting results (not separately reported) and communicating results to the patient/family/caregiver, or Care coordination (not separately reported).      Bárbara Hernandez

## 2022-03-24 ENCOUNTER — PATIENT MESSAGE (OUTPATIENT)
Dept: OBSTETRICS AND GYNECOLOGY | Facility: CLINIC | Age: 37
End: 2022-03-24
Payer: COMMERCIAL

## 2022-05-11 ENCOUNTER — OFFICE VISIT (OUTPATIENT)
Dept: INTERNAL MEDICINE | Facility: CLINIC | Age: 37
End: 2022-05-11
Payer: COMMERCIAL

## 2022-05-11 ENCOUNTER — LAB VISIT (OUTPATIENT)
Dept: LAB | Facility: HOSPITAL | Age: 37
End: 2022-05-11
Attending: INTERNAL MEDICINE
Payer: COMMERCIAL

## 2022-05-11 VITALS
HEART RATE: 80 BPM | BODY MASS INDEX: 36.36 KG/M2 | RESPIRATION RATE: 18 BRPM | SYSTOLIC BLOOD PRESSURE: 140 MMHG | WEIGHT: 231.69 LBS | DIASTOLIC BLOOD PRESSURE: 68 MMHG | HEIGHT: 67 IN | OXYGEN SATURATION: 100 % | TEMPERATURE: 98 F

## 2022-05-11 DIAGNOSIS — I10 HYPERTENSION, UNSPECIFIED TYPE: ICD-10-CM

## 2022-05-11 DIAGNOSIS — Z00.00 ANNUAL PHYSICAL EXAM: ICD-10-CM

## 2022-05-11 DIAGNOSIS — R42 VERTIGO: ICD-10-CM

## 2022-05-11 DIAGNOSIS — R42 VERTIGO: Primary | ICD-10-CM

## 2022-05-11 DIAGNOSIS — E66.9 OBESITY, CLASS II, BMI 35-39.9, NO COMORBIDITY: ICD-10-CM

## 2022-05-11 DIAGNOSIS — I10 HYPERTENSION, UNSPECIFIED TYPE: Primary | ICD-10-CM

## 2022-05-11 LAB
ANION GAP SERPL CALC-SCNC: 10 MMOL/L (ref 8–16)
BASOPHILS # BLD AUTO: 0.06 K/UL (ref 0–0.2)
BASOPHILS NFR BLD: 0.7 % (ref 0–1.9)
BUN SERPL-MCNC: 10 MG/DL (ref 6–20)
CALCIUM SERPL-MCNC: 9.4 MG/DL (ref 8.7–10.5)
CHLORIDE SERPL-SCNC: 104 MMOL/L (ref 95–110)
CO2 SERPL-SCNC: 24 MMOL/L (ref 23–29)
CREAT SERPL-MCNC: 0.8 MG/DL (ref 0.5–1.4)
DIFFERENTIAL METHOD: ABNORMAL
EOSINOPHIL # BLD AUTO: 0.2 K/UL (ref 0–0.5)
EOSINOPHIL NFR BLD: 2.1 % (ref 0–8)
ERYTHROCYTE [DISTWIDTH] IN BLOOD BY AUTOMATED COUNT: 12.1 % (ref 11.5–14.5)
EST. GFR  (AFRICAN AMERICAN): >60 ML/MIN/1.73 M^2
EST. GFR  (NON AFRICAN AMERICAN): >60 ML/MIN/1.73 M^2
GLUCOSE SERPL-MCNC: 87 MG/DL (ref 70–110)
HCT VFR BLD AUTO: 44.7 % (ref 37–48.5)
HGB BLD-MCNC: 14.1 G/DL (ref 12–16)
IMM GRANULOCYTES # BLD AUTO: 0.04 K/UL (ref 0–0.04)
IMM GRANULOCYTES NFR BLD AUTO: 0.5 % (ref 0–0.5)
LYMPHOCYTES # BLD AUTO: 4.1 K/UL (ref 1–4.8)
LYMPHOCYTES NFR BLD: 49.3 % (ref 18–48)
MCH RBC QN AUTO: 28 PG (ref 27–31)
MCHC RBC AUTO-ENTMCNC: 31.5 G/DL (ref 32–36)
MCV RBC AUTO: 89 FL (ref 82–98)
MONOCYTES # BLD AUTO: 0.6 K/UL (ref 0.3–1)
MONOCYTES NFR BLD: 7.7 % (ref 4–15)
NEUTROPHILS # BLD AUTO: 3.3 K/UL (ref 1.8–7.7)
NEUTROPHILS NFR BLD: 39.7 % (ref 38–73)
NRBC BLD-RTO: 0 /100 WBC
PLATELET # BLD AUTO: 289 K/UL (ref 150–450)
PMV BLD AUTO: 10.3 FL (ref 9.2–12.9)
POTASSIUM SERPL-SCNC: 4.2 MMOL/L (ref 3.5–5.1)
RBC # BLD AUTO: 5.03 M/UL (ref 4–5.4)
SODIUM SERPL-SCNC: 138 MMOL/L (ref 136–145)
TSH SERPL DL<=0.005 MIU/L-ACNC: 0.73 UIU/ML (ref 0.4–4)
WBC # BLD AUTO: 8.26 K/UL (ref 3.9–12.7)

## 2022-05-11 PROCEDURE — 84443 ASSAY THYROID STIM HORMONE: CPT | Performed by: INTERNAL MEDICINE

## 2022-05-11 PROCEDURE — 99999 PR PBB SHADOW E&M-EST. PATIENT-LVL IV: ICD-10-PCS | Mod: PBBFAC,,, | Performed by: INTERNAL MEDICINE

## 2022-05-11 PROCEDURE — 80048 BASIC METABOLIC PNL TOTAL CA: CPT | Performed by: INTERNAL MEDICINE

## 2022-05-11 PROCEDURE — 3077F SYST BP >= 140 MM HG: CPT | Mod: CPTII,S$GLB,, | Performed by: INTERNAL MEDICINE

## 2022-05-11 PROCEDURE — 3008F PR BODY MASS INDEX (BMI) DOCUMENTED: ICD-10-PCS | Mod: CPTII,S$GLB,, | Performed by: INTERNAL MEDICINE

## 2022-05-11 PROCEDURE — 1160F RVW MEDS BY RX/DR IN RCRD: CPT | Mod: CPTII,S$GLB,, | Performed by: INTERNAL MEDICINE

## 2022-05-11 PROCEDURE — 3008F BODY MASS INDEX DOCD: CPT | Mod: CPTII,S$GLB,, | Performed by: INTERNAL MEDICINE

## 2022-05-11 PROCEDURE — 36415 COLL VENOUS BLD VENIPUNCTURE: CPT | Mod: PO | Performed by: INTERNAL MEDICINE

## 2022-05-11 PROCEDURE — 85025 COMPLETE CBC W/AUTO DIFF WBC: CPT | Performed by: INTERNAL MEDICINE

## 2022-05-11 PROCEDURE — 1159F PR MEDICATION LIST DOCUMENTED IN MEDICAL RECORD: ICD-10-PCS | Mod: CPTII,S$GLB,, | Performed by: INTERNAL MEDICINE

## 2022-05-11 PROCEDURE — 3078F PR MOST RECENT DIASTOLIC BLOOD PRESSURE < 80 MM HG: ICD-10-PCS | Mod: CPTII,S$GLB,, | Performed by: INTERNAL MEDICINE

## 2022-05-11 PROCEDURE — 99214 OFFICE O/P EST MOD 30 MIN: CPT | Mod: S$GLB,,, | Performed by: INTERNAL MEDICINE

## 2022-05-11 PROCEDURE — 1160F PR REVIEW ALL MEDS BY PRESCRIBER/CLIN PHARMACIST DOCUMENTED: ICD-10-PCS | Mod: CPTII,S$GLB,, | Performed by: INTERNAL MEDICINE

## 2022-05-11 PROCEDURE — 3077F PR MOST RECENT SYSTOLIC BLOOD PRESSURE >= 140 MM HG: ICD-10-PCS | Mod: CPTII,S$GLB,, | Performed by: INTERNAL MEDICINE

## 2022-05-11 PROCEDURE — 1159F MED LIST DOCD IN RCRD: CPT | Mod: CPTII,S$GLB,, | Performed by: INTERNAL MEDICINE

## 2022-05-11 PROCEDURE — 99999 PR PBB SHADOW E&M-EST. PATIENT-LVL IV: CPT | Mod: PBBFAC,,, | Performed by: INTERNAL MEDICINE

## 2022-05-11 PROCEDURE — 3078F DIAST BP <80 MM HG: CPT | Mod: CPTII,S$GLB,, | Performed by: INTERNAL MEDICINE

## 2022-05-11 PROCEDURE — 99214 PR OFFICE/OUTPT VISIT, EST, LEVL IV, 30-39 MIN: ICD-10-PCS | Mod: S$GLB,,, | Performed by: INTERNAL MEDICINE

## 2022-05-11 RX ORDER — AMLODIPINE BESYLATE 5 MG/1
5 TABLET ORAL DAILY
Qty: 90 TABLET | Refills: 3 | Status: SHIPPED | OUTPATIENT
Start: 2022-05-11 | End: 2023-05-04

## 2022-05-11 RX ORDER — MECLIZINE HCL 12.5 MG 12.5 MG/1
TABLET ORAL
Qty: 60 TABLET | Refills: 1 | Status: SHIPPED | OUTPATIENT
Start: 2022-05-11 | End: 2023-03-17

## 2022-05-11 NOTE — PROGRESS NOTES
Subjective:       Patient ID: Joann Banuelos is a 36 y.o. female.    Chief Complaint: Blood Pressure Check and Dizziness    HPI   Pt with HTN, Severe obesity is here for f/u. Pt is c/o intermittent symptoms of room spinning/vertigo since having her child back in October. Changes in head/body position cause symptoms which last a few seconds at a time. Staying still resolves her symptoms. Pt denies any hx of head trauma, cardiac symptoms.   Review of Systems   Constitutional: Negative for activity change, appetite change, chills, diaphoresis, fatigue, fever and unexpected weight change.   HENT: Negative for nasal congestion, mouth sores, postnasal drip, rhinorrhea, sinus pressure/congestion, sneezing, sore throat, trouble swallowing and voice change.    Eyes: Negative for pain, discharge and visual disturbance.   Respiratory: Negative for cough, shortness of breath and wheezing.    Cardiovascular: Negative for chest pain, palpitations and leg swelling.   Gastrointestinal: Negative for abdominal pain, blood in stool, constipation, diarrhea, nausea and vomiting.   Endocrine: Negative for cold intolerance and heat intolerance.   Genitourinary: Negative for difficulty urinating, dysuria, frequency, hematuria and urgency.   Musculoskeletal: Negative for arthralgias and myalgias.   Integumentary:  Negative for rash and wound.   Allergic/Immunologic: Negative for environmental allergies and food allergies.   Neurological: Positive for dizziness and vertigo. Negative for tremors, seizures, syncope, weakness, light-headedness and headaches.   Hematological: Negative for adenopathy. Does not bruise/bleed easily.   Psychiatric/Behavioral: Negative for confusion and sleep disturbance. The patient is not nervous/anxious.          Objective:      Physical Exam  Constitutional:       General: She is not in acute distress.     Appearance: She is well-developed. She is not diaphoretic.   HENT:      Head: Normocephalic and  atraumatic.      Right Ear: External ear normal.      Left Ear: External ear normal.      Nose: Nose normal.      Mouth/Throat:      Pharynx: No oropharyngeal exudate.   Eyes:      General: No scleral icterus.        Right eye: No discharge.         Left eye: No discharge.      Conjunctiva/sclera: Conjunctivae normal.      Pupils: Pupils are equal, round, and reactive to light.   Neck:      Vascular: No JVD.   Cardiovascular:      Rate and Rhythm: Normal rate and regular rhythm.      Heart sounds: Normal heart sounds.   Pulmonary:      Effort: Pulmonary effort is normal. No respiratory distress.      Breath sounds: Normal breath sounds. No wheezing or rales.   Musculoskeletal:      Cervical back: Neck supple.   Lymphadenopathy:      Cervical: No cervical adenopathy.   Skin:     General: Skin is warm and dry.      Coloration: Skin is not pale.      Findings: No rash.   Neurological:      General: No focal deficit present.      Mental Status: She is alert and oriented to person, place, and time.      Cranial Nerves: No cranial nerve deficit.      Sensory: No sensory deficit.      Motor: No weakness.      Coordination: Coordination normal.      Gait: Gait normal.      Deep Tendon Reflexes: Reflexes normal.         Assessment:       Problem List Items Addressed This Visit        Cardiac/Vascular    Hypertension      Other Visit Diagnoses     Vertigo    -  Primary    Relevant Orders    Ambulatory referral/consult to ENT    CBC Auto Differential    Basic Metabolic Panel    TSH          Plan:    Vertigo- referral to ENT as this has been recurrent    -Rx Meclizine TID PRN      HTN- restart Norvasc at 5 mg qd

## 2022-05-24 ENCOUNTER — TELEPHONE (OUTPATIENT)
Dept: OTOLARYNGOLOGY | Facility: CLINIC | Age: 37
End: 2022-05-24

## 2022-06-24 ENCOUNTER — OFFICE VISIT (OUTPATIENT)
Dept: OTOLARYNGOLOGY | Facility: CLINIC | Age: 37
End: 2022-06-24
Payer: COMMERCIAL

## 2022-06-24 ENCOUNTER — CLINICAL SUPPORT (OUTPATIENT)
Dept: AUDIOLOGY | Facility: CLINIC | Age: 37
End: 2022-06-24
Payer: COMMERCIAL

## 2022-06-24 VITALS — WEIGHT: 231 LBS | BODY MASS INDEX: 36.73 KG/M2

## 2022-06-24 DIAGNOSIS — G43.109 MIGRAINOUS VERTIGO: Primary | ICD-10-CM

## 2022-06-24 DIAGNOSIS — R42 DIZZINESS AND GIDDINESS: Primary | ICD-10-CM

## 2022-06-24 DIAGNOSIS — H93.13 TINNITUS, BILATERAL: ICD-10-CM

## 2022-06-24 DIAGNOSIS — R42 VERTIGO: ICD-10-CM

## 2022-06-24 PROCEDURE — 92567 TYMPANOMETRY: CPT | Mod: S$GLB,,, | Performed by: AUDIOLOGIST

## 2022-06-24 PROCEDURE — 92567 PR TYMPA2METRY: ICD-10-PCS | Mod: S$GLB,,, | Performed by: AUDIOLOGIST

## 2022-06-24 PROCEDURE — 3008F PR BODY MASS INDEX (BMI) DOCUMENTED: ICD-10-PCS | Mod: CPTII,S$GLB,, | Performed by: OTOLARYNGOLOGY

## 2022-06-24 PROCEDURE — 92557 PR COMPREHENSIVE HEARING TEST: ICD-10-PCS | Mod: S$GLB,,, | Performed by: AUDIOLOGIST

## 2022-06-24 PROCEDURE — 92557 COMPREHENSIVE HEARING TEST: CPT | Mod: S$GLB,,, | Performed by: AUDIOLOGIST

## 2022-06-24 PROCEDURE — 99203 PR OFFICE/OUTPT VISIT, NEW, LEVL III, 30-44 MIN: ICD-10-PCS | Mod: S$GLB,,, | Performed by: OTOLARYNGOLOGY

## 2022-06-24 PROCEDURE — 99999 PR PBB SHADOW E&M-EST. PATIENT-LVL I: CPT | Mod: PBBFAC,,, | Performed by: AUDIOLOGIST

## 2022-06-24 PROCEDURE — 99999 PR PBB SHADOW E&M-EST. PATIENT-LVL I: ICD-10-PCS | Mod: PBBFAC,,, | Performed by: AUDIOLOGIST

## 2022-06-24 PROCEDURE — 99999 PR PBB SHADOW E&M-EST. PATIENT-LVL III: ICD-10-PCS | Mod: PBBFAC,,, | Performed by: OTOLARYNGOLOGY

## 2022-06-24 PROCEDURE — 3008F BODY MASS INDEX DOCD: CPT | Mod: CPTII,S$GLB,, | Performed by: OTOLARYNGOLOGY

## 2022-06-24 PROCEDURE — 1159F MED LIST DOCD IN RCRD: CPT | Mod: CPTII,S$GLB,, | Performed by: OTOLARYNGOLOGY

## 2022-06-24 PROCEDURE — 1159F PR MEDICATION LIST DOCUMENTED IN MEDICAL RECORD: ICD-10-PCS | Mod: CPTII,S$GLB,, | Performed by: OTOLARYNGOLOGY

## 2022-06-24 PROCEDURE — 99203 OFFICE O/P NEW LOW 30 MIN: CPT | Mod: S$GLB,,, | Performed by: OTOLARYNGOLOGY

## 2022-06-24 PROCEDURE — 99999 PR PBB SHADOW E&M-EST. PATIENT-LVL III: CPT | Mod: PBBFAC,,, | Performed by: OTOLARYNGOLOGY

## 2022-06-24 NOTE — PROGRESS NOTES
Joann Banuelos, a 36 y.o. female, was seen today in the clinic for an audiologic evaluation.  Pts main complaint was dizziness.  She reported episodic dizziness that began one year ago. She also reported tinnitus that is intermittent in nature and unrelated to the vertigo.  She denied otalgia and aural fullness.      Tympanometry revealed Type A in the right ear and Type A in the left ear.  Audiogram results revealed normal hearing bilaterally.  Speech reception thresholds were noted at 5 dB in the right ear and 5 dB in the left ear.  Speech discrimination scores were 100% in the right ear and 100% in the left ear.    Recommendations:  1. Otologic evaluation  2. Annual audiogram  3. Hearing protection when in noise             Routine observation

## 2022-06-25 NOTE — PROGRESS NOTES
Answers for HPI/ROS submitted by the patient on 6/23/2022  Fatigue (Tiredness)?: Yes  ear discharge: Yes  None of these : Yes  Snoring?: Yes  None of these : Yes  None of these: Yes  None of these: Yes  None of these: Yes  None of these : Yes  None of these: Yes  Hot all of the time? : Yes  dizziness: Yes  Light-headedness: Yes  None of these: Yes  None of these: Yes

## 2022-06-25 NOTE — PROGRESS NOTES
Subjective:       Patient ID: Joann Banuelos is a 36 y.o. female.    Chief Complaint: Dizziness    HPI     Joann Banuelos is a 36 y.o. female presents for evaluation of dizziness.      Dizziness is non positional, non-room spinning sense of motion.  Pt endorse hx of migraine headaches.  Reports increased levels of stress with new infant and lack of sleep.  Episodes have been brief and mild but recurrent, encouraged by partner to have it evaluated.  Denies any ear symptoms.          Review of Systems   HENT: Positive for ear discharge.    Eyes: Negative.    Cardiovascular: Negative.    Gastrointestinal: Negative.    Endocrine: Positive for heat intolerance.   Genitourinary: Negative.    Musculoskeletal: Negative.    Integumentary:  Negative.   Allergic/Immunologic: Negative.    Neurological: Positive for dizziness and light-headedness.   Hematological: Negative.    Psychiatric/Behavioral: Negative.          Objective:      Physical Exam  Vitals and nursing note reviewed.   Constitutional:       Appearance: Normal appearance.   HENT:      Head: Normocephalic and atraumatic.      Right Ear: Tympanic membrane, ear canal and external ear normal. There is no impacted cerumen.      Left Ear: Tympanic membrane, ear canal and external ear normal. There is no impacted cerumen.   Neurological:      Mental Status: She is alert.      Coordination: Romberg sign negative.      Gait: Tandem walk normal.      Comments: Head impulse: neg  Fukuda: negative       Data Reviewed:        Audiogram tracings independently reviewed and discussed with patient shows normal thresholds, WRS, tympanograms, and reflexes AU    Assessment:       Problem List Items Addressed This Visit    None     Visit Diagnoses     Migrainous vertigo    -  Primary    Vertigo              Plan:         no signs of peripheral weakness   recommend lifestyle changes to reduce migraine triggers   f/u prn         Answers for HPI/ROS submitted by the patient on  6/23/2022  Fatigue (Tiredness)?: Yes  Snoring?: Yes

## 2022-08-30 ENCOUNTER — PATIENT MESSAGE (OUTPATIENT)
Dept: OBSTETRICS AND GYNECOLOGY | Facility: CLINIC | Age: 37
End: 2022-08-30
Payer: COMMERCIAL

## 2022-08-30 DIAGNOSIS — Z30.431 IUD CHECK UP: Primary | ICD-10-CM

## 2022-08-31 ENCOUNTER — PATIENT MESSAGE (OUTPATIENT)
Dept: OBSTETRICS AND GYNECOLOGY | Facility: CLINIC | Age: 37
End: 2022-08-31
Payer: COMMERCIAL

## 2022-09-02 ENCOUNTER — HOSPITAL ENCOUNTER (OUTPATIENT)
Dept: RADIOLOGY | Facility: OTHER | Age: 37
Discharge: HOME | End: 2022-09-02
Attending: OBSTETRICS & GYNECOLOGY
Payer: COMMERCIAL

## 2022-09-02 DIAGNOSIS — Z30.431 IUD CHECK UP: ICD-10-CM

## 2022-09-02 PROCEDURE — 76856 US EXAM PELVIC COMPLETE: CPT | Mod: 26,,, | Performed by: RADIOLOGY

## 2022-09-02 PROCEDURE — 76830 TRANSVAGINAL US NON-OB: CPT | Mod: 26,,, | Performed by: RADIOLOGY

## 2022-09-02 PROCEDURE — 76830 US PELVIS COMP WITH TRANSVAG NON-OB (XPD): ICD-10-PCS | Mod: 26,,, | Performed by: RADIOLOGY

## 2022-09-02 PROCEDURE — 76830 TRANSVAGINAL US NON-OB: CPT | Mod: TC

## 2022-09-02 PROCEDURE — 76856 US PELVIS COMP WITH TRANSVAG NON-OB (XPD): ICD-10-PCS | Mod: 26,,, | Performed by: RADIOLOGY

## 2022-09-09 ENCOUNTER — PATIENT MESSAGE (OUTPATIENT)
Dept: OBSTETRICS AND GYNECOLOGY | Facility: CLINIC | Age: 37
End: 2022-09-09
Payer: COMMERCIAL

## 2022-09-21 ENCOUNTER — PATIENT MESSAGE (OUTPATIENT)
Dept: OBSTETRICS AND GYNECOLOGY | Facility: CLINIC | Age: 37
End: 2022-09-21
Payer: COMMERCIAL

## 2022-10-01 ENCOUNTER — PATIENT MESSAGE (OUTPATIENT)
Dept: OBSTETRICS AND GYNECOLOGY | Facility: CLINIC | Age: 37
End: 2022-10-01
Payer: COMMERCIAL

## 2022-10-01 RX ORDER — NORGESTIMATE AND ETHINYL ESTRADIOL 0.25-0.035
1 KIT ORAL DAILY
Qty: 30 TABLET | Refills: 11 | Status: SHIPPED | OUTPATIENT
Start: 2022-10-01 | End: 2023-03-17

## 2022-10-03 ENCOUNTER — PATIENT MESSAGE (OUTPATIENT)
Dept: OBSTETRICS AND GYNECOLOGY | Facility: CLINIC | Age: 37
End: 2022-10-03
Payer: COMMERCIAL

## 2022-11-03 ENCOUNTER — PATIENT MESSAGE (OUTPATIENT)
Dept: INTERNAL MEDICINE | Facility: CLINIC | Age: 37
End: 2022-11-03
Payer: COMMERCIAL

## 2022-11-04 ENCOUNTER — LAB VISIT (OUTPATIENT)
Dept: LAB | Facility: HOSPITAL | Age: 37
End: 2022-11-04
Attending: INTERNAL MEDICINE
Payer: COMMERCIAL

## 2022-11-04 DIAGNOSIS — E66.9 OBESITY, CLASS II, BMI 35-39.9, NO COMORBIDITY: ICD-10-CM

## 2022-11-04 DIAGNOSIS — I10 HYPERTENSION, UNSPECIFIED TYPE: ICD-10-CM

## 2022-11-04 DIAGNOSIS — Z00.00 ANNUAL PHYSICAL EXAM: ICD-10-CM

## 2022-11-04 LAB
BACTERIA #/AREA URNS AUTO: ABNORMAL /HPF
MICROSCOPIC COMMENT: ABNORMAL
RBC #/AREA URNS AUTO: 0 /HPF (ref 0–4)
SQUAMOUS #/AREA URNS AUTO: 1 /HPF
WBC #/AREA URNS AUTO: 1 /HPF (ref 0–5)

## 2022-11-04 PROCEDURE — 81001 URINALYSIS AUTO W/SCOPE: CPT | Performed by: INTERNAL MEDICINE

## 2022-11-08 ENCOUNTER — PATIENT OUTREACH (OUTPATIENT)
Dept: ADMINISTRATIVE | Facility: HOSPITAL | Age: 37
End: 2022-11-08
Payer: COMMERCIAL

## 2022-11-09 ENCOUNTER — OFFICE VISIT (OUTPATIENT)
Dept: INTERNAL MEDICINE | Facility: CLINIC | Age: 37
End: 2022-11-09
Payer: COMMERCIAL

## 2022-11-09 VITALS
SYSTOLIC BLOOD PRESSURE: 130 MMHG | TEMPERATURE: 97 F | HEART RATE: 82 BPM | HEIGHT: 67 IN | DIASTOLIC BLOOD PRESSURE: 88 MMHG | BODY MASS INDEX: 36.65 KG/M2 | RESPIRATION RATE: 18 BRPM | WEIGHT: 233.5 LBS

## 2022-11-09 DIAGNOSIS — I10 PRIMARY HYPERTENSION: ICD-10-CM

## 2022-11-09 DIAGNOSIS — E66.9 OBESITY, CLASS II, BMI 35-39.9, NO COMORBIDITY: ICD-10-CM

## 2022-11-09 DIAGNOSIS — Z00.00 ANNUAL PHYSICAL EXAM: Primary | ICD-10-CM

## 2022-11-09 PROCEDURE — 3044F PR MOST RECENT HEMOGLOBIN A1C LEVEL <7.0%: ICD-10-PCS | Mod: CPTII,S$GLB,, | Performed by: INTERNAL MEDICINE

## 2022-11-09 PROCEDURE — 99395 PREV VISIT EST AGE 18-39: CPT | Mod: 25,S$GLB,, | Performed by: INTERNAL MEDICINE

## 2022-11-09 PROCEDURE — 3044F HG A1C LEVEL LT 7.0%: CPT | Mod: CPTII,S$GLB,, | Performed by: INTERNAL MEDICINE

## 2022-11-09 PROCEDURE — 3079F DIAST BP 80-89 MM HG: CPT | Mod: CPTII,S$GLB,, | Performed by: INTERNAL MEDICINE

## 2022-11-09 PROCEDURE — 90686 IIV4 VACC NO PRSV 0.5 ML IM: CPT | Mod: S$GLB,,, | Performed by: INTERNAL MEDICINE

## 2022-11-09 PROCEDURE — 1159F PR MEDICATION LIST DOCUMENTED IN MEDICAL RECORD: ICD-10-PCS | Mod: CPTII,S$GLB,, | Performed by: INTERNAL MEDICINE

## 2022-11-09 PROCEDURE — 1160F RVW MEDS BY RX/DR IN RCRD: CPT | Mod: CPTII,S$GLB,, | Performed by: INTERNAL MEDICINE

## 2022-11-09 PROCEDURE — 1159F MED LIST DOCD IN RCRD: CPT | Mod: CPTII,S$GLB,, | Performed by: INTERNAL MEDICINE

## 2022-11-09 PROCEDURE — 3008F PR BODY MASS INDEX (BMI) DOCUMENTED: ICD-10-PCS | Mod: CPTII,S$GLB,, | Performed by: INTERNAL MEDICINE

## 2022-11-09 PROCEDURE — 90471 FLU VACCINE (QUAD) GREATER THAN OR EQUAL TO 3YO PRESERVATIVE FREE IM: ICD-10-PCS | Mod: S$GLB,,, | Performed by: INTERNAL MEDICINE

## 2022-11-09 PROCEDURE — 3079F PR MOST RECENT DIASTOLIC BLOOD PRESSURE 80-89 MM HG: ICD-10-PCS | Mod: CPTII,S$GLB,, | Performed by: INTERNAL MEDICINE

## 2022-11-09 PROCEDURE — 99999 PR PBB SHADOW E&M-EST. PATIENT-LVL III: ICD-10-PCS | Mod: PBBFAC,,, | Performed by: INTERNAL MEDICINE

## 2022-11-09 PROCEDURE — 3008F BODY MASS INDEX DOCD: CPT | Mod: CPTII,S$GLB,, | Performed by: INTERNAL MEDICINE

## 2022-11-09 PROCEDURE — 3075F PR MOST RECENT SYSTOLIC BLOOD PRESS GE 130-139MM HG: ICD-10-PCS | Mod: CPTII,S$GLB,, | Performed by: INTERNAL MEDICINE

## 2022-11-09 PROCEDURE — 90471 IMMUNIZATION ADMIN: CPT | Mod: S$GLB,,, | Performed by: INTERNAL MEDICINE

## 2022-11-09 PROCEDURE — 3075F SYST BP GE 130 - 139MM HG: CPT | Mod: CPTII,S$GLB,, | Performed by: INTERNAL MEDICINE

## 2022-11-09 PROCEDURE — 99999 PR PBB SHADOW E&M-EST. PATIENT-LVL III: CPT | Mod: PBBFAC,,, | Performed by: INTERNAL MEDICINE

## 2022-11-09 PROCEDURE — 99395 PR PREVENTIVE VISIT,EST,18-39: ICD-10-PCS | Mod: 25,S$GLB,, | Performed by: INTERNAL MEDICINE

## 2022-11-09 PROCEDURE — 90686 FLU VACCINE (QUAD) GREATER THAN OR EQUAL TO 3YO PRESERVATIVE FREE IM: ICD-10-PCS | Mod: S$GLB,,, | Performed by: INTERNAL MEDICINE

## 2022-11-09 PROCEDURE — 1160F PR REVIEW ALL MEDS BY PRESCRIBER/CLIN PHARMACIST DOCUMENTED: ICD-10-PCS | Mod: CPTII,S$GLB,, | Performed by: INTERNAL MEDICINE

## 2022-11-09 RX ORDER — KETOCONAZOLE 20 MG/G
CREAM TOPICAL DAILY
Qty: 60 G | Refills: 1 | Status: SHIPPED | OUTPATIENT
Start: 2022-11-09 | End: 2024-03-06

## 2022-11-09 NOTE — PROGRESS NOTES
Subjective:       Patient ID: Joann Banuelos is a 36 y.o. female.    Chief Complaint: Annual Exam    HPI  36 y.o. Female here for annual exam.      Vaccines: Influenza (2022); Tetanus (2021)  Eye exam: current  Gyn exam: current      Exercise: not currently   Diet: regular   LMP: 10/17/22     Past Medical History:  2007: Abnormal Pap smear  No date: Abnormal Pap smear of cervix  No date: Allergy  No date: HPV (human papilloma virus) anogenital infection  No date: Hypertension  No date: Skin disease      Comment:  eczema  No date: Skin disease      Comment:  tinea versicolor  Past Surgical History:  2007: CERVICAL BIOPSY  W/ LOOP ELECTRODE EXCISION  2012: DILATION AND CURETTAGE OF UTERUS      Comment:  retained IUD/Endometrial polyp  No date: HYSTEROSCOPY W/ POLYPECTOMY  Social History    Socioeconomic History      Marital status:       Spouse name: Not on file      Number of children: 3      Years of education: Not on file      Highest education level: Not on file    Occupational History      Occupation:         Employer: [employer]    Tobacco Use      Smoking status: Never Smoker      Smokeless tobacco: Never Used    Substance and Sexual Activity      Alcohol use: Not Currently        Alcohol/week: 7.0 standard drinks        Types: 7 Glasses of wine per week        Comment: Occasionally.      Drug use: No      Sexual activity: Yes        Partners: Male        Birth control/protection: None    Other Topics      Concerns:        Are you pregnant or think you may be?: No        Breast-feeding: No    Social History Narrative      Not on file     Social Determinants of Health  Financial Resource Strain: Low Risk       Difficulty of Paying Living Expenses: Not very hard  Food Insecurity: No Food Insecurity      Worried About Running Out of Food in the Last Year: Never true      Ran Out of Food in the Last Year: Never true  Transportation Needs: No Transportation Needs      Lack of Transportation  (Medical): No      Lack of Transportation (Non-Medical): No  Physical Activity: Sufficiently Active      Days of Exercise per Week: 3 days      Minutes of Exercise per Session: 60 min  Stress: No Stress Concern Present      Feeling of Stress : Only a little  Social Connections: Unknown      Frequency of Communication with Friends and Family: More than three times a week      Frequency of Social Gatherings with Friends and Family: Three times a week      Attends Taoist Services: Not on file      Active Member of Clubs or Organizations: Yes      Attends Club or Organization Meetings: More than 4 times per year      Marital Status:   Review of patient's allergies indicates:  No Known Allergies  Review of Systems   Constitutional:  Negative for activity change, appetite change, chills, diaphoresis, fatigue, fever and unexpected weight change.   HENT:  Negative for postnasal drip, rhinorrhea, sinus pressure/congestion, sneezing, sore throat, trouble swallowing and voice change.    Respiratory:  Negative for cough, shortness of breath and wheezing.    Cardiovascular:  Negative for chest pain, palpitations and leg swelling.   Gastrointestinal:  Negative for abdominal pain, blood in stool, constipation, diarrhea, nausea and vomiting.   Genitourinary:  Negative for dysuria.   Musculoskeletal:  Negative for arthralgias and myalgias.   Integumentary:  Negative for rash and wound.   Allergic/Immunologic: Negative for environmental allergies and food allergies.   Hematological:  Negative for adenopathy. Does not bruise/bleed easily.       Objective:      Physical Exam  Vitals and nursing note reviewed.   Constitutional:       General: She is not in acute distress.     Appearance: Normal appearance. She is well-developed. She is not diaphoretic.   HENT:      Head: Normocephalic and atraumatic.      Right Ear: External ear normal.      Left Ear: External ear normal.      Nose: Nose normal.      Mouth/Throat:       Pharynx: No oropharyngeal exudate.   Eyes:      General: No scleral icterus.        Right eye: No discharge.         Left eye: No discharge.      Conjunctiva/sclera: Conjunctivae normal.      Pupils: Pupils are equal, round, and reactive to light.   Neck:      Thyroid: No thyromegaly.      Vascular: No JVD.   Cardiovascular:      Rate and Rhythm: Normal rate and regular rhythm.      Pulses: Normal pulses.      Heart sounds: Normal heart sounds. No murmur heard.  Pulmonary:      Effort: Pulmonary effort is normal. No respiratory distress.      Breath sounds: Normal breath sounds. No wheezing, rhonchi or rales.   Chest:      Chest wall: No tenderness.   Abdominal:      General: Bowel sounds are normal. There is no distension.      Palpations: Abdomen is soft.      Tenderness: There is no abdominal tenderness. There is no guarding or rebound.   Musculoskeletal:      Cervical back: Neck supple.      Right lower leg: No edema.      Left lower leg: No edema.   Lymphadenopathy:      Cervical: No cervical adenopathy.   Skin:     General: Skin is warm and dry.      Coloration: Skin is not pale.      Findings: No rash.   Neurological:      General: No focal deficit present.      Mental Status: She is alert and oriented to person, place, and time.      Gait: Gait normal.   Psychiatric:         Behavior: Behavior normal.         Thought Content: Thought content normal.         Judgment: Judgment normal.       Assessment:       Problem List Items Addressed This Visit          Cardiac/Vascular    Hypertension       Endocrine    Obesity, Class II, BMI 35-39.9, no comorbidity     Other Visit Diagnoses       Annual physical exam    -  Primary            Plan:    Blood work reviewed with pt     HTN- stable on Norvasc      Severe obesity- proper diet/exercise stressed

## 2022-11-28 ENCOUNTER — PATIENT MESSAGE (OUTPATIENT)
Dept: INTERNAL MEDICINE | Facility: CLINIC | Age: 37
End: 2022-11-28
Payer: COMMERCIAL

## 2022-11-29 NOTE — PROGRESS NOTES
[facility] Note    Chief Complaint      Chief Complaint   Patient presents with    Sinus Problem    sinus congestion         History of Present Illness      Joann Banuelos is a 36 y.o. female who presents today via virtual visit for   Chief Complaint   Patient presents with    Sinus Problem    sinus congestion           Cough  This is a new problem. The current episode started in the past 7 days. The problem occurs every few minutes. The cough is Non-productive and productive of sputum. Associated symptoms include nasal congestion, postnasal drip and rhinorrhea. Pertinent negatives include no chest pain, chills, ear congestion, ear pain, fever, headaches, heartburn, hemoptysis, myalgias, rash, sore throat, shortness of breath, sweats, weight loss or wheezing. The symptoms are aggravated by lying down. She has tried OTC cough suppressant, body position changes, oral steroids, prescription cough suppressant and rest for the symptoms. The treatment provided mild relief. Her past medical history is significant for environmental allergies. There is no history of asthma, bronchiectasis, bronchitis, COPD, emphysema or pneumonia.    Patient reports sinus congestion, cough, runny nose for past 7 days. She visited and urgent care 3 days ago and was prescribed a Zpack, Tessalon pearls, and Dexamethasone cough syrup. She reports all symptoms are resolving except nasal congestion. She has rhinitis when she lays down at night. She denies any SOB, chest pain, N/V, unintentional weight loss, loss of appetite, fatigue, diarrhea, constipation. She is active daily and remains independent with ADL's. She will be traveling to [facility] for her birthday weekend Saturday and hopes to be feeling better.  Review of Systems   Constitutional: Negative.  Negative for chills, fever and weight loss.   HENT:  Positive for congestion, postnasal drip, rhinorrhea and sinus pain. Negative for ear pain and sore throat.    Eyes:  Negative.    Respiratory:  Positive for cough. Negative for hemoptysis, shortness of breath and wheezing.    Cardiovascular: Negative.  Negative for chest pain.   Gastrointestinal: Negative.  Negative for heartburn.   Genitourinary: Negative.    Musculoskeletal: Negative.  Negative for myalgias.   Skin: Negative.  Negative for rash.   Neurological: Negative.  Negative for headaches.   Endo/Heme/Allergies:  Positive for environmental allergies.   Psychiatric/Behavioral: Negative.        Family History:  family history includes Diabetes in her maternal grandfather, maternal grandmother, mother, paternal grandfather, and paternal grandmother; Eczema in her sister and sister; Hypertension in her father and mother.   Family history was reviewed with patient.     Medications:  Outpatient Encounter Medications as of 11/30/2022   Medication Sig Dispense Refill    amLODIPine (NORVASC) 5 MG tablet Take 1 tablet (5 mg total) by mouth once daily. 90 tablet 3    aspirin 81 MG Chew Take 81 mg by mouth once daily.      ketoconazole (NIZORAL) 2 % cream Apply topically once daily. 60 g 1    meclizine (ANTIVERT) 12.5 mg tablet 1-2 tabs PO TID PRN dizziness 60 tablet 1    norgestimate-ethinyl estradioL (ORTHO-CYCLEN) 0.25-35 mg-mcg per tablet Take 1 tablet by mouth once daily. 30 tablet 11    predniSONE (DELTASONE) 20 MG tablet Take 1 tablet (20 mg total) by mouth once daily. 5 tablet 0     Facility-Administered Encounter Medications as of 11/30/2022   Medication Dose Route Frequency Provider Last Rate Last Admin    rho(D) immune globulin injection 300 mcg  300 mcg Intramuscular 1 time in Clinic/HOD Mabel Tamayo NP           Allergies:  Review of patient's allergies indicates:  No Known Allergies    Health Maintenance:  Health Maintenance   Topic Date Due    Hepatitis C Screening  Never done    TETANUS VACCINE  08/10/2031    Lipid Panel  Completed     Health Maintenance Topics with due status: Not Due       Topic Last  Completion Date    TETANUS VACCINE 08/10/2021    Cervical Cancer Screening 12/06/2021       Physical Exam     Assessment/Plan     Joann Banuelso is a 36 y.o.female with:    Sinus congestion  -     predniSONE (DELTASONE) 20 MG tablet; Take 1 tablet (20 mg total) by mouth once daily.  Dispense: 5 tablet; Refill: 0    - continue Zpack, Tessalon pearls as previously prescribed.  As above, continue current medications and maintain follow up with specialists.  Return to clinic as needed.    I spent 18 minutes on the day of this virtual encounter for preparing, evaluating, treating, and discussing plan of care with this patient.  Greater than 50% of this time was spent face to face via virtual visit with patient.  All questions were answered to patient's satisfaction.        Karen L Spencer, NP-C [facility]

## 2022-11-30 ENCOUNTER — OFFICE VISIT (OUTPATIENT)
Dept: PRIMARY CARE CLINIC | Facility: CLINIC | Age: 37
End: 2022-11-30
Payer: COMMERCIAL

## 2022-11-30 DIAGNOSIS — R09.81 SINUS CONGESTION: Primary | ICD-10-CM

## 2022-11-30 PROCEDURE — 1159F PR MEDICATION LIST DOCUMENTED IN MEDICAL RECORD: ICD-10-PCS | Mod: CPTII,95,, | Performed by: NURSE PRACTITIONER

## 2022-11-30 PROCEDURE — 3044F HG A1C LEVEL LT 7.0%: CPT | Mod: CPTII,95,, | Performed by: NURSE PRACTITIONER

## 2022-11-30 PROCEDURE — 99213 PR OFFICE/OUTPT VISIT, EST, LEVL III, 20-29 MIN: ICD-10-PCS | Mod: 95,,, | Performed by: NURSE PRACTITIONER

## 2022-11-30 PROCEDURE — 1160F RVW MEDS BY RX/DR IN RCRD: CPT | Mod: CPTII,95,, | Performed by: NURSE PRACTITIONER

## 2022-11-30 PROCEDURE — 1160F PR REVIEW ALL MEDS BY PRESCRIBER/CLIN PHARMACIST DOCUMENTED: ICD-10-PCS | Mod: CPTII,95,, | Performed by: NURSE PRACTITIONER

## 2022-11-30 PROCEDURE — 1159F MED LIST DOCD IN RCRD: CPT | Mod: CPTII,95,, | Performed by: NURSE PRACTITIONER

## 2022-11-30 PROCEDURE — 3044F PR MOST RECENT HEMOGLOBIN A1C LEVEL <7.0%: ICD-10-PCS | Mod: CPTII,95,, | Performed by: NURSE PRACTITIONER

## 2022-11-30 PROCEDURE — 99213 OFFICE O/P EST LOW 20 MIN: CPT | Mod: 95,,, | Performed by: NURSE PRACTITIONER

## 2022-11-30 RX ORDER — PREDNISONE 20 MG/1
20 TABLET ORAL DAILY
Qty: 5 TABLET | Refills: 0 | Status: SHIPPED | OUTPATIENT
Start: 2022-11-30 | End: 2023-03-17

## 2023-02-23 ENCOUNTER — PATIENT MESSAGE (OUTPATIENT)
Dept: INTERNAL MEDICINE | Facility: CLINIC | Age: 38
End: 2023-02-23
Payer: COMMERCIAL

## 2023-02-23 DIAGNOSIS — E66.9 OBESITY, CLASS II, BMI 35-39.9, NO COMORBIDITY: Primary | ICD-10-CM

## 2023-03-08 ENCOUNTER — PATIENT MESSAGE (OUTPATIENT)
Dept: OBSTETRICS AND GYNECOLOGY | Facility: CLINIC | Age: 38
End: 2023-03-08
Payer: COMMERCIAL

## 2023-03-13 ENCOUNTER — PATIENT MESSAGE (OUTPATIENT)
Dept: OBSTETRICS AND GYNECOLOGY | Facility: CLINIC | Age: 38
End: 2023-03-13
Payer: COMMERCIAL

## 2023-03-16 ENCOUNTER — OFFICE VISIT (OUTPATIENT)
Dept: OBSTETRICS AND GYNECOLOGY | Facility: CLINIC | Age: 38
End: 2023-03-16
Payer: COMMERCIAL

## 2023-03-16 VITALS
BODY MASS INDEX: 36.93 KG/M2 | DIASTOLIC BLOOD PRESSURE: 85 MMHG | WEIGHT: 232.25 LBS | SYSTOLIC BLOOD PRESSURE: 124 MMHG

## 2023-03-16 DIAGNOSIS — N92.6 IRREGULAR BLEEDING: Primary | ICD-10-CM

## 2023-03-16 PROCEDURE — 1159F MED LIST DOCD IN RCRD: CPT | Mod: CPTII,S$GLB,, | Performed by: OBSTETRICS & GYNECOLOGY

## 2023-03-16 PROCEDURE — 99999 PR PBB SHADOW E&M-EST. PATIENT-LVL III: CPT | Mod: PBBFAC,,, | Performed by: OBSTETRICS & GYNECOLOGY

## 2023-03-16 PROCEDURE — 3008F BODY MASS INDEX DOCD: CPT | Mod: CPTII,S$GLB,, | Performed by: OBSTETRICS & GYNECOLOGY

## 2023-03-16 PROCEDURE — 99999 PR PBB SHADOW E&M-EST. PATIENT-LVL III: ICD-10-PCS | Mod: PBBFAC,,, | Performed by: OBSTETRICS & GYNECOLOGY

## 2023-03-16 PROCEDURE — 3079F PR MOST RECENT DIASTOLIC BLOOD PRESSURE 80-89 MM HG: ICD-10-PCS | Mod: CPTII,S$GLB,, | Performed by: OBSTETRICS & GYNECOLOGY

## 2023-03-16 PROCEDURE — 3074F SYST BP LT 130 MM HG: CPT | Mod: CPTII,S$GLB,, | Performed by: OBSTETRICS & GYNECOLOGY

## 2023-03-16 PROCEDURE — 99214 OFFICE O/P EST MOD 30 MIN: CPT | Mod: S$GLB,,, | Performed by: OBSTETRICS & GYNECOLOGY

## 2023-03-16 PROCEDURE — 3079F DIAST BP 80-89 MM HG: CPT | Mod: CPTII,S$GLB,, | Performed by: OBSTETRICS & GYNECOLOGY

## 2023-03-16 PROCEDURE — 3074F PR MOST RECENT SYSTOLIC BLOOD PRESSURE < 130 MM HG: ICD-10-PCS | Mod: CPTII,S$GLB,, | Performed by: OBSTETRICS & GYNECOLOGY

## 2023-03-16 PROCEDURE — 3008F PR BODY MASS INDEX (BMI) DOCUMENTED: ICD-10-PCS | Mod: CPTII,S$GLB,, | Performed by: OBSTETRICS & GYNECOLOGY

## 2023-03-16 PROCEDURE — 1160F PR REVIEW ALL MEDS BY PRESCRIBER/CLIN PHARMACIST DOCUMENTED: ICD-10-PCS | Mod: CPTII,S$GLB,, | Performed by: OBSTETRICS & GYNECOLOGY

## 2023-03-16 PROCEDURE — 1160F RVW MEDS BY RX/DR IN RCRD: CPT | Mod: CPTII,S$GLB,, | Performed by: OBSTETRICS & GYNECOLOGY

## 2023-03-16 PROCEDURE — 1159F PR MEDICATION LIST DOCUMENTED IN MEDICAL RECORD: ICD-10-PCS | Mod: CPTII,S$GLB,, | Performed by: OBSTETRICS & GYNECOLOGY

## 2023-03-16 PROCEDURE — 99214 PR OFFICE/OUTPT VISIT, EST, LEVL IV, 30-39 MIN: ICD-10-PCS | Mod: S$GLB,,, | Performed by: OBSTETRICS & GYNECOLOGY

## 2023-03-16 RX ORDER — TRANEXAMIC ACID 650 MG/1
1300 TABLET ORAL 3 TIMES DAILY
Qty: 30 TABLET | Refills: 0 | Status: SHIPPED | OUTPATIENT
Start: 2023-03-16 | End: 2023-03-21

## 2023-03-16 NOTE — PROGRESS NOTES
Chief Complaint   Patient presents with    Well Woman     Pt started bleeding 3/1 and has been having ongoing bleeding        HPI:   37 y.o.  here today for irregular bleeding/spotting that began 3/1/23. Has Mirena IUD placed by Dr. Dunham 2021. Bleeding has been heavy for at least the past 10 days, changing overnight pad every 2 hours with some cramping. Cycles still come once a month with IUD and last full 7 days, but less blood loss since IUD placement overall compared to before she had it. She had a similar episode of bleeding in the past that improved with two months of add-back estrogen with birth control pills. Because it improved with this in the past, she began taking pills this past  and noticed that bleeding slowed but did not stop so stopped taking them yesterday. Notes bloating and mild cramping. Denies other changes to medical history and is otherwise feeling well today.     TVUS (2022): Uterus 7x4x6 cm, IUD appropriately positioned, stable appearance of calcified endometrial lesion at the fundus measuring 7 mm. BOV wnl. No FF in cul-de-sac     Labs / Significant Studies  Pap (2021): ASCUS/HPV neg, repeat 3 years    No visits with results within 3 Month(s) from this visit.   Latest known visit with results is:   Lab Visit on 2022   Component Date Value Ref Range Status    WBC 2022 7.06  3.90 - 12.70 K/uL Final    RBC 2022 4.88  4.00 - 5.40 M/uL Final    Hemoglobin 2022 13.7  12.0 - 16.0 g/dL Final    Hematocrit 2022 42.5  37.0 - 48.5 % Final    MCV 2022 87  82 - 98 fL Final    MCH 2022 28.1  27.0 - 31.0 pg Final    MCHC 2022 32.2  32.0 - 36.0 g/dL Final    RDW 2022 12.1  11.5 - 14.5 % Final    Platelets 2022 300  150 - 450 K/uL Final    MPV 2022 10.3  9.2 - 12.9 fL Final    Immature Granulocytes 2022 0.0  0.0 - 0.5 % Final    Gran # (ANC) 2022 2.6  1.8 - 7.7 K/uL Final    Immature Grans (Abs) 2022  0.00  0.00 - 0.04 K/uL Final    Comment: Mild elevation in immature granulocytes is non specific and   can be seen in a variety of conditions including stress response,   acute inflammation, trauma and pregnancy. Correlation with other   laboratory and clinical findings is essential.      Lymph # 11/04/2022 3.8  1.0 - 4.8 K/uL Final    Mono # 11/04/2022 0.5  0.3 - 1.0 K/uL Final    Eos # 11/04/2022 0.2  0.0 - 0.5 K/uL Final    Baso # 11/04/2022 0.06  0.00 - 0.20 K/uL Final    nRBC 11/04/2022 0  0 /100 WBC Final    Gran % 11/04/2022 36.5 (L)  38.0 - 73.0 % Final    Lymph % 11/04/2022 53.7 (H)  18.0 - 48.0 % Final    Mono % 11/04/2022 6.5  4.0 - 15.0 % Final    Eosinophil % 11/04/2022 2.5  0.0 - 8.0 % Final    Basophil % 11/04/2022 0.8  0.0 - 1.9 % Final    Differential Method 11/04/2022 Automated   Final    Sodium 11/04/2022 138  136 - 145 mmol/L Final    Potassium 11/04/2022 4.3  3.5 - 5.1 mmol/L Final    Chloride 11/04/2022 105  95 - 110 mmol/L Final    CO2 11/04/2022 24  23 - 29 mmol/L Final    Glucose 11/04/2022 91  70 - 110 mg/dL Final    BUN 11/04/2022 10  6 - 20 mg/dL Final    Creatinine 11/04/2022 0.8  0.5 - 1.4 mg/dL Final    Calcium 11/04/2022 9.1  8.7 - 10.5 mg/dL Final    Total Protein 11/04/2022 7.5  6.0 - 8.4 g/dL Final    Albumin 11/04/2022 4.1  3.5 - 5.2 g/dL Final    Total Bilirubin 11/04/2022 0.2  0.1 - 1.0 mg/dL Final    Comment: For infants and newborns, interpretation of results should be based  on gestational age, weight and in agreement with clinical  observations.    Premature Infant recommended reference ranges:  Up to 24 hours.............<8.0 mg/dL  Up to 48 hours............<12.0 mg/dL  3-5 days..................<15.0 mg/dL  6-29 days.................<15.0 mg/dL      Alkaline Phosphatase 11/04/2022 84  55 - 135 U/L Final    AST 11/04/2022 19  10 - 40 U/L Final    ALT 11/04/2022 17  10 - 44 U/L Final    Anion Gap 11/04/2022 9  8 - 16 mmol/L Final    eGFR 11/04/2022 >60.0  >60 mL/min/1.73  m^2 Final    TSH 11/04/2022 0.747  0.400 - 4.000 uIU/mL Final    Cholesterol 11/04/2022 204 (H)  120 - 199 mg/dL Final    Comment: The National Cholesterol Education Program (NCEP) has set the  following guidelines (reference ranges) for Cholesterol:  Optimal.....................<200 mg/dL  Borderline High.............200-239 mg/dL  High........................> or = 240 mg/dL      Triglycerides 11/04/2022 71  30 - 150 mg/dL Final    Comment: The National Cholesterol Education Program (NCEP) has set the  following guidelines (reference values) for triglycerides:  Normal......................<150 mg/dL  Borderline High.............150-199 mg/dL  High........................200-499 mg/dL      HDL 11/04/2022 66  40 - 75 mg/dL Final    Comment: The National Cholesterol Education Program (NCEP) has set the  following guidelines (reference values) for HDL Cholesterol:  Low...............<40 mg/dL  Optimal...........>60 mg/dL      LDL Cholesterol 11/04/2022 123.8  63.0 - 159.0 mg/dL Final    Comment: The National Cholesterol Education Program (NCEP) has set the  following guidelines (reference values) for LDL Cholesterol:  Optimal.......................<130 mg/dL  Borderline High...............130-159 mg/dL  High..........................160-189 mg/dL  Very High.....................>190 mg/dL      HDL/Cholesterol Ratio 11/04/2022 32.4  20.0 - 50.0 % Final    Total Cholesterol/HDL Ratio 11/04/2022 3.1  2.0 - 5.0 Final    Non-HDL Cholesterol 11/04/2022 138  mg/dL Final    Comment: Risk category and Non-HDL cholesterol goals:  Coronary heart disease (CHD)or equivalent (10-year risk of CHD >20%):  Non-HDL cholesterol goal     <130 mg/dL  Two or more CHD risk factors and 10-year risk of CHD <= 20%:  Non-HDL cholesterol goal     <160 mg/dL  0 to 1 CHD risk factor:  Non-HDL cholesterol goal     <190 mg/dL      Hemoglobin A1C 11/04/2022 5.7 (H)  4.0 - 5.6 % Final    Comment: ADA Screening Guidelines:  5.7-6.4%  Consistent with  prediabetes  >or=6.5%  Consistent with diabetes    High levels of fetal hemoglobin interfere with the HbA1C  assay. Heterozygous hemoglobin variants (HbS, HgC, etc)do  not significantly interfere with this assay.   However, presence of multiple variants may affect accuracy.      Estimated Avg Glucose 2022 117  68 - 131 mg/dL Final        Past Medical History:   Diagnosis Date    Abnormal Pap smear     Abnormal Pap smear of cervix     Allergy     HPV (human papilloma virus) anogenital infection     Hypertension     Skin disease     eczema    Skin disease     tinea versicolor     Past Surgical History:   Procedure Laterality Date    CERVICAL BIOPSY  W/ LOOP ELECTRODE EXCISION      DILATION AND CURETTAGE OF UTERUS      retained IUD/Endometrial polyp    HYSTEROSCOPY W/ POLYPECTOMY         Current Outpatient Medications:     amLODIPine (NORVASC) 5 MG tablet, Take 1 tablet (5 mg total) by mouth once daily., Disp: 90 tablet, Rfl: 3    aspirin 81 MG Chew, Take 81 mg by mouth once daily., Disp: , Rfl:     ketoconazole (NIZORAL) 2 % cream, Apply topically once daily., Disp: 60 g, Rfl: 1    meclizine (ANTIVERT) 12.5 mg tablet, 1-2 tabs PO TID PRN dizziness, Disp: 60 tablet, Rfl: 1    norgestimate-ethinyl estradioL (ORTHO-CYCLEN) 0.25-35 mg-mcg per tablet, Take 1 tablet by mouth once daily., Disp: 30 tablet, Rfl: 11    predniSONE (DELTASONE) 20 MG tablet, Take 1 tablet (20 mg total) by mouth once daily., Disp: 5 tablet, Rfl: 0    tranexamic acid (LYSTEDA) 650 mg tablet, Take 2 tablets (1,300 mg total) by mouth 3 (three) times daily. for 5 days, Disp: 30 tablet, Rfl: 0    Current Facility-Administered Medications:     rho(D) immune globulin injection 300 mcg, 300 mcg, Intramuscular, 1 time in Clinic/HOD, Mabel Tamayo NP  Review of patient's allergies indicates:  No Known Allergies  OB History    Para Term  AB Living   6 4 3 1 2 3   SAB IAB Ectopic Multiple Live Births   2 0 0   3      #  Outcome Date GA Lbr Ranulfo/2nd Weight Sex Delivery Anes PTL Lv   6 Term 10/10/21 37w4d  2.89 kg (6 lb 5.9 oz) F Vag-Spont EPI N ANA   5 SAB 2016     SAB   FD   4 Term 14 40w0d / 00:32 3.43 kg (7 lb 9 oz) M Vag-Spont EPI N ANA   3  13    M INDUCTION EPI N FD      Birth Comments: System Generated. Please review and update pregnancy details.   2 2013           1 Term 09 38w0d  3.6 kg (7 lb 15 oz) F Vag-Spont EPI  ANA     Social History     Tobacco Use    Smoking status: Never    Smokeless tobacco: Never   Substance Use Topics    Alcohol use: Not Currently     Alcohol/week: 7.0 standard drinks     Types: 7 Glasses of wine per week     Comment: Occasionally.    Drug use: No     Family History   Problem Relation Age of Onset    Diabetes Paternal Grandfather     Diabetes Paternal Grandmother     Diabetes Maternal Grandmother     Diabetes Maternal Grandfather     Hypertension Father     Eczema Sister     Eczema Sister     Diabetes Mother     Hypertension Mother     Breast cancer Neg Hx     Colon cancer Neg Hx     Ovarian cancer Neg Hx     Melanoma Neg Hx     Lupus Neg Hx     Psoriasis Neg Hx        Review of Systems   Negative except as in HPI      Physical Exam   Vitals:    23 1612   BP: 124/85     Body mass index is 36.93 kg/m².    Physical Exam  Constitutional:       General: She is not in acute distress.     Appearance: Normal appearance.   Genitourinary:      Vulva, bladder and urethral meatus normal.      Vaginal bleeding (mild amount of dark red blood in vault) present.      No vaginal discharge.        Right Adnexa: not tender, not full and no mass present.     Left Adnexa: not tender, not full and no mass present.     No cervical motion tenderness or lesion.      IUD strings visualized.      Uterus is not tender.      No uterine mass detected.     Uterus is anteverted.      Bladder is not tender.       Pelvic exam was performed with patient in the lithotomy position.   HENT:       Head: Normocephalic and atraumatic.   Pulmonary:      Effort: Pulmonary effort is normal.   Abdominal:      General: Bowel sounds are normal. There is no distension.      Palpations: Abdomen is soft.      Tenderness: There is no abdominal tenderness. There is no guarding or rebound.   Musculoskeletal:         General: Normal range of motion.      Cervical back: Normal range of motion.   Neurological:      General: No focal deficit present.      Mental Status: She is alert and oriented to person, place, and time.   Skin:     General: Skin is warm and dry.   Psychiatric:         Mood and Affect: Mood normal.         Behavior: Behavior normal.         Thought Content: Thought content normal.   Exam conducted with a chaperone present.        Labs reviewed: Pap, HPV, CBC    ASSESSMENT:   Patient Active Problem List   Diagnosis    Obesity, Class II, BMI 35-39.9, no comorbidity    Hypertension    Left hip pain    Chronic hypertension complicating or reason for care during pregnancy, third trimester    Irregular bleeding       PLAN:  Problem List Items Addressed This Visit          Renal/    Irregular bleeding - Primary    Current Assessment & Plan     IUD strings visualized and appears to be in correct position, however with new onset increase in bleeding and prior US with possible endometrial polyp vs fibroid, will repeat TVUS and discuss possible HSC for surgical management and treatment of AUB. Risks of using estrogen with well-controlled HTN reviewed. Rx for TXA sent to pharmacy and patient encouraged to try it if it is covered by insurance, if not, patient amenable to OCP cascade for the next few days to stop bleeding while waiting for US results. Will discuss results and treatment plan with Dr. Dunham as she comes back from maternity leave next month.          Relevant Medications    tranexamic acid (LYSTEDA) 650 mg tablet    Other Relevant Orders    US Pelvis Comp with Transvag NON-OB (xpd (Completed)        Total  time spent on this encounter was 20 minutes.  This includes preparing to see the patient;  obtaining/reviewing separately obtained history;  performing a medical exam and/or evaluation;   counseling/educating the patient;  ordering medications, tests, of procedures;   referring/communicating with other health care professionals;  EMR documentation;  interpreting/communicating results to the patient;  and/or care coordination.    Follow up if symptoms worsen or fail to improve.       Nguyen Choudhury MD  Department of Obstetrics & Gynecology  [facility]

## 2023-03-17 NOTE — ASSESSMENT & PLAN NOTE
IUD strings visualized and appears to be in correct position, however with new onset increase in bleeding and prior US with possible endometrial polyp vs fibroid, will repeat TVUS and discuss possible HSC for surgical management and treatment of AUB. Risks of using estrogen with well-controlled HTN reviewed. Rx for TXA sent to pharmacy and patient encouraged to try it if it is covered by insurance, if not, patient amenable to OCP cascade for the next few days to stop bleeding while waiting for US results. Will discuss results and treatment plan with Dr. Dunham as she comes back from maternity leave next month.

## 2023-03-21 ENCOUNTER — TELEPHONE (OUTPATIENT)
Dept: OBSTETRICS AND GYNECOLOGY | Facility: CLINIC | Age: 38
End: 2023-03-21
Payer: COMMERCIAL

## 2023-03-21 NOTE — TELEPHONE ENCOUNTER
Spoke with patient about use of OCPs for 3 weeks to see if abnormal bleeding improves. TVUS shows IUD in correct position and 6 mm possible calcified fibroid at the fundus. If no improvement in bleeding with COCs, can also consider HSC, IUD removal, HSC myomectomy, possible ablation for bleeding management (consider BTL at same time as pt with undesired fertility). Patient to contact the office with no improvement in bleeding after 3 weeks of COCs. She voiced understanding and is amenable to the plan.

## 2023-04-01 ENCOUNTER — PATIENT MESSAGE (OUTPATIENT)
Dept: INTERNAL MEDICINE | Facility: CLINIC | Age: 38
End: 2023-04-01
Payer: COMMERCIAL

## 2023-04-03 ENCOUNTER — TELEPHONE (OUTPATIENT)
Dept: BARIATRICS | Facility: CLINIC | Age: 38
End: 2023-04-03
Payer: COMMERCIAL

## 2023-04-03 NOTE — TELEPHONE ENCOUNTER
----- Message from Geovanna Davies sent at 4/3/2023 10:19 AM CDT -----  Regarding: Consult to Bariatric Surgery  Good morning, Dr. Stephen oMreau placed a referral for patient to Consult to Bariatric Medicine. Patient stated she has had her financial service appointment and is waiting on someone to call her to move forward with the procedure. Please contact patient and assist with scheduling, thanks!    Obesity, Class II, BMI 35-39.9, no comorbidity [E66.9]

## 2023-05-04 RX ORDER — AMLODIPINE BESYLATE 5 MG/1
TABLET ORAL
Qty: 90 TABLET | Refills: 1 | Status: SHIPPED | OUTPATIENT
Start: 2023-05-04 | End: 2023-12-10

## 2023-05-04 NOTE — TELEPHONE ENCOUNTER
No care due was identified.  [facility] Embedded Care Due Messages. Reference number: [id number].   5/04/2023 3:25:13 AM CDT

## 2023-05-04 NOTE — TELEPHONE ENCOUNTER
Refill Decision Note   Joann Banuelos  is requesting a refill authorization.  Brief Assessment and Rationale for Refill:  Approve     Medication Therapy Plan:         Comments:     Note composed:5:52 AM 05/04/2023             Appointments     Last Visit   11/9/2022 Rogelio Mroeau,    Next Visit   Visit date not found Rogelio Moreau, DO

## 2023-05-09 ENCOUNTER — PATIENT MESSAGE (OUTPATIENT)
Dept: OBSTETRICS AND GYNECOLOGY | Facility: CLINIC | Age: 38
End: 2023-05-09
Payer: COMMERCIAL

## 2023-05-09 DIAGNOSIS — Z30.2 ENCOUNTER FOR STERILIZATION: Primary | ICD-10-CM

## 2023-05-09 NOTE — TELEPHONE ENCOUNTER
Britany,     Tubal ligation scheduled for 5/29.    Can you book her for EP visit week before to sign consents??    Thank you!

## 2023-05-16 ENCOUNTER — OFFICE VISIT (OUTPATIENT)
Dept: OBSTETRICS AND GYNECOLOGY | Facility: CLINIC | Age: 38
End: 2023-05-16
Payer: COMMERCIAL

## 2023-05-16 VITALS
HEIGHT: 67 IN | WEIGHT: 231.5 LBS | DIASTOLIC BLOOD PRESSURE: 85 MMHG | BODY MASS INDEX: 36.34 KG/M2 | SYSTOLIC BLOOD PRESSURE: 124 MMHG

## 2023-05-16 DIAGNOSIS — Z30.2 ENCOUNTER FOR STERILIZATION: Primary | ICD-10-CM

## 2023-05-16 DIAGNOSIS — N92.0 MENORRHAGIA WITH REGULAR CYCLE: ICD-10-CM

## 2023-05-16 DIAGNOSIS — Z97.5 IUD (INTRAUTERINE DEVICE) IN PLACE: ICD-10-CM

## 2023-05-16 PROCEDURE — 99999 PR PBB SHADOW E&M-EST. PATIENT-LVL III: ICD-10-PCS | Mod: PBBFAC,,, | Performed by: OBSTETRICS & GYNECOLOGY

## 2023-05-16 PROCEDURE — 3079F DIAST BP 80-89 MM HG: CPT | Mod: CPTII,S$GLB,, | Performed by: OBSTETRICS & GYNECOLOGY

## 2023-05-16 PROCEDURE — 3079F PR MOST RECENT DIASTOLIC BLOOD PRESSURE 80-89 MM HG: ICD-10-PCS | Mod: CPTII,S$GLB,, | Performed by: OBSTETRICS & GYNECOLOGY

## 2023-05-16 PROCEDURE — 1159F PR MEDICATION LIST DOCUMENTED IN MEDICAL RECORD: ICD-10-PCS | Mod: CPTII,S$GLB,, | Performed by: OBSTETRICS & GYNECOLOGY

## 2023-05-16 PROCEDURE — 99214 OFFICE O/P EST MOD 30 MIN: CPT | Mod: S$GLB,,, | Performed by: OBSTETRICS & GYNECOLOGY

## 2023-05-16 PROCEDURE — 3074F SYST BP LT 130 MM HG: CPT | Mod: CPTII,S$GLB,, | Performed by: OBSTETRICS & GYNECOLOGY

## 2023-05-16 PROCEDURE — 99214 PR OFFICE/OUTPT VISIT, EST, LEVL IV, 30-39 MIN: ICD-10-PCS | Mod: S$GLB,,, | Performed by: OBSTETRICS & GYNECOLOGY

## 2023-05-16 PROCEDURE — 3008F BODY MASS INDEX DOCD: CPT | Mod: CPTII,S$GLB,, | Performed by: OBSTETRICS & GYNECOLOGY

## 2023-05-16 PROCEDURE — 3008F PR BODY MASS INDEX (BMI) DOCUMENTED: ICD-10-PCS | Mod: CPTII,S$GLB,, | Performed by: OBSTETRICS & GYNECOLOGY

## 2023-05-16 PROCEDURE — 99999 PR PBB SHADOW E&M-EST. PATIENT-LVL III: CPT | Mod: PBBFAC,,, | Performed by: OBSTETRICS & GYNECOLOGY

## 2023-05-16 PROCEDURE — 3074F PR MOST RECENT SYSTOLIC BLOOD PRESSURE < 130 MM HG: ICD-10-PCS | Mod: CPTII,S$GLB,, | Performed by: OBSTETRICS & GYNECOLOGY

## 2023-05-16 PROCEDURE — 1159F MED LIST DOCD IN RCRD: CPT | Mod: CPTII,S$GLB,, | Performed by: OBSTETRICS & GYNECOLOGY

## 2023-05-16 NOTE — H&P (VIEW-ONLY)
HPI: Pt presents today to discuss moving forward with permanent sterilization. She also is interested in surgical intervention for her heavy cycles. She currently has a Mirena IUD in place. It has helped some with her cycles but she would like to move forward with an endometrial ablation at the time of her salpingectomy.     ROS:  GENERAL: Feeling well overall. Denies fever or chills.   SKIN: Denies rash or lesions.   HEAD: Denies head injury or headache.   NODES: Denies enlarged lymph nodes.   CHEST: Denies chest pain or shortness of breath.   CARDIOVASCULAR: Denies palpitations or left sided chest pain.   ABDOMEN: No abdominal pain, constipation, diarrhea, nausea, vomiting or rectal bleeding.   URINARY: No dysuria, hematuria, or burning on urination.  REPRODUCTIVE: See HPI.   BREASTS: Denies pain, lumps, or nipple discharge.   HEMATOLOGIC: No easy bruisability or excessive bleeding.   MUSCULOSKELETAL: Denies joint pain or swelling.   NEUROLOGIC: Denies syncope or weakness.   PSYCHIATRIC: Denies depression, anxiety or mood swings.    PE:   APPEARANCE: Well nourished, well developed,   female in no acute distress.  PELVIC: DEFERRED TO or    Diagnosis:  1. Encounter for sterilization    2. Menorrhagia with regular cycle    3. IUD (intrauterine device) in place        Plan:   - Laparoscopic Bilateral Salpingectomy, D+C Hysteroscopy with Novasure ablation scheduled for May 29th, 2023. Will remove IUD at time of surgery.     Day-of surgery routines, times of arrival, eating instructions discussed  Post op expectations and precautions reviewed    I have discussed the risks, benefits, indications, and alternatives of the procedure in detail with the patient.  She verbalizes her understanding.  All questions answered.  Surgical consents signed.  The patient agrees to proceed with procedure as planned.    Face to Face time with patient: 15    30 minutes of total time spent on the encounter, which includes  face to face time and non-face to face time preparing to see the patient (eg, review of tests), Obtaining and/or reviewing separately obtained history, Documenting clinical information in the electronic or other health record, Independently interpreting results (not separately reported) and communicating results to the patient/family/caregiver, or Care coordination (not separately reported).            Follow-up with me PRN annual exam.

## 2023-05-17 ENCOUNTER — PATIENT MESSAGE (OUTPATIENT)
Dept: SURGERY | Facility: OTHER | Age: 38
End: 2023-05-17
Payer: COMMERCIAL

## 2023-05-24 ENCOUNTER — HOSPITAL ENCOUNTER (OUTPATIENT)
Dept: PREADMISSION TESTING | Facility: OTHER | Age: 38
Discharge: HOME | End: 2023-05-24
Attending: OBSTETRICS & GYNECOLOGY
Payer: COMMERCIAL

## 2023-05-24 ENCOUNTER — ANESTHESIA EVENT (OUTPATIENT)
Dept: SURGERY | Facility: OTHER | Age: 38
End: 2023-05-24
Payer: COMMERCIAL

## 2023-05-24 VITALS
BODY MASS INDEX: 35.79 KG/M2 | HEIGHT: 67 IN | RESPIRATION RATE: 19 BRPM | OXYGEN SATURATION: 100 % | WEIGHT: 228 LBS | SYSTOLIC BLOOD PRESSURE: 134 MMHG | TEMPERATURE: 98 F | DIASTOLIC BLOOD PRESSURE: 81 MMHG | HEART RATE: 74 BPM

## 2023-05-24 RX ORDER — PROCHLORPERAZINE EDISYLATE 5 MG/ML
5 INJECTION INTRAMUSCULAR; INTRAVENOUS EVERY 30 MIN PRN
Status: CANCELLED | OUTPATIENT
Start: 2023-05-24

## 2023-05-24 RX ORDER — DIPHENHYDRAMINE HYDROCHLORIDE 50 MG/ML
25 INJECTION INTRAMUSCULAR; INTRAVENOUS EVERY 6 HOURS PRN
Status: CANCELLED | OUTPATIENT
Start: 2023-05-24

## 2023-05-24 RX ORDER — MEPERIDINE HYDROCHLORIDE 25 MG/ML
12.5 INJECTION INTRAMUSCULAR; INTRAVENOUS; SUBCUTANEOUS ONCE AS NEEDED
Status: CANCELLED | OUTPATIENT
Start: 2023-05-24 | End: 2023-05-25

## 2023-05-24 RX ORDER — HYDROMORPHONE HYDROCHLORIDE 2 MG/ML
0.4 INJECTION, SOLUTION INTRAMUSCULAR; INTRAVENOUS; SUBCUTANEOUS EVERY 5 MIN PRN
Status: CANCELLED | OUTPATIENT
Start: 2023-05-24

## 2023-05-24 RX ORDER — SODIUM CHLORIDE, SODIUM LACTATE, POTASSIUM CHLORIDE, CALCIUM CHLORIDE 600; 310; 30; 20 MG/100ML; MG/100ML; MG/100ML; MG/100ML
INJECTION, SOLUTION INTRAVENOUS CONTINUOUS
Status: CANCELLED | OUTPATIENT
Start: 2023-05-24

## 2023-05-24 RX ORDER — OXYCODONE HYDROCHLORIDE 5 MG/1
5 TABLET ORAL
Status: CANCELLED | OUTPATIENT
Start: 2023-05-24

## 2023-05-24 RX ORDER — LIDOCAINE HYDROCHLORIDE 10 MG/ML
0.5 INJECTION, SOLUTION EPIDURAL; INFILTRATION; INTRACAUDAL; PERINEURAL ONCE
Status: CANCELLED | OUTPATIENT
Start: 2023-05-24 | End: 2023-05-24

## 2023-05-24 RX ORDER — SODIUM CHLORIDE 0.9 % (FLUSH) 0.9 %
3 SYRINGE (ML) INJECTION
Status: DISCONTINUED | OUTPATIENT
Start: 2023-05-24 | End: 2023-05-25 | Stop reason: HOSPADM

## 2023-05-24 RX ORDER — ACETAMINOPHEN 500 MG
1000 TABLET ORAL
Status: CANCELLED | OUTPATIENT
Start: 2023-05-24 | End: 2023-05-24

## 2023-05-24 NOTE — DISCHARGE INSTRUCTIONS
Information to Prepare you for your Surgery    PRE-ADMIT TESTING -  [phone]    [address] [facility]          Your surgery has been scheduled at [facility]. We are pleased to have the opportunity to serve you. For Further Information please call [phone].    On the day of surgery please report to the Information Desk on the 1st floor.    CONTACT YOUR PHYSICIAN'S OFFICE THE DAY PRIOR TO YOUR SURGERY TO OBTAIN YOUR ARRIVAL TIME.     The evening before surgery do not eat anything after 9 p.m. ( this includes hard candy, chewing gum and mints).  You may only have GATORADE, POWERADE AND WATER  from 9 p.m. until you leave your home.   DO NOT DRINK ANY LIQUIDS ON THE WAY TO THE HOSPITAL.      Why does your anesthesiologist allow you to drink Gatorade/Powerade before surgery?  Gatorade/Powerade helps to increase your comfort before surgery and to decrease your nausea after surgery. The carbohydrates in Gatorade/Powerade help reduce your body's stress response to surgery.  If you are a diabetic-drink only water prior to surgery.       Patients may have 2 visitors pre and post procedure. Only 2 visitors will be allowed in the Surgical building with the patient. No one under the age of 12 will be allowed into the facility.    SPECIAL MEDICATION INSTRUCTIONS: TAKE medications checked off by the Anesthesiologist on your Medication List.    Angiogram Patients: Take medications as instructed by your physician, including aspirin.     Surgery Patients:    If you take ASPIRIN - Your PHYSICIAN/SURGEON will need to inform you IF/OR when you need to stop taking aspirin prior to your surgery.     The week prior to surgery do not ot take any medications containing IBUPROFEN or NSAIDS ( Advil, Motrin, Goodys, BC, Aleve, Naproxen etc) If you are not sure if you should take a medicine please call your surgeon's office.  Ok to take Tylenol    Do Not Wear any make-up  (especially eye make-up) to surgery. Please remove any false eyelashes or eyelash extensions. If you arrive the day of surgery with makeup/eyelashes on you will be required to remove prior to surgery. (There is a risk of corneal abrasions if eye makeup/eyelash extensions are not removed)      Leave all valuables at home.   Do Not wear any jewelry or watches, including any metal in body piercings. Jewelry must be removed prior to coming to the hospital.  There is a possibility that rings that are unable to be removed may be cut off if they are on the surgical extremity.    Please remove all hair extensions, wigs, clips and any other metal accessories/ ornaments from your hair.  These items may pose a flammable/fire risk in Surgery and must be removed.    Do not shave your surgical area at least 5 days prior to your surgery. The surgical prep will be performed at the hospital according to Infection Control regulations.    Contact Lens must be removed before surgery. Either do not wear the contact lens or bring a case and solution for storage.  Please bring a container for eyeglasses or dentures as required.  Bring any paperwork your physician has provided, such as consent forms,  history and physicals, doctor's orders, etc.   Bring comfortable clothes that are loose fitting to wear upon discharge. Take into consideration the type of surgery being performed.  Maintain your diet as advised per your physician the day prior to surgery.      Adequate rest the night before surgery is advised.   Park in the Parking lot behind the hospital or in the [facility] across the street from the parking lot. Parking is complimentary.  If you will be discharged the same day as your procedure, please arrange for a responsible adult to drive you home or to accompany you if traveling by taxi.   YOU WILL NOT BE PERMITTED TO DRIVE OR TO LEAVE THE HOSPITAL ALONE AFTER SURGERY.   If you are being discharged the same day, it is  strongly recommended that you arrange for someone to remain with you for the first 24 hrs following your surgery.    The Surgeon will speak to your family/visitor after your surgery regarding the outcome of your surgery and post op care.  The Surgeon may speak to you after your surgery, but there is a possibility you may not remember the details.  Please check with your family members regarding the conversation with the Surgeon.    We strongly recommend whoever is bringing you home be present for discharge instructions.  This will ensure a thorough understanding for your post op home care.    ALL CHILDREN MUST ALWAYS BE ACCOMPANIED BY AN ADULT.    Visitors-Refer to current Visitor policy handouts.    Thank you for your cooperation.  The Staff of [facility].            Bathing Instructions with Hibiclens    Shower the evening before and morning of your procedure with Chlorhexidine (Hibiclens)  do not use Chlorhexidine on your face or genitals. Do not get in your eyes.  Wash your face with water and your regular face wash/soap  Use your regular shampoo  Apply Chlorhexidine (Hibiclens) directly on your skin or on a wet washcloth and wash gently. When showering: Move away from the shower stream when applying Chlorhexidine (Hibiclens) to avoid rinsing off too soon.  Rinse thoroughly with warm water  Do not dilute Chlorhexidine (Hibiclens)   Dry off as usual, do not use any deodorant, powder, body lotions, perfume, after shave or cologne.

## 2023-05-24 NOTE — ANESTHESIA PREPROCEDURE EVALUATION
05/24/2023  Joann Banuelos is a 37 y.o., female.      Pre-op Assessment    I have reviewed the Patient Summary Reports.     I have reviewed the Nursing Notes. I have reviewed the NPO Status.   I have reviewed the Medications.     Review of Systems  Anesthesia Hx:  Denies Family Hx of Anesthesia complications.   Denies Personal Hx of Anesthesia complications.   Cardiovascular:   Hypertension        Physical Exam  General: Well nourished, Cooperative, Alert and Oriented    Airway:  Mallampati: II   Mouth Opening: Normal  TM Distance: Normal  Tongue: Normal  Neck ROM: Normal ROM    Dental:  Intact        Anesthesia Plan  Type of Anesthesia, risks & benefits discussed:    Anesthesia Type: Gen ETT  Intra-op Monitoring Plan: Standard ASA Monitors  Post Op Pain Control Plan: multimodal analgesia  Induction:  IV  Airway Plan: Video  Informed Consent: Informed consent signed with the Patient and all parties understand the risks and agree with anesthesia plan.  All questions answered.   ASA Score: 2    Ready For Surgery From Anesthesia Perspective.     .

## 2023-05-29 ENCOUNTER — ANESTHESIA (OUTPATIENT)
Dept: SURGERY | Facility: OTHER | Age: 38
End: 2023-05-29
Payer: COMMERCIAL

## 2023-05-29 ENCOUNTER — HOSPITAL ENCOUNTER (OUTPATIENT)
Facility: OTHER | Age: 38
Discharge: HOME | End: 2023-05-29
Attending: OBSTETRICS & GYNECOLOGY | Admitting: OBSTETRICS & GYNECOLOGY
Payer: COMMERCIAL

## 2023-05-29 DIAGNOSIS — Z98.890 S/P ENDOMETRIAL ABLATION: ICD-10-CM

## 2023-05-29 DIAGNOSIS — Z30.2 ENCOUNTER FOR STERILIZATION: Primary | ICD-10-CM

## 2023-05-29 DIAGNOSIS — Z90.79 STATUS POST BILATERAL SALPINGECTOMY: ICD-10-CM

## 2023-05-29 PROBLEM — N92.0 HEAVY MENSTRUAL BLEEDING: Status: ACTIVE | Noted: 2023-05-29

## 2023-05-29 PROBLEM — Z30.09 UNWANTED FERTILITY: Status: ACTIVE | Noted: 2023-05-29

## 2023-05-29 PROBLEM — N93.9 ABNORMAL UTERINE BLEEDING (AUB): Status: ACTIVE | Noted: 2023-05-29

## 2023-05-29 LAB
B-HCG UR QL: NEGATIVE
CTP QC/QA: YES

## 2023-05-29 PROCEDURE — 27201423 OPTIME MED/SURG SUP & DEVICES STERILE SUPPLY: Performed by: OBSTETRICS & GYNECOLOGY

## 2023-05-29 PROCEDURE — 71000016 HC POSTOP RECOV ADDL HR: Performed by: OBSTETRICS & GYNECOLOGY

## 2023-05-29 PROCEDURE — 37000009 HC ANESTHESIA EA ADD 15 MINS: Performed by: OBSTETRICS & GYNECOLOGY

## 2023-05-29 PROCEDURE — 25000003 PHARM REV CODE 250: Performed by: OBSTETRICS & GYNECOLOGY

## 2023-05-29 PROCEDURE — D9220A PRA ANESTHESIA: ICD-10-PCS | Mod: CRNA,,, | Performed by: NURSE ANESTHETIST, CERTIFIED REGISTERED

## 2023-05-29 PROCEDURE — 63600175 PHARM REV CODE 636 W HCPCS: Performed by: ANESTHESIOLOGY

## 2023-05-29 PROCEDURE — 63600175 PHARM REV CODE 636 W HCPCS: Performed by: GENERAL PRACTICE

## 2023-05-29 PROCEDURE — 88302 TISSUE EXAM BY PATHOLOGIST: CPT | Mod: 26,,, | Performed by: PATHOLOGY

## 2023-05-29 PROCEDURE — 25000003 PHARM REV CODE 250: Performed by: ANESTHESIOLOGY

## 2023-05-29 PROCEDURE — 71000039 HC RECOVERY, EACH ADD'L HOUR: Performed by: OBSTETRICS & GYNECOLOGY

## 2023-05-29 PROCEDURE — 36000708 HC OR TIME LEV III 1ST 15 MIN: Performed by: OBSTETRICS & GYNECOLOGY

## 2023-05-29 PROCEDURE — 58301 PR REMOVE, INTRAUTERINE DEVICE: ICD-10-PCS | Mod: 51,,, | Performed by: OBSTETRICS & GYNECOLOGY

## 2023-05-29 PROCEDURE — D9220A PRA ANESTHESIA: ICD-10-PCS | Mod: ANES,,, | Performed by: ANESTHESIOLOGY

## 2023-05-29 PROCEDURE — 71000033 HC RECOVERY, INTIAL HOUR: Performed by: OBSTETRICS & GYNECOLOGY

## 2023-05-29 PROCEDURE — 63600175 PHARM REV CODE 636 W HCPCS: Performed by: NURSE ANESTHETIST, CERTIFIED REGISTERED

## 2023-05-29 PROCEDURE — 58661 LAPAROSCOPY REMOVE ADNEXA: CPT | Mod: 50,,, | Performed by: OBSTETRICS & GYNECOLOGY

## 2023-05-29 PROCEDURE — 81025 URINE PREGNANCY TEST: CPT | Performed by: ANESTHESIOLOGY

## 2023-05-29 PROCEDURE — 58661 PR LAP,RMV  ADNEXAL STRUCTURE: ICD-10-PCS | Mod: 50,,, | Performed by: OBSTETRICS & GYNECOLOGY

## 2023-05-29 PROCEDURE — 25000003 PHARM REV CODE 250: Performed by: NURSE ANESTHETIST, CERTIFIED REGISTERED

## 2023-05-29 PROCEDURE — 36000709 HC OR TIME LEV III EA ADD 15 MIN: Performed by: OBSTETRICS & GYNECOLOGY

## 2023-05-29 PROCEDURE — 88302 TISSUE EXAM BY PATHOLOGIST: CPT | Mod: 59 | Performed by: PATHOLOGY

## 2023-05-29 PROCEDURE — 88305 TISSUE EXAM BY PATHOLOGIST: CPT | Mod: 26,,, | Performed by: PATHOLOGY

## 2023-05-29 PROCEDURE — 71000015 HC POSTOP RECOV 1ST HR: Performed by: OBSTETRICS & GYNECOLOGY

## 2023-05-29 PROCEDURE — 58301 REMOVE INTRAUTERINE DEVICE: CPT | Mod: 51,,, | Performed by: OBSTETRICS & GYNECOLOGY

## 2023-05-29 PROCEDURE — D9220A PRA ANESTHESIA: Mod: CRNA,,, | Performed by: NURSE ANESTHETIST, CERTIFIED REGISTERED

## 2023-05-29 PROCEDURE — 37000008 HC ANESTHESIA 1ST 15 MINUTES: Performed by: OBSTETRICS & GYNECOLOGY

## 2023-05-29 PROCEDURE — 58563 PR HYSTEROSCOPY,W/ENDOMETRIAL ABLATION: ICD-10-PCS | Mod: 51,,, | Performed by: OBSTETRICS & GYNECOLOGY

## 2023-05-29 PROCEDURE — 88305 TISSUE EXAM BY PATHOLOGIST: ICD-10-PCS | Mod: 26,,, | Performed by: PATHOLOGY

## 2023-05-29 PROCEDURE — 88305 TISSUE EXAM BY PATHOLOGIST: CPT | Performed by: PATHOLOGY

## 2023-05-29 PROCEDURE — 88302 PR  SURG PATH,LEVEL II: ICD-10-PCS | Mod: 26,,, | Performed by: PATHOLOGY

## 2023-05-29 PROCEDURE — D9220A PRA ANESTHESIA: Mod: ANES,,, | Performed by: ANESTHESIOLOGY

## 2023-05-29 PROCEDURE — 58563 HYSTEROSCOPY ABLATION: CPT | Mod: 51,,, | Performed by: OBSTETRICS & GYNECOLOGY

## 2023-05-29 RX ORDER — PROPOFOL 10 MG/ML
VIAL (ML) INTRAVENOUS
Status: DISCONTINUED | OUTPATIENT
Start: 2023-05-29 | End: 2023-05-29

## 2023-05-29 RX ORDER — HYDROMORPHONE HYDROCHLORIDE 2 MG/ML
0.4 INJECTION, SOLUTION INTRAMUSCULAR; INTRAVENOUS; SUBCUTANEOUS EVERY 5 MIN PRN
Status: DISCONTINUED | OUTPATIENT
Start: 2023-05-29 | End: 2023-05-29 | Stop reason: HOSPADM

## 2023-05-29 RX ORDER — LIDOCAINE HYDROCHLORIDE 10 MG/ML
0.5 INJECTION, SOLUTION EPIDURAL; INFILTRATION; INTRACAUDAL; PERINEURAL ONCE
Status: DISCONTINUED | OUTPATIENT
Start: 2023-05-29 | End: 2023-05-29 | Stop reason: HOSPADM

## 2023-05-29 RX ORDER — ROCURONIUM BROMIDE 10 MG/ML
INJECTION, SOLUTION INTRAVENOUS
Status: DISCONTINUED | OUTPATIENT
Start: 2023-05-29 | End: 2023-05-29

## 2023-05-29 RX ORDER — IBUPROFEN 600 MG/1
600 TABLET ORAL ONCE
Status: COMPLETED | OUTPATIENT
Start: 2023-05-29 | End: 2023-05-29

## 2023-05-29 RX ORDER — MEPERIDINE HYDROCHLORIDE 25 MG/ML
12.5 INJECTION INTRAMUSCULAR; INTRAVENOUS; SUBCUTANEOUS ONCE AS NEEDED
Status: DISCONTINUED | OUTPATIENT
Start: 2023-05-29 | End: 2023-05-29 | Stop reason: HOSPADM

## 2023-05-29 RX ORDER — FENTANYL CITRATE 50 UG/ML
INJECTION, SOLUTION INTRAMUSCULAR; INTRAVENOUS
Status: DISCONTINUED | OUTPATIENT
Start: 2023-05-29 | End: 2023-05-29

## 2023-05-29 RX ORDER — MUPIROCIN 20 MG/G
OINTMENT TOPICAL
Status: DISCONTINUED | OUTPATIENT
Start: 2023-05-29 | End: 2023-05-29 | Stop reason: HOSPADM

## 2023-05-29 RX ORDER — ONDANSETRON 2 MG/ML
4 INJECTION INTRAMUSCULAR; INTRAVENOUS EVERY 4 HOURS PRN
Status: DISCONTINUED | OUTPATIENT
Start: 2023-05-29 | End: 2023-05-29 | Stop reason: HOSPADM

## 2023-05-29 RX ORDER — SODIUM CHLORIDE, SODIUM LACTATE, POTASSIUM CHLORIDE, CALCIUM CHLORIDE 600; 310; 30; 20 MG/100ML; MG/100ML; MG/100ML; MG/100ML
INJECTION, SOLUTION INTRAVENOUS CONTINUOUS
Status: DISCONTINUED | OUTPATIENT
Start: 2023-05-29 | End: 2023-05-29 | Stop reason: HOSPADM

## 2023-05-29 RX ORDER — PROCHLORPERAZINE EDISYLATE 5 MG/ML
5 INJECTION INTRAMUSCULAR; INTRAVENOUS EVERY 30 MIN PRN
Status: DISCONTINUED | OUTPATIENT
Start: 2023-05-29 | End: 2023-05-29 | Stop reason: HOSPADM

## 2023-05-29 RX ORDER — ONDANSETRON 2 MG/ML
INJECTION INTRAMUSCULAR; INTRAVENOUS
Status: DISCONTINUED | OUTPATIENT
Start: 2023-05-29 | End: 2023-05-29

## 2023-05-29 RX ORDER — ACETAMINOPHEN 500 MG
1000 TABLET ORAL EVERY 6 HOURS PRN
Status: DISCONTINUED | OUTPATIENT
Start: 2023-05-29 | End: 2023-05-29 | Stop reason: HOSPADM

## 2023-05-29 RX ORDER — HYDROMORPHONE HYDROCHLORIDE 2 MG/ML
INJECTION, SOLUTION INTRAMUSCULAR; INTRAVENOUS; SUBCUTANEOUS
Status: DISCONTINUED | OUTPATIENT
Start: 2023-05-29 | End: 2023-05-29

## 2023-05-29 RX ORDER — OXYCODONE HYDROCHLORIDE 5 MG/1
5 CAPSULE ORAL EVERY 6 HOURS PRN
Qty: 5 CAPSULE | Refills: 0 | Status: SHIPPED | OUTPATIENT
Start: 2023-05-29 | End: 2023-05-29 | Stop reason: SDUPTHER

## 2023-05-29 RX ORDER — OXYCODONE HYDROCHLORIDE 5 MG/1
5 TABLET ORAL
Status: DISCONTINUED | OUTPATIENT
Start: 2023-05-29 | End: 2023-05-29 | Stop reason: HOSPADM

## 2023-05-29 RX ORDER — ACETAMINOPHEN 500 MG
1000 TABLET ORAL
Status: COMPLETED | OUTPATIENT
Start: 2023-05-29 | End: 2023-05-29

## 2023-05-29 RX ORDER — DEXTROMETHORPHAN HYDROBROMIDE, GUAIFENESIN 5; 100 MG/5ML; MG/5ML
650 LIQUID ORAL
Qty: 60 TABLET | Refills: 1 | Status: SHIPPED | OUTPATIENT
Start: 2023-05-29 | End: 2024-03-06

## 2023-05-29 RX ORDER — SODIUM CHLORIDE 9 MG/ML
INJECTION, SOLUTION INTRAVENOUS CONTINUOUS
Status: DISCONTINUED | OUTPATIENT
Start: 2023-05-29 | End: 2023-05-29 | Stop reason: HOSPADM

## 2023-05-29 RX ORDER — KETOROLAC TROMETHAMINE 30 MG/ML
INJECTION, SOLUTION INTRAMUSCULAR; INTRAVENOUS
Status: DISCONTINUED | OUTPATIENT
Start: 2023-05-29 | End: 2023-05-29

## 2023-05-29 RX ORDER — DIPHENHYDRAMINE HYDROCHLORIDE 50 MG/ML
25 INJECTION INTRAMUSCULAR; INTRAVENOUS EVERY 6 HOURS PRN
Status: DISCONTINUED | OUTPATIENT
Start: 2023-05-29 | End: 2023-05-29 | Stop reason: HOSPADM

## 2023-05-29 RX ORDER — OXYCODONE HYDROCHLORIDE 5 MG/1
5 CAPSULE ORAL EVERY 6 HOURS PRN
Qty: 10 CAPSULE | Refills: 0 | Status: SHIPPED | OUTPATIENT
Start: 2023-05-29 | End: 2024-03-06

## 2023-05-29 RX ORDER — LIDOCAINE HYDROCHLORIDE 20 MG/ML
INJECTION INTRAVENOUS
Status: DISCONTINUED | OUTPATIENT
Start: 2023-05-29 | End: 2023-05-29

## 2023-05-29 RX ORDER — FAMOTIDINE 20 MG/1
20 TABLET, FILM COATED ORAL
Status: COMPLETED | OUTPATIENT
Start: 2023-05-29 | End: 2023-05-29

## 2023-05-29 RX ORDER — HYDROMORPHONE HYDROCHLORIDE 2 MG/ML
0.2 INJECTION, SOLUTION INTRAMUSCULAR; INTRAVENOUS; SUBCUTANEOUS
Status: DISCONTINUED | OUTPATIENT
Start: 2023-05-29 | End: 2023-05-29 | Stop reason: HOSPADM

## 2023-05-29 RX ORDER — KETOROLAC TROMETHAMINE 30 MG/ML
15 INJECTION, SOLUTION INTRAMUSCULAR; INTRAVENOUS EVERY 6 HOURS PRN
Status: DISCONTINUED | OUTPATIENT
Start: 2023-05-29 | End: 2023-05-29 | Stop reason: HOSPADM

## 2023-05-29 RX ORDER — DEXAMETHASONE SODIUM PHOSPHATE 4 MG/ML
INJECTION, SOLUTION INTRA-ARTICULAR; INTRALESIONAL; INTRAMUSCULAR; INTRAVENOUS; SOFT TISSUE
Status: DISCONTINUED | OUTPATIENT
Start: 2023-05-29 | End: 2023-05-29

## 2023-05-29 RX ORDER — MIDAZOLAM HYDROCHLORIDE 1 MG/ML
INJECTION INTRAMUSCULAR; INTRAVENOUS
Status: DISCONTINUED | OUTPATIENT
Start: 2023-05-29 | End: 2023-05-29

## 2023-05-29 RX ORDER — IBUPROFEN 600 MG/1
600 TABLET ORAL EVERY 6 HOURS PRN
Qty: 30 TABLET | Refills: 1 | Status: SHIPPED | OUTPATIENT
Start: 2023-05-29 | End: 2024-03-06

## 2023-05-29 RX ADMIN — LIDOCAINE HYDROCHLORIDE 100 MG: 20 INJECTION, SOLUTION INTRAVENOUS at 07:05

## 2023-05-29 RX ADMIN — SODIUM CHLORIDE, SODIUM LACTATE, POTASSIUM CHLORIDE, AND CALCIUM CHLORIDE: .6; .31; .03; .02 INJECTION, SOLUTION INTRAVENOUS at 06:05

## 2023-05-29 RX ADMIN — ONDANSETRON 4 MG: 2 INJECTION INTRAMUSCULAR; INTRAVENOUS at 12:05

## 2023-05-29 RX ADMIN — PROPOFOL 200 MG: 10 INJECTION, EMULSION INTRAVENOUS at 07:05

## 2023-05-29 RX ADMIN — IBUPROFEN 600 MG: 600 TABLET, FILM COATED ORAL at 10:05

## 2023-05-29 RX ADMIN — ONDANSETRON HYDROCHLORIDE 4 MG: 2 INJECTION INTRAMUSCULAR; INTRAVENOUS at 07:05

## 2023-05-29 RX ADMIN — MIDAZOLAM HYDROCHLORIDE 2 MG: 1 INJECTION, SOLUTION INTRAMUSCULAR; INTRAVENOUS at 06:05

## 2023-05-29 RX ADMIN — FENTANYL CITRATE 100 MCG: 50 INJECTION, SOLUTION INTRAMUSCULAR; INTRAVENOUS at 07:05

## 2023-05-29 RX ADMIN — HYDROMORPHONE HYDROCHLORIDE 0.4 MG: 2 INJECTION INTRAMUSCULAR; INTRAVENOUS; SUBCUTANEOUS at 09:05

## 2023-05-29 RX ADMIN — HYDROMORPHONE HYDROCHLORIDE 0.4 MG: 2 INJECTION, SOLUTION INTRAMUSCULAR; INTRAVENOUS; SUBCUTANEOUS at 08:05

## 2023-05-29 RX ADMIN — SUGAMMADEX 200 MG: 100 INJECTION, SOLUTION INTRAVENOUS at 08:05

## 2023-05-29 RX ADMIN — FAMOTIDINE 20 MG: 20 TABLET, FILM COATED ORAL at 06:05

## 2023-05-29 RX ADMIN — OXYCODONE HYDROCHLORIDE 5 MG: 5 TABLET ORAL at 09:05

## 2023-05-29 RX ADMIN — DEXAMETHASONE SODIUM PHOSPHATE 8 MG: 4 INJECTION, SOLUTION INTRAMUSCULAR; INTRAVENOUS at 07:05

## 2023-05-29 RX ADMIN — ROCURONIUM BROMIDE 50 MG: 10 SOLUTION INTRAVENOUS at 07:05

## 2023-05-29 RX ADMIN — ACETAMINOPHEN 1000 MG: 500 TABLET, FILM COATED ORAL at 06:05

## 2023-05-29 RX ADMIN — KETOROLAC TROMETHAMINE 30 MG: 30 INJECTION, SOLUTION INTRAMUSCULAR; INTRAVENOUS at 08:05

## 2023-05-29 RX ADMIN — MUPIROCIN: 20 OINTMENT TOPICAL at 06:05

## 2023-05-29 NOTE — TRANSFER OF CARE
Anesthesia Transfer of Care Note    Patient: Joann Banuelos    Procedure(s) Performed: Procedure(s) (LRB):  SALPINGECTOMY, LAPAROSCOPIC (Bilateral)  ABLATION, ENDOMETRIUM, HYDROTHERMAL (N/A)  REMOVAL, INTRAUTERINE DEVICE (N/A)  LYSIS, ADHESIONS, LAPAROSCOPIC (N/A)    Patient location: PACU    Anesthesia Type: general    Transport from OR: Transported from OR on 6-10 L/min O2 by face mask with adequate spontaneous ventilation    Post pain: adequate analgesia    Post assessment: no apparent anesthetic complications and tolerated procedure well    Post vital signs: stable    Level of consciousness: responds to stimulation    Nausea/Vomiting: no nausea/vomiting    Complications: none    Transfer of care protocol was followed      Last vitals:   Visit Vitals  /72 (BP Location: Right arm, Patient Position: Sitting)   Pulse 77   Temp 36.2 °C (97.2 °F) (Skin)   Resp 20   LMP 05/18/2023 (Exact Date)   SpO2 98%   Breastfeeding No

## 2023-05-29 NOTE — ANESTHESIA POSTPROCEDURE EVALUATION
Anesthesia Post Evaluation    Patient: Joann Banuelos    Procedure(s) Performed: Procedure(s) (LRB):  SALPINGECTOMY, LAPAROSCOPIC (Bilateral)  ABLATION, ENDOMETRIUM, HYDROTHERMAL (N/A)  REMOVAL, INTRAUTERINE DEVICE (N/A)  LYSIS, ADHESIONS, LAPAROSCOPIC (N/A)    Final Anesthesia Type: general      Patient location during evaluation: PACU  Patient participation: Yes- Able to Participate  Level of consciousness: awake and alert  Post-procedure vital signs: reviewed and stable  Pain management: adequate  Airway patency: patent    PONV status at discharge: No PONV  Anesthetic complications: no      Cardiovascular status: blood pressure returned to baseline  Respiratory status: unassisted  Hydration status: euvolemic  Follow-up not needed.          Vitals Value Taken Time   /66 05/29/23 0937   Temp 36.2 °C (97.2 °F) 05/29/23 0834   Pulse 71 05/29/23 0944   Resp 18 05/29/23 0938   SpO2 97 % 05/29/23 0944   Vitals shown include unvalidated device data.      No case tracking events are documented in the log.      Pain/Alberto Score: Pain Rating Prior to Med Admin: 7 (5/29/2023  9:37 AM)  Pain Rating Post Med Admin: 7 (5/29/2023  9:41 AM)  Alberto Score: 10 (5/29/2023  9:05 AM)

## 2023-05-29 NOTE — INTERVAL H&P NOTE
"The patient has been examined and the H&P has been reviewed:    I concur with the findings and no changes have occurred since H&P was written.    Surgery risks, benefits and alternative options discussed and understood by patient. Consents for procedure/blood transfusion previously discussed and signed in clinic, confirmed at bedside this AM with addition of "Removal of IUD" to consent. All questions answered. Will proceed to OR for hysteroscopy/IUD removal/endometrial ablation/laparoscopic salpingectomy.      Active Hospital Problems    Diagnosis  POA    *Heavy menstrual bleeding [N92.0]  Yes    Unwanted fertility [Z30.09]  Yes      Resolved Hospital Problems   No resolved problems to display.       Nancy Zimmer M.D.  OB/GYN PGY-4  "

## 2023-05-29 NOTE — DISCHARGE SUMMARY
Discharge Summary  Gynecology    Admit Date: 5/29/2023    Discharge Date: 5/29/2023     Attending Physician: Sushila Dunham MD    Principal Diagnoses: Heavy menstrual bleeding    Active Hospital Problems    Diagnosis  POA    *Heavy menstrual bleeding [N92.0]  Yes    Unwanted fertility [Z30.09]  Yes    S/p IUD removal/hysteroscopy/D&C/endometrial ablation/laparoscopic BS/SHELLI 5/29/23 [Z98.890]  Not Applicable      Resolved Hospital Problems   No resolved problems to display.       Procedures: Procedure(s) (LRB):  SALPINGECTOMY, LAPAROSCOPIC (Bilateral)  ABLATION, ENDOMETRIUM, HYDROTHERMAL (N/A)  REMOVAL, INTRAUTERINE DEVICE (N/A)  LYSIS, ADHESIONS, LAPAROSCOPIC (N/A)    Discharged Condition: Good    Hospital Course:   Patient presented for scheduled procedure. Patient was passed back to OR for IUD removal/hysteroscopy/D&C/endometrial ablation/laparoscopic BS/SHELLI. Please see operative report for further details. The patient tolerated procedure well and patient was taken to recovery in a stable condition.     Prior to discharge patient was able to void, ambulate, tolerate p.o. and pain was well controlled with p.o. medications. Patient was given routine post-operative instructions for which patient voiced understanding. Patient was subsequently discharged home with plans to follow up with Dr. Dunham for post-operative care.    Consults: None     Treatments:   1. Surgery as above    Disposition: Home or Self Care    Patient Instructions:   Current Discharge Medication List        START taking these medications    Details   acetaminophen (TYLENOL) 650 MG TbSR Take 1 tablet (650 mg total) by mouth every 6 to 8 hours as needed (Pain).  Qty: 60 tablet, Refills: 1      ibuprofen (ADVIL,MOTRIN) 600 MG tablet Take 1 tablet (600 mg total) by mouth every 6 (six) hours as needed for Pain.  Qty: 30 tablet, Refills: 1      oxyCODONE (OXY-IR) 5 mg Cap Take 1 capsule (5 mg total) by mouth every 6 (six) hours as needed for  Pain.  Qty: 10 capsule, Refills: 0    Comments: Quantity prescribed more than 7 day supply? Yes, quantity medically necessary           CONTINUE these medications which have NOT CHANGED    Details   amLODIPine (NORVASC) 5 MG tablet TAKE 1 TABLET BY MOUTH EVERY DAY  Qty: 90 tablet, Refills: 1      aspirin 81 MG Chew Take 81 mg by mouth once daily.      ketoconazole (NIZORAL) 2 % cream Apply topically once daily.  Qty: 60 g, Refills: 1             Discharge Procedure Orders   Diet general     Lifting restrictions   Order Comments: No lifting greater than 15 pounds for 4 weeks.     Remove dressing in 24 hours   Order Comments: If you have a bandage on wound, you may remove it the day after dismissal.  If you had steri-strips remove them once they begin to peel off (usually 2 weeks).  If your steri-strips still haven't come off in 2 weeks, please remove them.  Keep incision clean and dry.  Inspect the incision daily for signs and symptoms of infection.     Wound care routine (specify)   Order Comments: WOUND CARE:  If you have a band-aid or bandage on your wound, you may remove it the day after dismissal.  You may wash the wound with mild soap and water. You may shower at any time but should avoid immersing any abdominal incisions in water for at least two weeks after surgery or until the wound is completely healed. If given, please shower with Hibiclens soap until bottle is completely finished. Keep your wound clean and dry.  You should observe your incision for signs of infection which include redness, warmth, drainage or fever.     Call MD for:  temperature >100.4     Call MD for:  persistent nausea and vomiting     Call MD for:  severe uncontrolled pain     Call MD for:  difficulty breathing, headache or visual disturbances     Call MD for:  redness, tenderness, or signs of infection (pain, swelling, redness, odor or green/yellow discharge around incision site)     Call MD for:  hives     Call MD for:   Order  Comments: Inability to void, urine is ketchup colored or you have large clots, vaginal bleeding is heavier than a period.    VAGINAL DISCHARGE: You may develop a vaginal discharge and intermittent vaginal spotting after surgery and up to 6 weeks postoperatively.  The discharge may have an odor and may change in color but it is normal.  This is due to healing of the uterine lining.  Contact your surgical team if you develop vaginal or vulvar irritation along with a discharge.  Also contact your surgical team if you have vaginal discharge that smells like urine or stool.    CONSTIPATION REMEDIES: Patients are often constipated after surgery or with use of oral narcotic medicine. You should continue to take a stool softener during the next six weeks, and consume adequate amounts of water.  If you have not had a bowel movement for 3 days after dismissal, or are uncomfortable and unable to pass stool, please try one or all of the following measures:  1.  Milk of Magnesia - 30 cc by mouth every 12 hours   2.  Dulcolax suppository - One suppository per rectum every 4-6 hours   3.  Metamucil, Fibercon or other bulk former - use as directed  4.  Fleets Enema    PAIN MEDICATIONS:   Take your pain medications as instructed. It is best to take pain medications before your pain becomes severe. This will allow you to take less medication yet have better pain relief. For the first 2 or 3 days it may be helpful to take your pain medications on a regular schedule (e.g. every 4 to 6 hours). This will help you to keep your pain under better control. You should then begin to take fewer medications each day until you no longer need them. Do not take pain medication on an empty stomach. This may lead to nausea and vomiting.     Call MD for:  persistent dizziness or light-headedness     Call MD for:  extreme fatigue     Activity as tolerated   Order Comments: PELVIC REST: No douching, tampons, or intercourse for 4 weeks.    DRIVING:  No  driving while on narcotics. Driving may be resumed initially with a competent passenger one to two weeks after surgery if no longer taking narcotics.     EXERCISE:  For six weeks your exercise should be limited to walking. You may walk as far as you wish, as long as you increase your level of exertion gradually and avoid slippery surfaces. You may climb stairs as needed to get around, but should not use stair climbing for exercise.     Shower on day dressing removed (No bath)   Order Comments: You may shower at any time but should avoid immersing any abdominal incisions in water for at least 2 weeks after surgery or until the wound is completely healed.  If given, please shower with Hibaclens soap until bottle is completely finished.        Follow-up Information       Sushila Dunham MD Follow up in 4 week(s).    Specialties: Obstetrics and Gynecology, Obstetrics  Why: Post-Op  Contact information:  [address] [id number] [phone]                             Nancy Zimmer M.D.  OB/GYN PGY-4

## 2023-05-29 NOTE — DISCHARGE INSTRUCTIONS
Anesthesia: After Your Surgery  Youve just had surgery. During surgery, you received medication called anesthesia to keep you comfortable and pain-free. After surgery, you may experience some pain or nausea. This is common. Here are some tips for feeling better and recovering after surgery.    Going home  Your doctor or nurse will show you how to take care of yourself when you go home. He or she will also answer your questions. Have an adult family member or friend drive you home. For the first 24 hours after your surgery:  Do not drive or use heavy equipment.  Do not make important decisions or sign legal documents.  Avoid alcohol.  Have someone stay with you, if needed. He or she can watch for problems and help keep you safe.  Take your time getting up from a seated or lying position. You may experience dizziness for 24 hours  Be sure to keep all follow-up appointments with your doctor. And rest after your procedure for as long as your doctor tells you to.    Coping with pain  If you have pain after surgery, pain medication will help you feel better. Take it as directed, before pain becomes severe. Also, ask your doctor or pharmacist about other ways to control pain, such as with heat, ice, and relaxation. And follow any other instructions your surgeon or nurse gives you.    URINARY RETENTION  Should you experience a decrease in your urine output or are unable to urinate following surgery, this can be due to the medications given during surgery.  We recommend you going to the nearest Emergency Department.    Tips for taking pain medication  To get the best relief possible, remember these points:  Pain medications can upset your stomach. Taking them with a little food may help.  Most pain relievers taken by mouth need at least 20 to 30 minutes to take effect.  Taking medication on a schedule can help you remember to take it. Try to time your medication so that you can take it before beginning an activity, such  as dressing, walking, or sitting down for dinner.  Constipation is a common side effect of pain medications. Contact your doctor before taking any medications like laxatives or stool softeners to help relieve constipation. Also ask about any dietary restrictions, because drinking lots of fluids and eating foods like fruits and vegetables that are high in fiber can also help. Remember, dont take laxatives unless your surgeon has prescribed them.  Mixing alcohol and pain medication can cause dizziness and slow your breathing. It can even be fatal. Dont drink alcohol while taking pain medication.  Pain medication can slow your reflexes. Dont drive or operate machinery while taking pain medication.  If your health care provider tells you to take acetaminophen to help relieve your pain, ask him or her how much you are supposed to take each day. (Acetaminophen is the generic name for Tylenol and other brand-name pain relievers.) Acetaminophen or other pain relievers may interact with your prescription medicines or other over-the-counter (OTC) drugs. Some prescription medications contain acetaminophen along with other active ingredients. Using both prescription and OTC acetaminophen for pain can cause you to overdose. The FDA recommends that you read the labels on your OTC medications carefully. This will help you to clearly understand the list of active ingredients, dosing instructions, and any warnings. It may also help you avoid taking too much acetaminophen. If you have questions or don't understand the information, ask your pharmacist or health care provider to explain it to you before you take the OTC medication.    Managing nausea  Some people have an upset stomach after surgery. This is often due to anesthesia, pain, pain medications, or the stress of surgery. The following tips will help you manage nausea and get good nutrition as you recover. If you were on a special diet before surgery, ask your doctor if you  should follow it during recovery. These tips may help:  Dont push yourself to eat. Your body will tell you when to eat and how much.  Start off with clear liquids and soup. They are easier to digest.  Progress to semi-solid foods (mashed potatoes, applesauce, and gelatin) as you feel ready.  Slowly move to solid foods. Dont eat fatty, rich, or spicy foods at first.  Dont force yourself to have three large meals a day. Instead, eat smaller amounts more often.  Take pain medications with a small amount of solid food, such as crackers or toast to avoid nausea.      Call your surgeon if    You feel too sleepy, dizzy, or groggy (medication may be too strong).  You have side effects like nausea, vomiting, or skin changes (rash, itching, or hives).   © 2533-6637 [employer]. [address] [place] [address]. All rights reserved. This information is not intended as a substitute for professional medical care. Always follow your healthcare professional's instructions.               Abdominal Laparoscopy Discharge Instructions      Activity  Limit your activity for 4-6 weeks.  Dont lift anything heavier than 5-10 pounds.  Avoid strenuous activities, such as mowing the lawn, vacuuming, or playing sports.  Limit your activity to short, slow walks. Gradually increase your pace and distance as you feel able.  Listen to your body. If an activity causes pain, stop.  Rest when you are tired.  Dont have sexual intercourse or use tampons or douches until your doctor says its safe to do so.    Home Care  Always keep your incision clean and dry  Shower as instructed in 24 hours. You may wash your incision gently with mild soap and warm water and pat dry.  Avoid tub baths, hot tubs and swimming pools until seen by your physician for a post-op follow up.  If you have steri strips they should fall off in 7-10 days.    If you have band aids covering your incisions change daily or when soiled.  The band aids may  be removed post op day 1 if they are clean and dry.  Take your medication exactly as instructed by your doctor.  Return to your diet as you feel able. Eat a healthy, well-balanced diet.  Avoid constipation.  Use laxatives, stool softeners, or enemas as directed by your doctor.  Eat more high-fiber foods.  Drink 6-8 glasses of water every day, unless directed otherwise.  Follow-Up  Make a follow-up appointment as directed by our staff.       When to Call Your Doctor  Call your doctor right away if you have any of the following:  Fever above 101.5°F  (38.6°C) or chills  Bright red vaginal bleeding or a foul-smelling discharge  Vaginal bleeding that soaks more than one sanitary pad per hour  Trouble urinating or burning sensation when you urinate  Severe abdominal pain or bloating  Redness, swelling, or drainage at your incision site  Shortness of breath

## 2023-05-29 NOTE — ANESTHESIA PROCEDURE NOTES
Intubation    Date/Time: 5/29/2023 7:09 AM  Performed by: Becky Richey  Authorized by: Jakub Slade MD     Intubation:     Induction:  Intravenous    Intubated:  Postinduction    Mask Ventilation:  Easy mask    Attempts:  1    Attempted By:  CRNA    Method of Intubation:  Video laryngoscopy    Blade:  Ortiz 3    Laryngeal View Grade: Grade I - full view of cords      Difficult Airway Encountered?: No      Complications:  None    Airway Device:  Oral endotracheal tube    Airway Device Size:  7.5    Style/Cuff Inflation:  Cuffed (inflated to minimal occlusive pressure)    Tube secured:  21    Secured at:  The lips    Placement Verified By:  Capnometry    Complicating Factors:  Obesity    Findings Post-Intubation:  BS equal bilateral and atraumatic/condition of teeth unchanged

## 2023-05-29 NOTE — PLAN OF CARE
Joann Banuelos has met all discharge criteria from Phase II. Vital Signs are stable, ambulating  without difficulty. Discharge instructions given, patient verbalized understanding. Discharged from facility via wheelchair in stable condition.

## 2023-05-29 NOTE — OP NOTE
Operative Report     Date of Surgery: 05/29/2023     Surgeon: Sushila Dunham MD     Assistant: Nancy Zimmer MD (PGY-4)     Pre-Op Diagnoses:   1. Heavy menstrual bleeding  2. Undesired future fertility  3. IUD in place     Post-Op Diagnoses:   1-3. Same  4. S/p removal of IUD/hysteroscopy/D&C/endometrial ablation/laparoscopic bilateral salpingectomy/laparoscopic lysis of adhesions    Procedures:  1. Hysteroscopy  2. Dilation and curettage  3. Endometrial ablation  4. Laparoscopic bilateral salpingectomy  5. Laparoscopic lysis of adhesions  6. Removal of IUD     Findings/Key Components: Normal appearing external genitalia and vagina. Multiparous cervix with excellent descensus, would be optimal TVH candidate if indicated in the future. IUD removed. Uterus sounded to 9 cm, cavity length 5.5 cm. Endometrium noted to be proliferative with no discrete fibroids or polyps identified. Sharp endometrial curettage performed. Novasure endometrial ablation performed without complication. Hysteroscopically, no damage to the endocervical canal, uniform ablation to the uterine cavity, no evidence of perforation. Uncomplicated peritoneal entry with no evidence of visceral injury. Normal appearing liver and gallbladder. Omental adhesions to anterior abdominal wall surrounding umbilicus taken down without difficulty with no evidence of visceral injury. Normal-appearing uterus and bilateral adnexal structures. Bilateral salpingectomy performed without complication. Good hemostasis noted to case conclusion. Intra-operative images below.    EBL: 10 cc      IVF: 750 cc    Urine Output: 275 cc     HYSTEROSCOPIC FLUID DEFICIT: 85 cc   NOVASURE CAVITY MEASUREMENTS: 5.5 cm (L) x 3.0 cm (W)  NOVASURE POWER: 91 W  NOVASURE TIME: 1:06      Specimens:  1. Endometrial curettage  2. Bilateral fallopian tubes     Procedure in Detail:  The patient was taken to the operating room, where general anesthesia was administered and found to be  adequate. She was then placed in dorsal lithotomy position using [place] stirrups, and prepped and draped in the usual sterile manner, both vaginally and abdominally. A surgical timeout was performed with patient's name, date of birth, allergies, and procedure to be performed verbalized. All OR staff were in agreement. No preoperative antibiotics were administered as none were indicated.     Attention was turned vaginally. A noble catheter was placed in a sterile fashion. Right angle retractors were then placed vaginally to visualize the cervix, and the anterior lip of the cervix was grasped with a single tooth tenaculum. The IUD strings were grasped with ring forceps and the IUD was removed without difficulty. The uterus was then sounded to 9 cm. The cervix was dilated 18 [place] Islander, and a SANA uterine manipulator was placed without complication.    Gloves were then changed and attention was then turned to the abdomen. Perforating towel clamps were placed on either side of the umbilicus, and the anterior abdominal wall was tented upward. A Veress needle was introduced into the peritoneal cavity with placement confirmed via saline drop test. A pneumoperitoneum was then created with CO2 gas. A 5 mm incision was made at the inferior aspect of the umbilicus. A 5 mm trocar was introduced through the umbilical incision site under direct visualization with the laparoscope. The abdominal cavity was thoroughly surveyed, as described in the Findings above.      An incision was made in the left lower quadrant, and 5 mm trocar was introduced under direct visualization. An incision was also made in the right lower quadrant, and 5 mm trocar was introduced, also under direct visualization. Good hemostasis was noted and there was no evidence of injury from abdominal entry. Thin and filmy omental adhesions were noted surrounding the umbilical port site. These were taken down without difficulty utilizing serial cut and cautery with  the Ligasure device. Excellent hemostasis was noted at the omentum with no evidence of visceral injury following lysis of adhesions.    Attention was then turned to the bilateral fallopian tubes. A grasper was placed through the lateral port and the end of the right fallopian tube was grasped to provide traction. The Ligasure was used to remove the entire fallopian tube utilizing serial cut and cautery, paying careful attention to stay directly inferior to the tube and away from the ovary. The right tube was then transected at the cornua of the uterus using the Ligasure. The tube was then removed through the lateral port under direct visualization. Excellent hemostasis was noted. A grasper was then placed through the lateral port and the end of the left fallopian tube was grasped to provide traction. The Ligasure was used to remove the entire fallopian tube utilizing serial cut and cautery, paying careful attention to stay directly inferior to the tube and away from the ovary. The tube was then transected at the cornua of the uterus using the Ligasure. The tube was removed through the lateral port under direct visualization without difficulty. Excellent hemostasis was noted.    Attention was then turned vaginally. The SANA manipulator was removed. The 5.5 mm hysteroscope was then white balanced and the line cleared of air. The hysteroscope was then inserted into the cervical os and the uterine cavity was directly visualized, as well as both ostia. Proliferative endometrium was noted with no evidence of discrete fibroids or polyps. The cervical length was measured hysteroscopically at 3.5 cm.    Sharp curettage was then performed to remove endometrial tissue for sampling until a gritty texture was noted in all 4 quadrants. The cavity length was then applied to the Novasure system. The Novasure system was placed into the endometrial cavity, pulled back 0.5 cm from the fundus, and array was then deployed. The width was  measured at 3.0 cm. Cavity assessment was completed and passed. The Novasure was then applied at a power of 91W for 1:06. Following removal of the Novasure device, the cavity was again inspected with a thorough burn noted to the endometrial cavity and no evidence of perforation. The tenaculum was removed from the anterior cervix with good hemostasis noted.    Gloves were changed once again. The procedure was then completed by deflating the abdomen and removal all trocars. The skin at all trocar sites closed with 4-0 monocryl in a subcuticular fashion. The wounds were dressed with steri-strips and Band-Aids. Sponge, lap,and instrument counts were correct x 2. The noble catheter was removed. The patient tolerated the procedure well and was taken to recovery in stable condition.       Nancy Zimmer M.D.  OB/GYN PGY-4

## 2023-05-30 VITALS
DIASTOLIC BLOOD PRESSURE: 66 MMHG | OXYGEN SATURATION: 97 % | HEART RATE: 69 BPM | SYSTOLIC BLOOD PRESSURE: 130 MMHG | RESPIRATION RATE: 18 BRPM | TEMPERATURE: 97 F

## 2023-05-31 LAB
FINAL PATHOLOGIC DIAGNOSIS: NORMAL
GROSS: NORMAL
Lab: NORMAL

## 2023-06-07 ENCOUNTER — OFFICE VISIT (OUTPATIENT)
Dept: BARIATRICS | Facility: CLINIC | Age: 38
End: 2023-06-07
Payer: COMMERCIAL

## 2023-06-07 VITALS
DIASTOLIC BLOOD PRESSURE: 82 MMHG | WEIGHT: 237.5 LBS | HEART RATE: 82 BPM | BODY MASS INDEX: 37.28 KG/M2 | OXYGEN SATURATION: 99 % | HEIGHT: 67 IN | SYSTOLIC BLOOD PRESSURE: 124 MMHG

## 2023-06-07 DIAGNOSIS — Z71.3 ENCOUNTER FOR WEIGHT LOSS COUNSELING: ICD-10-CM

## 2023-06-07 DIAGNOSIS — R73.03 PREDIABETES: ICD-10-CM

## 2023-06-07 DIAGNOSIS — E66.01 CLASS 2 SEVERE OBESITY DUE TO EXCESS CALORIES WITH SERIOUS COMORBIDITY AND BODY MASS INDEX (BMI) OF 37.0 TO 37.9 IN ADULT: Primary | ICD-10-CM

## 2023-06-07 DIAGNOSIS — I10 PRIMARY HYPERTENSION: ICD-10-CM

## 2023-06-07 PROCEDURE — 3074F PR MOST RECENT SYSTOLIC BLOOD PRESSURE < 130 MM HG: ICD-10-PCS | Mod: CPTII,S$GLB,, | Performed by: STUDENT IN AN ORGANIZED HEALTH CARE EDUCATION/TRAINING PROGRAM

## 2023-06-07 PROCEDURE — 99204 OFFICE O/P NEW MOD 45 MIN: CPT | Mod: S$GLB,,, | Performed by: STUDENT IN AN ORGANIZED HEALTH CARE EDUCATION/TRAINING PROGRAM

## 2023-06-07 PROCEDURE — 1160F RVW MEDS BY RX/DR IN RCRD: CPT | Mod: CPTII,S$GLB,, | Performed by: STUDENT IN AN ORGANIZED HEALTH CARE EDUCATION/TRAINING PROGRAM

## 2023-06-07 PROCEDURE — 3079F PR MOST RECENT DIASTOLIC BLOOD PRESSURE 80-89 MM HG: ICD-10-PCS | Mod: CPTII,S$GLB,, | Performed by: STUDENT IN AN ORGANIZED HEALTH CARE EDUCATION/TRAINING PROGRAM

## 2023-06-07 PROCEDURE — 99204 PR OFFICE/OUTPT VISIT, NEW, LEVL IV, 45-59 MIN: ICD-10-PCS | Mod: S$GLB,,, | Performed by: STUDENT IN AN ORGANIZED HEALTH CARE EDUCATION/TRAINING PROGRAM

## 2023-06-07 PROCEDURE — 3008F PR BODY MASS INDEX (BMI) DOCUMENTED: ICD-10-PCS | Mod: CPTII,S$GLB,, | Performed by: STUDENT IN AN ORGANIZED HEALTH CARE EDUCATION/TRAINING PROGRAM

## 2023-06-07 PROCEDURE — 3074F SYST BP LT 130 MM HG: CPT | Mod: CPTII,S$GLB,, | Performed by: STUDENT IN AN ORGANIZED HEALTH CARE EDUCATION/TRAINING PROGRAM

## 2023-06-07 PROCEDURE — 1160F PR REVIEW ALL MEDS BY PRESCRIBER/CLIN PHARMACIST DOCUMENTED: ICD-10-PCS | Mod: CPTII,S$GLB,, | Performed by: STUDENT IN AN ORGANIZED HEALTH CARE EDUCATION/TRAINING PROGRAM

## 2023-06-07 PROCEDURE — 99999 PR PBB SHADOW E&M-EST. PATIENT-LVL IV: CPT | Mod: PBBFAC,,, | Performed by: STUDENT IN AN ORGANIZED HEALTH CARE EDUCATION/TRAINING PROGRAM

## 2023-06-07 PROCEDURE — 1159F MED LIST DOCD IN RCRD: CPT | Mod: CPTII,S$GLB,, | Performed by: STUDENT IN AN ORGANIZED HEALTH CARE EDUCATION/TRAINING PROGRAM

## 2023-06-07 PROCEDURE — 1159F PR MEDICATION LIST DOCUMENTED IN MEDICAL RECORD: ICD-10-PCS | Mod: CPTII,S$GLB,, | Performed by: STUDENT IN AN ORGANIZED HEALTH CARE EDUCATION/TRAINING PROGRAM

## 2023-06-07 PROCEDURE — 99999 PR PBB SHADOW E&M-EST. PATIENT-LVL IV: ICD-10-PCS | Mod: PBBFAC,,, | Performed by: STUDENT IN AN ORGANIZED HEALTH CARE EDUCATION/TRAINING PROGRAM

## 2023-06-07 PROCEDURE — 3079F DIAST BP 80-89 MM HG: CPT | Mod: CPTII,S$GLB,, | Performed by: STUDENT IN AN ORGANIZED HEALTH CARE EDUCATION/TRAINING PROGRAM

## 2023-06-07 PROCEDURE — 3008F BODY MASS INDEX DOCD: CPT | Mod: CPTII,S$GLB,, | Performed by: STUDENT IN AN ORGANIZED HEALTH CARE EDUCATION/TRAINING PROGRAM

## 2023-06-07 RX ORDER — SEMAGLUTIDE 0.25 MG/.5ML
0.25 INJECTION, SOLUTION SUBCUTANEOUS
Qty: 2 ML | Refills: 0 | Status: SHIPPED | OUTPATIENT
Start: 2023-06-07 | End: 2023-06-29

## 2023-06-07 RX ORDER — SEMAGLUTIDE 0.5 MG/.5ML
0.5 INJECTION, SOLUTION SUBCUTANEOUS
Qty: 2 ML | Refills: 0 | Status: SHIPPED | OUTPATIENT
Start: 2023-07-05 | End: 2023-07-27

## 2023-06-07 RX ORDER — SEMAGLUTIDE 1 MG/.5ML
1 INJECTION, SOLUTION SUBCUTANEOUS
Qty: 2 ML | Refills: 0 | Status: SHIPPED | OUTPATIENT
Start: 2023-08-02 | End: 2023-08-24

## 2023-06-07 NOTE — PROGRESS NOTES
Subjective     Patient ID: Joann Banuelos is a 37 y.o. female.    Chief Complaint: Consult and Obesity    Patient presents for treatment of obesity.   Works in , travels a lot  Saw Dr. Shea in 2008, prescribed topiramate    Co-morbidities  HTN    Negative for thyroid cancer    H/o tubal ligation    Weight History  Lowest adult weight: 180 lbs  Highest adult weight: 248 lbs     Current Physical Activity  3-5x/week  Trainer 3x/week  Cardio and lifting about 2x/week    Current Eating Habits  Breakfast - Power Punch Plus from [employer]  Lunch - fast food, take out, bagged salad  Dinner - pasta aj, fried fish, chicken, pork, lamb; bagged salad with protein; mashed potatoes  Snacks  Beverages - water, coffee, wine (1-2 glasses a day), red bulls    Medical Weight Loss  6/7/2023: 237.5 lbs, BMI 37.8, BFP 41.3%, BFM 98.2 lbs, SMM 79.6 lbs, BMR 1735 kcal          Review of Systems       Objective    Latest Reference Range & Units 11/04/22 08:36   WBC 3.90 - 12.70 K/uL 7.06   RBC 4.00 - 5.40 M/uL 4.88   Hemoglobin 12.0 - 16.0 g/dL 13.7   Hematocrit 37.0 - 48.5 % 42.5   MCV 82 - 98 fL 87   MCH 27.0 - 31.0 pg 28.1   MCHC 32.0 - 36.0 g/dL 32.2   RDW 11.5 - 14.5 % 12.1   Platelets 150 - 450 K/uL 300   MPV 9.2 - 12.9 fL 10.3   Gran % 38.0 - 73.0 % 36.5 (L)   Lymph % 18.0 - 48.0 % 53.7 (H)   Mono % 4.0 - 15.0 % 6.5   Eosinophil % 0.0 - 8.0 % 2.5   Basophil % 0.0 - 1.9 % 0.8   Immature Granulocytes 0.0 - 0.5 % 0.0   Gran # (ANC) 1.8 - 7.7 K/uL 2.6   Lymph # 1.0 - 4.8 K/uL 3.8   Mono # 0.3 - 1.0 K/uL 0.5   Eos # 0.0 - 0.5 K/uL 0.2   Baso # 0.00 - 0.20 K/uL 0.06   Immature Grans (Abs) 0.00 - 0.04 K/uL 0.00   nRBC 0 /100 WBC 0   Differential Method  Automated   Sodium 136 - 145 mmol/L 138   Potassium 3.5 - 5.1 mmol/L 4.3   Chloride 95 - 110 mmol/L 105   CO2 23 - 29 mmol/L 24   Anion Gap 8 - 16 mmol/L 9   BUN 6 - 20 mg/dL 10   Creatinine 0.5 - 1.4 mg/dL 0.8   eGFR >60 mL/min/1.73 m^2 >60.0   Glucose 70 - 110  mg/dL 91   Calcium 8.7 - 10.5 mg/dL 9.1   Alkaline Phosphatase 55 - 135 U/L 84   PROTEIN TOTAL 6.0 - 8.4 g/dL 7.5   Albumin 3.5 - 5.2 g/dL 4.1   BILIRUBIN TOTAL 0.1 - 1.0 mg/dL 0.2   AST 10 - 40 U/L 19   ALT 10 - 44 U/L 17   Cholesterol 120 - 199 mg/dL 204 (H)   HDL 40 - 75 mg/dL 66   HDL/Cholesterol Ratio 20.0 - 50.0 % 32.4   LDL Cholesterol External 63.0 - 159.0 mg/dL 123.8   Non-HDL Cholesterol mg/dL 138   Total Cholesterol/HDL Ratio 2.0 - 5.0  3.1   Triglycerides 30 - 150 mg/dL 71   Hemoglobin A1C External 4.0 - 5.6 % 5.7 (H)   Estimated Avg Glucose 68 - 131 mg/dL 117   TSH 0.400 - 4.000 uIU/mL 0.747   (L): Data is abnormally low  (H): Data is abnormally high    Vitals:    06/07/23 1408   BP: 124/82   Pulse: 82       Physical Exam  Vitals reviewed.   Constitutional:       General: She is not in acute distress.     Appearance: Normal appearance. She is obese. She is not ill-appearing, toxic-appearing or diaphoretic.   HENT:      Head: Normocephalic and atraumatic.   Cardiovascular:      Rate and Rhythm: Normal rate.   Pulmonary:      Effort: Pulmonary effort is normal. No respiratory distress.   Skin:     General: Skin is warm and dry.   Neurological:      Mental Status: She is alert and oriented to person, place, and time.          Assessment and Plan     1. Class 2 severe obesity due to excess calories with serious comorbidity and body mass index (BMI) of 37.0 to 37.9 in adult  -     semaglutide, weight loss, (WEGOVY) 0.25 mg/0.5 mL PnIj; Inject 0.25 mg into the skin every 7 days. for 4 doses  Dispense: 2 mL; Refill: 0  -     semaglutide, weight loss, (WEGOVY) 0.5 mg/0.5 mL PnIj; Inject 0.5 mg into the skin every 7 days. for 4 doses  Dispense: 2 mL; Refill: 0  -     semaglutide, weight loss, (WEGOVY) 1 mg/0.5 mL PnIj; Inject 1 mg into the skin every 7 days. for 4 doses  Dispense: 2 mL; Refill: 0    2. Primary hypertension  -     semaglutide, weight loss, (WEGOVY) 0.25 mg/0.5 mL PnIj; Inject 0.25 mg into the  skin every 7 days. for 4 doses  Dispense: 2 mL; Refill: 0  -     semaglutide, weight loss, (WEGOVY) 0.5 mg/0.5 mL PnIj; Inject 0.5 mg into the skin every 7 days. for 4 doses  Dispense: 2 mL; Refill: 0  -     semaglutide, weight loss, (WEGOVY) 1 mg/0.5 mL PnIj; Inject 1 mg into the skin every 7 days. for 4 doses  Dispense: 2 mL; Refill: 0    3. Prediabetes  -     semaglutide, weight loss, (WEGOVY) 0.25 mg/0.5 mL PnIj; Inject 0.25 mg into the skin every 7 days. for 4 doses  Dispense: 2 mL; Refill: 0  -     semaglutide, weight loss, (WEGOVY) 0.5 mg/0.5 mL PnIj; Inject 0.5 mg into the skin every 7 days. for 4 doses  Dispense: 2 mL; Refill: 0  -     semaglutide, weight loss, (WEGOVY) 1 mg/0.5 mL PnIj; Inject 1 mg into the skin every 7 days. for 4 doses  Dispense: 2 mL; Refill: 0    4. Encounter for weight loss counseling        - Log all food and beverage intake with a daily calorie goal of 1400 calories per day    - Moderate intensity aerobic exercise for 150 minutes per week

## 2023-06-07 NOTE — PATIENT INSTRUCTIONS
Start Wegovy once a week. Start with 0.25mg once a week x 4 weeks, then 0.5 mg weekly x 4 week, then 1 mg weekly x 4 weeks.     Decrease portions as soon as you start Wegovy. Avoid fried, greasy, fatty foods.     Some nausea in the first 2 weeks is not unusual.     If you get pain across the upper abdomen and around to your back, please call the office.     Go to www.wegovy.com for a savings card.            Copyright © 2011, [facility]. For more information about The Healthy Eating Plate, please see The Nutrition Source, Department of Nutrition, [employer], www.thenutritionsource.org, and [employer], www.health.[employer].      Meal Planning & Grocery Shopping    Meal planning builds the foundation for healthy eating. When you have structured ideas for healthy meals and foods available at home to prepare those meals, weight control becomes easier.  If only healthy foods are available at home, then you will be much more likely to eat healthy foods. And you will be less likely to go to a restaurant or  a fast food meal, which tend to be unhealthy and higher in calories than meals prepared at home.      Take 5-10 minutes each week to plan meals for the next 7 days.  Make a grocery list based on the meal plan.    Grocery Shopping Tips:  Shop on a full stomach.  Schedule your shopping for times when you are most motivated and able to be disciplined about your purchases. For example, after a stressful day at work it may be difficult to make the healthiest choices. Shopping at other times, such as early in the morning or after dinner, may be easier.  Focus your shopping on the outside aisles of the store, which tend to contain more fresh foods and lower calorie foods. The inside aisles tend to have more processed foods.  Stick to your list. Avoid buying unhealthy items just because they are on sale.   Compare nutrition labels to check the number of calories  and percentage of fat.      What to buy:    Vegetables  Fresh vegetables  Frozen vegetables with no sauce or added salt  Canned vegetables with no sauce or added salt    Protein  Lean meats, such as chicken and turkey  Limit red meats, such as beef to no more than 1x/week  Limit processed meats, such as cold cuts, benitez, sausage, and hot dogs. Look for brands that have no nitrites and are minimally processed. Consider turkey sausage or turkey benitez.  Fish and Shellfish  Eggs  Dried beans  Canned beans (reduced sodium)    Fat  Use healthy oils, such as olive oil or canola oil, for cooking, salad dressings, etc.  Unflavored nuts and seeds  Nut butters (no added sugar)    Dairy  Yogurt (no sugar added)  Cheese  Low-fat milk  Unsweetened nondairy milks (almond milk, soy milk, etc)    Fruit  Fresh Fruit  Frozen fruit with no added sugar  Canned fruit with no added sugar  Dried fruit with no added sugar  100% fruit juice    Whole Grains  Single ingredient grains, such as oats, quinoa, brown rice  Whole-wheat pasta  Sprouted whole-grain bread    What to avoid:  - Avoid fried foods  - Avoid foods with added sugar  - Avoid sugar-sweetened beverages  - Avoid ultra-processed foods      Snacks: (100-200 calories; >5g protein)     - 1 low-fat cheese stick with 8 cherry tomatoes or 1 serving fresh fruit  - 4 thin slices fat-free turkey breast and 1 slice low-fat cheese  - 4 thin slices fat-free honey ham with wedge of melon  - 2 slices of turkey benitez  - Boiled eggs (can buy at [employer] already boiled w/ shell removed)  - for convenience,  [place] read, snack, go (deli meat and cheese rolls)  - P3 packets (Protein packs w/ cheese, nuts, lean deli meat)  - [facility] Fit and Lean Protein Pudding (find at [employer] - per 1 cup serving = 100 calories, 15 g protein, 0 g sugar)  - 6-8 edamame pods (equivalent to about 1/4 cup edamame without pods).   - 1/4 cup unsalted nuts with ½ cup fruit  - 6-oz container Alexia Vicente  "vanilla yogurt, topped with 1oz unsalted nuts         - apple, celery or baby carrots spread with 2 Tbsp PB2  - apple slices with 1 oz slice low-fat cheese  - Apple slices dipped in 2 Tbsp of PB2  - 2 Tbsp PB2 mixed in light or greek yogurt or sugar-free pudding  - celery, cucumber, bell pepper or baby carrots dipped in ¼ cup hummus bean spread   - celery, cucumber, baby carrots dipped in high protein greek yogurt (Mix 16 oz plain greek yogurt + 1 packet of [place] ranch dip mix)  - Luis Miguel Links Beef Steak - 14g protein! (similar to beef jerky but very lean)  - 2 wedges L[place] Cow - Light Herb & Garlic Cheese with sliced cucumber or green bell pepper  - 1/2 cup low-fat cottage cheese with ¼ cup fruit or ¼ cup salsa  - 1/2 cup low fat cottage cheese with 10-15 cherry tomatoes  - 8 oz glass of FAIRLIFE fat free milk (13 g protein)  - 8 oz glass of FAIRLIFE fat free milk + 1 packet of sugar-free hot cocoa  - Add [employer]StART[employer] advantage Cafe Caramel shake to decaf coffee. Serve hot or blend with ice for "frappaccino" like drink  - RTD Protein drinks: Atkins, Low Carb Slim Fast, EAS light, Muscle Milk Light, etc.  - Homemade Protein drinks: [employer] Soy95, Isopure, Nectar, UNJURY, Whey Gourmet, etc. Mix 1 scoop powder with 8oz skim/1% milk or light soymilk.  - Protein bars: Atkins, EAS, Pure Protein,  Quest, Think Thin, Detour, etc. Must have 0-4 grams sugar - Read the label.     ** Be CREATIVE. You can always snack on bites of grilled chicken or tuna salad made with low fat jiang, if needed!       Lifestyle Activity    Lifestyle activity consists of all the activities that burn calories during the course of a normal day. Using the stairs, washing the dishes, or even getting up to turn off the television are all examples of lifestyle activity. All activities, no matter how small, burn calories. Increasing lifestyle activity can help with weight control, so building physical activity into your everyday routine is important. "     A pedometer is a tool that can help you track how much lifestyle activity you are getting. It can help you stay active. A pedometer counts each step you take and displays your total steps on the screen. Many smartphones, including iPhones and Androids, have applications that automatically count your steps. If you decide to use this method, make sure you are holding or wearing your smartphone so it can count all of your steps.     During the next 2 weeks, aim for 5,000 or more steps per day. Each week aim to increase your daily step goal by 250 so that you will reach an average of 10,000 steps per day (equal to walking 4 to 5 miles).     Start making more active choices in your routine in order to increase the amount of walking you do each day.     Strategies to Increase Lifestyle Activity:    Take several 5-10-minute walks during the day.   Set a reminder to take a 5 minute break from your desk every hour.   Choose the farthest entrance to a building that you are entering.   Host walking meetings at work.   Walk down the martinez to contact co-workers face-to-face, instead of emailing or calling.  Walk to a restroom, water cooler, or copy machine on a different floor at work.   Walk during your lunch break.   Park farther away in parking lots.   Get off the bus or train earlier and walk farther to your destination.   Take the stairs rather than the elevator or the escalator.   Start a walking club with co-workers or neighbors.   Walk - don't drive - for trips less than one mile.   Take an after-dinner walk with family.   Go for a walk while talking on your wireless phone.   Walk the dog more often.   If you prefer to stay indoors because of the weather, try walking in a shopping mall or doing laps around a large store.   Purchase a treadmill to use at home.   Schedule time for walking every week and stick to it like any other appointment.   Or think of your own strategy: _______________________________        Physical Activity    Physical activity improves your health and helps with weight control, especially weight loss maintenance.      When starting to incorporate an exercise routine into your day, it is helpful to set specific goals.  For example, I will walk at moderate intensity from 12:30-12:45pm on Monday, Wednesday, and Friday.  Schedule your exercise time into your calendar.  Think of any potential barriers that may come up and prevent you from exercising.  Develop solutions or back-up plans for when these barriers may arise.    Walking is an ideal activity to start with if you do not currently have an exercise routine. You can make the activity more fun by doing it with a friend or listening to music or a book on tape while you do it. If you don't enjoy walking, think of other activities you like, such as bike riding or swimming.  Other alternatives include group fitness classes or exercise at home using DVDs, Apps, etc.    If you are new to exercising, then start with 10 minutes 3-4 days per week.  After two weeks, increase to 15 minutes 3-4 days per week.  If you already exercise more than this, continue at your higher level, or increase by 10-15 minutes if able.         Aerobic Activity  Aerobic Activity gets you breathing harder and your heart beating faster.  Eventually, you should work up to 150 - 300 minutes of moderate-intensity aerobic activity every week or 75 - 150 minutes of vigorous-intensity aerobic activity every week.  An easy way to gauge the intensity of your activity is with the Talk Test.  During moderate activity, you should be able to talk, but not sing without having to pause for a breath.  During vigorous activity, you shouldn't be able to say more than a few words without pausing for a breath.  You should not push yourself until you are completely out of breath, tired, or sore.    Examples of Aerobic Activity:  Walking  Running  Swimming  Biking  Playing sports like tennis or  basketball  Dancing  Jumping Rope      Strength Training  It is also recommended that you perform muscle-strengthening activities at least 2 days per week.  Be sure to include activities that work all major muscle groups (legs, arms, abdomen, back, buttocks, chest, and shoulders)    Examples of Strength Training  Lifting weights  Working with resistance band  Using your body weight for resistance (squats, push-ups, sit-ups)  Pilates  Yoga      https://health.gov/moveyourway/activity-planner/      Remember to keep a record of your activity to track your progress.

## 2023-06-08 ENCOUNTER — PATIENT MESSAGE (OUTPATIENT)
Dept: BARIATRICS | Facility: CLINIC | Age: 38
End: 2023-06-08
Payer: COMMERCIAL

## 2023-06-08 NOTE — TELEPHONE ENCOUNTER
PA for medication Wegovy has been completed and submitted to [employer] for review. Will notify pt via portal.

## 2023-06-10 ENCOUNTER — PATIENT MESSAGE (OUTPATIENT)
Dept: BARIATRICS | Facility: CLINIC | Age: 38
End: 2023-06-10
Payer: COMMERCIAL

## 2023-06-29 ENCOUNTER — OFFICE VISIT (OUTPATIENT)
Dept: OBSTETRICS AND GYNECOLOGY | Facility: CLINIC | Age: 38
End: 2023-06-29
Payer: COMMERCIAL

## 2023-06-29 VITALS — WEIGHT: 233.69 LBS | BODY MASS INDEX: 37.56 KG/M2 | HEIGHT: 66 IN

## 2023-06-29 DIAGNOSIS — Z12.4 PAP SMEAR FOR CERVICAL CANCER SCREENING: ICD-10-CM

## 2023-06-29 DIAGNOSIS — Z12.31 ENCOUNTER FOR SCREENING MAMMOGRAM FOR MALIGNANT NEOPLASM OF BREAST: ICD-10-CM

## 2023-06-29 DIAGNOSIS — Z01.419 ENCOUNTER FOR GYNECOLOGICAL EXAMINATION: Primary | ICD-10-CM

## 2023-06-29 DIAGNOSIS — Z80.9 FAMILY HISTORY OF CANCER: ICD-10-CM

## 2023-06-29 DIAGNOSIS — Z11.51 ENCOUNTER FOR SCREENING FOR HUMAN PAPILLOMAVIRUS (HPV): ICD-10-CM

## 2023-06-29 PROCEDURE — 88175 CYTOPATH C/V AUTO FLUID REDO: CPT | Performed by: OBSTETRICS & GYNECOLOGY

## 2023-06-29 PROCEDURE — 99395 PR PREVENTIVE VISIT,EST,18-39: ICD-10-PCS | Mod: S$GLB,,, | Performed by: OBSTETRICS & GYNECOLOGY

## 2023-06-29 PROCEDURE — 1159F MED LIST DOCD IN RCRD: CPT | Mod: CPTII,S$GLB,, | Performed by: OBSTETRICS & GYNECOLOGY

## 2023-06-29 PROCEDURE — 1159F PR MEDICATION LIST DOCUMENTED IN MEDICAL RECORD: ICD-10-PCS | Mod: CPTII,S$GLB,, | Performed by: OBSTETRICS & GYNECOLOGY

## 2023-06-29 PROCEDURE — 99999 PR PBB SHADOW E&M-EST. PATIENT-LVL III: ICD-10-PCS | Mod: PBBFAC,,, | Performed by: OBSTETRICS & GYNECOLOGY

## 2023-06-29 PROCEDURE — 99395 PREV VISIT EST AGE 18-39: CPT | Mod: S$GLB,,, | Performed by: OBSTETRICS & GYNECOLOGY

## 2023-06-29 PROCEDURE — 87624 HPV HI-RISK TYP POOLED RSLT: CPT | Performed by: OBSTETRICS & GYNECOLOGY

## 2023-06-29 PROCEDURE — 3008F PR BODY MASS INDEX (BMI) DOCUMENTED: ICD-10-PCS | Mod: CPTII,S$GLB,, | Performed by: OBSTETRICS & GYNECOLOGY

## 2023-06-29 PROCEDURE — 3008F BODY MASS INDEX DOCD: CPT | Mod: CPTII,S$GLB,, | Performed by: OBSTETRICS & GYNECOLOGY

## 2023-06-29 PROCEDURE — 99999 PR PBB SHADOW E&M-EST. PATIENT-LVL III: CPT | Mod: PBBFAC,,, | Performed by: OBSTETRICS & GYNECOLOGY

## 2023-06-29 NOTE — PROGRESS NOTES
HPI: Pt is a 37 y.o.  female who presents for routine annual exam. She uses BTL for contraception. She does not want STD screening.     S/p Laparoscopic BTL, D+C H scope/endometrial ablation 5/29/23.     Last pap 12/6/21 Rare ASCUS, HPV negative      ROS:  GENERAL: Feeling well overall. Denies fever or chills.   SKIN: Denies rash or lesions.   HEAD: Denies head injury or headache.   NODES: Denies enlarged lymph nodes.   CHEST: Denies chest pain or shortness of breath.   CARDIOVASCULAR: Denies palpitations or left sided chest pain.   ABDOMEN: No abdominal pain, constipation, diarrhea, nausea or vomiting   URINARY: No dysuria, hematuria, or burning on urination.  REPRODUCTIVE: See HPI.   BREASTS: Denies pain, lumps, or nipple discharge.   HEMATOLOGIC: No easy bruisability or excessive bleeding.   MUSCULOSKELETAL: Denies joint pain or swelling.   NEUROLOGIC: Denies syncope or weakness.   PSYCHIATRIC: Denies acute depression or anxiety     PE:   APPEARANCE: Well nourished, well developed, friendly  female in no acute distress.  NODES: no inguinal lymphadenopathy  BREASTS: Symmetrical, no skin changes or visible lesions. No palpable masses, nipple discharge or adenopathy bilaterally.  ABDOMEN: Soft. No tenderness or masses. No distention.   VULVA: No lesions. Normal external female genitalia.  URETHRAL MEATUS: Normal size and location, no lesions, no prolapse.  URETHRA: No masses, tenderness, or prolapse.  VAGINA: Moist. No lesions or lacerations noted. No abnormal discharge present. No odor present.   CERVIX: No lesions or discharge. No cervical motion tenderness.   UTERUS: Normal size, regular shape, mobile, non-tender.  ADNEXA: No tenderness. No fullness or masses palpated in the adnexal regions.   ANUS PERINEUM: Normal.      Diagnosis:  1. Encounter for gynecological examination    2. Family history of cancer    3. Pap smear for cervical cancer screening    4. Encounter for screening for human  papillomavirus (HPV)    5. Encounter for screening mammogram for malignant neoplasm of breast        Plan:     Orders Placed This Encounter    HPV High Risk Genotypes, PCR    Mammo Digital Screening Bilat w/ Calixto    Liquid-Based Pap Smear, Screening    WOMEN'S GENETICS REQUEST     - pap/HPV updated today  - MMG in 12/23 @ age 38    Patient was counseled today on the new ACS guidelines for cervical cytology screening as well as the current recommendations for breast cancer screening. She was counseled to follow up with her PCP for other routine health maintenance.     Follow-up with me in 1 year for routine exam; pap in 3 years.

## 2023-07-03 ENCOUNTER — PATIENT MESSAGE (OUTPATIENT)
Dept: BARIATRICS | Facility: CLINIC | Age: 38
End: 2023-07-03
Payer: COMMERCIAL

## 2023-07-07 LAB
FINAL PATHOLOGIC DIAGNOSIS: NORMAL
Lab: NORMAL

## 2023-07-30 ENCOUNTER — PATIENT MESSAGE (OUTPATIENT)
Dept: BARIATRICS | Facility: CLINIC | Age: 38
End: 2023-07-30
Payer: COMMERCIAL

## 2023-09-12 ENCOUNTER — OFFICE VISIT (OUTPATIENT)
Dept: BARIATRICS | Facility: CLINIC | Age: 38
End: 2023-09-12
Payer: COMMERCIAL

## 2023-09-12 VITALS
DIASTOLIC BLOOD PRESSURE: 70 MMHG | OXYGEN SATURATION: 100 % | HEART RATE: 68 BPM | BODY MASS INDEX: 38.49 KG/M2 | WEIGHT: 238.5 LBS | SYSTOLIC BLOOD PRESSURE: 137 MMHG

## 2023-09-12 DIAGNOSIS — E66.01 CLASS 2 SEVERE OBESITY DUE TO EXCESS CALORIES WITH SERIOUS COMORBIDITY AND BODY MASS INDEX (BMI) OF 38.0 TO 38.9 IN ADULT: Primary | ICD-10-CM

## 2023-09-12 DIAGNOSIS — I10 PRIMARY HYPERTENSION: ICD-10-CM

## 2023-09-12 DIAGNOSIS — Z71.3 ENCOUNTER FOR WEIGHT LOSS COUNSELING: ICD-10-CM

## 2023-09-12 DIAGNOSIS — R73.03 PREDIABETES: ICD-10-CM

## 2023-09-12 PROCEDURE — 1160F PR REVIEW ALL MEDS BY PRESCRIBER/CLIN PHARMACIST DOCUMENTED: ICD-10-PCS | Mod: CPTII,S$GLB,, | Performed by: STUDENT IN AN ORGANIZED HEALTH CARE EDUCATION/TRAINING PROGRAM

## 2023-09-12 PROCEDURE — 3075F PR MOST RECENT SYSTOLIC BLOOD PRESS GE 130-139MM HG: ICD-10-PCS | Mod: CPTII,S$GLB,, | Performed by: STUDENT IN AN ORGANIZED HEALTH CARE EDUCATION/TRAINING PROGRAM

## 2023-09-12 PROCEDURE — 3008F BODY MASS INDEX DOCD: CPT | Mod: CPTII,S$GLB,, | Performed by: STUDENT IN AN ORGANIZED HEALTH CARE EDUCATION/TRAINING PROGRAM

## 2023-09-12 PROCEDURE — 3075F SYST BP GE 130 - 139MM HG: CPT | Mod: CPTII,S$GLB,, | Performed by: STUDENT IN AN ORGANIZED HEALTH CARE EDUCATION/TRAINING PROGRAM

## 2023-09-12 PROCEDURE — 1160F RVW MEDS BY RX/DR IN RCRD: CPT | Mod: CPTII,S$GLB,, | Performed by: STUDENT IN AN ORGANIZED HEALTH CARE EDUCATION/TRAINING PROGRAM

## 2023-09-12 PROCEDURE — 3078F DIAST BP <80 MM HG: CPT | Mod: CPTII,S$GLB,, | Performed by: STUDENT IN AN ORGANIZED HEALTH CARE EDUCATION/TRAINING PROGRAM

## 2023-09-12 PROCEDURE — 99213 PR OFFICE/OUTPT VISIT, EST, LEVL III, 20-29 MIN: ICD-10-PCS | Mod: S$GLB,,, | Performed by: STUDENT IN AN ORGANIZED HEALTH CARE EDUCATION/TRAINING PROGRAM

## 2023-09-12 PROCEDURE — 1159F PR MEDICATION LIST DOCUMENTED IN MEDICAL RECORD: ICD-10-PCS | Mod: CPTII,S$GLB,, | Performed by: STUDENT IN AN ORGANIZED HEALTH CARE EDUCATION/TRAINING PROGRAM

## 2023-09-12 PROCEDURE — 3078F PR MOST RECENT DIASTOLIC BLOOD PRESSURE < 80 MM HG: ICD-10-PCS | Mod: CPTII,S$GLB,, | Performed by: STUDENT IN AN ORGANIZED HEALTH CARE EDUCATION/TRAINING PROGRAM

## 2023-09-12 PROCEDURE — 99213 OFFICE O/P EST LOW 20 MIN: CPT | Mod: S$GLB,,, | Performed by: STUDENT IN AN ORGANIZED HEALTH CARE EDUCATION/TRAINING PROGRAM

## 2023-09-12 PROCEDURE — 1159F MED LIST DOCD IN RCRD: CPT | Mod: CPTII,S$GLB,, | Performed by: STUDENT IN AN ORGANIZED HEALTH CARE EDUCATION/TRAINING PROGRAM

## 2023-09-12 PROCEDURE — 99999 PR PBB SHADOW E&M-EST. PATIENT-LVL III: CPT | Mod: PBBFAC,,, | Performed by: STUDENT IN AN ORGANIZED HEALTH CARE EDUCATION/TRAINING PROGRAM

## 2023-09-12 PROCEDURE — 3008F PR BODY MASS INDEX (BMI) DOCUMENTED: ICD-10-PCS | Mod: CPTII,S$GLB,, | Performed by: STUDENT IN AN ORGANIZED HEALTH CARE EDUCATION/TRAINING PROGRAM

## 2023-09-12 PROCEDURE — 99999 PR PBB SHADOW E&M-EST. PATIENT-LVL III: ICD-10-PCS | Mod: PBBFAC,,, | Performed by: STUDENT IN AN ORGANIZED HEALTH CARE EDUCATION/TRAINING PROGRAM

## 2023-09-12 RX ORDER — TOPIRAMATE 25 MG/1
25 TABLET ORAL 2 TIMES DAILY
Qty: 180 TABLET | Refills: 0 | Status: SHIPPED | OUTPATIENT
Start: 2023-09-12 | End: 2024-03-06

## 2023-09-12 NOTE — PROGRESS NOTES
Subjective     Patient ID: Joann Banuelos is a 37 y.o. female.    Chief Complaint: Obesity, Follow-up, and Weight Check    Patient presents for treatment of obesity.   Works in , travels a lot  Saw Dr. Shea in 2008, prescribed topiramate    Co-morbidities  HTN    Negative for thyroid cancer    H/o tubal ligation    Weight History  Lowest adult weight: 180 lbs  Highest adult weight: 248 lbs     Current Physical Activity  3-5x/week  Trainer 3x/week  Cardio and lifting about 2x/week    Current Eating Habits  Breakfast - Power Punch Plus from [employer]; coffee and turkey, benitez sandwich or spinach wrap  Lunch - fast food, take out, bagged salad  Dinner - pasta aj, fried fish, chicken, pork, lamb; bagged salad with protein; mashed potatoes  Snacks  Beverages - water, coffee, wine (1-2 glasses a day), red bulls    Only getting on average 4 hours of sleep per night  Eats well 90% of the time, but then gets fast food or chips 10% of the time  Drinking less  Has not been exercising as consistently    Medical Weight Loss  6/7/2023: 237.5 lbs, BMI 37.8, BFP 41.3%, BFM 98.2 lbs, SMM 79.6 lbs, BMR 1735 kcal  9/12/2023: 238.5 lbs, BMI 37.9, BFP 41.8%, BFM 99.6 lbs, SMM 79.8 lbs, BMR 1731 kcal    Insurance denied [employer] because patient BMI < 40      Review of Systems   Constitutional:  Negative for chills and fever.   Respiratory:  Negative for shortness of breath.    Cardiovascular:  Negative for chest pain.   Gastrointestinal:  Negative for abdominal pain, nausea and vomiting.   Neurological:  Negative for dizziness and light-headedness.   Psychiatric/Behavioral:  The patient is not nervous/anxious.           Objective    Latest Reference Range & Units 11/04/22 08:36   WBC 3.90 - 12.70 K/uL 7.06   RBC 4.00 - 5.40 M/uL 4.88   Hemoglobin 12.0 - 16.0 g/dL 13.7   Hematocrit 37.0 - 48.5 % 42.5   MCV 82 - 98 fL 87   MCH 27.0 - 31.0 pg 28.1   MCHC 32.0 - 36.0 g/dL 32.2   RDW 11.5 - 14.5 % 12.1   Platelets  150 - 450 K/uL 300   MPV 9.2 - 12.9 fL 10.3   Gran % 38.0 - 73.0 % 36.5 (L)   Lymph % 18.0 - 48.0 % 53.7 (H)   Mono % 4.0 - 15.0 % 6.5   Eosinophil % 0.0 - 8.0 % 2.5   Basophil % 0.0 - 1.9 % 0.8   Immature Granulocytes 0.0 - 0.5 % 0.0   Gran # (ANC) 1.8 - 7.7 K/uL 2.6   Lymph # 1.0 - 4.8 K/uL 3.8   Mono # 0.3 - 1.0 K/uL 0.5   Eos # 0.0 - 0.5 K/uL 0.2   Baso # 0.00 - 0.20 K/uL 0.06   Immature Grans (Abs) 0.00 - 0.04 K/uL 0.00   nRBC 0 /100 WBC 0   Differential Method  Automated   Sodium 136 - 145 mmol/L 138   Potassium 3.5 - 5.1 mmol/L 4.3   Chloride 95 - 110 mmol/L 105   CO2 23 - 29 mmol/L 24   Anion Gap 8 - 16 mmol/L 9   BUN 6 - 20 mg/dL 10   Creatinine 0.5 - 1.4 mg/dL 0.8   eGFR >60 mL/min/1.73 m^2 >60.0   Glucose 70 - 110 mg/dL 91   Calcium 8.7 - 10.5 mg/dL 9.1   Alkaline Phosphatase 55 - 135 U/L 84   PROTEIN TOTAL 6.0 - 8.4 g/dL 7.5   Albumin 3.5 - 5.2 g/dL 4.1   BILIRUBIN TOTAL 0.1 - 1.0 mg/dL 0.2   AST 10 - 40 U/L 19   ALT 10 - 44 U/L 17   Cholesterol 120 - 199 mg/dL 204 (H)   HDL 40 - 75 mg/dL 66   HDL/Cholesterol Ratio 20.0 - 50.0 % 32.4   LDL Cholesterol External 63.0 - 159.0 mg/dL 123.8   Non-HDL Cholesterol mg/dL 138   Total Cholesterol/HDL Ratio 2.0 - 5.0  3.1   Triglycerides 30 - 150 mg/dL 71   Hemoglobin A1C External 4.0 - 5.6 % 5.7 (H)   Estimated Avg Glucose 68 - 131 mg/dL 117   TSH 0.400 - 4.000 uIU/mL 0.747   (L): Data is abnormally low  (H): Data is abnormally high    Vitals:    09/12/23 0855   BP: 137/70   Pulse: 68         Physical Exam  Vitals reviewed.   Constitutional:       General: She is not in acute distress.     Appearance: Normal appearance. She is obese. She is not ill-appearing, toxic-appearing or diaphoretic.   HENT:      Head: Normocephalic and atraumatic.   Cardiovascular:      Rate and Rhythm: Normal rate.   Pulmonary:      Effort: Pulmonary effort is normal. No respiratory distress.   Skin:     General: Skin is warm and dry.   Neurological:      Mental Status: She is alert and  oriented to person, place, and time.            Assessment and Plan     1. Class 2 severe obesity due to excess calories with serious comorbidity and body mass index (BMI) of 38.0 to 38.9 in adult    2. Primary hypertension    3. Prediabetes    4. Encounter for weight loss counseling    Other orders  -     topiramate (TOPAMAX) 25 MG tablet; Take 1 tablet (25 mg total) by mouth 2 (two) times daily.  Dispense: 180 tablet; Refill: 0          - Log all food and beverage intake with a daily calorie goal of 1400 calories per day    - Moderate intensity aerobic exercise for 150 minutes per week

## 2023-12-07 ENCOUNTER — HOSPITAL ENCOUNTER (OUTPATIENT)
Dept: RADIOLOGY | Facility: OTHER | Age: 38
Discharge: HOME | End: 2023-12-07
Attending: OBSTETRICS & GYNECOLOGY
Payer: COMMERCIAL

## 2023-12-07 DIAGNOSIS — Z12.31 ENCOUNTER FOR SCREENING MAMMOGRAM FOR MALIGNANT NEOPLASM OF BREAST: ICD-10-CM

## 2023-12-07 PROCEDURE — 77063 MAMMO DIGITAL SCREENING BILAT WITH TOMO: ICD-10-PCS | Mod: 26,,, | Performed by: RADIOLOGY

## 2023-12-07 PROCEDURE — 77063 BREAST TOMOSYNTHESIS BI: CPT | Mod: 26,,, | Performed by: RADIOLOGY

## 2023-12-07 PROCEDURE — 77067 SCR MAMMO BI INCL CAD: CPT | Mod: 26,,, | Performed by: RADIOLOGY

## 2023-12-07 PROCEDURE — 77067 SCR MAMMO BI INCL CAD: CPT | Mod: TC

## 2023-12-07 PROCEDURE — 77067 MAMMO DIGITAL SCREENING BILAT WITH TOMO: ICD-10-PCS | Mod: 26,,, | Performed by: RADIOLOGY

## 2023-12-09 NOTE — TELEPHONE ENCOUNTER
No care due was identified.  [facility] Embedded Care Due Messages. Reference number: [id number].   12/09/2023 8:12:22 AM CST

## 2023-12-10 RX ORDER — AMLODIPINE BESYLATE 5 MG/1
TABLET ORAL
Qty: 90 TABLET | Refills: 0 | Status: SHIPPED | OUTPATIENT
Start: 2023-12-10 | End: 2024-03-15

## 2023-12-11 NOTE — TELEPHONE ENCOUNTER
Refill Decision Note   Joann Banuelos  is requesting a refill authorization.  Brief Assessment and Rationale for Refill:  Approve     Medication Therapy Plan:         Comments:     Note composed:6:49 PM 12/10/2023

## 2023-12-12 ENCOUNTER — PATIENT MESSAGE (OUTPATIENT)
Dept: OBSTETRICS AND GYNECOLOGY | Facility: CLINIC | Age: 38
End: 2023-12-12
Payer: COMMERCIAL

## 2023-12-12 DIAGNOSIS — R92.8 ABNORMAL MAMMOGRAM: Primary | ICD-10-CM

## 2023-12-13 ENCOUNTER — TELEPHONE (OUTPATIENT)
Dept: RADIOLOGY | Facility: OTHER | Age: 38
End: 2023-12-13
Payer: COMMERCIAL

## 2023-12-13 ENCOUNTER — PATIENT MESSAGE (OUTPATIENT)
Dept: RADIOLOGY | Facility: OTHER | Age: 38
End: 2023-12-13
Payer: COMMERCIAL

## 2023-12-13 NOTE — TELEPHONE ENCOUNTER
Called pt to schedule mammo recall. Call went directly to voicemail multiple times. Left message with direct contact. Portal message sent.

## 2023-12-27 ENCOUNTER — HOSPITAL ENCOUNTER (OUTPATIENT)
Dept: RADIOLOGY | Facility: OTHER | Age: 38
Discharge: HOME | End: 2023-12-27
Attending: OBSTETRICS & GYNECOLOGY
Payer: COMMERCIAL

## 2023-12-27 DIAGNOSIS — R92.8 ABNORMAL MAMMOGRAM: ICD-10-CM

## 2023-12-27 PROCEDURE — 77062 BREAST TOMOSYNTHESIS BI: CPT | Mod: TC

## 2023-12-27 PROCEDURE — 77062 MAMMO DIGITAL DIAGNOSTIC BILAT WITH TOMO: ICD-10-PCS | Mod: 26,,, | Performed by: RADIOLOGY

## 2023-12-27 PROCEDURE — 77066 MAMMO DIGITAL DIAGNOSTIC BILAT WITH TOMO: ICD-10-PCS | Mod: 26,,, | Performed by: RADIOLOGY

## 2023-12-27 PROCEDURE — 77062 BREAST TOMOSYNTHESIS BI: CPT | Mod: 26,,, | Performed by: RADIOLOGY

## 2023-12-27 PROCEDURE — 77066 DX MAMMO INCL CAD BI: CPT | Mod: 26,,, | Performed by: RADIOLOGY

## 2024-02-06 ENCOUNTER — PATIENT MESSAGE (OUTPATIENT)
Dept: ADMINISTRATIVE | Facility: HOSPITAL | Age: 39
End: 2024-02-06
Payer: COMMERCIAL

## 2024-02-06 ENCOUNTER — PATIENT OUTREACH (OUTPATIENT)
Dept: ADMINISTRATIVE | Facility: HOSPITAL | Age: 39
End: 2024-02-06
Payer: COMMERCIAL

## 2024-02-06 NOTE — PROGRESS NOTES

## 2024-02-23 DIAGNOSIS — O10.913 CHRONIC HYPERTENSION COMPLICATING OR REASON FOR CARE DURING PREGNANCY, THIRD TRIMESTER: ICD-10-CM

## 2024-03-05 ENCOUNTER — OFFICE VISIT (OUTPATIENT)
Dept: INTERNAL MEDICINE | Facility: CLINIC | Age: 39
End: 2024-03-05
Payer: COMMERCIAL

## 2024-03-05 VITALS
BODY MASS INDEX: 39.48 KG/M2 | SYSTOLIC BLOOD PRESSURE: 122 MMHG | WEIGHT: 245.69 LBS | HEIGHT: 66 IN | DIASTOLIC BLOOD PRESSURE: 80 MMHG | TEMPERATURE: 97 F

## 2024-03-05 DIAGNOSIS — I10 PRIMARY HYPERTENSION: ICD-10-CM

## 2024-03-05 DIAGNOSIS — M79.645 THUMB PAIN, LEFT: ICD-10-CM

## 2024-03-05 DIAGNOSIS — R06.83 SNORING: ICD-10-CM

## 2024-03-05 DIAGNOSIS — Z00.00 ANNUAL PHYSICAL EXAM: Primary | ICD-10-CM

## 2024-03-05 DIAGNOSIS — E66.9 OBESITY, CLASS II, BMI 35-39.9, NO COMORBIDITY: ICD-10-CM

## 2024-03-05 DIAGNOSIS — R52 PAIN: Primary | ICD-10-CM

## 2024-03-05 DIAGNOSIS — R73.03 PREDIABETES: ICD-10-CM

## 2024-03-05 PROCEDURE — 1159F MED LIST DOCD IN RCRD: CPT | Mod: CPTII,S$GLB,, | Performed by: INTERNAL MEDICINE

## 2024-03-05 PROCEDURE — 3079F DIAST BP 80-89 MM HG: CPT | Mod: CPTII,S$GLB,, | Performed by: INTERNAL MEDICINE

## 2024-03-05 PROCEDURE — 3008F BODY MASS INDEX DOCD: CPT | Mod: CPTII,S$GLB,, | Performed by: INTERNAL MEDICINE

## 2024-03-05 PROCEDURE — 99999 PR PBB SHADOW E&M-EST. PATIENT-LVL III: CPT | Mod: PBBFAC,,, | Performed by: INTERNAL MEDICINE

## 2024-03-05 PROCEDURE — 3074F SYST BP LT 130 MM HG: CPT | Mod: CPTII,S$GLB,, | Performed by: INTERNAL MEDICINE

## 2024-03-05 PROCEDURE — 99396 PREV VISIT EST AGE 40-64: CPT | Mod: S$GLB,,, | Performed by: INTERNAL MEDICINE

## 2024-03-05 NOTE — PROGRESS NOTES
Subjective     Patient ID: Joann Banueols is a 38 y.o. female.    Chief Complaint: Annual Exam    HPI  38 y.o. Female here for annual exam.      Vaccines: Influenza (2022); Tetanus (2021)  Eye exam: current  Gyn exam: current   Mammo: 12/23     Exercise: not currently   Diet: regular   LMP: 2/10/24     Past Medical History:  2007: Abnormal Pap smear  No date: Abnormal Pap smear of cervix  No date: Allergy  No date: HPV (human papilloma virus) anogenital infection  No date: Hypertension  No date: Skin disease      Comment:  eczema  No date: Skin disease      Comment:  tinea versicolor  Past Surgical History:  2007: CERVICAL BIOPSY  W/ LOOP ELECTRODE EXCISION  2012: DILATION AND CURETTAGE OF UTERUS      Comment:  retained IUD/Endometrial polyp  No date: HYSTEROSCOPY W/ POLYPECTOMY  Social History    Socioeconomic History      Marital status:       Spouse name: Not on file      Number of children: 3      Years of education: Not on file      Highest education level: Not on file    Occupational History      Occupation:         Employer: [email or web address]    Tobacco Use      Smoking status: Never Smoker      Smokeless tobacco: Never Used    Substance and Sexual Activity      Alcohol use: Not Currently        Alcohol/week: 7.0 standard drinks        Types: 7 Glasses of wine per week        Comment: Occasionally.      Drug use: No      Sexual activity: Yes        Partners: Male        Birth control/protection: None    Other Topics      Concerns:        Are you pregnant or think you may be?: No        Breast-feeding: No    Social History Narrative      Not on file     Social Determinants of Health  Financial Resource Strain: Low Risk       Difficulty of Paying Living Expenses: Not very hard  Food Insecurity: No Food Insecurity      Worried About Running Out of Food in the Last Year: Never true      Ran Out of Food in the Last Year: Never true  Transportation Needs: No Transportation Needs      Lack of  Transportation (Medical): No      Lack of Transportation (Non-Medical): No  Physical Activity: Sufficiently Active      Days of Exercise per Week: 3 days      Minutes of Exercise per Session: 60 min  Stress: No Stress Concern Present      Feeling of Stress : Only a little  Social Connections: Unknown      Frequency of Communication with Friends and Family: More than three times a week      Frequency of Social Gatherings with Friends and Family: Three times a week      Attends Yazidi Services: Not on file      Active Member of Clubs or Organizations: Yes      Attends Club or Organization Meetings: More than 4 times per year      Marital Status:   Review of patient's allergies indicates:  No Known Allergies  Review of Systems   Constitutional:  Negative for activity change, appetite change, chills, diaphoresis, fatigue, fever and unexpected weight change.   HENT:  Negative for nasal congestion, hearing loss, mouth sores, postnasal drip, rhinorrhea, sinus pressure/congestion, sneezing, sore throat, trouble swallowing and voice change.    Eyes:  Negative for pain, discharge and visual disturbance.   Respiratory:  Negative for cough, chest tightness, shortness of breath and wheezing.    Cardiovascular:  Negative for chest pain, palpitations and leg swelling.   Gastrointestinal:  Negative for abdominal pain, blood in stool, constipation, diarrhea, nausea and vomiting.   Endocrine: Negative for cold intolerance, heat intolerance, polydipsia and polyuria.   Genitourinary:  Negative for difficulty urinating, dysuria, frequency, hematuria, menstrual problem and urgency.   Musculoskeletal:  Positive for arthralgias. Negative for joint swelling, myalgias and neck pain.   Integumentary:  Negative for rash and wound.   Allergic/Immunologic: Negative for environmental allergies and food allergies.   Neurological:  Negative for dizziness, tremors, seizures, syncope, weakness, light-headedness and headaches.    Hematological:  Negative for adenopathy. Does not bruise/bleed easily.   Psychiatric/Behavioral:  Negative for confusion, dysphoric mood and sleep disturbance. The patient is not nervous/anxious.           Objective     Physical Exam  Vitals and nursing note reviewed.   Constitutional:       General: She is not in acute distress.     Appearance: Normal appearance. She is well-developed. She is not diaphoretic.   HENT:      Head: Normocephalic and atraumatic.      Right Ear: External ear normal.      Left Ear: External ear normal.      Nose: Nose normal.      Mouth/Throat:      Pharynx: No oropharyngeal exudate.   Eyes:      General: No scleral icterus.        Right eye: No discharge.         Left eye: No discharge.      Conjunctiva/sclera: Conjunctivae normal.      Pupils: Pupils are equal, round, and reactive to light.   Neck:      Thyroid: No thyromegaly.      Vascular: No JVD.   Cardiovascular:      Rate and Rhythm: Normal rate and regular rhythm.      Pulses: Normal pulses.      Heart sounds: Normal heart sounds. No murmur heard.  Pulmonary:      Effort: Pulmonary effort is normal. No respiratory distress.      Breath sounds: Normal breath sounds. No wheezing, rhonchi or rales.   Chest:      Chest wall: No tenderness.   Abdominal:      General: Bowel sounds are normal. There is no distension.      Palpations: Abdomen is soft.      Tenderness: There is no abdominal tenderness. There is no guarding or rebound.   Musculoskeletal:      Cervical back: Neck supple.      Right lower leg: No edema.      Left lower leg: No edema.   Lymphadenopathy:      Cervical: No cervical adenopathy.   Skin:     General: Skin is warm and dry.      Coloration: Skin is not pale.      Findings: No rash.   Neurological:      General: No focal deficit present.      Mental Status: She is alert and oriented to person, place, and time.      Gait: Gait normal.   Psychiatric:         Behavior: Behavior normal.         Thought Content: Thought  content normal.         Judgment: Judgment normal.            Assessment and Plan     1. Annual physical exam  -     CBC Auto Differential; Future; Expected date: 03/05/2024  -     Comprehensive Metabolic Panel; Future; Expected date: 03/05/2024  -     TSH; Future; Expected date: 03/05/2024  -     Lipid Panel; Future; Expected date: 03/05/2024  -     Urinalysis; Future  -     Hemoglobin A1C; Future; Expected date: 03/05/2024    2. Primary hypertension    3. Obesity, Class II, BMI 35-39.9, no comorbidity    4. Prediabetes    5. Snoring  -     Ambulatory referral/consult to Sleep Disorders; Future; Expected date: 03/12/2024    6. Thumb pain, left  -     Ambulatory referral/consult to Orthopedics; Future; Expected date: 03/12/2024         Blood work reviewed with pt      HTN- stable on Norvasc       Severe obesity- proper diet/exercise stressed      Prediabetes- stable     Snoring- referral to sleep medicine      F/u in 1 yr

## 2024-03-06 ENCOUNTER — OFFICE VISIT (OUTPATIENT)
Dept: SLEEP MEDICINE | Facility: CLINIC | Age: 39
End: 2024-03-06
Payer: COMMERCIAL

## 2024-03-06 ENCOUNTER — LAB VISIT (OUTPATIENT)
Dept: LAB | Facility: OTHER | Age: 39
End: 2024-03-06
Attending: INTERNAL MEDICINE
Payer: COMMERCIAL

## 2024-03-06 VITALS — HEIGHT: 66 IN | BODY MASS INDEX: 39.32 KG/M2 | WEIGHT: 244.69 LBS

## 2024-03-06 DIAGNOSIS — R06.83 SNORING: ICD-10-CM

## 2024-03-06 DIAGNOSIS — Z00.00 ANNUAL PHYSICAL EXAM: ICD-10-CM

## 2024-03-06 DIAGNOSIS — F51.09 OTHER INSOMNIA NOT DUE TO A SUBSTANCE OR KNOWN PHYSIOLOGICAL CONDITION: ICD-10-CM

## 2024-03-06 DIAGNOSIS — I10 HYPERTENSION, UNSPECIFIED TYPE: ICD-10-CM

## 2024-03-06 DIAGNOSIS — G47.10 HYPERSOMNOLENCE: Primary | ICD-10-CM

## 2024-03-06 LAB
BILIRUB UR QL STRIP: NEGATIVE
CLARITY UR: CLEAR
COLOR UR: YELLOW
GLUCOSE UR QL STRIP: NEGATIVE
HGB UR QL STRIP: NEGATIVE
KETONES UR QL STRIP: NEGATIVE
LEUKOCYTE ESTERASE UR QL STRIP: NEGATIVE
NITRITE UR QL STRIP: NEGATIVE
PH UR STRIP: 6 [PH] (ref 5–8)
PROT UR QL STRIP: NEGATIVE
SP GR UR STRIP: 1.02 (ref 1–1.03)
URN SPEC COLLECT METH UR: NORMAL
UROBILINOGEN UR STRIP-ACNC: NEGATIVE EU/DL

## 2024-03-06 PROCEDURE — 99204 OFFICE O/P NEW MOD 45 MIN: CPT | Mod: S$GLB,,, | Performed by: PHYSICIAN ASSISTANT

## 2024-03-06 PROCEDURE — 1159F MED LIST DOCD IN RCRD: CPT | Mod: CPTII,S$GLB,, | Performed by: PHYSICIAN ASSISTANT

## 2024-03-06 PROCEDURE — 81003 URINALYSIS AUTO W/O SCOPE: CPT | Performed by: INTERNAL MEDICINE

## 2024-03-06 PROCEDURE — 99999 PR PBB SHADOW E&M-EST. PATIENT-LVL III: CPT | Mod: PBBFAC,,, | Performed by: PHYSICIAN ASSISTANT

## 2024-03-06 PROCEDURE — 3008F BODY MASS INDEX DOCD: CPT | Mod: CPTII,S$GLB,, | Performed by: PHYSICIAN ASSISTANT

## 2024-03-06 PROCEDURE — 1160F RVW MEDS BY RX/DR IN RCRD: CPT | Mod: CPTII,S$GLB,, | Performed by: PHYSICIAN ASSISTANT

## 2024-03-06 NOTE — PROGRESS NOTES
Referred by Rogelio Moreau, *     NEW PATIENT VISIT    Joann Banuelos  is a pleasant 38 y.o. female  with PMH significant for HTN, preDM, BMI 39+ who presents for sleep evaluation following referral from PCP        C/o loud snoring, snoring/gasping arousals, poor disrupted and non-refreshing sleep, and excessive daytime sleepiness and fatigue. State her mother has a history of severe ADA and is on CPAP. States she was discussing her concerns with PCP who recommended evaluation for ADA, which is why she presents today      SLEEP SCHEDULE   Environment    Bed Time 10PM-MN   Sleep Latency 30-60mins   Arousals 2-3   Nocturia 0   Back to sleep 30min   Wake time 4-5AM work day  7-9AM off day   Naps none   Work            3/6/2024     7:54 AM   Sleep Clinic ROS    Difficulty breathing through the nose?  No   Sore throat or dry mouth in the morning? Yes   Irregular or very fast heart beat?  Yes   Shortness of breath?  Yes   Acid reflux? Yes   Body aches and pains?  Yes   Morning headaches? Sometimes   Dizziness? Yes   Mood changes?  Yes   Do you exercise?  Yes   Do you feel like moving your legs a lot?  No       Past Medical History:   Diagnosis Date    Abnormal Pap smear 2007    Abnormal Pap smear of cervix     Allergy     Eczema     HPV (human papilloma virus) anogenital infection     Hypertension     Tinea versicolor      Patient Active Problem List   Diagnosis    Obesity, Class II, BMI 35-39.9, no comorbidity    Hypertension    Left hip pain    Chronic hypertension complicating or reason for care during pregnancy, third trimester    Irregular bleeding    Heavy menstrual bleeding    Unwanted fertility    S/p IUD removal/hysteroscopy/D&C/endometrial ablation/laparoscopic BS/SHELLI 5/29/23    Prediabetes       Current Outpatient Medications:     amLODIPine (NORVASC) 5 MG tablet, TAKE 1 TABLET BY MOUTH EVERY DAY, Disp: 90 tablet, Rfl: 0    aspirin 81 MG Chew, Take 81 mg by mouth once daily., Disp: , Rfl:   "      Vitals:    03/06/24 0806   Weight: 111 kg (244 lb 11.4 oz)   Height: 5' 6" (1.676 m)     Physical Exam:    GEN:   Well-appearing  Psych:  Appropriate affect, demonstrates insight  SKIN:  No rash on the face or bridge of the nose      LABS:   No results found for: "HGB", "CO2"      RECORDS REVIEWED:    No previous sleep study    ASSESSMENT        3/6/2024     7:45 AM   EPWORTH SLEEPINESS SCALE   Sitting and reading 1   Watching TV 1   Sitting, inactive in a public place (e.g. a theatre or a meeting) 2   As a passenger in a car for an hour without a break 3   Lying down to rest in the afternoon when circumstances permit 2   Sitting and talking to someone 0   Sitting quietly after a lunch without alcohol 2   In a car, while stopped for a few minutes in traffic 1   Total score 12       PROBLEM DESCRIPTION/ Sx on Presentation  STATUS   sx ADA   + loud snoring, + snoring/gasping arousals, denies witnessed apneas  + wakes feeling un-refreshed   Mother has ADA on CPAP  New   Daytime Sx   + sleepiness when inactive   ESS 12/24 on intake  New   Insomnia   Trouble falling asleep: 30-60mins  Arousals:         2-3 x nightly  Hard to get back to sleep?: up to 30mins    Prior pertinent medications:  Current pertinent medications:   New   Nocturia   x 0 per sleep period  New   Other issues:     PLAN     -recommend sleep testing   -HST ordered  -discussed trial therapy if ADA present and the patient is open to a trial of CPAP therapy  -discussed ADA and PAP with patient in detail, including possible complications of untreated ADA like heart attack/stroke  -advised on strict driving precautions; advised never to drive drowsy     Advised on plan of care. Answered all patient questions. Patient verbalized understanding and voiced agreement with plan of care.    RTC if dx of ADA made and CPAP ordered, will need follow up 31-90 days after receiving machine for compliance      The patient was given open opportunity to ask questions " and/or express concerns about treatment plan. All questions/concerns were discussed.     Two patient identifiers used prior to evaluation.

## 2024-03-08 ENCOUNTER — HOSPITAL ENCOUNTER (OUTPATIENT)
Dept: RADIOLOGY | Facility: OTHER | Age: 39
Discharge: HOME | End: 2024-03-08
Payer: COMMERCIAL

## 2024-03-08 ENCOUNTER — OFFICE VISIT (OUTPATIENT)
Dept: ORTHOPEDICS | Facility: CLINIC | Age: 39
End: 2024-03-08
Payer: COMMERCIAL

## 2024-03-08 VITALS — HEIGHT: 66 IN | BODY MASS INDEX: 39.32 KG/M2 | WEIGHT: 244.69 LBS

## 2024-03-08 DIAGNOSIS — M79.645 THUMB PAIN, LEFT: ICD-10-CM

## 2024-03-08 DIAGNOSIS — R52 PAIN: ICD-10-CM

## 2024-03-08 DIAGNOSIS — M77.8 TENDONITIS OF WRIST, LEFT: Primary | ICD-10-CM

## 2024-03-08 PROCEDURE — 3044F HG A1C LEVEL LT 7.0%: CPT | Mod: CPTII,S$GLB,, | Performed by: SPECIALIST/TECHNOLOGIST

## 2024-03-08 PROCEDURE — 99999 PR PBB SHADOW E&M-EST. PATIENT-LVL III: CPT | Mod: PBBFAC,,, | Performed by: SPECIALIST/TECHNOLOGIST

## 2024-03-08 PROCEDURE — 73130 X-RAY EXAM OF HAND: CPT | Mod: 26,LT,, | Performed by: INTERNAL MEDICINE

## 2024-03-08 PROCEDURE — 3008F BODY MASS INDEX DOCD: CPT | Mod: CPTII,S$GLB,, | Performed by: SPECIALIST/TECHNOLOGIST

## 2024-03-08 PROCEDURE — 73130 X-RAY EXAM OF HAND: CPT | Mod: TC,FY,LT

## 2024-03-08 PROCEDURE — 99204 OFFICE O/P NEW MOD 45 MIN: CPT | Mod: 25,S$GLB,, | Performed by: SPECIALIST/TECHNOLOGIST

## 2024-03-08 PROCEDURE — 1159F MED LIST DOCD IN RCRD: CPT | Mod: CPTII,S$GLB,, | Performed by: SPECIALIST/TECHNOLOGIST

## 2024-03-08 PROCEDURE — 20550 NJX 1 TENDON SHEATH/LIGAMENT: CPT | Mod: LT,S$GLB,, | Performed by: SPECIALIST/TECHNOLOGIST

## 2024-03-08 RX ORDER — DEXAMETHASONE SODIUM PHOSPHATE 4 MG/ML
4 INJECTION, SOLUTION INTRA-ARTICULAR; INTRALESIONAL; INTRAMUSCULAR; INTRAVENOUS; SOFT TISSUE
Status: DISCONTINUED | OUTPATIENT
Start: 2024-03-08 | End: 2024-03-08 | Stop reason: HOSPADM

## 2024-03-08 RX ORDER — MELOXICAM 7.5 MG/1
7.5 TABLET ORAL DAILY
Qty: 30 TABLET | Refills: 0 | Status: SHIPPED | OUTPATIENT
Start: 2024-03-08 | End: 2024-04-24 | Stop reason: SDUPTHER

## 2024-03-08 RX ADMIN — DEXAMETHASONE SODIUM PHOSPHATE 4 MG: 4 INJECTION, SOLUTION INTRA-ARTICULAR; INTRALESIONAL; INTRAMUSCULAR; INTRAVENOUS; SOFT TISSUE at 11:03

## 2024-03-08 NOTE — PROGRESS NOTES
Subjective:       Patient ID: Joann Banuelos is a 38 y.o. female.    Chief Complaint: Finger Pain of the Left Hand      HPI  3/4/24  38-year-old female presents to clinic today with left wrist pain located along the FCR tendon insertion.  She states this has been going on for about 3 months.  She denies any trauma or previous surgeries to the wrist or hand.  She states she has been trying to rub Voltaren cream with minimal relief.  She does state she gets relief from taking ibuprofen.  She states she has been trying to wear a thumb spica brace with no relief due to the brace rubbing in the site of irritation.  She denies any numbness or tingling.  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      Past Medical History:   Diagnosis Date    Abnormal Pap smear 2007    Abnormal Pap smear of cervix     Allergy     Eczema     HPV (human papilloma virus) anogenital infection     Hypertension     Tinea versicolor      Past Surgical History:   Procedure Laterality Date    CERVICAL BIOPSY  W/ LOOP ELECTRODE EXCISION  2007    DILATION AND CURETTAGE OF UTERUS  2012    retained IUD/Endometrial polyp    HYDROTHERMAL ABLATION OF ENDOMETRIUM N/A 5/29/2023    Procedure: ABLATION, ENDOMETRIUM, HYDROTHERMAL;  Surgeon: Sushila Dunham MD;  Location: [facility];  Service: OB/GYN;  Laterality: N/A;  NOVASURE    HYSTEROSCOPY W/ POLYPECTOMY      LAPAROSCOPIC LYSIS OF ADHESIONS N/A 5/29/2023    Procedure: LYSIS, ADHESIONS, LAPAROSCOPIC;  Surgeon: Sushila Dunham MD;  Location: [facility];  Service: OB/GYN;  Laterality: N/A;    LAPAROSCOPIC SALPINGECTOMY Bilateral 5/29/2023    Procedure: SALPINGECTOMY, LAPAROSCOPIC;  Surgeon: Sushila Dunham MD;  Location: [facility];  Service: OB/GYN;  Laterality: Bilateral;    REMOVAL OF INTRAUTERINE DEVICE (IUD) N/A 5/29/2023    Procedure: REMOVAL, INTRAUTERINE DEVICE;  Surgeon: Sushila Dunham MD;  Location: [facility] OR;  Service: OB/GYN;  Laterality: N/A;     Family History   Problem Relation Age  of Onset    Diabetes Paternal Grandfather     Diabetes Paternal Grandmother     Diabetes Maternal Grandmother     Diabetes Maternal Grandfather     Hypertension Father     Eczema Sister     Eczema Sister     Diabetes Mother     Hypertension Mother     Breast cancer Neg Hx     Colon cancer Neg Hx     Ovarian cancer Neg Hx     Melanoma Neg Hx     Lupus Neg Hx     Psoriasis Neg Hx      Social History     Socioeconomic History    Marital status:     Number of children: 2   Occupational History    Occupation: Medical sales     Employer: [employer]   Tobacco Use    Smoking status: Never    Smokeless tobacco: Never   Substance and Sexual Activity    Alcohol use: Not Currently     Alcohol/week: 7.0 standard drinks of alcohol     Types: 7 Glasses of wine per week     Comment: Occasionally ; none 24 hr prior to surgery    Drug use: No    Sexual activity: Yes     Partners: Male     Birth control/protection: None   Other Topics Concern    Are you pregnant or think you may be? No    Breast-feeding No   Social History Narrative    ** Merged History Encounter **          Social Determinants of Health     Financial Resource Strain: Low Risk  (3/4/2024)    Overall Financial Resource Strain (CARDIA)     Difficulty of Paying Living Expenses: Not very hard   Food Insecurity: No Food Insecurity (3/4/2024)    Hunger Vital Sign     Worried About Running Out of Food in the Last Year: Never true     Ran Out of Food in the Last Year: Never true   Transportation Needs: No Transportation Needs (3/4/2024)    PRAPARE - Transportation     Lack of Transportation (Medical): No     Lack of Transportation (Non-Medical): No   Physical Activity: Insufficiently Active (3/4/2024)    Exercise Vital Sign     Days of Exercise per Week: 3 days     Minutes of Exercise per Session: 40 min   Stress: No Stress Concern Present (3/4/2024)    Citizen of [place] of Occupational Health - Occupational Stress Questionnaire     Feeling of Stress : Only a little  "  Social Connections: Unknown (3/4/2024)    Social Connection and Isolation Panel [NHANES]     Frequency of Communication with Friends and Family: More than three times a week     Frequency of Social Gatherings with Friends and Family: More than three times a week     Active Member of Clubs or Organizations: Yes     Attends Club or Organization Meetings: More than 4 times per year     Marital Status:    Housing Stability: Low Risk  (3/4/2024)    Housing Stability Vital Sign     Unable to Pay for Housing in the Last Year: No     Number of Places Lived in the Last Year: 1     Unstable Housing in the Last Year: No       Current Outpatient Medications   Medication Sig Dispense Refill    amLODIPine (NORVASC) 5 MG tablet TAKE 1 TABLET BY MOUTH EVERY DAY 90 tablet 0    aspirin 81 MG Chew Take 81 mg by mouth once daily.       No current facility-administered medications for this visit.     Review of patient's allergies indicates:  No Known Allergies    Review of Systems        Objective:      Vitals:    03/08/24 1116   Weight: 111 kg (244 lb 11.4 oz)   Height: 5' 6" (1.676 m)     Physical Exam  Cardiovascular:      Pulses:           Radial pulses are Normal on the right side and Normal on the left side.       Hand/Wrist Musculoskeletal Exam    Inspection    Right      Erythema: none      Ecchymosis: none      Edema: none      Deformity: none    Left      Erythema: none      Ecchymosis: none      Edema: none      Deformity: none    Palpation    Left      Wrist tenderness to palpation: flexor carpi radialis    Range of Motion    Right Hand      Right hand range of motion is normal.      Left Hand      Left hand range of motion is normal.       Right Wrist      Right wrist range of motion is normal.      Left Wrist      Left wrist range of motion is normal.      Strength        Strength additional comments: Pain with resisted wrist flexion and radial deviation    Neurovascular    Right       Radial pulse: normal      " Capillary refill: <3 sec and brisk      Ulnar nerve sensory distribution: normal      Median nerve sensory distribution: normal      Superficial radial nerve sensory distribution: normal    Left       Radial pulse: normal      Capillary refill: brisk and <3 sec      Ulnar nerve sensory distribution: normal      Median nerve sensory distribution: normal      Superficial radial nerve sensory distribution: normal      Diagnostics Review: X-Ray: Reviewed  My personal interpretation of the x-ray are no signs of fracture or dislocation.       Assessment:       1. Thumb pain, left        Plan:       Treatment options discussed in regards to injection, bracing, and therapy.  Reviewed with the patient the pathophysiology of flexor carpi radialis tendonitis.  Provided patient with a wrist brace today.  We will order therapy to begin.  She would like to have an injection of the FCR tendon.  Patient we will follow up in 6 weeks if her symptoms are not improving or worsening.

## 2024-03-08 NOTE — PROCEDURES
L FCR Tendon Sheath 1st Injection    Date/Time: 3/8/2024 11:00 AM    Performed by: Wayne Barraza PA-C  Authorized by: Wayne Barraza PA-C    Consent Done?:  Yes (Verbal)  Indications:  Pain  Timeout: prior to procedure the correct patient, procedure, and site was verified    Local anesthesia used?: Yes    Anesthesia:  Local infiltration  Local anesthetic:  Lidocaine 1% without epinephrine  Anesthetic total (ml):  0.5    Location:  Wrist  : L FCR Tendon Sheath.  Ultrasonic guidance for needle placement?: Yes    Needle size:  25 G  Approach:  Volar  Medications:  4 mg dexAMETHasone 4 mg/mL  Patient tolerance:  Patient tolerated the procedure well with no immediate complications

## 2024-03-15 RX ORDER — AMLODIPINE BESYLATE 5 MG/1
TABLET ORAL
Qty: 90 TABLET | Refills: 3 | Status: SHIPPED | OUTPATIENT
Start: 2024-03-15 | End: 2024-05-29 | Stop reason: SDUPTHER

## 2024-03-15 NOTE — TELEPHONE ENCOUNTER
No care due was identified.  [facility] Embedded Care Due Messages. Reference number: [id number].   3/15/2024 12:31:15 PM CDT

## 2024-03-15 NOTE — TELEPHONE ENCOUNTER
Refill Decision Note   Joann Banuelos  is requesting a refill authorization.  Brief Assessment and Rationale for Refill:  Approve     Medication Therapy Plan:         Comments:     Note composed:12:54 PM 03/15/2024

## 2024-03-18 ENCOUNTER — TELEPHONE (OUTPATIENT)
Dept: SLEEP MEDICINE | Facility: OTHER | Age: 39
End: 2024-03-18
Payer: COMMERCIAL

## 2024-03-25 ENCOUNTER — TELEPHONE (OUTPATIENT)
Dept: SLEEP MEDICINE | Facility: OTHER | Age: 39
End: 2024-03-25
Payer: COMMERCIAL

## 2024-04-08 ENCOUNTER — HOSPITAL ENCOUNTER (OUTPATIENT)
Dept: SLEEP MEDICINE | Facility: OTHER | Age: 39
Discharge: HOME | End: 2024-04-08
Attending: PHYSICIAN ASSISTANT
Payer: COMMERCIAL

## 2024-04-08 DIAGNOSIS — G47.10 HYPERSOMNOLENCE: ICD-10-CM

## 2024-04-08 DIAGNOSIS — R06.83 SNORING: ICD-10-CM

## 2024-04-08 DIAGNOSIS — F51.09 OTHER INSOMNIA NOT DUE TO A SUBSTANCE OR KNOWN PHYSIOLOGICAL CONDITION: ICD-10-CM

## 2024-04-08 DIAGNOSIS — I10 HYPERTENSION, UNSPECIFIED TYPE: ICD-10-CM

## 2024-04-08 PROCEDURE — 95800 SLP STDY UNATTENDED: CPT

## 2024-04-09 ENCOUNTER — CLINICAL SUPPORT (OUTPATIENT)
Dept: REHABILITATION | Facility: HOSPITAL | Age: 39
End: 2024-04-09
Payer: COMMERCIAL

## 2024-04-09 DIAGNOSIS — M77.8 TENDONITIS OF WRIST, LEFT: ICD-10-CM

## 2024-04-09 DIAGNOSIS — M25.532 LEFT WRIST PAIN: Primary | ICD-10-CM

## 2024-04-09 PROBLEM — R06.83 SNORING: Status: ACTIVE | Noted: 2024-04-09

## 2024-04-09 PROCEDURE — 97010 HOT OR COLD PACKS THERAPY: CPT | Mod: PO

## 2024-04-09 PROCEDURE — 97166 OT EVAL MOD COMPLEX 45 MIN: CPT | Mod: PO

## 2024-04-09 PROCEDURE — 97110 THERAPEUTIC EXERCISES: CPT | Mod: PO

## 2024-04-09 NOTE — PLAN OF CARE
[facility]  Initial Evaluation     Date: 4/9/2024  Patient: Joann Banuelos  Chart Number: [id number]  Referring Physician: Wayne Barraza PA-C  Therapy Diagnosis:   1. Left wrist pain        2. Tendonitis of wrist, left  Ambulatory referral/consult to Physical/Occupational Therapy          Medical Diagnosis: M77.8 (ICD-10-CM) - Tendonitis of wrist, left   Physician Orders: Eval/tx  Evaluation Date: 4/9/2024  Plan of Care Certification Date: 7/9/2024  Authorization Period: 12/31/2024  Surgery Date and Procedure: N/A  Date of Return to MD: ALIREZA    Visit #: 1 of 1  Time In: 9:00 AM  Time Out: 9:45 AM  Total Billable Time: 45 min    Precautions: Standard    Subjective     Involved Side: Left  Dominant Side: Right  Date of Onset: 4 months  History of Current Condition: Pt w/ c/o left wrist pain located along the FCR tendon insertion. She states this has been going on for about 3 months. She denies any trauma or previous surgeries to the wrist or hand. She states she has been trying to rub Voltaren cream with minimal relief. She does state she gets relief from taking ibuprofen.     Imaging: No fracture or dislocation. Osseous lunotriquetral coalition. Joint spaces are preserved. Subchondral cyst-like lesions in the fused lunate, likely degenerative. No soft tissue swelling or calcifications.   Previous Therapy: None    Patient's Goals for Therapy: Return to premorbid level of function    Pain:  Functional Pain Scale Rating 0-10:   0/10 on average  0/10at best  7/10 at worst  Location: Radial volar wrist, thenar eminence  Description: Throbbing  Aggravating Factors: Gripping/pinching  Easing Factors: Rest, meds    Previous Level of Function Independent w/ ADL's, work    Current Level of Function Difficulty w/ gripping, pinching    Occupation:    Working presently: employed  Duties: Mostly clerical    Past Medical History/Physical Systems Review:   Joann Banuelos  has  a past medical history of Abnormal Pap smear, Abnormal Pap smear of cervix, Allergy, Eczema, HPV (human papilloma virus) anogenital infection, Hypertension, and Tinea versicolor.    Joann Banuelos  has a past surgical history that includes Hysteroscopy w/ polypectomy; Cervical biopsy w/ loop electrode excision (2007); Dilation and curettage of uterus (2012); Laparoscopic salpingectomy (Bilateral, 5/29/2023); Hydrothermal ablation of endometrium (N/A, 5/29/2023); Removal of intrauterine device (IUD) (N/A, 5/29/2023); and Laparoscopic lysis of adhesions (N/A, 5/29/2023).    Joann has a current medication list which includes the following prescription(s): amlodipine, aspirin, and meloxicam.    Review of patient's allergies indicates:  No Known Allergies       Objective     Mental status: alert    Observation:   Unremarkable      Sensation:    4/9/2024    Left   Monofilament Testing    Normal 1.65-2.83 +   Diminished Light Touch 3.22-3.61    Diminished Protective 3.84-4.31    Loss of Protective 4.56-6.65    Untestable >6.65        Edema: Circumferential measurements: In centimters     Left Right   PWC (Proximal Wrist Crease) 16.4  16.5      Range of Motion:   Left    Thumb AROM  MP  IP  RA  PA  OPP    0/55 0/71 47 42 SF tip       Special Tests:      Left   Thumb CMC Grind Test -   Finkelstein's Test -        Strength: (Measured in psi)     Left Right   Rung II N/T 80       Pinch Strength (Measured in psi)     Left Right   Key Pinch N/T  24    3pt Pinch N/T  17        FOTO Hand/Wrist/Finger Survey    Function Level Intake: 48%           Treatment     Treatment Time In: 9:25 AM  Treatment Time Out: 9:45 AM  Total Treatment time separate from Evaluation time:20 min    Joann received the following supervised modalities after being cleared for contraindications for 10 minutes in order to promote increased blood flow and tissue elasticity:   -Ice left hand    Joann performed therapeutic exercises for 10 minutes  including:  -HEP training    Home Exercise Program/Education:  Issued HEP (see patient instructions in EMR) and educated on modality use for pain management . Exercises were reviewed and Joann was able to demonstrate them prior to the end of the session.   Pt received a written copy of exercises to perform at home. Joann demonstrated good  understanding of the education provided.  Pt was advised to perform these exercises free of pain, and to stop performing them if pain occurs.    Patient/Family Education: role of OT, goals for OT, scheduling/cancellations - pt verbalized understanding. Discussed insurance limitations with patient.    Additional Education provided: Use of ice and heat      Assessment     Joann Banuelos is a 38 y.o. female presents with limitations as described in problem list. Patient can benefit from Occupational Therapy services for Iontophoresis, ultrasound, moist heat, therapeutic exercises, home exercise program provied with written instructions, ice and strengthening and orthotics, if deemed necessary . The following goals were discussed with the patient and she is in agreement with them as to be addressed in the treatment plan.    The patient's rehab potential is Good.     Anticipated barriers to occupational therapy: None  Pt has no cultural, educational or language barriers to learning provided.    Profile and History Assessment of Occupational Performance Level of Clinical Decision Making Complexity Score   Occupational Profile:   Joann Banuelos is a 38 y.o. female who is currently employed Joann Banuelos has difficulty with  ADLs and IADLs as listed previously, which  affecting his/her daily functional abilities.      Comorbidities:    has a past medical history of Abnormal Pap smear, Abnormal Pap smear of cervix, Allergy, Eczema, HPV (human papilloma virus) anogenital infection, Hypertension, and Tinea versicolor.    Medical and Therapy History Review:    Expanded               Performance Deficits    Physical:  Joint Mobility   Strength  Pinch Strength  Pain    Cognitive:  No Deficits    Psychosocial:    No Deficits     Clinical Decision Making:  moderate    Modification/Need for Assistance:  Not Necessary    Intervention Selection:  Multiple Treatment Options       moderate  Based on PMHX, co morbidities , data from assessments and functional level of assistance required with task and clinical presentation directly impacting function.         Goals:    LTG's (8 weeks):  1)   Increase  strength to 50 lbs. to grasp pot handle. Progressing  2)   Increase lat pinch to 16 psis for opening water bottles. Progressing  3)   Decrease complaints of pain to  3 out of 10 at worst to increase functional hand use for ADL/work/leisure activities. Progressing  4)   Patient to score at 70% or more on FOTO to demonstrate improved perception of functional LUE use. Progressing  5)   Pt will return to near to prior level of function for ADLs and household management reporting I or Mod I with ADLs (dressing, feeding, grooming, toileting). Progressing    STG's (4 weeks)  1)   Patient to be IND with HEP and modalities for pain/edema managment. Progressing  2)   Increase ROM left thumb to WNL to increase functional hand use for ADLs/work/leisure activities. Progressing  3)   Increase  strength to 30 lbs. to improve functional grasp for ADLs/work/leisure activities. Progressing  4)   Increase lat pinch to 8 psi's to increase independence with button and FM Coordination. Progressing  5)   Decrease complaints of pain to  5 out of 10 at worst to increase functional hand use for ADL/work/leisure activities. Progressing    Plan     Pt to be treated by Occupational Therapy 1-2 times per week for 8 weeks during the certification period from 4/9/2024 to 7/9/2024 to achieve the established goals.     Treatment to include: Paraffin, Fluidotherapy, Manual therapy/joint mobilizations, US  3 mhz, Therapeutic exercises/activities., Strengthening, Orthotic Fabrication/Fit/Training, and Joint Protection, as well as any other treatments deemed necessary based on the patient's needs or progress.     BEA GARDNER/DAVID, CHT  Occupational therapist, Certified Hand Therapist

## 2024-04-09 NOTE — PATIENT INSTRUCTIONS
"Extension (Active With Finger Extension)        With forearm on table and wrist over edge, lift hand with fingers straight.   Repeat 20 times. Do 3 sessions per day.    Radial / Ulnar Deviation (Assistive)        Move hand side to side like a windshield wiper. Do not move elbow.  Repeat 20 times. Do 3 sessions per day.    Circumduction (Active)        With fingers curled, move slowly at wrist in clock- wise circles 20 times. Repeat counterclockwise. Do not move elbow or shoulder.  Do 3 sessions per day.    MP Flexion (Active Blocked)        Using other hand to brace base of thumb, bend as far as possible with tip joint held straight.  Repeat 20 times. Do 3 sessions per day.    IP Flexion (Active Blocked)        Brace thumb below tip joint. Bend joint as far as possible.  Repeat 20 times. Do 3 sessions per day.    MP Flexion (Active)        Bend thumb to touch base of little finger, keeping tip joint straight.  Repeat 20 times. Do 3 sessions per day.    Opposition (Active)        Touch tip of thumb to nail tip of each finger in turn, making an "O" shape.  Repeat 20 times. Do 3 sessions per day.    Composite Extension (Active)        Bring thumb up and out in hitchhiker position. Repeat 20 times. Do 3 sessions per day.    MP Extension (Active)        With palm on table, lift thumb up. Relax and lower thumb.  Repeat 20 times. Do 3 sessions per day.      Composite Movement Circumduction (Active)        Make circles with thumb clockwise and counter-clockwise.  Repeat 20 times each direction. Do 3 sessions per day.                                      "

## 2024-04-11 ENCOUNTER — PATIENT MESSAGE (OUTPATIENT)
Dept: SLEEP MEDICINE | Facility: CLINIC | Age: 39
End: 2024-04-11
Payer: COMMERCIAL

## 2024-04-11 DIAGNOSIS — G47.33 OSA (OBSTRUCTIVE SLEEP APNEA): Primary | ICD-10-CM

## 2024-04-11 PROCEDURE — 95806 SLEEP STUDY UNATT&RESP EFFT: CPT | Mod: 26,,, | Performed by: INTERNAL MEDICINE

## 2024-04-12 NOTE — PROGRESS NOTES
Occupational Therapy Daily Treatment Note     Date: 4/15/2024  Name: Joann Banuelos  Clinic Number: [id number]    Therapy Diagnosis:   Encounter Diagnosis   Name Primary?    Left wrist pain Yes     Physician: Wayne Barraza PA-C    Medical Diagnosis: M77.8 (ICD-10-CM) - Tendonitis of wrist, left   Physician Orders: Eval/tx  Evaluation Date: 4/9/2024  Plan of Care Certification Date: 7/9/2024  Authorization Period: 12/31/2024  Surgery Date and Procedure: N/A  Date of Return to MD: ALIREZA     Visit #: 1 of 20  Time In: 12:00 PM  Time Out: 12:45 PM  Total Billable Time: 45 min     Precautions: Standard      Subjective     Pt reports: Performing HEP regularly  Response to previous treatment:Kurt well    Pain: 4/10  Location: Left thenar eminence     Objective     Joann received the following supervised modalities after being cleared for contraindications for 10 minutes in order to promote increased blood flow and tissue elasticity:   -Fluidotherapy  -Patient received ultrasound  for pain control and decreased inflammation @ 100 % duty cycle, 3.3 Mhz, applied to left thenar eminence, intensity = 1.0 w/cm2 for 8 minutes.    Joann participated in dynamic functional therapeutic activities to improve functional performance for 22  minutes, including:  -Soft tissue mobs left thenar eminence/thumb area  -Isospheres 3'  -Towel scrunches 3'  -Lifts 20    Home Exercises and Education Provided     Education provided:   - Progress towards goals     Written Home Exercises Provided: Patient instructed to cont prior HEP.  Exercises were reviewed and Joann was able to demonstrate them prior to the end of the session.  Joann demonstrated good  understanding of the HEP provided.   .   See EMR under Patient Instructions for exercises provided prior visit.        Assessment     Pt demonstrated Improvements in symptoms today.    Joann is progressing towards her goals and there are no updates to goals at this time. Pt prognosis is Good.      Pt will continue to benefit from skilled outpatient occupational therapy to address the deficits listed in the problem list on initial evaluation provide pt/family education and to maximize pt's level of function.     Anticipated barriers to therapy: None      Pt's spiritual, cultural and educational needs considered and pt agreeable to plan of care and goals.    Goals:  LTG's (8 weeks):  1)   Increase  strength to 50 lbs. to grasp pot handle. Progressing  2)   Increase lat pinch to 16 psis for opening water bottles. Progressing  3)   Decrease complaints of pain to  3 out of 10 at worst to increase functional hand use for ADL/work/leisure activities. Progressing  4)   Patient to score at 70% or more on FOTO to demonstrate improved perception of functional LUE use. Progressing  5)   Pt will return to near to prior level of function for ADLs and household management reporting I or Mod I with ADLs (dressing, feeding, grooming, toileting). Progressing     STG's (4 weeks)  1)   Patient to be IND with HEP and modalities for pain/edema managment. Progressing  2)   Increase ROM left thumb to WNL to increase functional hand use for ADLs/work/leisure activities. Progressing  3)   Increase  strength to 30 lbs. to improve functional grasp for ADLs/work/leisure activities. Progressing  4)   Increase lat pinch to 8 psi's to increase independence with button and FM Coordination. Progressing  5)   Decrease complaints of pain to  5 out of 10 at worst to increase functional hand use for ADL/work/leisure activities. Progressing    Plan   Cont OT to address above goals.        BEA Britton OTR/L, SHANONT

## 2024-04-15 ENCOUNTER — CLINICAL SUPPORT (OUTPATIENT)
Dept: REHABILITATION | Facility: HOSPITAL | Age: 39
End: 2024-04-15
Payer: COMMERCIAL

## 2024-04-15 DIAGNOSIS — M25.532 LEFT WRIST PAIN: Primary | ICD-10-CM

## 2024-04-15 PROCEDURE — 97035 APP MDLTY 1+ULTRASOUND EA 15: CPT | Mod: PO

## 2024-04-15 PROCEDURE — 97022 WHIRLPOOL THERAPY: CPT | Mod: PO

## 2024-04-15 PROCEDURE — 97530 THERAPEUTIC ACTIVITIES: CPT | Mod: PO

## 2024-04-19 ENCOUNTER — TELEPHONE (OUTPATIENT)
Dept: BARIATRICS | Facility: CLINIC | Age: 39
End: 2024-04-19
Payer: COMMERCIAL

## 2024-04-19 NOTE — TELEPHONE ENCOUNTER
Called pt to schedule to Antony sx consult appt. Time and date approved per pt. [facility] sx workup explained. All questions and concerns addressed. Informed pt that her [facility] , YARELY Lawrence RN will reach out to her agter her 4-23-24 consult appt. Understanding stated.   Dashboard, snapshot, and RN f/u updated.

## 2024-04-22 ENCOUNTER — TELEPHONE (OUTPATIENT)
Dept: ORTHOPEDICS | Facility: CLINIC | Age: 39
End: 2024-04-22
Payer: COMMERCIAL

## 2024-04-24 ENCOUNTER — OFFICE VISIT (OUTPATIENT)
Dept: ORTHOPEDICS | Facility: CLINIC | Age: 39
End: 2024-04-24
Payer: COMMERCIAL

## 2024-04-24 VITALS — BODY MASS INDEX: 39.32 KG/M2 | WEIGHT: 244.69 LBS | HEIGHT: 66 IN

## 2024-04-24 DIAGNOSIS — G56.03 BILATERAL CARPAL TUNNEL SYNDROME: Primary | ICD-10-CM

## 2024-04-24 DIAGNOSIS — M77.8 TENDONITIS OF WRIST, LEFT: ICD-10-CM

## 2024-04-24 PROCEDURE — 99999 PR PBB SHADOW E&M-EST. PATIENT-LVL III: CPT | Mod: PBBFAC,,, | Performed by: SPECIALIST/TECHNOLOGIST

## 2024-04-24 PROCEDURE — 3044F HG A1C LEVEL LT 7.0%: CPT | Mod: CPTII,S$GLB,, | Performed by: SPECIALIST/TECHNOLOGIST

## 2024-04-24 PROCEDURE — 99214 OFFICE O/P EST MOD 30 MIN: CPT | Mod: S$GLB,,, | Performed by: SPECIALIST/TECHNOLOGIST

## 2024-04-24 PROCEDURE — 1159F MED LIST DOCD IN RCRD: CPT | Mod: CPTII,S$GLB,, | Performed by: SPECIALIST/TECHNOLOGIST

## 2024-04-24 PROCEDURE — 3008F BODY MASS INDEX DOCD: CPT | Mod: CPTII,S$GLB,, | Performed by: SPECIALIST/TECHNOLOGIST

## 2024-04-24 RX ORDER — MELOXICAM 7.5 MG/1
7.5 TABLET ORAL DAILY
Qty: 30 TABLET | Refills: 0 | Status: SHIPPED | OUTPATIENT
Start: 2024-04-24

## 2024-04-24 NOTE — PROGRESS NOTES
Subjective:       Patient ID: Joann Banuelos is a 38 y.o. female.    Chief Complaint: Pain of the Left Hand (Left Thumb)    Interval HPI  4/24/24  Patient reports for follow up of FCR tendon injection.  She notes that she has reduction in her pain but does note that she is increase in numbness and tingling to the median nerve distribution.  She states she has been wearing her braces at nighttime which had been helping with the numbness.  She also states she has been attending therapy which has been helping low she notes that she is very sore the end of therapy.    HPI  3/4/24  38-year-old female presents to clinic today with left wrist pain located along the FCR tendon insertion.  She states this has been going on for about 3 months.  She denies any trauma or previous surgeries to the wrist or hand.  She states she has been trying to rub Voltaren cream with minimal relief.  She does state she gets relief from taking ibuprofen.  She states she has been trying to wear a thumb spica brace with no relief due to the brace rubbing in the site of irritation.  She denies any numbness or tingling.  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      Past Medical History:   Diagnosis Date    Abnormal Pap smear 2007    Abnormal Pap smear of cervix     Allergy     Eczema     HPV (human papilloma virus) anogenital infection     Hypertension     Tinea versicolor      Past Surgical History:   Procedure Laterality Date    CERVICAL BIOPSY  W/ LOOP ELECTRODE EXCISION  2007    DILATION AND CURETTAGE OF UTERUS  2012    retained IUD/Endometrial polyp    HYDROTHERMAL ABLATION OF ENDOMETRIUM N/A 5/29/2023    Procedure: ABLATION, ENDOMETRIUM, HYDROTHERMAL;  Surgeon: Sushila Dunham MD;  Location: [facility];  Service: OB/GYN;  Laterality: N/A;  NOVASURE    HYSTEROSCOPY W/ POLYPECTOMY      LAPAROSCOPIC LYSIS OF ADHESIONS N/A 5/29/2023    Procedure: LYSIS, ADHESIONS, LAPAROSCOPIC;  Surgeon: Sushila Dunham MD;  Location: [facility];   Service: OB/GYN;  Laterality: N/A;    LAPAROSCOPIC SALPINGECTOMY Bilateral 5/29/2023    Procedure: SALPINGECTOMY, LAPAROSCOPIC;  Surgeon: Sushila Dunham MD;  Location: [facility];  Service: OB/GYN;  Laterality: Bilateral;    REMOVAL OF INTRAUTERINE DEVICE (IUD) N/A 5/29/2023    Procedure: REMOVAL, INTRAUTERINE DEVICE;  Surgeon: Sushila Dunham MD;  Location: [facility] OR;  Service: OB/GYN;  Laterality: N/A;     Family History   Problem Relation Name Age of Onset    Diabetes Paternal Grandfather      Diabetes Paternal Grandmother      Diabetes Maternal Grandmother      Diabetes Maternal Grandfather      Hypertension Father      Eczema Sister      Eczema Sister      Diabetes Mother      Hypertension Mother      Breast cancer Neg Hx      Colon cancer Neg Hx      Ovarian cancer Neg Hx      Melanoma Neg Hx      Lupus Neg Hx      Psoriasis Neg Hx       Social History     Socioeconomic History    Marital status:     Number of children: 2   Occupational History    Occupation: Medical sales     Employer: [employer]   Tobacco Use    Smoking status: Never    Smokeless tobacco: Never   Substance and Sexual Activity    Alcohol use: Not Currently     Alcohol/week: 7.0 standard drinks of alcohol     Types: 7 Glasses of wine per week     Comment: Occasionally ; none 24 hr prior to surgery    Drug use: No    Sexual activity: Yes     Partners: Male     Birth control/protection: None   Other Topics Concern    Are you pregnant or think you may be? No    Breast-feeding No   Social History Narrative    ** Merged History Encounter **          Social Determinants of Health     Financial Resource Strain: Low Risk  (3/4/2024)    Overall Financial Resource Strain (CARDIA)     Difficulty of Paying Living Expenses: Not very hard   Food Insecurity: No Food Insecurity (3/4/2024)    Hunger Vital Sign     Worried About Running Out of Food in the Last Year: Never true     Ran Out of Food in the Last Year: Never true   Transportation Needs: No  "Transportation Needs (3/4/2024)    PRAPARE - Transportation     Lack of Transportation (Medical): No     Lack of Transportation (Non-Medical): No   Physical Activity: Insufficiently Active (3/4/2024)    Exercise Vital Sign     Days of Exercise per Week: 3 days     Minutes of Exercise per Session: 40 min   Stress: No Stress Concern Present (3/4/2024)    [employer] [place] [employer] - Occupational Stress Questionnaire     Feeling of Stress : Only a little   Social Connections: Unknown (3/4/2024)    Social Connection and Isolation Panel [NHANES]     Frequency of Communication with Friends and Family: More than three times a week     Frequency of Social Gatherings with Friends and Family: More than three times a week     Active Member of Clubs or Organizations: Yes     Attends Club or Organization Meetings: More than 4 times per year     Marital Status:    Housing Stability: Low Risk  (3/4/2024)    Housing Stability Vital Sign     Unable to Pay for Housing in the Last Year: No     Number of Places Lived in the Last Year: 1     Unstable Housing in the Last Year: No       Current Outpatient Medications   Medication Sig Dispense Refill    amLODIPine (NORVASC) 5 MG tablet TAKE 1 TABLET BY MOUTH EVERY DAY 90 tablet 3    meloxicam (MOBIC) 7.5 MG tablet Take 1 tablet (7.5 mg total) by mouth once daily. 30 tablet 0     No current facility-administered medications for this visit.     Review of patient's allergies indicates:  No Known Allergies    Review of Systems        Objective:      Vitals:    04/24/24 0822   Weight: 111 kg (244 lb 11.4 oz)   Height: 5' 6" (1.676 m)     Physical Exam  Cardiovascular:      Pulses:           Radial pulses are Normal on the right side and Normal on the left side.       Hand/Wrist Musculoskeletal Exam    Inspection    Right      Erythema: none      Ecchymosis: none      Edema: none      Deformity: none    Left      Erythema: none      Ecchymosis: none      Edema: none      " Deformity: none    Palpation    Left      Wrist tenderness to palpation: first dorsal compartment and radial styloid    Range of Motion    Right Hand      Right hand range of motion is normal.      Left Hand      Left hand range of motion is normal.       Right Wrist      Right wrist range of motion is normal.      Left Wrist      Left wrist range of motion is normal.      Strength        Strength additional comments: Pain with resisted wrist flexion and radial deviation    Neurovascular    Right       Radial pulse: normal      Capillary refill: <3 sec and brisk      Ulnar nerve sensory distribution: normal      Median nerve sensory distribution: normal      Superficial radial nerve sensory distribution: normal    Left       Radial pulse: normal      Capillary refill: brisk and <3 sec      Ulnar nerve sensory distribution: normal      Median nerve sensory distribution: normal      Superficial radial nerve sensory distribution: normal    Special Tests    Right      Phalen's: positive      Carpal compression test: positive      Tinel's - carpal tunnel: positive      Tinel's - cubital tunnel: negative    Left      Phalen's: positive      Carpal compression test: positive      Tinel's - carpal tunnel: positive      Tinel's - cubital tunnel: negative      Diagnostics Review: X-Ray: Reviewed  My personal interpretation of the x-ray are no signs of fracture or dislocation.       Assessment:       1. Bilateral carpal tunnel syndrome    2. Tendonitis of wrist, left          Plan:       ASSESSMENT/PLAN:      38 y.o. yo female with   Encounter Diagnoses   Name Primary?    Bilateral carpal tunnel syndrome Yes    Tendonitis of wrist, left         Plan:  We have discussed the natural history of Carpal tunnel syndrome including treatment options such as splinting, oral and topical anti-inflammatories, cortisone injections and surgery.. I have recommended an EMG/NCS to assess the severity of Her carpal tunnel syndrome    Follow up  after EMG/NCS for results review with Dr. Gutiérrez  Recommend nighttime Bracing for her wrist  Call with any questions/concerns in the interim    The patient's pathophysiology was explained in detail with reference to x-rays, models, other visual aids as appropriate.  Treatment options were discussed in detail.  Questions were invited and answered to the patient's satisfaction. I reviewed Primary care , and other specialty's notes to better coordinate patient's care.    Jonh Barraza PA-C, [facility]  [facility]     Please be aware that this note has been generated with the assistance of MModal voice-to-text.  Please excuse any spelling or grammatical errors.

## 2024-04-29 NOTE — PROGRESS NOTES
Occupational Therapy Daily Treatment Note     Date: 4/30/2024  Name: Joann Baneulos  Clinic Number: [id number]    Therapy Diagnosis:   Encounter Diagnosis   Name Primary?    Left wrist pain Yes       Physician: Wayne Barraza PA-C    Medical Diagnosis: M77.8 (ICD-10-CM) - Tendonitis of wrist, left   Physician Orders: Eval/tx  Evaluation Date: 4/9/2024  Plan of Care Certification Date: 7/9/2024  Authorization Period: 12/31/2024  Surgery Date and Procedure: N/A  Date of Return to MD: ALIREZA     Visit #: 2 of 20  Time In: 10:30 AM  Time Out: 11:15 AM  Total Billable Time: 45 min     Precautions: Standard      Subjective     Pt reports: Performing HEP regularly  Response to previous treatment:Kurt well    Pain: 4/10  Location: Left thenar eminence     Objective     Joann received the following supervised modalities after being cleared for contraindications for 10 minutes in order to promote increased blood flow and tissue elasticity:   -Fluidotherapy  -Patient received ultrasound  for pain control and decreased inflammation @ 100 % duty cycle, 3.3 Mhz, applied to left thenar eminence, intensity = 1.0 w/cm2 for 8 minutes.    Joann participated in dynamic functional therapeutic activities to improve functional performance for 22  minutes, including:  -Soft tissue mobs left thenar eminence/thumb area  -Octi 3'  -Isospheres 3'  -Towel scrunches 3'  -Juxacisor 3'    Home Exercises and Education Provided     Education provided:   - Progress towards goals     Written Home Exercises Provided: Patient instructed to cont prior HEP.  Exercises were reviewed and Joann was able to demonstrate them prior to the end of the session.  Joann demonstrated good  understanding of the HEP provided.   .   See EMR under Patient Instructions for exercises provided prior visit.        Assessment     Pt demonstrated Improvements in symptoms today.    Joann is progressing towards her goals and there are no updates to goals at this time. Pt  prognosis is Good.     Pt will continue to benefit from skilled outpatient occupational therapy to address the deficits listed in the problem list on initial evaluation provide pt/family education and to maximize pt's level of function.     Anticipated barriers to therapy: None      Pt's spiritual, cultural and educational needs considered and pt agreeable to plan of care and goals.    Goals:  LTG's (8 weeks):  1)   Increase  strength to 50 lbs. to grasp pot handle. Progressing  2)   Increase lat pinch to 16 psis for opening water bottles. Progressing  3)   Decrease complaints of pain to  3 out of 10 at worst to increase functional hand use for ADL/work/leisure activities. Progressing  4)   Patient to score at 70% or more on FOTO to demonstrate improved perception of functional LUE use. Progressing  5)   Pt will return to near to prior level of function for ADLs and household management reporting I or Mod I with ADLs (dressing, feeding, grooming, toileting). Progressing     STG's (4 weeks)  1)   Patient to be IND with HEP and modalities for pain/edema managment. Progressing  2)   Increase ROM left thumb to WNL to increase functional hand use for ADLs/work/leisure activities. Progressing  3)   Increase  strength to 30 lbs. to improve functional grasp for ADLs/work/leisure activities. Progressing  4)   Increase lat pinch to 8 psi's to increase independence with button and FM Coordination. Progressing  5)   Decrease complaints of pain to  5 out of 10 at worst to increase functional hand use for ADL/work/leisure activities. Progressing    Plan   Cont OT to address above goals.        BEA Britton OTR/L, CHT

## 2024-04-30 ENCOUNTER — CLINICAL SUPPORT (OUTPATIENT)
Dept: REHABILITATION | Facility: HOSPITAL | Age: 39
End: 2024-04-30
Payer: COMMERCIAL

## 2024-04-30 DIAGNOSIS — M25.532 LEFT WRIST PAIN: Primary | ICD-10-CM

## 2024-04-30 PROCEDURE — 97530 THERAPEUTIC ACTIVITIES: CPT | Mod: PO

## 2024-04-30 PROCEDURE — 97022 WHIRLPOOL THERAPY: CPT | Mod: PO

## 2024-04-30 PROCEDURE — 97035 APP MDLTY 1+ULTRASOUND EA 15: CPT | Mod: PO

## 2024-05-07 ENCOUNTER — TELEPHONE (OUTPATIENT)
Dept: BARIATRICS | Facility: CLINIC | Age: 39
End: 2024-05-07
Payer: COMMERCIAL

## 2024-05-07 DIAGNOSIS — E66.01 MORBID OBESITY: ICD-10-CM

## 2024-05-07 DIAGNOSIS — I10 PRIMARY HYPERTENSION: ICD-10-CM

## 2024-05-07 DIAGNOSIS — K21.9 GASTROESOPHAGEAL REFLUX DISEASE, UNSPECIFIED WHETHER ESOPHAGITIS PRESENT: Primary | ICD-10-CM

## 2024-05-07 DIAGNOSIS — Z01.818 PREOPERATIVE TESTING: ICD-10-CM

## 2024-05-09 ENCOUNTER — OFFICE VISIT (OUTPATIENT)
Dept: PODIATRY | Facility: CLINIC | Age: 39
End: 2024-05-09
Payer: COMMERCIAL

## 2024-05-09 VITALS
SYSTOLIC BLOOD PRESSURE: 147 MMHG | HEIGHT: 66 IN | WEIGHT: 244.69 LBS | DIASTOLIC BLOOD PRESSURE: 80 MMHG | BODY MASS INDEX: 39.32 KG/M2 | HEART RATE: 90 BPM

## 2024-05-09 DIAGNOSIS — B35.3 TINEA PEDIS, UNSPECIFIED LATERALITY: ICD-10-CM

## 2024-05-09 DIAGNOSIS — B35.1 ONYCHOMYCOSIS DUE TO DERMATOPHYTE: Primary | ICD-10-CM

## 2024-05-09 PROCEDURE — 1159F MED LIST DOCD IN RCRD: CPT | Mod: CPTII,S$GLB,, | Performed by: PODIATRIST

## 2024-05-09 PROCEDURE — 99999 PR PBB SHADOW E&M-EST. PATIENT-LVL III: CPT | Mod: PBBFAC,,, | Performed by: PODIATRIST

## 2024-05-09 PROCEDURE — 3008F BODY MASS INDEX DOCD: CPT | Mod: CPTII,S$GLB,, | Performed by: PODIATRIST

## 2024-05-09 PROCEDURE — 3079F DIAST BP 80-89 MM HG: CPT | Mod: CPTII,S$GLB,, | Performed by: PODIATRIST

## 2024-05-09 PROCEDURE — 3077F SYST BP >= 140 MM HG: CPT | Mod: CPTII,S$GLB,, | Performed by: PODIATRIST

## 2024-05-09 PROCEDURE — 3044F HG A1C LEVEL LT 7.0%: CPT | Mod: CPTII,S$GLB,, | Performed by: PODIATRIST

## 2024-05-09 PROCEDURE — 99204 OFFICE O/P NEW MOD 45 MIN: CPT | Mod: S$GLB,,, | Performed by: PODIATRIST

## 2024-05-09 RX ORDER — CICLOPIROX OLAMINE 7.7 MG/G
CREAM TOPICAL 2 TIMES DAILY
Qty: 90 G | Refills: 2 | Status: SHIPPED | OUTPATIENT
Start: 2024-05-09

## 2024-05-09 RX ORDER — CICLOPIROX 80 MG/ML
SOLUTION TOPICAL NIGHTLY
Qty: 6.6 ML | Refills: 11 | Status: SHIPPED | OUTPATIENT
Start: 2024-05-09

## 2024-05-09 NOTE — PROGRESS NOTES
Occupational Therapy Daily Treatment Note     Date: 5/10/2024  Name: Joann Banuelos  Clinic Number: [id number]    Therapy Diagnosis:   Encounter Diagnosis   Name Primary?    Left wrist pain Yes         Physician: Wayne Barraza PA-C    Medical Diagnosis: M77.8 (ICD-10-CM) - Tendonitis of wrist, left   Physician Orders: Eval/tx  Evaluation Date: 4/9/2024  Plan of Care Certification Date: 7/9/2024  Authorization Period: 12/31/2024  Surgery Date and Procedure: N/A  Date of Return to MD: ALIREZA     Visit #: 3 of 20  Time In: 10:30 AM  Time Out: 11:15 AM  Total Billable Time: 45 min     Precautions: Standard      Subjective     Pt reports: Much improved  Response to previous treatment:Kurt well    Pain: 0/10    Objective     Joann received the following supervised modalities after being cleared for contraindications for 10 minutes in order to promote increased blood flow and tissue elasticity:   -Fluidotherapy    Joann participated in dynamic functional therapeutic activities to improve functional performance for 30 minutes, including:  -Soft tissue mobs left thenar eminence/thumb area  -Myofascial scraping volar wrist and thenar eminence  -Octi 3'  -Isospheres 3'  -Towel scrunches 3'  -Juxacisor 3'    Home Exercises and Education Provided     Education provided:   - Progress towards goals     Written Home Exercises Provided: Patient instructed to cont prior HEP.  Exercises were reviewed and Joann was able to demonstrate them prior to the end of the session.  Joann demonstrated good  understanding of the HEP provided.   .   See EMR under Patient Instructions for exercises provided prior visit.        Assessment     Pt demonstrated Improvements in symptoms today.    Joann is progressing towards her goals and there are no updates to goals at this time. Pt prognosis is Good.     Pt will continue to benefit from skilled outpatient occupational therapy to address the deficits listed in the problem list on initial evaluation  provide pt/family education and to maximize pt's level of function.     Anticipated barriers to therapy: None      Pt's spiritual, cultural and educational needs considered and pt agreeable to plan of care and goals.    Goals:  LTG's (8 weeks):  1)   Increase  strength to 50 lbs. to grasp pot handle. Progressing  2)   Increase lat pinch to 16 psis for opening water bottles. Progressing  3)   Decrease complaints of pain to  3 out of 10 at worst to increase functional hand use for ADL/work/leisure activities. Progressing  4)   Patient to score at 70% or more on FOTO to demonstrate improved perception of functional LUE use. Progressing  5)   Pt will return to near to prior level of function for ADLs and household management reporting I or Mod I with ADLs (dressing, feeding, grooming, toileting). Progressing     STG's (4 weeks)  1)   Patient to be IND with HEP and modalities for pain/edema managment. Progressing  2)   Increase ROM left thumb to WNL to increase functional hand use for ADLs/work/leisure activities. Progressing  3)   Increase  strength to 30 lbs. to improve functional grasp for ADLs/work/leisure activities. Progressing  4)   Increase lat pinch to 8 psi's to increase independence with button and FM Coordination. Progressing  5)   Decrease complaints of pain to  5 out of 10 at worst to increase functional hand use for ADL/work/leisure activities. Progressing    Plan   Cont OT to address above goals.        BEA Britton OTR/L, CHT

## 2024-05-09 NOTE — PROGRESS NOTES
Subjective:      Patient ID: Joann Banuelos is a 38 y.o. female.    Chief Complaint: Fungus (Nail fungus) and Foot Problem (Skin peeling on foot with itching)    Thick discolored misshapen toenail  from the nail bed right big toe.  Chronic condition present for more than a year.  Aggravated by no particular condition.  No prior medical treatment.  No self-treatment.  Denies trauma and surgery right hallux.      Cc2  thick crusting dry flaking intensely itching skin on the tops of both big toes.  Gradual onset, worsening over the past many months.  Aggravated by socks shoes pressure ambulation.  No prior medical treatment.  No self-treatment.  Denies trauma and surgery both feet.    Review of Systems   Constitutional: Negative for chills, diaphoresis, fever, malaise/fatigue and night sweats.   Cardiovascular:  Negative for claudication, cyanosis, leg swelling and syncope.   Skin:  Positive for dry skin, itching and nail changes. Negative for color change, rash, suspicious lesions and unusual hair distribution.   Musculoskeletal:  Negative for falls, joint pain, joint swelling, muscle cramps, muscle weakness and stiffness.   Gastrointestinal:  Negative for constipation, diarrhea, nausea and vomiting.   Neurological:  Negative for brief paralysis, disturbances in coordination, focal weakness, numbness, paresthesias, sensory change and tremors.         Objective:      Physical Exam  Constitutional:       General: She is not in acute distress.     Appearance: She is well-developed. She is not diaphoretic.   Cardiovascular:      Pulses:           Popliteal pulses are 2+ on the right side and 2+ on the left side.        Dorsalis pedis pulses are 2+ on the right side and 2+ on the left side.        Posterior tibial pulses are 2+ on the right side and 2+ on the left side.      Comments: Capillary refill 3 seconds all toes/distal feet, all toes/both feet warm to touch.      Negative lymphadenopathy bilateral  popliteal fossa and tarsal tunnel.      Negavie lower extremity edema bilateral.    Musculoskeletal:      Right ankle: No swelling, deformity, ecchymosis or lacerations. Normal range of motion. Normal pulse.      Right Achilles Tendon: Normal. No defects. Amos's test negative.   Lymphadenopathy:      Lower Body: No right inguinal adenopathy. No left inguinal adenopathy.      Comments: Negative lymphadenopathy bilateral popliteal fossa and tarsal tunnel.    Negative lymphangitic streaking bilateral feet/ankles/legs.   Skin:     General: Skin is warm and dry.      Capillary Refill: Capillary refill takes 2 to 3 seconds.      Coloration: Skin is not pale.      Findings: No abrasion, bruising, burn, ecchymosis, erythema, laceration, lesion or rash.      Nails: There is no clubbing.      Comments:   Toenails 1st  right are hypertrophic, dystrophic, discolored tanish brown with tan, gray crumbly subungual debris.  Tender to distal nail plate pressure, without periungual skin abnormality of each.    Dry scale with superficial flakes over an erythematous base of the dorsal surface of the right and left hallux without ulceration, drainage, pus, tracking, fluctuance, malodor, or cardinal signs infection.       Neurological:      Mental Status: She is alert and oriented to person, place, and time.      Sensory: No sensory deficit.      Motor: No tremor, atrophy or abnormal muscle tone.      Gait: Gait normal.      Deep Tendon Reflexes:      Reflex Scores:       Patellar reflexes are 2+ on the right side and 2+ on the left side.       Achilles reflexes are 2+ on the right side and 2+ on the left side.  Psychiatric:         Behavior: Behavior is cooperative.           Assessment:       Encounter Diagnoses   Name Primary?    Onychomycosis due to dermatophyte Yes    Tinea pedis, unspecified laterality          Plan:       Joann was seen today for fungus and foot problem.    Diagnoses and all orders for this  visit:    Onychomycosis due to dermatophyte    Tinea pedis, unspecified laterality    Other orders  -     ciclopirox (LOPROX) 0.77 % Crea; Apply topically 2 (two) times daily.  -     ciclopirox (PENLAC) 8 % Soln; Apply topically nightly.      I counseled the patient on her conditions, their implications and medical management.        Loprox, Penlac.      Dry well after hygiene.      Natural fiber socks changed at least once mid day.      Discussed conservative treatment with shoes of adequate dimensions, material, and style to alleviate symptoms and delay or prevent surgical intervention.           No follow-ups on file.

## 2024-05-10 ENCOUNTER — CLINICAL SUPPORT (OUTPATIENT)
Dept: REHABILITATION | Facility: HOSPITAL | Age: 39
End: 2024-05-10
Payer: COMMERCIAL

## 2024-05-10 DIAGNOSIS — M25.532 LEFT WRIST PAIN: Primary | ICD-10-CM

## 2024-05-10 PROCEDURE — 97530 THERAPEUTIC ACTIVITIES: CPT | Mod: PO

## 2024-05-10 PROCEDURE — 97022 WHIRLPOOL THERAPY: CPT | Mod: PO

## 2024-05-13 ENCOUNTER — OFFICE VISIT (OUTPATIENT)
Dept: BARIATRICS | Facility: CLINIC | Age: 39
End: 2024-05-13
Payer: COMMERCIAL

## 2024-05-13 ENCOUNTER — CLINICAL SUPPORT (OUTPATIENT)
Dept: BARIATRICS | Facility: CLINIC | Age: 39
End: 2024-05-13
Payer: COMMERCIAL

## 2024-05-13 VITALS
DIASTOLIC BLOOD PRESSURE: 76 MMHG | SYSTOLIC BLOOD PRESSURE: 137 MMHG | BODY MASS INDEX: 39.28 KG/M2 | HEART RATE: 80 BPM | OXYGEN SATURATION: 99 % | WEIGHT: 243.38 LBS

## 2024-05-13 DIAGNOSIS — I10 PRIMARY HYPERTENSION: Primary | ICD-10-CM

## 2024-05-13 DIAGNOSIS — I10 PRIMARY HYPERTENSION: ICD-10-CM

## 2024-05-13 DIAGNOSIS — E66.9 OBESITY (BMI 30-39.9): ICD-10-CM

## 2024-05-13 DIAGNOSIS — G47.33 OSA (OBSTRUCTIVE SLEEP APNEA): Chronic | ICD-10-CM

## 2024-05-13 DIAGNOSIS — R73.03 PREDIABETES: ICD-10-CM

## 2024-05-13 DIAGNOSIS — E66.9 OBESITY, CLASS II, BMI 35-39.9, NO COMORBIDITY: Primary | ICD-10-CM

## 2024-05-13 DIAGNOSIS — Z01.818 PREOPERATIVE TESTING: ICD-10-CM

## 2024-05-13 DIAGNOSIS — Z71.3 DIETARY COUNSELING AND SURVEILLANCE: ICD-10-CM

## 2024-05-13 PROCEDURE — 3008F BODY MASS INDEX DOCD: CPT | Mod: CPTII,S$GLB,, | Performed by: SURGERY

## 2024-05-13 PROCEDURE — 99204 OFFICE O/P NEW MOD 45 MIN: CPT | Mod: S$GLB,,, | Performed by: SURGERY

## 2024-05-13 PROCEDURE — 99999 PR PBB SHADOW E&M-EST. PATIENT-LVL III: CPT | Mod: PBBFAC,,, | Performed by: SURGERY

## 2024-05-13 PROCEDURE — 99499 UNLISTED E&M SERVICE: CPT | Mod: 95,,, | Performed by: DIETITIAN, REGISTERED

## 2024-05-13 PROCEDURE — 3044F HG A1C LEVEL LT 7.0%: CPT | Mod: CPTII,S$GLB,, | Performed by: SURGERY

## 2024-05-13 PROCEDURE — 3078F DIAST BP <80 MM HG: CPT | Mod: CPTII,S$GLB,, | Performed by: SURGERY

## 2024-05-13 PROCEDURE — 3075F SYST BP GE 130 - 139MM HG: CPT | Mod: CPTII,S$GLB,, | Performed by: SURGERY

## 2024-05-13 NOTE — PROGRESS NOTES
The patient location is: parked car ([place])  The chief complaint leading to consultation is: obesity    Visit type: audiovisual    Face to Face time with patient: 30 min  30 minutes of total time spent on the encounter, which includes face to face time and non-face to face time preparing to see the patient (eg, review of tests), Obtaining and/or reviewing separately obtained history, Documenting clinical information in the electronic or other health record, Independently interpreting results (not separately reported) and communicating results to the patient/family/caregiver, or Care coordination (not separately reported).         Each patient to whom he or she provides medical services by telemedicine is:  (1) informed of the relationship between the physician and patient and the respective role of any other health care provider with respect to management of the patient; and (2) notified that he or she may decline to receive medical services by telemedicine and may withdraw from such care at any time.      NUTRITIONAL CONSULT    Referring Physician: Ingrid Berman M.D.   Reason for MNT Referral: Initial assessment for sleeve gastrectomy work-up    PAST MEDICAL HISTORY:   38 y.o. female  Weight history includes struggling with weight all her life but especially after having children.  Dieting attempts include working out with a  and medical weight loss. Most weight lost was with phentermine, exercise 5 days/week and cooking more at home d/t Covid, and lost over 60 lbs. States her greatest challenge is dining out for work.    Past Medical History:   Diagnosis Date    Abnormal Pap smear 2007    Abnormal Pap smear of cervix     Allergy     Eczema     HPV (human papilloma virus) anogenital infection     Hypertension     Tinea versicolor      CLINICAL DATA:  38 y.o.-year-old Black or  female.  Height: 5 ft. 6 in.  Weight: 242 lbs  IBW: 144 lbs  BMI: 39  The patient's goal weight (50% EBW): 193  lbs    Goal for Bariatric Surgery: to improve health, to improve quality of life, to lose weight, and to prevent future medical conditions    DAILY NUTRITIONAL NEEDS: pre-op nutritional bariatric guidelines to promote weight loss  4431-7712 Calories    Grams Protein    NUTRITION & HEALTH HISTORY:  Greatest challenge: dining out frequency, sweets, starchy CHO, portion control, snacking at night, irregular meal patterns, and emotional eating    Current diet recall:     B: [employer] - iced brown sugar oat milk shaken espresso with turkey benitez sandwich with egg and cheese  L: working from home (bag salad with benitez bits/chicken) OR dining out [employer] (kids meal or barahona salad) or sit down restaurant (chicken wings OR grilled fish and salad OR cream pasta with chicken/shrimp)  D: dining out (pork chops and creamed spinach with 2 glasses of wine) (Ramen or Ophelia noodle soup) OR home cooking pasta with chicken/meatsauce or grilled meats and baked beans and veg    Current Diet:  Meal pattern: 3 meals  Protein supplements: none  Snacking: in the car while driving (almonds or Red bull) or at home (Cheez its or chips)  Vegetables: Likes a variety. Eats almost daily.  Fruits: Likes a variety. Eats rarely.  Beverages: water, coffee with sugar, and coffee without sugar  Dining out: Daily. Weekly. Mostly fast food, restaurants, and take-out.  Cooking at home: Weekly. Mostly baked, grilled, smothered, and fried meat, fish, starchy CHO, and vegetables.    Exercise:  Past exercise: off and on    Current exercise: 3-4 days/week with  15 min cardio and heavy weight lifting OR walking with toddler OR riding stationary bike    Restrictions to exercise: none    Vitamins / Minerals / Herbs:   none    Labs:   None available at time of visit    Food Allergies:   None known    Social:  Works regular daytime shifts.  Alcohol: wine 1-2 glasses/night  Smoking: None.    ASSESSMENT:  Patient reports attempts at weight loss, only to  regain lost weight.  Patient demonstrated knowledge of healthy eating behaviors and exercise patterns; admits to not eating healthy and not exercising at this point.  Patient states willingness to change lifestyle and make behavior modifications.        Barriers to Education: none    Stage of change: determination    NUTRITION DIAGNOSIS:    Morbid Obesity related to Excessive carbohydrate intake and Excessive calorie intake as evidence by BMI.    BARIATRIC DIET DISCUSSION/PLAN:  Discussed diet after surgery and related to patient's food record.  Reviewed nutrition guidelines for before and after surgery.  Answered all questions.  Continue to review Bariatric Nutrition Guidebook at home and call with any questions.  Work on Bariatric Nutrition Checklist.  Work on expanding variety of vegetables.  Work on gradually cutting back on starchy CHO in the diet.  Begin trying various protein supplements to determine preference.  1200-calorie diet.  1500-calorie diet.  5-6 meals per day.  Start including protein supplements in the diet plan daily.  Reduce frequency of dining out.  More grocery shopping and meal preparation at home.  Eliminate sugar added to coffee. Suggested protein shake instead.    RECOMMENDATIONS:  Pt is a potential candidate for bariatric surgery - Sleeve.    Follow up in one month.  Needs additional visits with RD.    Patient verbalized understanding.      Communicated nutrition plan with bariatric team.    SESSION TIME:  30 minutes

## 2024-05-13 NOTE — PROGRESS NOTES
BARIATRIC NEW PATIENT EVALUATION    CHIEF COMPLAINT:   *** obesity, There is no height or weight on file to calculate BMI. and inability to {Maintain or lose weight:19748}.    HPI:  Joann Banuelos is a 38 y.o. female w/ hx of HTN, ADA (cpap) presenting for *** obesity, There is no height or weight on file to calculate BMI. and inability to {Maintain or lose weight:99000}. The patient has tried ***.    Weight history includes all her life but especially after having children.  Dieting attempts include working out with a  and medical weight loss. Most weight lost was with phentermine, exercise 5 days/week and cooking more at home d/t Covid, and lost over 60 lbs. States her greatest challenge is dining out for work.      Surgical hx of laparoscopic salpingectomy and lysis of adhesions (2023)    PAST MEDICAL HISTORY:  Past Medical History:   Diagnosis Date    Abnormal Pap smear 2007    Abnormal Pap smear of cervix     Allergy     Eczema     HPV (human papilloma virus) anogenital infection     Hypertension     Tinea versicolor          PAST SURGICAL HISTORY:  Past Surgical History:   Procedure Laterality Date    CERVICAL BIOPSY  W/ LOOP ELECTRODE EXCISION  2007    DILATION AND CURETTAGE OF UTERUS  2012    retained IUD/Endometrial polyp    HYDROTHERMAL ABLATION OF ENDOMETRIUM N/A 5/29/2023    Procedure: ABLATION, ENDOMETRIUM, HYDROTHERMAL;  Surgeon: Sushila Dunham MD;  Location: [facility];  Service: OB/GYN;  Laterality: N/A;  NOVASURE    HYSTEROSCOPY W/ POLYPECTOMY      LAPAROSCOPIC LYSIS OF ADHESIONS N/A 5/29/2023    Procedure: LYSIS, ADHESIONS, LAPAROSCOPIC;  Surgeon: Sushila Dunham MD;  Location: [facility] OR;  Service: OB/GYN;  Laterality: N/A;    LAPAROSCOPIC SALPINGECTOMY Bilateral 5/29/2023    Procedure: SALPINGECTOMY, LAPAROSCOPIC;  Surgeon: Sushila Dunham MD;  Location: [facility] OR;  Service: OB/GYN;  Laterality: Bilateral;    REMOVAL OF INTRAUTERINE DEVICE (IUD) N/A 5/29/2023    Procedure:  REMOVAL, INTRAUTERINE DEVICE;  Surgeon: Sushila Dunham MD;  Location: [facility];  Service: OB/GYN;  Laterality: N/A;       FAMILY HISTORY:  Family History   Problem Relation Name Age of Onset    Diabetes Paternal Grandfather      Diabetes Paternal Grandmother      Diabetes Maternal Grandmother      Diabetes Maternal Grandfather      Hypertension Father      Eczema Sister      Eczema Sister      Diabetes Mother      Hypertension Mother      Breast cancer Neg Hx      Colon cancer Neg Hx      Ovarian cancer Neg Hx      Melanoma Neg Hx      Lupus Neg Hx      Psoriasis Neg Hx            SOCIAL HISTORY:  Social History     Social History Narrative    ** Merged History Encounter **              MEDICATIONS:  Medications have been reviewed.    ALLERGIES:  Allergies have been reviewed.    ROS    There were no vitals filed for this visit.    Physical Exam    DIAGNOSIS:  1. ***, There is no height or weight on file to calculate BMI. and inability to {Maintain or lose weight:82527}.  2. Co-morbidities: {Co-morbidities:46420}    PLAN:  The patient is {Good, Not a Good, Potential:31613} candidate for Bariatric Surgery. The patient is interested in {Surgery:77988}. The surgery and post-op care was discussed in detail with the patient. All questions were answered.    The patient understands that bariatric surgery is a tool to aid in weight loss and that they need to be committed to the diet and exercise post-operatively for successful weight loss. Discussed with patient that bariatric surgery is not the easy way out and that it will take plenty of dedication on the patient's part to be successful. Also discussed the possibility of weight regain if the patient strays from the diet guidelines or exercise requirements. Patient verbalized understanding and wishes to proceed with the work-up.    Estimated Goal weight is *** lbs, approximately 50% of their excess weight.      ORDERS:  1. {Orders:32634}  2. {Consults:72028}  3.  Bariatric Labs: BMP, CBC, Folate Serum, H. pylori, HgA1C, Hepatic Panel/LFT, Iron & TIBC, Lipid Profile, Magnesium, Phosphate, T3, T4, TSH, Free T4, Vitamin B12, and Vitamin B1.    Referring Physician: Rogelio Moreau DO  RTC: As scheduled.     Sumeet Walters MS4  [facility]

## 2024-05-13 NOTE — PROGRESS NOTES
BARIATRIC NEW PATIENT EVALUATION    CHIEF COMPLAINT:   Morbid obesity, Body mass index is 39.28 kg/m². and inability to maintain weight loss.    HPI:  Joann Banuelos is a 38 y.o. female w/ hx of HTN, ADA (cpap) presenting for class II morbid obesity, Body mass index is 39.28 kg/m². and inability to maintain weight loss.     Weight history includes all her life but especially after having children. Has 3 kids and is done with family expansion - s/p lap b/l salpingectomy     Dieting attempts include working out with a  and medical weight loss. Most weight lost was with phentermine, exercise 5 days/week and cooking more at home d/t Covid, and lost over 60 lbs. States her greatest challenge is dining out for work. Most recent A1C is 5.6.    5 days a week in gym - with  3 / week and two on her own.    HTN - is on amlodipine.    GERD - has some heartburn when having spicy things like crawfish, hot cheetos; particularly at night. Has improved over the past few years. Otherwise, does not have symptoms if she avoids spice. No acid brash / reflux with any frequency. Avoids eating late at night.     Prediabetes but A1c 5.6% most recently.     Has newly diagnosed ADA; just picked up CPAP today.    Sister had a gastric bypass many years ago; did well overall.     Highest Weight, what age? 253 Lbs earlier this year.  Lowest Weight, what age? 175 Lbs; around age 25  Diet, diet pills? Has had some success with phentermine in the past; semaglutide did not seem to help.  History of Anorexia, gambling, alcohol, drugs  Goal Weight:  Surgical hx of laparoscopic salpingectomy and lysis of adhesions (2023)    Her weight loss goal is to get back to about 180#.    PAST MEDICAL HISTORY:  Past Medical History:   Diagnosis Date    Abnormal Pap smear 2007    Abnormal Pap smear of cervix     Allergy     Eczema     HPV (human papilloma virus) anogenital infection     Hypertension     Tinea versicolor          PAST SURGICAL  HISTORY:  Past Surgical History:   Procedure Laterality Date    CERVICAL BIOPSY  W/ LOOP ELECTRODE EXCISION  2007    DILATION AND CURETTAGE OF UTERUS  2012    retained IUD/Endometrial polyp    HYDROTHERMAL ABLATION OF ENDOMETRIUM N/A 5/29/2023    Procedure: ABLATION, ENDOMETRIUM, HYDROTHERMAL;  Surgeon: Sushila Dunham MD;  Location: [facility];  Service: OB/GYN;  Laterality: N/A;  NOVASURE    HYSTEROSCOPY W/ POLYPECTOMY      LAPAROSCOPIC LYSIS OF ADHESIONS N/A 5/29/2023    Procedure: LYSIS, ADHESIONS, LAPAROSCOPIC;  Surgeon: Sushila Dunham MD;  Location: [facility] OR;  Service: OB/GYN;  Laterality: N/A;    LAPAROSCOPIC SALPINGECTOMY Bilateral 5/29/2023    Procedure: SALPINGECTOMY, LAPAROSCOPIC;  Surgeon: Sushila Dunham MD;  Location: [facility] OR;  Service: OB/GYN;  Laterality: Bilateral;    REMOVAL OF INTRAUTERINE DEVICE (IUD) N/A 5/29/2023    Procedure: REMOVAL, INTRAUTERINE DEVICE;  Surgeon: Sushila Dunham MD;  Location: [facility];  Service: OB/GYN;  Laterality: N/A;       FAMILY HISTORY:  Family History   Problem Relation Name Age of Onset    Diabetes Paternal Grandfather      Diabetes Paternal Grandmother      Diabetes Maternal Grandmother      Diabetes Maternal Grandfather      Hypertension Father      Eczema Sister      Eczema Sister      Diabetes Mother      Hypertension Mother      Breast cancer Neg Hx      Colon cancer Neg Hx      Ovarian cancer Neg Hx      Melanoma Neg Hx      Lupus Neg Hx      Psoriasis Neg Hx            SOCIAL HISTORY:  Social History     Social History Narrative    ** Merged History Encounter **              MEDICATIONS:  Medications have been reviewed.    ALLERGIES:  Allergies have been reviewed.    Review of Systems   Constitutional: Negative.    Respiratory:  Negative for cough and shortness of breath.    Cardiovascular: Negative.    Gastrointestinal:  Negative for abdominal pain and heartburn.   Psychiatric/Behavioral: Negative.     All other systems reviewed and are  negative.    There were no vitals filed for this visit.    Physical Exam  Vitals reviewed.   Constitutional:       General: She is not in acute distress.     Appearance: She is obese.   Cardiovascular:      Rate and Rhythm: Normal rate.   Pulmonary:      Effort: Pulmonary effort is normal. No respiratory distress.   Abdominal:      General: There is no distension.      Tenderness: There is no abdominal tenderness.      Comments: Small umbilical keloid from prior laparoscopy.   Neurological:      General: No focal deficit present.      Mental Status: She is alert and oriented to person, place, and time.   Psychiatric:         Mood and Affect: Mood normal.         Behavior: Behavior normal.       DIAGNOSIS:  1. Class II Morbid Obesity with serious comorbid, Body mass index is 39.28 kg/m². and inability to maintain weight loss.  2. Co-morbidities: hypertension, obstructive sleep apnea, and prediabetes    PLAN:  The patient is a good candidate for Bariatric Surgery. The patient is interested in sleeve gastrectomy. The surgery and post-op care was discussed in detail with the patient. All questions were answered.    The patient understands that bariatric surgery is a tool to aid in weight loss and that they need to be committed to the diet and exercise post-operatively for successful weight loss. Discussed with patient that bariatric surgery is not the easy way out and that it will take plenty of dedication on the patient's part to be successful. Also discussed the possibility of weight regain if the patient strays from the diet guidelines or exercise requirements. Patient verbalized understanding and wishes to proceed with the work-up.    Ideal body weight: 59.3 kg (130 lb 11.7 oz)  Adjusted ideal body weight: 79.7 kg (175 lb 12.7 oz)     Estimated Goal weight is 185 lbs, approximately 50% of their excess weight.      ORDERS:  1. Chest X-Ray, EKG, and EGD  2. Psychological Consult, Bariatric Dietician Consult, and PCP  Clearance  3. Bariatric Labs: BMP, CBC, Folate Serum, H. pylori, Iron & TIBC, Magnesium, Phosphate, T3, T4, Free T4, Vitamin B12, and Vitamin B1. Recently (3/2024 - reviewed) had labs including lipid panel, hepatic panel, A1c and TSH so will not repeat these at this time.    Referring Physician: Rogelio Moreau DO  RTC: As scheduled.     I spent 45 minutes on this patient encounter. This includes times spent with the patient involving coordination of care and counseling, discussion of the patient's condition, delineating the plan of management above, discussing the rationale / recommendations for treatment, chart review and documentation. All time accounted for was spent on the day of service.          Ingrid Berman MD FACS  Minimally Invasive General & Bariatric Surgery  [facility] - [place], [place]

## 2024-05-14 PROBLEM — G47.33 OSA (OBSTRUCTIVE SLEEP APNEA): Chronic | Status: ACTIVE | Noted: 2024-05-14

## 2024-05-14 PROBLEM — Z30.09 UNWANTED FERTILITY: Status: RESOLVED | Noted: 2023-05-29 | Resolved: 2024-05-14

## 2024-05-20 NOTE — TELEPHONE ENCOUNTER
Reviewed workup noted in visit notes from Dr. Berman on 5/13/24.    Workup needed:  Fasting labs  EKG  CXR  Psych eval and CL  Diet CL  PCP CL  EGD    Placed EGD referral and messaged [facility] Endo Schedulers.     Called and LVM for RC to schedule pt's workup. Will respond on the [facility] portal.

## 2024-05-21 ENCOUNTER — TELEPHONE (OUTPATIENT)
Dept: ENDOSCOPY | Facility: HOSPITAL | Age: 39
End: 2024-05-21
Payer: COMMERCIAL

## 2024-05-21 NOTE — TELEPHONE ENCOUNTER
----- Message from Joseline Mesa sent at 5/21/2024  8:35 AM CDT -----  Regarding: FW: EGD    ----- Message -----  From: Ana Paula Gibbs RN  Sent: 5/20/2024  11:56 AM CDT  To: [facility] Endo Schedulers  Subject: EGD                                              Good afternoon,    Ms. Banuelos is in workup for a bariatric procedure.  If possible, please reach out and schedule pt with Dr. Ingrid Berman for her EGD unless another MD is available sooner. Her referral is in place.      Thanks,  Priscilla GRANDE

## 2024-05-21 NOTE — TELEPHONE ENCOUNTER
Contacted the patient to schedule an endoscopy procedure(s) 5/21/24. The patient did not answer the call and left a voice message requesting a call back.

## 2024-05-22 ENCOUNTER — TELEPHONE (OUTPATIENT)
Dept: ENDOSCOPY | Facility: HOSPITAL | Age: 39
End: 2024-05-22
Payer: COMMERCIAL

## 2024-05-22 NOTE — TELEPHONE ENCOUNTER
2nd attempt to schedule pt for procedure   No answer message left on pt vm    Contacted the patient to schedule an endoscopy procedure(s) 5/22/24. The patient did not answer the call and left a voice message requesting a call back.

## 2024-05-22 NOTE — TELEPHONE ENCOUNTER
----- Message from Joseline Mesa sent at 5/21/2024  8:35 AM CDT -----  Regarding: FW: EGD    ----- Message -----  From: Ana Paula Gibbs RN  Sent: 5/20/2024  11:56 AM CDT  To: [facility] Endo Schedulers  Subject: EGD                                              Good afternoon,    Ms. Banuelos is in workup for a bariatric procedure.  If possible, please reach out and schedule pt with Dr. Ingrid Berman for her EGD unless another MD is available sooner. Her referral is in place.      Thanks,  Priscilla GRANDE

## 2024-05-22 NOTE — TELEPHONE ENCOUNTER
----- Message from Joseline Mesa sent at 5/21/2024  8:35 AM CDT -----  Regarding: FW: EGD    ----- Message -----  From: Ana Paula Gibbs RN  Sent: 5/20/2024  11:56 AM CDT  To: [facility] Endo Schedulers  Subject: EGD                                              Good afternoon,    Ms. Banuelos is in workup for a bariatric procedure.  If possible, please reach out and schedule pt with Dr. Ingrid Berman for her EGD unless another MD is available sooner. Her referral is in place.      Thanks,  Priscilla GRANDE

## 2024-05-23 ENCOUNTER — TELEPHONE (OUTPATIENT)
Dept: BARIATRICS | Facility: CLINIC | Age: 39
End: 2024-05-23
Payer: COMMERCIAL

## 2024-05-23 ENCOUNTER — TELEPHONE (OUTPATIENT)
Dept: ENDOSCOPY | Facility: HOSPITAL | Age: 39
End: 2024-05-23
Payer: COMMERCIAL

## 2024-05-23 NOTE — TELEPHONE ENCOUNTER
----- Message from Joseline Mesa sent at 5/21/2024  8:35 AM CDT -----  Regarding: FW: EGD    ----- Message -----  From: Ana Paula Gibbs RN  Sent: 5/20/2024  11:56 AM CDT  To: [facility] Endo Schedulers  Subject: EGD                                              Good afternoon,    Ms. Banuelos is in workup for a bariatric procedure.  If possible, please reach out and schedule pt with Dr. Ingrid Berman for her EGD unless another MD is available sooner. Her referral is in place.      Thanks,  Priscilla GRANDE

## 2024-05-23 NOTE — TELEPHONE ENCOUNTER
Dear Dr. Berman,     Outreach was placed 3+ times to schedule  (EGD) Endoscopy without connecting with the patient. Please review if procedure(s) is needed.     Thank you,     [facility] Endoscopy Scheduling

## 2024-05-23 NOTE — TELEPHONE ENCOUNTER
Spoke to patient who stated [place] but has contacted the Endoscopy Department to make an appointment to discuss scheduling. Advised patient that I will follow up in the next few days regarding her work-up and what's left to be completed.

## 2024-05-23 NOTE — TELEPHONE ENCOUNTER
Contacted the patient to schedule an endoscopy procedure(s) 5/23/24. The patient did not answer the call and left a voice message requesting a call back.

## 2024-05-24 NOTE — PROGRESS NOTES
Occupational Therapy Daily Treatment Note     Date: 5/27/2024  Name: Joann Banuelos  Clinic Number: [id number]    Therapy Diagnosis:   Encounter Diagnosis   Name Primary?    Left wrist pain Yes         Physician: Wayne Barraza PA-C    Medical Diagnosis: M77.8 (ICD-10-CM) - Tendonitis of wrist, left   Physician Orders: Eval/tx  Evaluation Date: 4/9/2024  Plan of Care Certification Date: 7/9/2024  Authorization Period: 12/31/2024  Surgery Date and Procedure: N/A  Date of Return to MD: ALIREZA     Visit #: 4 of 20  Time In: 10:30 AM  Time Out: 11:15 AM  Total Billable Time: 45 min     Precautions: Standard      Subjective     Pt reports: She feels she is improving  Response to previous treatment:Kurt well    Pain: 4/10  Location: Left thenar eminence     Objective     Joann received the following supervised modalities after being cleared for contraindications for 18 minutes in order to promote increased blood flow and tissue elasticity:   -Fluidotherapy  -Patient received ultrasound  for pain control and decreased inflammation @ 100 % duty cycle, 3.3 Mhz, applied to left thenar eminence, intensity = 1.0 w/cm2 for 8 minutes.    Joann participated in dynamic functional therapeutic activities to improve functional performance for 22  minutes, including:  -Soft tissue mobs left thenar eminence/thumb area  -Octi 3'  -Isospheres 3'  -Towel scrunches 3'  -Juxacisor 3'    Thumb AROM WNL all joint planes     Strength: (Measured in psi)       Left   Rung II 85         Pinch Strength (Measured in psi)       Left   Key Pinch 19   3pt Pinch 11          FOTO Hand/Wrist/Finger Survey     Function Level Intake: 67%           Home Exercises and Education Provided     Education provided:   - Progress towards goals     Written Home Exercises Provided: Patient instructed to cont prior HEP.  Exercises were reviewed and Joann was able to demonstrate them prior to the end of the session.  Joann demonstrated good  understanding of the  HEP provided.   .   See EMR under Patient Instructions for exercises provided prior visit.        Assessment     Pt demonstrated significantly improved /pinch strength.    Joann is progressing towards her goals and there are no updates to goals at this time. Pt prognosis is Good.     Pt will continue to benefit from skilled outpatient occupational therapy to address the deficits listed in the problem list on initial evaluation provide pt/family education and to maximize pt's level of function.     Anticipated barriers to therapy: None      Pt's spiritual, cultural and educational needs considered and pt agreeable to plan of care and goals.    Goals:  LTG's (8 weeks):  1)   Increase  strength to 50 lbs. to grasp pot handle. Met  2)   Increase lat pinch to 16 psis for opening water bottles. Met  3)   Decrease complaints of pain to  3 out of 10 at worst to increase functional hand use for ADL/work/leisure activities. Progressing  4)   Patient to score at 70% or more on FOTO to demonstrate improved perception of functional LUE use. Progressing  5)   Pt will return to near to prior level of function for ADLs and household management reporting I or Mod I with ADLs (dressing, feeding, grooming, toileting). Progressing     STG's (4 weeks)  1)   Patient to be IND with HEP and modalities for pain/edema managment. Progressing  2)   Increase ROM left thumb to WNL to increase functional hand use for ADLs/work/leisure activities. Met  3)   Increase  strength to 30 lbs. to improve functional grasp for ADLs/work/leisure activities. Met  4)   Increase lat pinch to 8 psi's to increase independence with button and FM Coordination. Met  5)   Decrease complaints of pain to  5 out of 10 at worst to increase functional hand use for ADL/work/leisure activities. Progressing    Plan   Cont OT to address above goals.        BEA Britton OTR/L, CHT

## 2024-05-27 ENCOUNTER — CLINICAL SUPPORT (OUTPATIENT)
Dept: REHABILITATION | Facility: HOSPITAL | Age: 39
End: 2024-05-27
Payer: COMMERCIAL

## 2024-05-27 DIAGNOSIS — M25.532 LEFT WRIST PAIN: Primary | ICD-10-CM

## 2024-05-27 PROCEDURE — 97035 APP MDLTY 1+ULTRASOUND EA 15: CPT | Mod: PO

## 2024-05-27 PROCEDURE — 97530 THERAPEUTIC ACTIVITIES: CPT | Mod: PO

## 2024-05-27 PROCEDURE — 97022 WHIRLPOOL THERAPY: CPT | Mod: PO

## 2024-05-29 RX ORDER — AMLODIPINE BESYLATE 5 MG/1
5 TABLET ORAL DAILY
Qty: 90 TABLET | Refills: 3 | Status: SHIPPED | OUTPATIENT
Start: 2024-05-29

## 2024-05-29 NOTE — TELEPHONE ENCOUNTER
Refill Decision Note   Joann Banuelos  is requesting a refill authorization.    Brief Assessment and Rationale for Refill:   Approve       Medication Therapy Plan:         Comments:     Note composed:3:23 PM 05/29/2024

## 2024-05-29 NOTE — TELEPHONE ENCOUNTER
No care due was identified.  Health [facility] Embedded Care Due Messages. Reference number: [id number].   5/29/2024 1:01:50 PM CDT

## 2024-05-29 NOTE — PROGRESS NOTES
Occupational Therapy Daily Treatment Note     Date: 6/3/2024  Name: Joann Banuelos  Clinic Number: [id number]    Therapy Diagnosis:   Encounter Diagnosis   Name Primary?    Left wrist pain Yes       Physician: Wayne Barraza PA-C    Medical Diagnosis: M77.8 (ICD-10-CM) - Tendonitis of wrist, left   Physician Orders: Eval/tx  Evaluation Date: 4/9/2024  Plan of Care Certification Date: 7/9/2024  Authorization Period: 12/31/2024  Surgery Date and Procedure: N/A  Date of Return to MD: ALIREZA     Visit #: 5 of 20  Time In: 10:30 AM  Time Out: 11:15 AM  Total Billable Time: 45 min     Precautions: Standard      Subjective     Pt reports: Performing extensive house cleaning w/ increased pain as a result  Response to previous treatment:Kurt well    Pain: 6/10  Location: Left thenar eminence     Objective     Joann received the following supervised modalities after being cleared for contraindications for 18 minutes in order to promote increased blood flow and tissue elasticity:   -Fluidotherapy  -Patient received ultrasound  for pain control and decreased inflammation @ 100 % duty cycle, 3.3 Mhz, applied to left thenar eminence, intensity = 1.0 w/cm2 for 8 minutes.    Joann participated in dynamic functional therapeutic activities to improve functional performance for 22  minutes, including:  -Soft tissue mobs left thenar eminence/thumb area  -Fabricated small STS to encourage more FPL activation and lessen use of opponens and adductor    Pt observed to be using thumb intrinsics more than typical 2/2 long thumbnail     Home Exercises and Education Provided     Education provided:   - Progress towards goals     Written Home Exercises Provided: Patient instructed to cont prior HEP.  Exercises were reviewed and Joann was able to demonstrate them prior to the end of the session.  Joann demonstrated good  understanding of the HEP provided.   .   See EMR under Patient Instructions for exercises provided prior visit.         Assessment     Pt demonstrated overuse of thumb intrinsics 2/2 long nails which appear to cause cramping of the thenar eminence    Joann is progressing towards her goals and there are no updates to goals at this time. Pt prognosis is Good.     Pt will continue to benefit from skilled outpatient occupational therapy to address the deficits listed in the problem list on initial evaluation provide pt/family education and to maximize pt's level of function.     Anticipated barriers to therapy: None      Pt's spiritual, cultural and educational needs considered and pt agreeable to plan of care and goals.    Goals:  LTG's (8 weeks):  1)   Increase  strength to 50 lbs. to grasp pot handle. Met  2)   Increase lat pinch to 16 psis for opening water bottles. Met  3)   Decrease complaints of pain to  3 out of 10 at worst to increase functional hand use for ADL/work/leisure activities. Progressing  4)   Patient to score at 70% or more on FOTO to demonstrate improved perception of functional LUE use. Progressing  5)   Pt will return to near to prior level of function for ADLs and household management reporting I or Mod I with ADLs (dressing, feeding, grooming, toileting). Progressing     STG's (4 weeks)  1)   Patient to be IND with HEP and modalities for pain/edema managment. Progressing  2)   Increase ROM left thumb to WNL to increase functional hand use for ADLs/work/leisure activities. Met  3)   Increase  strength to 30 lbs. to improve functional grasp for ADLs/work/leisure activities. Met  4)   Increase lat pinch to 8 psi's to increase independence with button and FM Coordination. Met  5)   Decrease complaints of pain to  5 out of 10 at worst to increase functional hand use for ADL/work/leisure activities. Progressing    Plan   Cont OT to address above goals.        BEA Britton OTR/L, CHT

## 2024-06-03 ENCOUNTER — TELEPHONE (OUTPATIENT)
Dept: ENDOSCOPY | Facility: HOSPITAL | Age: 39
End: 2024-06-03
Payer: COMMERCIAL

## 2024-06-03 ENCOUNTER — CLINICAL SUPPORT (OUTPATIENT)
Dept: REHABILITATION | Facility: HOSPITAL | Age: 39
End: 2024-06-03
Payer: COMMERCIAL

## 2024-06-03 DIAGNOSIS — Z98.84 BARIATRIC SURGERY STATUS: Primary | ICD-10-CM

## 2024-06-03 DIAGNOSIS — M25.532 LEFT WRIST PAIN: Primary | ICD-10-CM

## 2024-06-03 PROCEDURE — 97022 WHIRLPOOL THERAPY: CPT | Mod: PO

## 2024-06-03 PROCEDURE — 97530 THERAPEUTIC ACTIVITIES: CPT | Mod: PO

## 2024-06-03 NOTE — TELEPHONE ENCOUNTER
Spoke to pt to schedule procedure(s) Upper Endoscopy (EGD)       Physician to perform procedure(s) Dr. Bass  Date of Procedure (s) 7/10/24  Arrival Time 11:30 AM  Time of Procedure(s) 12:30 AM   Location of Procedure(s) [address]  Type of Rx Prep sent to patient: Other  Instructions provided to patient via [facility]    Patient was informed on the following information and verbalized understanding. Screening questionnaire reviewed with patient and complete. If procedure requires anesthesia, a responsible adult needs to be present to accompany the patient home, patient cannot drive after receiving anesthesia. Appointment details are tentative, especially check-in time. Patient will receive a prep-op call 7 days prior to confirm check-in time for procedure. If applicable the patient should contact their pharmacy to verify Rx for procedure prep is ready for pick-up. Patient was advised to call the scheduling department at [phone] if pharmacy states no Rx is available. Patient was advised to call the endoscopy scheduling department if any questions or concerns arise.      [facility] Scheduling Department

## 2024-06-04 ENCOUNTER — HOSPITAL ENCOUNTER (OUTPATIENT)
Dept: RADIOLOGY | Facility: OTHER | Age: 39
Discharge: HOME | End: 2024-06-04
Attending: SURGERY
Payer: COMMERCIAL

## 2024-06-04 DIAGNOSIS — I10 PRIMARY HYPERTENSION: ICD-10-CM

## 2024-06-04 DIAGNOSIS — E66.9 OBESITY, CLASS II, BMI 35-39.9, NO COMORBIDITY: ICD-10-CM

## 2024-06-04 DIAGNOSIS — R73.03 PREDIABETES: ICD-10-CM

## 2024-06-04 DIAGNOSIS — G47.33 OSA (OBSTRUCTIVE SLEEP APNEA): Chronic | ICD-10-CM

## 2024-06-04 DIAGNOSIS — Z01.818 PREOPERATIVE TESTING: ICD-10-CM

## 2024-06-04 PROCEDURE — 71046 X-RAY EXAM CHEST 2 VIEWS: CPT | Mod: 26,,, | Performed by: STUDENT IN AN ORGANIZED HEALTH CARE EDUCATION/TRAINING PROGRAM

## 2024-06-04 PROCEDURE — 71046 X-RAY EXAM CHEST 2 VIEWS: CPT | Mod: TC,FY

## 2024-06-18 ENCOUNTER — PROCEDURE VISIT (OUTPATIENT)
Dept: NEUROLOGY | Facility: CLINIC | Age: 39
End: 2024-06-18
Payer: COMMERCIAL

## 2024-06-18 DIAGNOSIS — G56.03 BILATERAL CARPAL TUNNEL SYNDROME: ICD-10-CM

## 2024-06-18 PROCEDURE — 95885 MUSC TST DONE W/NERV TST LIM: CPT | Mod: 59,S$GLB,, | Performed by: PHYSICAL MEDICINE & REHABILITATION

## 2024-06-18 PROCEDURE — 95886 MUSC TEST DONE W/N TEST COMP: CPT | Mod: S$GLB,,, | Performed by: PHYSICAL MEDICINE & REHABILITATION

## 2024-06-18 PROCEDURE — 95911 NRV CNDJ TEST 9-10 STUDIES: CPT | Mod: S$GLB,,, | Performed by: PHYSICAL MEDICINE & REHABILITATION

## 2024-06-18 NOTE — PROCEDURES
Test Date:  2024    Patient: joann banuelos : 1985 Physician: Narciso Reynoso D.O.   ID#: [id number] SEX: Female Ref. Phys: Wayne Barraza PA-C     HPI: Joann Banuelos is a 38 y.o.female who presents for NCS/EMG to evaluate for CTS bilaterally.    PROCEDURE:  Prior to the procedure, the procedure was discussed in detail with the patient.  All questions were answered, and verbal consent was obtained.  For nerve conduction studies, a combination of surface electrodes, bar electrodes, and/or ring electrodes were used as needed.  For needle EMG, each site was cleaned and prepped in usual fashion with an alcohol pad.  A monopolar needle (28G) was used.  There was no significant bleeding, and bandages were applied as needed.  The procedure was tolerated without adverse effect.  The patient was instructed on post-procedure care including ice if needed for 10-15 minutes up to 4 times/day for any sore muscles.  I discussed with the patient that the data would be reviewed and a report sent to the referring provider, where any follow up questions regarding next steps should be directed.        NCV & EMG Findings:  Evaluation of the right median motor nerve showed prolonged distal onset latency.  The right median sensory nerve showed prolonged distal onset latency, prolonged distal peak latency, and decreased conduction velocity.  All remaining nerves (as indicated in the following tables) were within normal limits.  All examined muscles (as indicated in the following table) showed no evidence of electrical instability.      IMPRESSIONS:  There is electrophysiologic evidence of a right sensorimotor median mononeuropathy across the wrist (I.e. Carpal tunnel syndrome).  There is no motor axonal loss.  There is no active denervation.  This is graded as Moderate in severity on the right.    This is a normal electrophysiologic study of the left upper extremity.       ___________________________  Narciso Reynoso  D.O.        NCS+  Motor Nerve Results      Latency Amplitude F-Lat Segment Distance CV Comment   Site (ms) Norm (mV) Norm (ms)  (cm) (m/s) Norm    Left Median (APB)   Wrist 3.9  < 4.4 9.4  > 4.2         Elbow 7.9 - 6.0 -  Elbow-Wrist 26 65  > 51    Right Median (APB)   Wrist *4.7  < 4.4 7.5  > 4.2         Elbow 8.8 - 6.5 -  Elbow-Wrist 23 56  > 51    Left Ulnar (ADM)   Wrist 2.7  < 3.7 7.8  > 3.0         Bel Elbow 6.8 - 8.5 -  Bel Elbow-Wrist 25 61  > 52    Abv Elbow 8.1 - 8.1 -  Abv Elbow-Bel Elbow 9 69  > 43    Right Ulnar (ADM)   Wrist 2.5  < 3.7 8.3  > 3.0         Bel Elbow 6.7 - 7.9 -  Bel Elbow-Wrist 24 57  > 52    Abv Elbow 8.0 - 8.2 -  Abv Elbow-Bel Elbow 9 69  > 43      Sensory Nerve Results      Start Lat Latency (Peak) Amplitude (P-P) Segment Distance Start CV Comment   Site (ms) Norm (ms) (µV) Norm  (cm) (m/s) Norm    Left Median   Wrist-Dig I 2.5 - 3.1 65 - Wrist-Dig I 10 40 -    Wrist-Dig II 3.2  < 3.3 4.0 71  > 13 Wrist-Dig II 14 44  > 42    Palm-Wrist 1.90 - 2.4 52 - Palm-Wrist - - -    Right Median (Rec:Dig II)   Wrist *3.4  < 3.3 *4.1 39  > 13 Wrist-Dig II 14 *41  > 42    Left Ulnar (Rec:Wrist)   Palm 1.90 - 2.6 13 - Palm-Wrist - - -    Right Ulnar (Rec:Dig V)   Wrist 2.2  < 3.1 3.1 62  > 8 Wrist-Dig V 14 64  > 45    Left Radial (Rec:Dig I)   Wrist 2.4 - 2.8 9 - Wrist-Dig I 10 42 -      Inter-Nerve Comparisons     Nerve 1 Value 1 Nerve 2 Value 2 Parameter Result Normal   Sensory Sites   L Median Wrist-Dig I 3.1 ms L Radial Wrist-Dig I 2.8 ms Peak Lat Diff 0.30 ms <0.40   L Median Palm-Wrist 2.4 ms L Ulnar Palm-Wrist 2.6 ms Peak Lat Diff 0.20 ms <0.30     EMG+     Side Muscle Nerve Root Ins Act Fibs Psw Amp Dur Poly Recrt Int Pat Comment   Left Deltoid Axillary C5-C6 Nml Nml Nml Nml Nml 0 Nml Nml    Left Biceps Musculocut C5-C6 Nml Nml Nml Nml Nml 0 Nml Nml    Left Triceps Radial C6-C8 Nml Nml Nml Nml Nml 0 Nml Nml    Left Pronator Teres Median C6-C7 Nml Nml Nml Nml Nml 0 Nml Nml    Left FDI Ulnar  C8-T1 Nml Nml Nml Nml Nml 0 Nml Nml    Right APB Median C8-T1 Nml Nml Nml Nml Nml 0 Nml Nml            Waveforms:    Motor              Sensory

## 2024-07-02 ENCOUNTER — OFFICE VISIT (OUTPATIENT)
Dept: ORTHOPEDICS | Facility: CLINIC | Age: 39
End: 2024-07-02
Payer: COMMERCIAL

## 2024-07-02 ENCOUNTER — PATIENT MESSAGE (OUTPATIENT)
Dept: ENDOSCOPY | Facility: HOSPITAL | Age: 39
End: 2024-07-02
Payer: COMMERCIAL

## 2024-07-02 VITALS — WEIGHT: 243.38 LBS | HEIGHT: 66 IN | BODY MASS INDEX: 39.12 KG/M2

## 2024-07-02 DIAGNOSIS — M18.12 ARTHRITIS OF CARPOMETACARPAL (CMC) JOINT OF LEFT THUMB: Primary | ICD-10-CM

## 2024-07-02 DIAGNOSIS — R94.131 ELECTROMYOGRAM (EMG) ABNORMAL: ICD-10-CM

## 2024-07-02 PROCEDURE — 1159F MED LIST DOCD IN RCRD: CPT | Mod: CPTII,S$GLB,, | Performed by: ORTHOPAEDIC SURGERY

## 2024-07-02 PROCEDURE — 3008F BODY MASS INDEX DOCD: CPT | Mod: CPTII,S$GLB,, | Performed by: ORTHOPAEDIC SURGERY

## 2024-07-02 PROCEDURE — 99999 PR PBB SHADOW E&M-EST. PATIENT-LVL III: CPT | Mod: PBBFAC,,, | Performed by: ORTHOPAEDIC SURGERY

## 2024-07-02 PROCEDURE — 1160F RVW MEDS BY RX/DR IN RCRD: CPT | Mod: CPTII,S$GLB,, | Performed by: ORTHOPAEDIC SURGERY

## 2024-07-02 PROCEDURE — 3044F HG A1C LEVEL LT 7.0%: CPT | Mod: CPTII,S$GLB,, | Performed by: ORTHOPAEDIC SURGERY

## 2024-07-02 PROCEDURE — 99214 OFFICE O/P EST MOD 30 MIN: CPT | Mod: S$GLB,,, | Performed by: ORTHOPAEDIC SURGERY

## 2024-07-02 NOTE — PROGRESS NOTES
Subjective:       Patient ID: Joann Banuelos is a 38 y.o. female.    Chief Complaint: Numbness of the Right Hand (EMG moderate) and Pain of the Left Hand  HPI interval 07/02/2024  Patient is here for EMG follow-up for left hand numbness and tingling..  On 04/24/2024 received a FCR tendon injection reports improvement in her left thumb base/wrist pain.  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  She has been going to PT which has also been helping her with her left thumb wrist pain.  She reports intermittent numbness of the left hand along the median nerve distribution. She denies any numbness or tingling of the right hand.  She has tried wrist bracing.  She denies any neck pain.           Interval HPI  4/24/24  Patient reports for follow up of FCR tendon injection.  She notes that she has reduction in her pain but does note that she is increase in numbness and tingling to the median nerve distribution.  She states she has been wearing her braces at nighttime which had been helping with the numbness.  She also states she has been attending therapy which has been helping low she notes that she is very sore the end of therapy.    HPI  3/4/24  38-year-old female presents to clinic today with left wrist pain located along the FCR tendon insertion.  She states this has been going on for about 3 months.  She denies any trauma or previous surgeries to the wrist or hand.  She states she has been trying to rub Voltaren cream with minimal relief.  She does state she gets relief from taking ibuprofen.  She states she has been trying to wear a thumb spica brace with no relief due to the brace rubbing in the site of irritation.  She denies any numbness or tingling.  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      Past Medical History:   Diagnosis Date    Abnormal Pap smear 2007    Abnormal Pap smear of cervix     Allergy     Eczema     HPV (human papilloma virus) anogenital infection     Hypertension      Tinea versicolor      Past Surgical History:   Procedure Laterality Date    CERVICAL BIOPSY  W/ LOOP ELECTRODE EXCISION  2007    DILATION AND CURETTAGE OF UTERUS  2012    retained IUD/Endometrial polyp    HYDROTHERMAL ABLATION OF ENDOMETRIUM N/A 05/29/2023    Procedure: ABLATION, ENDOMETRIUM, HYDROTHERMAL;  Surgeon: Sushila Dunham MD;  Location: [facility] OR;  Service: OB/GYN;  Laterality: N/A;  NOVASURE    HYSTEROSCOPY W/ POLYPECTOMY      LAPAROSCOPIC LYSIS OF ADHESIONS N/A 05/29/2023    Procedure: LYSIS, ADHESIONS, LAPAROSCOPIC;  Surgeon: Sushila Dunham MD;  Location: [facility] OR;  Service: OB/GYN;  Laterality: N/A;    LAPAROSCOPIC SALPINGECTOMY Bilateral 05/29/2023    Procedure: SALPINGECTOMY, LAPAROSCOPIC;  Surgeon: Sushila Dunham MD;  Location: [facility] OR;  Service: OB/GYN;  Laterality: Bilateral;    REMOVAL OF INTRAUTERINE DEVICE (IUD) N/A 05/29/2023    Procedure: REMOVAL, INTRAUTERINE DEVICE;  Surgeon: Sushila Dunham MD;  Location: [facility] OR;  Service: OB/GYN;  Laterality: N/A;    TUBAL LIGATION  5/29/23     Family History   Problem Relation Name Age of Onset    Diabetes Paternal Grandfather Angel Young     Cancer Paternal Grandfather Angel Young     Obesity Paternal Grandfather Angel Young     Diabetes Paternal Grandmother Lois Young     Heart disease Paternal Grandmother Lois Young     Obesity Paternal Grandmother Lois Young     Diabetes Maternal Grandmother Coleen Jaffe     Obesity Maternal Grandmother Coleen Jaffe     Diabetes Maternal Grandfather Devin Jaffe     Obesity Maternal Grandfather Devin Alannah     Stroke Maternal Grandfather Devin Alannah     Hypertension Father Willis Young     Diabetes Father Willis Young     Obesity Father Willis Young     Eczema Sister Kinitra Young     Obesity Sister Kinitra Young     Eczema Sister      Diabetes Mother Priyanka Young     Hypertension Mother Priyanka Young     Hyperlipidemia Mother Priyanka Young     Obesity Mother Priyanka  Hector     Sleep apnea Mother Priyanka Young     Obesity Sister Luli Young     Breast cancer Neg Hx      Colon cancer Neg Hx      Ovarian cancer Neg Hx      Melanoma Neg Hx      Lupus Neg Hx      Psoriasis Neg Hx       Social History     Socioeconomic History    Marital status:     Number of children: 2   Occupational History    Occupation:      Employer: [employer]   Tobacco Use    Smoking status: Never    Smokeless tobacco: Never   Substance and Sexual Activity    Alcohol use: Yes     Alcohol/week: 5.0 standard drinks of alcohol     Types: 5 Glasses of wine per week     Comment: Occasionally ; none 24 hr prior to surgery    Drug use: No    Sexual activity: Yes     Partners: Male     Birth control/protection: See Surgical Hx     Comment: Tubes tied   Other Topics Concern    Are you pregnant or think you may be? No    Breast-feeding No   Social History Narrative    ** Merged History Encounter **          Social Determinants of Health     Financial Resource Strain: Low Risk  (3/4/2024)    Overall Financial Resource Strain (CARDIA)     Difficulty of Paying Living Expenses: Not very hard   Food Insecurity: No Food Insecurity (3/4/2024)    Hunger Vital Sign     Worried About Running Out of Food in the Last Year: Never true     Ran Out of Food in the Last Year: Never true   Transportation Needs: No Transportation Needs (3/4/2024)    PRAPARE - Transportation     Lack of Transportation (Medical): No     Lack of Transportation (Non-Medical): No   Physical Activity: Insufficiently Active (3/4/2024)    Exercise Vital Sign     Days of Exercise per Week: 3 days     Minutes of Exercise per Session: 40 min   Stress: No Stress Concern Present (3/4/2024)    [employer] [place] [employer] - Occupational Stress Questionnaire     Feeling of Stress : Only a little   Housing Stability: Low Risk  (3/4/2024)    Housing Stability Vital Sign     Unable to Pay for Housing in the Last Year: No     Number of Places  "Lived in the Last Year: 1     Unstable Housing in the Last Year: No       Current Outpatient Medications   Medication Sig Dispense Refill    amLODIPine (NORVASC) 5 MG tablet Take 1 tablet (5 mg total) by mouth once daily. 90 tablet 3    ciclopirox (LOPROX) 0.77 % Crea Apply topically 2 (two) times daily. 90 g 2    ciclopirox (PENLAC) 8 % Soln Apply topically nightly. 6.6 mL 11    meloxicam (MOBIC) 7.5 MG tablet Take 1 tablet (7.5 mg total) by mouth once daily. 30 tablet 0     No current facility-administered medications for this visit.     Review of patient's allergies indicates:  No Known Allergies    Review of Systems        Objective:      Vitals:    07/02/24 0853   Weight: 110.4 kg (243 lb 6.2 oz)   Height: 5' 6" (1.676 m)     Physical Exam  Cardiovascular:      Pulses:           Radial pulses are Normal on the right side and Normal on the left side.       Hand/Wrist Musculoskeletal Exam    Inspection    Right      Erythema: none      Ecchymosis: none      Edema: none      Deformity: none    Left      Erythema: none      Ecchymosis: none      Edema: none      Deformity: none    Palpation    Left      Wrist tenderness to palpation: first dorsal compartment and radial styloid    Range of Motion    Right Hand      Right hand range of motion is normal.      Left Hand      Left hand range of motion is normal.       Right Wrist      Right wrist range of motion is normal.      Left Wrist      Left wrist range of motion is normal.      Strength        Strength additional comments: Pain with resisted wrist flexion and radial deviation    Neurovascular    Right       Radial pulse: normal      Capillary refill: <3 sec and brisk      Ulnar nerve sensory distribution: normal      Median nerve sensory distribution: normal      Superficial radial nerve sensory distribution: normal    Left       Radial pulse: normal      Capillary refill: brisk and <3 sec      Ulnar nerve sensory distribution: normal      Median nerve sensory " distribution: normal      Superficial radial nerve sensory distribution: normal    Special Tests    Right      Phalen's: positive      Carpal compression test: positive      Tinel's - carpal tunnel: positive      Tinel's - cubital tunnel: negative    Left      Phalen's: positive      Carpal compression test: positive      Tinel's - carpal tunnel: positive      Tinel's - cubital tunnel: negative      Diagnostics Review: X-Ray: Reviewed  My personal interpretation of the x-ray are no signs of fracture or dislocation.       Assessment:       No diagnosis found.        Plan:       ASSESSMENT/PLAN:      38 y.o. yo female with   No diagnosis found.     Joann was seen today for numbness and pain.    Diagnoses and all orders for this visit:    Arthritis of carpometacarpal (CMC) joint of left thumb    Electromyogram (EMG) abnormal       Plan:  EMG reviewed and discussed with patient today which demonstrate right moderate severity CTS.  Negative for any CTS on the left.  Patient is asymptomatic at this time we will continue to monitor and she will follow up as needed.  Patient does present with early stage left CMC arthritis.  Recommendations for Voltaren gel CBD oil comfort cool bracing and anti-inflammatory information provided for patient. We we discussed possible left thumb arthroscopy with thermal if her symptoms persist.  She will follow up as needed.

## 2024-07-02 NOTE — PROGRESS NOTES
Patient has left hand pain it is done much better since she has had treatment on it she is here today for EMG results EMG shows moderate carpal tunnel on the right however she has absolutely no symptoms on the right she actually has negative physical exam therefore were not suggesting surgical treatment we will just continue to watch this she starts having changes in her history or symptoms at like to see her back sooner in reference to her left hand she is improving doing much better we would like to continue to watch this

## 2024-07-07 ENCOUNTER — PATIENT MESSAGE (OUTPATIENT)
Dept: BARIATRICS | Facility: CLINIC | Age: 39
End: 2024-07-07
Payer: COMMERCIAL

## 2024-07-09 ENCOUNTER — TELEPHONE (OUTPATIENT)
Dept: ENDOSCOPY | Facility: HOSPITAL | Age: 39
End: 2024-07-09
Payer: COMMERCIAL

## 2024-07-09 ENCOUNTER — LAB VISIT (OUTPATIENT)
Dept: LAB | Facility: HOSPITAL | Age: 39
End: 2024-07-09
Attending: SURGERY
Payer: COMMERCIAL

## 2024-07-09 ENCOUNTER — HOSPITAL ENCOUNTER (OUTPATIENT)
Dept: CARDIOLOGY | Facility: CLINIC | Age: 39
Discharge: HOME | End: 2024-07-09
Payer: COMMERCIAL

## 2024-07-09 DIAGNOSIS — E66.9 OBESITY, CLASS II, BMI 35-39.9, NO COMORBIDITY: ICD-10-CM

## 2024-07-09 DIAGNOSIS — R73.03 PREDIABETES: ICD-10-CM

## 2024-07-09 DIAGNOSIS — I10 PRIMARY HYPERTENSION: ICD-10-CM

## 2024-07-09 DIAGNOSIS — E66.01 MORBID OBESITY: ICD-10-CM

## 2024-07-09 DIAGNOSIS — Z01.818 PREOPERATIVE TESTING: ICD-10-CM

## 2024-07-09 DIAGNOSIS — G47.33 OSA (OBSTRUCTIVE SLEEP APNEA): Chronic | ICD-10-CM

## 2024-07-09 LAB
25(OH)D3+25(OH)D2 SERPL-MCNC: 10 NG/ML (ref 30–96)
ALBUMIN SERPL BCP-MCNC: 4.1 G/DL (ref 3.5–5.2)
ALP SERPL-CCNC: 83 U/L (ref 55–135)
ALT SERPL W/O P-5'-P-CCNC: 35 U/L (ref 10–44)
ANION GAP SERPL CALC-SCNC: 10 MMOL/L (ref 8–16)
AST SERPL-CCNC: 27 U/L (ref 10–40)
BASOPHILS # BLD AUTO: 0.05 K/UL (ref 0–0.2)
BASOPHILS NFR BLD: 0.6 % (ref 0–1.9)
BILIRUB DIRECT SERPL-MCNC: 0.1 MG/DL (ref 0.1–0.3)
BILIRUB SERPL-MCNC: 0.4 MG/DL (ref 0.1–1)
BUN SERPL-MCNC: 9 MG/DL (ref 6–20)
CALCIUM SERPL-MCNC: 9.6 MG/DL (ref 8.7–10.5)
CHLORIDE SERPL-SCNC: 105 MMOL/L (ref 95–110)
CHOLEST SERPL-MCNC: 236 MG/DL (ref 120–199)
CHOLEST/HDLC SERPL: 3.4 {RATIO} (ref 2–5)
CO2 SERPL-SCNC: 22 MMOL/L (ref 23–29)
CREAT SERPL-MCNC: 0.8 MG/DL (ref 0.5–1.4)
DIFFERENTIAL METHOD BLD: NORMAL
EOSINOPHIL # BLD AUTO: 0.3 K/UL (ref 0–0.5)
EOSINOPHIL NFR BLD: 3.2 % (ref 0–8)
ERYTHROCYTE [DISTWIDTH] IN BLOOD BY AUTOMATED COUNT: 12.5 % (ref 11.5–14.5)
EST. GFR  (NO RACE VARIABLE): >60 ML/MIN/1.73 M^2
ESTIMATED AVG GLUCOSE: 120 MG/DL (ref 68–131)
FOLATE SERPL-MCNC: 8.4 NG/ML (ref 4–24)
GLUCOSE SERPL-MCNC: 99 MG/DL (ref 70–110)
H PYLORI IGG SERPL QL IA: NEGATIVE
HBA1C MFR BLD: 5.8 % (ref 4–5.6)
HCT VFR BLD AUTO: 41.6 % (ref 37–48.5)
HDLC SERPL-MCNC: 69 MG/DL (ref 40–75)
HDLC SERPL: 29.2 % (ref 20–50)
HGB BLD-MCNC: 13.4 G/DL (ref 12–16)
IMM GRANULOCYTES # BLD AUTO: 0.03 K/UL (ref 0–0.04)
IMM GRANULOCYTES NFR BLD AUTO: 0.4 % (ref 0–0.5)
IRON SERPL-MCNC: 98 UG/DL (ref 30–160)
LDLC SERPL CALC-MCNC: 146 MG/DL (ref 63–159)
LYMPHOCYTES # BLD AUTO: 3.4 K/UL (ref 1–4.8)
LYMPHOCYTES NFR BLD: 43.5 % (ref 18–48)
MAGNESIUM SERPL-MCNC: 2 MG/DL (ref 1.6–2.6)
MCH RBC QN AUTO: 28 PG (ref 27–31)
MCHC RBC AUTO-ENTMCNC: 32.2 G/DL (ref 32–36)
MCV RBC AUTO: 87 FL (ref 82–98)
MONOCYTES # BLD AUTO: 0.5 K/UL (ref 0.3–1)
MONOCYTES NFR BLD: 6.8 % (ref 4–15)
NEUTROPHILS # BLD AUTO: 3.6 K/UL (ref 1.8–7.7)
NEUTROPHILS NFR BLD: 45.5 % (ref 38–73)
NONHDLC SERPL-MCNC: 167 MG/DL
NRBC BLD-RTO: 0 /100 WBC
OHS QRS DURATION: 74 MS
OHS QTC CALCULATION: 449 MS
PHOSPHATE SERPL-MCNC: 3.3 MG/DL (ref 2.7–4.5)
PLATELET # BLD AUTO: 268 K/UL (ref 150–450)
PMV BLD AUTO: 10.2 FL (ref 9.2–12.9)
POTASSIUM SERPL-SCNC: 3.9 MMOL/L (ref 3.5–5.1)
PROT SERPL-MCNC: 7.5 G/DL (ref 6–8.4)
RBC # BLD AUTO: 4.79 M/UL (ref 4–5.4)
SATURATED IRON: 23 % (ref 20–50)
SODIUM SERPL-SCNC: 137 MMOL/L (ref 136–145)
T3 SERPL-MCNC: 105 NG/DL (ref 60–180)
T4 FREE SERPL-MCNC: 0.79 NG/DL (ref 0.71–1.51)
T4 SERPL-MCNC: 7 UG/DL (ref 4.5–11.5)
TOTAL IRON BINDING CAPACITY: 429 UG/DL (ref 250–450)
TRANSFERRIN SERPL-MCNC: 290 MG/DL (ref 200–375)
TRIGL SERPL-MCNC: 105 MG/DL (ref 30–150)
TSH SERPL DL<=0.005 MIU/L-ACNC: 1.36 UIU/ML (ref 0.4–4)
VIT B12 SERPL-MCNC: 825 PG/ML (ref 210–950)
WBC # BLD AUTO: 7.9 K/UL (ref 3.9–12.7)

## 2024-07-09 PROCEDURE — 82306 VITAMIN D 25 HYDROXY: CPT | Performed by: SURGERY

## 2024-07-09 PROCEDURE — 83036 HEMOGLOBIN GLYCOSYLATED A1C: CPT | Performed by: SURGERY

## 2024-07-09 PROCEDURE — 86677 HELICOBACTER PYLORI ANTIBODY: CPT | Performed by: SURGERY

## 2024-07-09 PROCEDURE — 80076 HEPATIC FUNCTION PANEL: CPT | Performed by: SURGERY

## 2024-07-09 PROCEDURE — 84436 ASSAY OF TOTAL THYROXINE: CPT | Performed by: SURGERY

## 2024-07-09 PROCEDURE — 85025 COMPLETE CBC W/AUTO DIFF WBC: CPT | Performed by: SURGERY

## 2024-07-09 PROCEDURE — 93005 ELECTROCARDIOGRAM TRACING: CPT | Mod: S$GLB,,, | Performed by: SURGERY

## 2024-07-09 PROCEDURE — 84443 ASSAY THYROID STIM HORMONE: CPT | Performed by: SURGERY

## 2024-07-09 PROCEDURE — 84480 ASSAY TRIIODOTHYRONINE (T3): CPT | Performed by: SURGERY

## 2024-07-09 PROCEDURE — 36415 COLL VENOUS BLD VENIPUNCTURE: CPT | Performed by: SURGERY

## 2024-07-09 PROCEDURE — 83735 ASSAY OF MAGNESIUM: CPT | Performed by: SURGERY

## 2024-07-09 PROCEDURE — 82607 VITAMIN B-12: CPT | Performed by: SURGERY

## 2024-07-09 PROCEDURE — 93010 ELECTROCARDIOGRAM REPORT: CPT | Mod: S$GLB,,, | Performed by: INTERNAL MEDICINE

## 2024-07-09 PROCEDURE — 80048 BASIC METABOLIC PNL TOTAL CA: CPT | Performed by: SURGERY

## 2024-07-09 PROCEDURE — 84425 ASSAY OF VITAMIN B-1: CPT | Performed by: SURGERY

## 2024-07-09 PROCEDURE — 83540 ASSAY OF IRON: CPT | Performed by: SURGERY

## 2024-07-09 PROCEDURE — 84100 ASSAY OF PHOSPHORUS: CPT | Performed by: SURGERY

## 2024-07-09 PROCEDURE — 84439 ASSAY OF FREE THYROXINE: CPT | Performed by: SURGERY

## 2024-07-09 PROCEDURE — 82746 ASSAY OF FOLIC ACID SERUM: CPT | Performed by: SURGERY

## 2024-07-09 PROCEDURE — 80061 LIPID PANEL: CPT | Performed by: SURGERY

## 2024-07-10 ENCOUNTER — ANESTHESIA (OUTPATIENT)
Dept: ENDOSCOPY | Facility: HOSPITAL | Age: 39
End: 2024-07-10
Payer: COMMERCIAL

## 2024-07-10 ENCOUNTER — ANESTHESIA EVENT (OUTPATIENT)
Dept: ENDOSCOPY | Facility: HOSPITAL | Age: 39
End: 2024-07-10
Payer: COMMERCIAL

## 2024-07-10 ENCOUNTER — HOSPITAL ENCOUNTER (OUTPATIENT)
Facility: HOSPITAL | Age: 39
Discharge: HOME | End: 2024-07-10
Attending: SURGERY | Admitting: SURGERY
Payer: COMMERCIAL

## 2024-07-10 VITALS
SYSTOLIC BLOOD PRESSURE: 130 MMHG | HEART RATE: 70 BPM | BODY MASS INDEX: 39.05 KG/M2 | TEMPERATURE: 98 F | RESPIRATION RATE: 20 BRPM | HEIGHT: 66 IN | DIASTOLIC BLOOD PRESSURE: 82 MMHG | OXYGEN SATURATION: 100 % | WEIGHT: 243 LBS

## 2024-07-10 DIAGNOSIS — E55.9 VITAMIN D DEFICIENCY: Primary | ICD-10-CM

## 2024-07-10 DIAGNOSIS — E66.01 MORBID OBESITY: ICD-10-CM

## 2024-07-10 LAB
B-HCG UR QL: NEGATIVE
CTP QC/QA: YES

## 2024-07-10 PROCEDURE — 37000008 HC ANESTHESIA 1ST 15 MINUTES: Performed by: SURGERY

## 2024-07-10 PROCEDURE — 37000009 HC ANESTHESIA EA ADD 15 MINS: Performed by: SURGERY

## 2024-07-10 PROCEDURE — 43235 EGD DIAGNOSTIC BRUSH WASH: CPT | Performed by: SURGERY

## 2024-07-10 PROCEDURE — 43235 EGD DIAGNOSTIC BRUSH WASH: CPT | Mod: ,,, | Performed by: SURGERY

## 2024-07-10 PROCEDURE — 63600175 PHARM REV CODE 636 W HCPCS: Performed by: NURSE ANESTHETIST, CERTIFIED REGISTERED

## 2024-07-10 PROCEDURE — 81025 URINE PREGNANCY TEST: CPT | Performed by: SURGERY

## 2024-07-10 PROCEDURE — 25000003 PHARM REV CODE 250: Performed by: NURSE ANESTHETIST, CERTIFIED REGISTERED

## 2024-07-10 RX ORDER — GLUCAGON 1 MG
1 KIT INJECTION
Status: DISCONTINUED | OUTPATIENT
Start: 2024-07-10 | End: 2024-07-10 | Stop reason: HOSPADM

## 2024-07-10 RX ORDER — LIDOCAINE HYDROCHLORIDE 20 MG/ML
INJECTION, SOLUTION EPIDURAL; INFILTRATION; INTRACAUDAL; PERINEURAL
Status: DISCONTINUED | OUTPATIENT
Start: 2024-07-10 | End: 2024-07-10

## 2024-07-10 RX ORDER — SODIUM CHLORIDE 9 MG/ML
INJECTION, SOLUTION INTRAVENOUS CONTINUOUS
Status: DISCONTINUED | OUTPATIENT
Start: 2024-07-10 | End: 2024-07-10 | Stop reason: HOSPADM

## 2024-07-10 RX ORDER — ERGOCALCIFEROL 1.25 MG/1
50000 CAPSULE ORAL
Qty: 24 CAPSULE | Refills: 0 | Status: SHIPPED | OUTPATIENT
Start: 2024-07-11 | End: 2024-10-09

## 2024-07-10 RX ORDER — PROPOFOL 10 MG/ML
VIAL (ML) INTRAVENOUS
Status: DISCONTINUED | OUTPATIENT
Start: 2024-07-10 | End: 2024-07-10

## 2024-07-10 RX ADMIN — SODIUM CHLORIDE: 9 INJECTION, SOLUTION INTRAVENOUS at 02:07

## 2024-07-10 RX ADMIN — LIDOCAINE HYDROCHLORIDE 100 MG: 20 INJECTION, SOLUTION EPIDURAL; INFILTRATION; INTRACAUDAL; PERINEURAL at 02:07

## 2024-07-10 RX ADMIN — PROPOFOL 40 MG: 10 INJECTION, EMULSION INTRAVENOUS at 02:07

## 2024-07-10 NOTE — H&P
Short Stay Endoscopy History and Physical    PCP - Rogelio Moreau, DO     Procedure - EGD  ASA - per anesthesia  Mallampati - per anesthesia  History of Anesthesia problems - no  Family history Anesthesia problems -  no   Plan of anesthesia - General    HPI:  This is a 38 y.o. female here for pre-op evaluation prior to bariatric surgery.      ROS:  Constitutional: No fevers, chills, No weight loss  CV: No chest pain  Pulm: No cough, No shortness of breath  Ophtho: No vision changes  GI: no nausea, vomiting, blood in stools  Derm: No rash    Medical History:  has a past medical history of Abnormal Pap smear (2007), Abnormal Pap smear of cervix, Allergy, Eczema, HPV (human papilloma virus) anogenital infection, Hypertension, and Tinea versicolor.    Surgical History:  has a past surgical history that includes Hysteroscopy w/ polypectomy; Cervical biopsy w/ loop electrode excision (2007); Dilation and curettage of uterus (2012); Laparoscopic salpingectomy (Bilateral, 05/29/2023); Hydrothermal ablation of endometrium (N/A, 05/29/2023); Removal of intrauterine device (IUD) (N/A, 05/29/2023); Laparoscopic lysis of adhesions (N/A, 05/29/2023); and Tubal ligation (5/29/23).    Family History: family history includes Cancer in her paternal grandfather; Diabetes in her father, maternal grandfather, maternal grandmother, mother, paternal grandfather, and paternal grandmother; Eczema in her sister and sister; Heart disease in her paternal grandmother; Hyperlipidemia in her mother; Hypertension in her father and mother; Obesity in her father, maternal grandfather, maternal grandmother, mother, paternal grandfather, paternal grandmother, sister, and sister; Sleep apnea in her mother; Stroke in her maternal grandfather.. Otherwise no colon cancer, inflammatory bowel disease, or GI malignancies.    Social History:  reports that she has never smoked. She has never used smokeless tobacco. She reports current alcohol use of  about 5.0 standard drinks of alcohol per week. She reports that she does not use drugs.    Review of patient's allergies indicates:  No Known Allergies    Medications:   Medications Prior to Admission   Medication Sig Dispense Refill Last Dose    amLODIPine (NORVASC) 5 MG tablet Take 1 tablet (5 mg total) by mouth once daily. 90 tablet 3 7/10/2024    ciclopirox (LOPROX) 0.77 % Crea Apply topically 2 (two) times daily. 90 g 2     ciclopirox (PENLAC) 8 % Soln Apply topically nightly. 6.6 mL 11     [START ON 7/11/2024] ergocalciferol (VITAMIN D2) 50,000 unit Cap Take 1 capsule (50,000 Units total) by mouth twice a week. 24 capsule 0     meloxicam (MOBIC) 7.5 MG tablet Take 1 tablet (7.5 mg total) by mouth once daily. 30 tablet 0        Physical Exam:    Vital Signs:   Vitals:    07/10/24 1234   BP: 127/76   Pulse: 75   Resp: 16   Temp: 98 °F (36.7 °C)       General Appearance: Well appearing in no acute distress  Eyes:    No scleral icterus  ENT: Neck supple, Lips, mucosa, and tongue normal; teeth and gums normal  Abdomen: Soft, non tender, non distended with normal bowel sounds. No hepatosplenomegaly, ascites, or mass.  Extremities: No edema  Skin: No rash    Labs:  Lab Results   Component Value Date    WBC 7.90 07/09/2024    HGB 13.4 07/09/2024    HCT 41.6 07/09/2024     07/09/2024    CHOL 236 (H) 07/09/2024    TRIG 105 07/09/2024    HDL 69 07/09/2024    ALT 35 07/09/2024    AST 27 07/09/2024     07/09/2024    K 3.9 07/09/2024     07/09/2024    CREATININE 0.8 07/09/2024    BUN 9 07/09/2024    CO2 22 (L) 07/09/2024    TSH 1.360 07/09/2024    HGBA1C 5.8 (H) 07/09/2024       I have explained the risks and benefits of endoscopy procedures to the patient including but not limited to bleeding, perforation, infection, and death.  The patient was asked if they understand and allowed to ask any further questions to their satisfaction.      Forest Schroeder MD  General Surgery PGY-3

## 2024-07-10 NOTE — ANESTHESIA PREPROCEDURE EVALUATION
[facility]  Anesthesia Pre-Operative Evaluation         Patient Name/: Joann Banuelos, 1985  MRN: [id number]    SUBJECTIVE:     Pre-operative evaluation for Procedure(s) (LRB):  EGD (ESOPHAGOGASTRODUODENOSCOPY) (N/A)     07/10/2024    Joann Banuelos is a 38 y.o. female     Patient now presents for the above procedure(s).    ________________________________________  No results found for this or any previous visit.    ________________________________________    LDA:        Drips:    0.9% NaCl   Intravenous Continuous           Patient Active Problem List   Diagnosis    Obesity, Class II, BMI 35-39.9, no comorbidity    Hypertension    Left hip pain    Chronic hypertension complicating or reason for care during pregnancy, third trimester    Irregular bleeding    Heavy menstrual bleeding    S/p IUD removal/hysteroscopy/D&C/endometrial ablation/laparoscopic BS/SHELLI 23    Prediabetes    Snoring    Left wrist pain    ADA (obstructive sleep apnea)       Review of patient's allergies indicates:  No Known Allergies    Current Inpatient Medications:       No current facility-administered medications on file prior to encounter.     Current Outpatient Medications on File Prior to Encounter   Medication Sig Dispense Refill    amLODIPine (NORVASC) 5 MG tablet Take 1 tablet (5 mg total) by mouth once daily. 90 tablet 3    ciclopirox (LOPROX) 0.77 % Crea Apply topically 2 (two) times daily. 90 g 2    ciclopirox (PENLAC) 8 % Soln Apply topically nightly. 6.6 mL 11    meloxicam (MOBIC) 7.5 MG tablet Take 1 tablet (7.5 mg total) by mouth once daily. 30 tablet 0       Past Surgical History:   Procedure Laterality Date    CERVICAL BIOPSY  W/ LOOP ELECTRODE EXCISION      DILATION AND CURETTAGE OF UTERUS      retained IUD/Endometrial polyp    HYDROTHERMAL ABLATION OF ENDOMETRIUM N/A 2023    Procedure: ABLATION, ENDOMETRIUM, HYDROTHERMAL;  Surgeon: Sushila Dunham MD;  Location: [facility] OR;   Service: OB/GYN;  Laterality: N/A;  NOVASURE    HYSTEROSCOPY W/ POLYPECTOMY      LAPAROSCOPIC LYSIS OF ADHESIONS N/A 05/29/2023    Procedure: LYSIS, ADHESIONS, LAPAROSCOPIC;  Surgeon: Sushila Dunham MD;  Location: [facility] OR;  Service: OB/GYN;  Laterality: N/A;    LAPAROSCOPIC SALPINGECTOMY Bilateral 05/29/2023    Procedure: SALPINGECTOMY, LAPAROSCOPIC;  Surgeon: Sushila Dunham MD;  Location: [facility] OR;  Service: OB/GYN;  Laterality: Bilateral;    REMOVAL OF INTRAUTERINE DEVICE (IUD) N/A 05/29/2023    Procedure: REMOVAL, INTRAUTERINE DEVICE;  Surgeon: Sushila Dunham MD;  Location: [facility] OR;  Service: OB/GYN;  Laterality: N/A;    TUBAL LIGATION  5/29/23       Social History:  Tobacco Use: Low Risk  (7/2/2024)    Patient History     Smoking Tobacco Use: Never     Smokeless Tobacco Use: Never     Passive Exposure: Not on file       Alcohol Use: Alcohol Misuse (3/4/2024)    AUDIT-C     Frequency of Alcohol Consumption: 2-4 times a month     Average Number of Drinks: 1 or 2     Frequency of Binge Drinking: Monthly       OBJECTIVE:     Vital Signs Range:  BMI Readings from Last 1 Encounters:   07/02/24 39.28 kg/m²               Significant Labs:        Component Value Date/Time    WBC 7.90 07/09/2024 0822    HGB 13.4 07/09/2024 0822    HCT 41.6 07/09/2024 0822     07/09/2024 0822     07/09/2024 0822    K 3.9 07/09/2024 0822     07/09/2024 0822    CO2 22 (L) 07/09/2024 0822    GLU 99 07/09/2024 0822    BUN 9 07/09/2024 0822    CREATININE 0.8 07/09/2024 0822    MG 2.0 07/09/2024 0822    PHOS 3.3 07/09/2024 0822    CALCIUM 9.6 07/09/2024 0822    ALBUMIN 4.1 07/09/2024 0822    PROT 7.5 07/09/2024 0822    ALKPHOS 83 07/09/2024 0822    BILITOT 0.4 07/09/2024 0822    AST 27 07/09/2024 0822    ALT 35 07/09/2024 0822    HGBA1C 5.8 (H) 07/09/2024 0822        Please see Results Review for additional labs.     Diagnostic Studies: No relevant studies.    EKG:   Results for orders placed or performed during  the hospital encounter of 07/09/24   EKG    Collection Time: 07/09/24  8:51 AM   Result Value Ref Range    QRS Duration 74 ms    [facility] QTC Calculation 449 ms    Narrative    Test Reason : E66.9,I10,R73.03,G47.33,Z01.818,    Vent. Rate : 071 BPM     Atrial Rate : 071 BPM     P-R Int : 180 ms          QRS Dur : 074 ms      QT Int : 414 ms       P-R-T Axes : 048 029 037 degrees     QTc Int : 449 ms    Normal sinus rhythm  Normal ECG  When compared with ECG of 30-JUL-2009 12:25,  Premature ventricular complexes are no longer Present    Confirmed by Lorna Boo MD ([id number]) on 7/9/2024 4:31:48 PM    Referred By: DANIELLE THOMAS           Confirmed By:Lorna Boo MD       ECHO:  See subjective, if available.      ASSESSMENT/PLAN:                                                                                                                  07/10/2024  Joann Banuelos is a 38 y.o., female.      Pre-op Assessment          Review of Systems  Cardiovascular:     Hypertension                                        Pulmonary:        Sleep Apnea                Hepatic/GI:     GERD     Bariatric surgery status          Endocrine:        Morbid Obesity / BMI > 40      Physical Exam  General: Well nourished, Cooperative, Alert and Oriented    Airway:  Mallampati: II   Mouth Opening: Normal  TM Distance: Normal  Tongue: Normal  Neck ROM: Normal ROM    Dental:  Intact        Anesthesia Plan  Type of Anesthesia, risks & benefits discussed:    Anesthesia Type: Gen Natural Airway  Intra-op Monitoring Plan: Standard ASA Monitors  Induction:  IV  Informed Consent: Informed consent signed with the Patient and all parties understand the risks and agree with anesthesia plan.  All questions answered.   ASA Score: 3  Day of Surgery Review of History & Physical: H&P Update referred to the surgeon/provider.    Ready For Surgery From Anesthesia Perspective.     .

## 2024-07-10 NOTE — TRANSFER OF CARE
"Anesthesia Transfer of Care Note    Patient: Joann Banuelos    Procedure(s) Performed: Procedure(s) (LRB):  EGD (ESOPHAGOGASTRODUODENOSCOPY) (N/A)    Patient location: PACU    Anesthesia Type: general    Transport from OR: Transported from OR on 2-3 L/min O2 by NC with adequate spontaneous ventilation    Post pain: adequate analgesia    Post assessment: no apparent anesthetic complications    Post vital signs: stable    Level of consciousness: awake    Nausea/Vomiting: no nausea/vomiting    Complications: none    Transfer of care protocol was followed      Last vitals: Visit Vitals  /76 (Patient Position: Lying)   Pulse 75   Temp 36.7 °C (98 °F) (Temporal)   Resp 16   Ht 5' 6" (1.676 m)   Wt 110.2 kg (243 lb)   SpO2 100%   Breastfeeding No   BMI 39.22 kg/m²     "

## 2024-07-10 NOTE — PROVATION PATIENT INSTRUCTIONS
Discharge Summary/Instructions after an Endoscopic Procedure  Patient Name: Joann Banuelos  Patient MRN: [id number]  Patient YOB: 1985  Wednesday, July 10, 2024  Ingrid Berman MD  Dear patient,  As a result of recent federal legislation (The Federal Cures Act), you may   receive lab or pathology results from your procedure in your [facility]   account before your physician is able to contact you. Your physician or   their representative will relay the results to you with their   recommendations at their soonest availability.  Thank you,  RESTRICTIONS:  During your procedure today, you received medications for sedation.  These   medications may affect your judgment, balance and coordination.  Therefore,   for 24 hours, you have the following restrictions:   - DO NOT drive a car, operate machinery, make legal/financial decisions,   sign important papers or drink alcohol.    ACTIVITY:  Today: no heavy lifting, straining or running due to procedural   sedation/anesthesia.  The following day: return to full activity including work.  DIET:  Eat and drink normally unless instructed otherwise.     TREATMENT FOR COMMON SIDE EFFECTS:  - Mild abdominal pain, nausea, belching, bloating or excessive gas:  rest,   eat lightly and use a heating pad.  - Sore Throat: treat with throat lozenges and/or gargle with warm salt   water.  - Because air was used during the procedure, expelling large amounts of air   from your rectum or belching is normal.  - If a bowel prep was taken, you may not have a bowel movement for 1-3 days.    This is normal.  SYMPTOMS TO WATCH FOR AND REPORT TO YOUR PHYSICIAN:  1. Abdominal pain or bloating, other than gas cramps.  2. Chest pain.  3. Back pain.  4. Signs of infection such as: chills or fever occurring within 24 hours   after the procedure.  5. Rectal bleeding, which would show as bright red, maroon, or black stools.   (A tablespoon of blood from the rectum is not serious, especially if    hemorrhoids are present.)  6. Vomiting.  7. Weakness or dizziness.  GO DIRECTLY TO THE NEAREST EMERGENCY ROOM IF YOU HAVE ANY OF THE FOLLOWING:      Difficulty breathing              Chills and/or fever over 101 F   Persistent vomiting and/or vomiting blood   Severe abdominal pain   Severe chest pain   Black, tarry stools   Bleeding- more than one tablespoon   Any other symptom or condition that you feel may need urgent attention  Your doctor recommends these additional instructions:  If any biopsies were taken, your doctors clinic will contact you in 1 to 2   weeks with any results.  - Patient has a contact number available for emergencies.  The signs and   symptoms of potential delayed complications were discussed with the   patient.  Return to normal activities tomorrow.  Written discharge   instructions were provided to the patient.   - Resume previous diet.   - Discharge patient to home (ambulatory).  For questions, problems or results please call your physician - Ingrid Berman MD at Work:  [phone].  [facility], EMERGENCY ROOM PHONE NUMBER: [phone]  IF A COMPLICATION OR EMERGENCY SITUATION ARISES AND YOU ARE UNABLE TO REACH   YOUR PHYSICIAN - GO DIRECTLY TO THE EMERGENCY ROOM.  MD Ingrid Kahn MD  7/10/2024 2:43:33 PM  This report has been verified and signed electronically.  Dear patient,  As a result of recent federal legislation (The Federal Cures Act), you may   receive lab or pathology results from your procedure in your [facility]   account before your physician is able to contact you. Your physician or   their representative will relay the results to you with their   recommendations at their soonest availability.  Thank you,  PROVATION

## 2024-07-11 ENCOUNTER — PATIENT MESSAGE (OUTPATIENT)
Dept: INTERNAL MEDICINE | Facility: CLINIC | Age: 39
End: 2024-07-11
Payer: COMMERCIAL

## 2024-07-11 NOTE — ANESTHESIA POSTPROCEDURE EVALUATION
Anesthesia Post Evaluation    Patient: Joann Banuelos    Procedure(s) Performed: Procedure(s) (LRB):  EGD (ESOPHAGOGASTRODUODENOSCOPY) (N/A)    Final Anesthesia Type: general      Patient location during evaluation: PACU  Patient participation: Yes- Able to Participate  Level of consciousness: awake and alert  Post-procedure vital signs: reviewed and stable  Pain management: adequate  Airway patency: patent    PONV status at discharge: No PONV  Anesthetic complications: no      Cardiovascular status: blood pressure returned to baseline  Respiratory status: unassisted  Hydration status: euvolemic  Follow-up not needed.              Vitals Value Taken Time   /82 07/10/24 1515   Temp 36.9 °C (98.4 °F) 07/10/24 1515   Pulse 70 07/10/24 1515   Resp 20 07/10/24 1515   SpO2 100 % 07/10/24 1515         No case tracking events are documented in the log.      Pain/Alberto Score: Alberto Score: 10 (7/10/2024  3:15 PM)

## 2024-07-12 LAB — VIT B1 BLD-MCNC: 43 UG/L (ref 38–122)

## 2024-07-15 ENCOUNTER — CLINICAL SUPPORT (OUTPATIENT)
Dept: PSYCHIATRY | Facility: CLINIC | Age: 39
End: 2024-07-15
Payer: COMMERCIAL

## 2024-07-15 ENCOUNTER — CLINICAL SUPPORT (OUTPATIENT)
Dept: BARIATRICS | Facility: CLINIC | Age: 39
End: 2024-07-15
Payer: COMMERCIAL

## 2024-07-15 DIAGNOSIS — Z00.8 ENCOUNTER FOR PRE-SURGICAL PSYCHOLOGICAL ASSESSMENT: Primary | ICD-10-CM

## 2024-07-15 DIAGNOSIS — Z71.3 DIETARY COUNSELING: Primary | ICD-10-CM

## 2024-07-15 DIAGNOSIS — E66.9 OBESITY (BMI 30-39.9): ICD-10-CM

## 2024-07-15 PROCEDURE — 97803 MED NUTRITION INDIV SUBSEQ: CPT | Mod: 95,,, | Performed by: DIETITIAN, REGISTERED

## 2024-07-15 NOTE — PROGRESS NOTES
The patient location is: home ([place])  The chief complaint leading to consultation is: obesity    Visit type: audiovisual    Face to Face time with patient: 30 min  30 minutes of total time spent on the encounter, which includes face to face time and non-face to face time preparing to see the patient (eg, review of tests), Obtaining and/or reviewing separately obtained history, Documenting clinical information in the electronic or other health record, Independently interpreting results (not separately reported) and communicating results to the patient/family/caregiver, or Care coordination (not separately reported).         Each patient to whom he or she provides medical services by telemedicine is:  (1) informed of the relationship between the physician and patient and the respective role of any other health care provider with respect to management of the patient; and (2) notified that he or she may decline to receive medical services by telemedicine and may withdraw from such care at any time.        NUTRITIONAL Note     Referring Physician: Ingrid Berman M.D.   Reason for MNT Referral: Follow up assessment for sleeve gastrectomy work-up     38 y.o. female presents with self reported 4 lbs weight loss over the past 2 months by making some dietary and lifestyle changes in preparation for bariatric surgery. Still has an on the go lifestyle but has been trying to make better choices. Discussed hidden sugars in coffee, sauces and salad dressing, etc.          Past Medical History:   Diagnosis Date    Abnormal Pap smear 2007    Abnormal Pap smear of cervix      Allergy      Eczema      HPV (human papilloma virus) anogenital infection      Hypertension      Tinea versicolor        CLINICAL DATA:  38 y.o.-year-old Black or  female.  Height: 5 ft. 6 in.  Weight: 238 lbs  IBW: 144 lbs  BMI: 38.4     DAILY NUTRITIONAL NEEDS: pre-op nutritional bariatric guidelines to promote weight loss  5516-4763 Calories     Grams Protein     NUTRITION & HEALTH HISTORY:  Greatest challenge: dining out frequency, sweets, starchy CHO, portion control, snacking at night, irregular meal patterns, and emotional eating     Current diet recall:      B: [employer] - chris iced brown sugar oat milk shaken espresso iced coffee with heavy cream, with the spinach and feta egg wrap or the egg bites OR boiled eggs and ice coffee at home with heavy cream or sweetened creamer  L: working from home (bag salad with benitez bits/chicken with various dressings) OR dining out [employer] (grilled chicken sandwich or barahona salad with grilled nuggets and honey mustard)   D: take out (Monegasque food - chicken in a spicy sauce and small amount rice) OR home cooked (grilled chicken thighs, red beans and rice, jambalaya, seafood pasta in cream sauce, ww spaghetti and meat sauce)     Current Diet:  Meal pattern: 3 meals  Protein supplements: likes Premier shakes  Snacking: trying to reduce (junk food and sweets)  Vegetables: Likes a variety. Eats almost daily.  Fruits: Likes a variety. Eats rarely.  Beverages: water, coffee with sugar, and coffee without sugar  Dining out: Daily. Weekly. Mostly fast food, restaurants, and take-out.  Cooking at home: Weekly. Mostly baked, grilled, smothered meat, fish, (limited starchy CHO), and vegetables.     Exercise:  3-4 days/week with  15 min cardio and heavy weight lifting      Restrictions to exercise: none     Vitamins / Minerals / Herbs:   Vit D Rx     Labs:   Reviewed     Food Allergies:   None known     Social:  Works regular daytime shifts.  Alcohol: Reduced lately to once/week (from wine 1-2 glasses/night)  Smoking: None.     ASSESSMENT:  Patient demonstrated knowledge of healthy eating behaviors and exercise patterns; admits to not eating healthy and not exercising at this point.  Patient demonstrates willingness to change lifestyle and make behavior modifications AEB weight loss, dietary changes, and  exercise.           Barriers to Education: none     Stage of change: determination/action     NUTRITION DIAGNOSIS:    Obesity related to Excessive carbohydrate intake and Excessive calorie intake as evidence by BMI.     BARIATRIC DIET DISCUSSION/PLAN:  Discussed diet after surgery and related to patient's food record.  Reviewed nutrition guidelines for before and after surgery.  Answered all questions.  Continue to review Bariatric Nutrition Guidebook at home and call with any questions.  Work on Bariatric Nutrition Checklist.  Work on expanding variety of vegetables.  Continue cutting back on starchy CHO in the diet.  1200-calorie diet.  1500-calorie diet.  5-6 meals per day.  Begin trying various protein supplements to determine preference.  Start including protein supplements in the diet plan daily.  Reduce frequency of dining out or make better choices (low fat and low carb)  More grocery shopping and meal preparation at home.  Try to be more aware of hidden sugars added to coffee, sauces, salad dresssings.     RECOMMENDATIONS:  Pt is a potential candidate for bariatric surgery - Sleeve.     Follow up in one month.  Needs additional visits with RD to work on dietary and lifestyle changes in preparation for bariatric surgery.     Patient verbalized understanding.        Communicated nutrition plan with bariatric team.     SESSION TIME:  30 minutes

## 2024-07-15 NOTE — PATIENT INSTRUCTIONS
NUTRITION    Before & After  BARIATRIC SURGERY         [facility]  Surgical Weight Loss Program            BARIATRIC CLINIC  [address] [facility] [facility] [address], [address] [place] [address]  Telephone: [phone]; Fax: [phone]    Bariatric Dietitians    Shana Rodriguez    Support Group Meetings  2nd Tuesday of the month 5:30 pm - 6:30 pm via Zoom. Scan QR code below to join.                I. Preparing for Bariatric Surgery    Bariatric surgery is a great tool in helping you lose weight. Your surgeon, advance practice providers, dietitians, nurses, and medical assistants will assist you in preparing for surgery. You are the most important person in making the weight loss successful - by following the nutrition plans before and after surgery, following up with post-operative visits, and establishing lifelong healthy habits.      The Bariatric Diet    What is a Bariatric Diet and Why Should I Follow It?  The bariatric diet is high protein, low-fat, low carbohydrate diet, and reduced calories.  In other words, it prioritizes lean protein, followed by non-starchy vegetables, fruit, and whole grains. Since the stomach is smaller after bariatric surgery - about the size of an egg - it is important to eat nutrient-dense foods. Adequate nutrition helps in the healing of your incisions, preventing gastric discomfort, and in maintaining your nutritional health. Otherwise, you could develop nutrient deficiencies, which may consequently affect your health AND you may not reach the maximal amount of weight loss, or the rate of weight loss may slow down.    Why Protein?  Protein is a very important macronutrient. (The other two macronutrients are carbohydrates and fats). It is in our muscle, organs, blood, bone, skin, hair, and many other parts of the body. Sources of protein include meat, poultry, fish, seafood, beans, nuts, seeds, and dairy. Protein is especially important after bariatric  surgery. Why? Studies have shown that higher protein diets along with calorie restriction can lead to greater weight loss, fat loss, and preservation of lean mass than other diets. Protein can help you feel full and more satisfied than carbohydrates, so you eat fewer calories.     What About Carbohydrates?  Carbohydrates provide fuel for our bodies. There are three types of carbohydrates: sugars, starches (long chains of sugar molecules), and fiber. Your body breaks down sugars and starches into glucose (blood sugar) to provide energy for your cells, tissues, and organs. Sources of carbohydrates include fruits, vegetables, and grains. While preparing for bariatric surgery, we emphasize limiting starchy carbohydrates such as bread, cereals, crackers, pasta, rice, corn, peas, and potatoes and eating non-starchy vegetables, fruit, and low-fat dairy.     What About Fat?  Our bodies need a small amount of fat for absorbing certain vitamins, insulating our bodies, and cushioning our organs. Before and after bariatric surgery, you will want to avoid adding extra calories from fat. That means eating lean proteins, baking, grilling, steaming, boiling, and air frying foods, and using cooking sprays instead of oil. It also means avoiding fatty meats, butter, margarine, lard, shortening, animal fat (e.g. benitez grease), and foods made with hydrogenated oil. These foods include packaged/highly processed foods, baked goods, snack foods, creamers, and fried foods.    To prepare for bariatric surgery, you will need to start following a high protein, low-fat, and low carbohydrate diet NOW. Below are 10 recommendations to help you adapt to the bariatric lifestyle, be ready for life after surgery, and be successful in your health improvement journey. Aim to consume 80 g - 120 g protein and 1200 - 1500 calories per day. That breaks down to 20 g - 30 g protein per meal and 10 g - 15 g protein per snack. Note: This is not a keto  diet.    Include protein/lean meats and non-starchy vegetables in all meals. Fruit can be used as a snack or dessert. Canned fruit in its own juice and water are ok. Avoid fruit in syrup.  Cut out high fat foods like beef/pork benitez and sausages, gravies, fried foods, butter, margarine, whole milk, cream, half & half, and ice cream.   Begin cutting back on starchy vegetables (all potatoes, corn and peas) and grains (all breads pasta, rice, cereal, grits, oatmeal, crackers). Cut out sugary drinks (sodas, energy drinks, sports drinks, sweet tea, lemonade, and fruit juice).   Switch to smaller/salad plates (less than 8 inches across) to help with portion control. Begin limiting carbonated beverages (sodas, seltzer) and caffeine.   Begin replacing one meal with a high protein shake with at least 20 grams of protein and under 4 grams of sugar. Do some type of physical activity at least 3 times a week for 30 minutes. It does not have to be 30 minutes all at once!   Cut out junk foods (baked goods, candies, chips) and eat high protein snacks instead like nuts, cheese, Greek yogurt, and protein bars. Practice taking small bites of food and sips; Practice not drinking while eating - drink liquids 30 mins before and/or after eating.     High Protein Sources  Poultry: skinless chicken or turkey (light/dark), ground (90% lean)  Fish/Shellfish: catfish, clams, crab, crawfish, lobster, salmon, shrimp, squid, tilapia, trout, tuna  Beef: tenderloin, roast (rib, lyle, rump), steak (sirloin, round, cubed, T-bone, flank), ground (90% lean)  Pork: lean ham, [place] benitez, tenderloin, center loin chop, ground (90% lean)  Game: venison, rabbit, duck (skin removed)  Deli meats: turkey, roast beef, ham, chicken  Dairy: low fat, part skim, and fat free cheese (sliced or shredded) mozzarella string cheese, cottage cheese, Greek or low-fat yogurt (less than 10g sugar per serving)  Eggs: Any way you like them!  Beans and Lentils: red,  white, black, lima, black-eyed-peas, chickpeas, hummus  Soy: tofu, edamame  Nuts and Seeds: unsalted, ¼ cup or 2 tbsp nut/seed butter per day  Protein supplements: Protein shakes, drinks, powders, bars, protein chips, and protein soup    How to Prepare Proteins  Remove all visible fat before and after cooking  Bake, broil, boil, roast, grill, air go, slow cook, pressure cook (Instant pot)  Sauté in Kassy, I Can't Believe It's Not Butter, or other cooking spray  NO BREADING or DEEP FRYING      Non-Starchy Vegetables  This is a short list of the variety of vegetables you can eat on a bariatric diet.    Asparagus  Beets  Beet greens  Broccoli  [place] Sprouts  Cabbage  Carrots  Cauliflower (florets, mashed, riced)  Celery Cucumber  Eggplant  Green Beans  Kale  Lettuce/lettuce mixes  Mushrooms  Mustard greens  Okra  Onions  Peppers Snow peas  Spaghetti Squash  Spinach  Squash  Swiss chard  Tomatoes  Zucchini  Zucchini noodles     How to Prepare Non-Starchy Vegetables  Bake, boil, broil, grill, air-go, microwave, raw, roast, steam, stew  Sauté in Kassy, I Can't Believe It's Not Butter, or other cooking spray  NO BREADING or DEEP FRYING  Do not add cream or cheese sauce or butter  Use lemon, vinegar, herbs, and spices for flavor without added calories    Choosing Fluids  All fluids before and after surgery should be sugar-free, non-carbonated, and low calorie - no more than 15 calories per serving. Check the nutrition facts on the back of the bottle/package. Not all diet or low calorie drinks will meet the 15 calorie limit.    Plain water - infuse with lemon/lime/orange, berries, mint leaves, cucumber slices  Flavored cespedes - Propel Zero, Nestle Pure Life Splash, Aquafina Flavor Splash, Hint Water        Coffee or tea - limit to one 12 oz caffeinated drink per day  Diet iced teas - [place] Diet Green Tea, Diet Snapple Tea, Wendie diet green tea  Sugar-free (SF) powders and drops - Crystal Light, Tyler Gerber's  Light, SF Brian-Aid, SF Hawaiian Punch, Dasani Drops, Great Value, Market Pantry, etc.   Sugar-free sports drinks - Powerade Zero, Gatorade Zero, Gatorade G-Fit, Gatorade Zero with 10g protein  Diet juices - diet cranberry juices, Diet V-8 Splash, diet lemonade  Sugar-free Jell-O and sugar-free popsicles, under 15 calories per serving  Low sodium broth, low sodium bone broth    *Try to drink at least 64 oz of fluid per day. That's about 4 bottles*            Low Calorie/Sugar-Free Drinks      Plain water   Infuse with fruit as desired   Non-carbonated flavored water   Unsweetened tea  (no green tea)     Sugar-free drink powders   Sugar-free drink drops   Diet juices     Sugar-free Jell-o   Sugar-free popsicles   Low sodium broths*   * Try bone broth, which has 4 g - 9 g of protein per cup. Also, you can add dry seasonings to broths if you find them bland        High Protein Drinks, Powders, Snacks, and Soups    On the next few pages are examples of acceptable, commonly used protein drinks, powders, snacks, and soups to start including in your diet. There are many more available in stores and online. If you are not sure if a product is okay to use, ask your dietitian.    Ready-to-Drink (RTD) Protein Shakes  When choosing a protein shake, please read the label. Make sure the protein shake has at least 20 grams of protein, no more than 4 grams of sugar per serving, and 100% whey protein (not collagen) or pea or soy protein if plant-based.  Lactose free protein shakes are marked with an *      Boost Glucose Control Max  30 g protein   Ensure Max Protein  30 g protein    Equate High Performance  30 g protein     Aldis Core Power*  26 g protein   Aldis Nutrition Plan*  30 g protein   GNC Total Lean*  20 g protein     Muscle Milk*  30 g protein   Premier Protein  30 g protein   Quest  30 g protein       Plant-Based Protein Shakes (Lactose-free)      Evolve*  20 g protein   Owyn*  20 g protein   Purely Inspired*  20 g  protein     Smoothies      PJ's Coffee  Protein Velvet Ice  21 g protein    Smoothie King Monicadidaquan (without fruit)  20 oz, 45 g protein      Ready-to-Drink Protein Doty      Isopure Zero Carb  32 g protein    Premier Clear  20 g protein   Protein 2.0  20 g protein       Protein Powders  Protein powders can be mixed with water, fat-free/skim or 1% milk, or plain, unsweetened plant-based milks like almond and soy milk. They can also be mixed with sugar-free flavored drinks. Lactose-free protein powders are marked with an *. Remember:    Choose powders that have fewer than 4 grams of sugar per serving  If you are tracking, count the calories and protein from the liquids that you add to your protein powders        Body Fortress  30 g protein       25 g protein   ISOPURE+  20-25 g protein     Ac Robert*+  24 g protein   Muscle Milk*  32 g protein Orgain*  21 g protein     Premier Protein+  30 g protein   Pure Protein  25 g protein   Unjury+  21 g protein   + Available flavored and unflavored    Protein Bars and Protein Chips  Protein bars and protein chips can be a great way to add a tasty snack to your day and meet your protein needs. Try to purchase protein bars with at least 15 g of protein and no more than 4g of sugar. Below are some protein bars that meet these guidelines. Always check the nutrition facts when buying bars. Calorie, protein, and sugar content can change with different flavors and sizes.    Atkins Protein Meal Bar    1.76 oz  190-250 calories  13-17 g protein  1-3 g sugar Zheng Nu Fit Snack Protein Bar    170-190 calories  16 g protein   4 g sugar Barebells Protein Bar    1.9 oz  200 calories  20 g protein  1 g sugar Fit Crunch    1.62 oz  190-200 calories  16 g protein   3 g sugar         Fulfill  1.41 oz  160 calories  15 g protein  1 g sugar No Cow   2.12 oz  190-200 calories  20-22g protein  1 g sugar  One Bar    2.14 oz  220 calories  20 g protein  1 g sugar  Pure Protein Bar    1.76  oz  190-200 calories  20-21 g protein  2-3 g sugar          Quest Hero/Crispy    1.94 oz  15 g protein  1 g sugar Quest Protein Bar    2.12 oz  180-200 calories  15-21 g protein  1 g sugar Think!    2.1 oz  240 calories  20g protein  0 g sugar          Atkins Chips    1.1 oz  140 calories  13 g protein One Puffs    1.05 oz  150 calories  14 g protein Quest Chips    1.1 oz  150 calories  18 g protein        High Protein Soups  High protein soups are available online (see Resources section). If you have hypertension, look for soups with sodium under 20% per serving.      Bariatric Pal  15 g protein   Unjury  21 g protein   Celebrate  15 g protein       Other Tips in Preparing for Bariatric Surgery (see Appendix for detailed information)    Stock your pantry with bariatric-friendly foods   Purge your pantry of high fat, high sugar, and junk foods and drinks   Learn to measure your portion sizes   Read nutrition facts on food packaging   Track your food     Goals in Preparing for Bariatric Surgery     Eating between 80 g - 120 g protein and 7313-6652 calories every day. That breaks down to 20 g - 30 g of protein per meal and 10 g-15 g of protein per snack  Drinking 64 fluid ounces from water and/or sugar-free, non-carbonated drinks every day  Including protein at every meal and snack  Finding a high protein shake/drink/powder to use during the pre- and post-operative liquid diet  Getting into the habit of reading nutrition facts  Getting physical activity at least 90 minutes per week  Shifting how your plate looks with most of it as protein, followed by non-starchy vegetables and fruit     * YOU DO NOT NEED TO LOSE WEIGHT PRIOR TO SURGERY *  However, you must show progress towards these nutrition goals to be approved for surgery.        II. Bariatric Nutrition Core Points and Contract Agreement    Eat a protein at all meals and snacks   Limit starchy carbohydrates (bread, rice, pasta, potatoes, corn, grits, oatmeal,  etc.)  Eat 4-6 small meals per day. Protein drinks should be used for 1-2 of the small meals.  Measure portion sizes. Small plates, bowls and cups make smaller portions look bigger.  Limit eating out; make better choices when eating out (low fat/low carb)  Include fruits and vegetables in the diet DAILY  Avoid/Limit desserts/candy   Low-fat diet (Baked, broiled, grilled, and boiled instead of fried, sautéed, creamed)  Increase activity (walking, swimming, exercise videos)  Limit sugary, caffeinated, and carbonated beverages  Aim for 64 oz. water per day  Limit alcohol  Practice chewing foods thoroughly.  Practice sipping beverages--no chugging or gulping.  No Straws.  NO LIQUIDS 30 MIN. BEFORE, DURING, AND 30 MIN. AFTER MEALS.  Keep food logs and bring to each visit for review AVOID/LIMIT THESE FOODS   High in Fat/Sugar:   High fat milk (whole, 2%)  Butter, margarine, oil (instead use Kassy sprays or I Can't Believe It's Not Butter Spray)   Mayonnaise, sour cream, cream cheese, salad dressing (may use low fat versions of these items)  Ice Cream  Cakes, cookies, pies, desserts  Candy  Luncheon meats (bologna, salami, chopped ham)  Sausage, Coleman  Gravy  Breaded and Fried Foods  Sugary drinks  Alcohol   Starchy Carbohydrates.    White and wheat Bread, muffins, bagels, English muffins, biscuits, buns, rolls, cornbread,  Rice, Pasta   Cereals (including grits, oatmeal)   Crackers, Pretzels, Chips, Granola  Corn, Popcorn, Peas, Quinoa  White Potatoes, Sweet potatoes  Flour and corn tortillas   Practice NOT DRINKING   Carbonated drinks  Using a straw          I have been educated on the above lifestyle and nutrition changes regarding weight loss surgery.  I understand and agree that following these guidelines will help me to lose weight and maintain my health long-term.    Patient Signature ______________________________________   Date ____________________    Dietitian Signature  _____________________________________    CONTACT INFORMATION  Shana Payan, RD, LDN  Milagro Peng, MS, RD, LDN, [facility]  Darby Michael, MS, RDN, LDN    [facility] [address] [place] [address] PHONE: [phone] FAX: [phone]    *Send us a message anytime using your [facility] account*        III. Pre-Operative Liquid Protein Diet    Your surgeon and dietitian will have you start a high protein, low calorie, full liquid diet.   For BMI of 40 or more: you will do 2 weeks of the pre-op liquid diet  For BMI of below 40: you will do 1 week of the pre-op liquid diet unless instructed otherwise by the bariatric team.    On the liquid diet, your caloric intake will be about 600 - 800 calories per day, with a protein intake of  grams per day. This will help you lose weight, decrease the size of your abdomen, and shrink your liver. These changes help to decrease your risk of complications during surgery.      While on the liquid diet, remember:     Clear Liquids            Besides protein drinks, all liquids should be sugar-free, decaffeinated **, non-carbonated fluids, and under 15 calories per 8 oz/1 cup serving  Unlimited sugar free beverages are allowed on pre-op liquid diet  You should drink at least 64 ounces of clear, sugar-free fluids during your pre-operative liquid diet (unless instructed otherwise by your medical providers and/or bariatric team)  Fruit, fruit purees, fruit juices, yogurt, and puddings ARE NOT part of the liquid diet, either alone or mixed into liquids or protein drinks.  Approved low calorie clear liquids include:  Water  Flavored water  Sugar-free, non-carbonated, non-caffeinated drinks  Sugar-free powders and drops  Unsweetened tea  Diet juices  Sugar-free Jell-o  Sugar-free popsicles  Broths/bone broths  Gatorade Zero  Powerade Zero     Soups and Broths        Unlimited amounts of broths are allowed during the pre-op liquid  diet.  If your provider has you on a low sodium diet, please choose low sodium broths.  The only high protein soups allowed on the liquid diet are powdered soups from companies like RadioRx, RealDirect, Bariatric Pal, Unjury. Include protein from high protein soups into your daily protein totals.  Bone broth can be a good source of protein - it can have up to 15 g of protein per cup.  Canned and fresh soups from the grocery stores and restaurants (egg drop, broccoli & cheddar, ellen broth), gumbo or red bean juice ARE NOT Allowed on the bariatric liquid diet.       Tea/Coffee     **Limit caffeinated tea or coffee to one 12 oz serving per day.    Do not drink green tea of any kind.  To vincnet, use a sugar substitute like Splenda, stevia, etc. To lighten, use fat free or 1% milk, unsweetened almond or soy milk, or a sugar-free creamer. Watch the serving size of the milks or creamer! Remember, no more than 15 calories per serving. Or, try using a little protein shake!     Protein     Aim to get  grams of protein per day.  Choose protein shakes with at least 20g of protein, with no more than 4 grams of sugar per 8 oz serving and is 100% whey protein (not collagen protein).  If you prefer or require a plant-based option, please choose soy or pea protein shakes.   If you use protein powder instead of premade protein shakes, mix protein powder with water, skim or 1% milk, unsweetened soy, cashew, or almond milk according to directions on protein product label.   Do not add protein powder to other high protein drinks. Your body can only absorb about 35-40 g protein at a time.  Add flavor to protein drinks using sugar-free drink powders and drops, sugar-free syrups, and extracts. Do not add yogurt, honey, peanut butter, fruit, or vegetables to protein drinks.     Also remember:  Stop taking any vitamins for 1 week prior to surgery.  Stop herbal supplements including green tea and fish oils for 2 weeks prior to  surgery.        Tips to Thriving on the Liquid Diet  Make popsicles out of protein cespedes and protein shakes by pouring the protein drink/shake into a popsicle mold  If you don't have popsicle molds, use a small paper or plastic cup or an empty yogurt container  Pour drink/shake into cup/container  Insert a popsicle stick, spoon, or fork in the cup  Keep the stick in place by punching a hole through plastic wrap or tin foil and covering the cup  Make slushies or frosties out of protein drinks/shakes  Semi freeze protein drinks  Pour into , add ice, blend to desired consistency  Vary your flavor of protein drinks  Include broths and bone broths in your diet; Season broths with herbs and spices if you find them bland  Mix high protein soup with low sodium bone broth for added flavor and protein.          IV. Bariatric Vitamin and Mineral Needs    Bariatric surgery can change the way your body absorbs certain vitamins and minerals. With a smaller stomach, it is also harder for you to get all the nutrients you need through food. After bariatric surgery, it is essential for you to take the following vitamins and minerals for the rest of your life to avoid nutrient deficiency. These can be purchased in stores or online (see below).     The dietitian will call you one week after your surgery and discuss starting your recommended vitamins and minerals.      DO NOT START VITAMINS AND MINERALS UNTIL INSTRUCTED    You can purchase your vitamins and minerals locally ([employer], pharmacy, grocery, etc.) or on the websites below:    https://www.[email or web address] (use verification code [facility] for discount)  https://store.[email or web address].com/collections/[email or web address] (use discount code XTGZH667 for 20% off your first order)  [email or web address]  [email or web address].[email or web address]  [email or web address]://[email or web address]    B-1* (also called Thiamine) or Super B Complex with Thiamine   The following dosing applies to sleeve,  bypass, or VIANNEY-S surgeries:    How Much Do I Need?  50 mg of B-1 (thiamine) per day  The larger the dose, the less often you take it:  Take 25 mg 2 x day; or take 50 mg 1 x day; or take 100 mg every 2 days; or take 250 mg 1 x week; or take 500 mg every 2 weeks    Options:   Dissolve on Tongue   Pill      * We recommend purchasing 250 mg of B-1 and 2500 mcg of B-12 and taking each once per week.      Multivitamin with Iron   DO NOT GET A MEN'S, 50+ OR SILVER MULTIVITAMIN. They do not iron.    How Many Multivitamins Do I Take?   The following dosing applies to sleeve, bypass, or VIANNEY-S surgeries:  If taking a non-bariatric vitamin, take 2 times per day (one in the am and one in the pm)  If you are taking a bariatric vitamin (Barimelt, Bariatric Advantage, Bariatric Pal, etc.), you may only need to take 1 tablet per day.    How Much Iron do I need? Ensure you get at least the iron requirements as listed below:  Men and post-menopausal women:  take at least 18 mg of iron per day  Pre-menopausal women and patients with anemia: take at least 36 mg per day    Options:   Chewable   Pills   Dissolve on Tongue       Calcium Citrate with Vitamin D   *Take Calcium supplement 2 hours apart from multivitamin with iron*    How Much Do I need?  Your body can only absorb 500 - 600 mg of calcium at a time; Space out your daily requirements listed below:  For sleeve and bypass patients: take 1184-5721 mg per day  For VIANNEY-S patients: take 6870-2715 mg per day    Options:   Soft Chew   Liquid   Dissolve on Tongue   Pill       B-12*    Look for sublingual or dissolve on the pill bottles    How Much Do I Need?  500 mcg per day; The larger the dose, the less often you take it.  500 mcg = 1 x day; 1000 mcg = every 2 days; 2500 mcg = 1 x week; 5000 mcg = every 2 weeks    Options:   Dissolve Under Tongue   Drops Under Tongue     * We recommend purchasing 250 mg of B-1 and 2500 mcg of B-12 and taking each once per week.    FOR VIANNEY-S  PATIENTS ONLY- INCLUDE VITAMIN A SUPPLEMENT  Vitamin A    FOR VIANNEY-S PATIENTS ONLY    How Much Do I Need?  10,000 IU/3,000 mcg per day    Options:   Dissolve on Tongue   Drops   Softgels             Post-Op Medication Instructions    Taking Vitamins, Minerals and Prescribed Medications  Continue all prescribed medications upon arrival home from hospital discharge as instructed per your prescribing providers or per discharge instructions from the hospital.  Specific, written instructions about your medicines will be given to you by your nurse upon hospital discharge.    Speak with your primary care doctor or the physician who prescribed your medications if you have any concerns regarding changes in your medications that are not prescribed by the bariatric team.      You may swallow pills smaller than 5 mm or the tip of the pencil eraser right after surgery.     DO NOT swallow pills larger than 5 mm (tip of pencil eraser) for 2 weeks after surgery.  If your medications are larger than 5 mm you will either need crushable, chewable, liquid, or capsule form for 2 weeks after surgery. Review your current medications with the prescribing provider and/or pharmacy to determine if medications are appropriate to crush, open or alter    Extended-release medications should never be altered and are meant to be taken whole.     If you are having a hard time with swallowing pills, split or crush them and try mixing them with unsweetened applesauce    If a change in these medications is needed, please consult with the provider that prescribes these medications so that they can prescribe an alternative form, dose, or medication    DO NOT take gummy multivitamins due to poor quality and sugar content    DO NOT take calcium citrate and iron within 2 hours of each other due to poor absorption    The dietitian will call you one week after your surgery and discuss starting your recommended vitamins and minerals at that time    Take one  vitamin/mineral/medication at a time, spaced 10-15 mins apart        V. Diet Progression after Bariatric Surgery    DIETARY  PHASE TIME FRAME  POST-SURGERY FOODS AND BEVERAGES   1 - LIQUID       Weeks 1 & 2    Day 1-7: 48 oz fluids. Add protein shake when able to drink 48 oz fluids    Day 8-14: 64 oz fluids, 60 g - 80 g protein Water, sugar-free, decaffeinated, non-carbonated beverages  Sugar free Jell-o and sugar-free popsicles, under 15 calories per serving  Skim, 1% milk, unsweetened almond or soy milk   Protein drinks and powders < 4 grams of sugar per serving, high protein soups   2 - PUREE       Weeks 3 & 4    Daily goals:   g protein  64 oz fluids   All Phase 1 liquids plus  Pureed lean meats, seafood, beans, and fruits and vegetables (without peel)  Soft scrambled egg  Nonfat Greek yogurt, low fat/fat free cheese   98% fat free cream soups  Sugar-free pudding   3 - SOFT     1 month after surgery    Daily goals:   g protein  64 oz fluids   All Phase 2 foods and liquids plus  Cooked, fork tender lean meats, fish and seafood  Lean deli meats  Eggs - scrambled, boiled and poached  Raw fruits and cooked vegetables - no peel   4 - SOLID Without Starchy Carbohydrates     2 months after surgery    Daily goals:   g protein  64 oz fluids  800-1000 calories All Phase 3 foods and liquids plus  Raw vegetables  Fruit with peel  Nuts and seeds  Dried meats/jerky and sticks  Protein bars < 4 grams of sugar   NO STARCHY CARBOHYDRATES (breads, pastas, rice, corn, peas, or any potatoes)   5 - REGULAR BARIATRIC   At goal weight    Daily goals   g protein  64 oz fluids   All Phase foods 4 plus up to ½ a serving of starchy carbohydrate (¼ cup of pasta, rice, corn, peas, potatoes or ½ slice of bread, bun,  tortilla per meal)       Phase 1: Bariatric High Protein Liquid Diet  Weeks 1 & 2 Post-Op    Dehydration is the #1 reason patients return to the hospital after being discharged. After surgery, your job is  to drink fluids and walk frequently throughout the day. Use the medicine cups and the fluid tracker you were given at your pre-op visit.    After bariatric surgery, patients need to take very small sips of fluid throughout the day to stay hydrated.  Remember to sip slowly.    Bariatric patients should NOT have alcohol, sugar or sugary drinks, carbonated beverages, caffeine or use a straw for drinking.     In the hospital and upon discharge, measure and document protein and fluid intake on the log sheet (Post Surgery Fluid and Protein Tracker) provided.  Set a reminder or timer to alert you to drink fluids.  The [employer] [place] is a valuable tool and can assist with alerts.    After you leave the hospital, follow the nutrition discharge instructions below and reference the Bariatric Nutrition Guidebook for additional information.    Start sipping water and clear protein drinks (Premier clear, [id number], Isopure Clear, or protein powder with water).  Drink 1-2 ounces or 1-2 medicine cups (30 ml/medicine cup) of clear liquids every 15 minutes while awake and increase as tolerated.   Fluid/clear liquid intake should be a minimum of 48 ounces with a goal of 64 ounces. Increase the amount of fluids as tolerated.      Once you reach 48 ounces of water/clear protein drinks, you will advance to a full protein liquid diet. For the full protein liquid diet, either mix protein powder with skim (1%) milk, unsweetened soy, cashew, or almond milk, according to directions on protein product label, or you can drink premade protein shakes (you may dilute or add water to thin pre-made shakes if needed).     Continue full protein liquid diet for 2 weeks after surgery.  Further diet progression will be discussed at your 2-week post-op appointment- Do not progress unless instructed to do so by the Bariatric team.    A dietitian will call you 1 week post op to see how you are doing. At your 1-week dietitian phone call appointment, your  dietitian will discuss your vitamin regimen and start date. When you are cleared and instructed, follow the instructions on pages 21 regarding pill size, guidelines swallowing medications, and vitamin regimen.       Post-op Liquid Diet Goals       4-8 oz fluids every hour, minimum of 48 oz per day with goal of 64 oz  Add clear protein drinks and advance to the pre-made protein shakes as instructed once you reach 48 oz fluids daily.  Goal of 30 g - 40 g protein per day  Walk as tolerated        8 oz fluids every hour, goal of 64 oz fluids per day  Goal of 60 g - 80 g protein per day  Walk as tolerated       Physical Activity    If your doctor has cleared you for walking or bike riding before your surgery, you can continue this after surgery.  Do not to lift, push, or pull anything heavier than 10 lbs for the first 6 weeks after surgery.      A dietitian will call you at the end of Week 1 to see how you are doing and discuss vitamin regimen and start date. When you come to the clinic for your 2-week follow-up, a dietitian will discuss advancing your diet. Please bring your fluid tracker and your vitamins for review. If you have any questions about your diet or vitamins call the Bariatric Clinic [phone]          Taste and Tolerance Changes After Surgery    You may find that after having surgery, things don't taste the same to you. Drinks may taste too sweet, or water may taste metallic. You may find that flavors you liked before, you no longer enjoy. Here are some common post-op complaints and tips for handling them.    Water has a metallic taste  Add flavoring to water, such as a squeeze of lemon or lime, some fresh fruit (to infuse, not eat!) with crushed mint, or sugar-free powders and drops.     Muscle spasm in stomach after drinking fluids  Your stomach may be sensitive to the temperature of the fluid. If cold fluids cause muscle spasms, try drinking fluids at room temperature or warm. You may need to try  different temperatures until you find what works best. Remember to take small sips!    Drinks taste too sweet   Dilute drinks with water. Or, use less flavoring when mixing sugar-free drinks.    Protein shake causes diarrhea  Some patients may develop lactose sensitivity or intolerance after surgery. Try lactose-free protein shakes and powders such as Fairlife, Muscle Milk, or plant-based protein shakes and powders like Owyn and Evolve.    Protein shake tastes chalky, has an after-taste  Try mixing protein powder with your fluid of choice instead of drinking pre-made shakes. Or, mix unflavored protein powder or fruit-flavored protein powder into sugar-free drinks.     Hunger and Fullness Cues    After surgery, you may not experience feelings of hunger or fullness. Or, your body may use different signs to tell you it is hungry or full.    Hunger  You may not feel hunger or feel like eating. That does not mean you should skip meals. Your body still needs nutrients. Eat a high protein meal, snack, or drink a protein shake every 2-3 hours. You may experience other signs of hunger, like tiredness, or getting hangry. Learn what your new hunger cues are and pay attention to them.    Fullness  You may not feel fullness. That does not mean you should keep eating. Learn to visualize how much food is an appropriate amount, i.e. a palm-sized portion of protein. You may have other signs of fullness, such as nausea, burping, a runny nose (this has happened!), or pressure in your stomach, shoulders, or chest. Learn your new fullness cues and pay attention to them.      Phase 2: Bariatric High Protein Pureed Diet  Weeks 3 & 4    Two weeks after surgery, you may be ready to add pureed foods to your diet.  All food should be the consistency of baby food, or thinner.      Protein First   It is very important to pay attention to protein intake during this time. Meeting protein needs daily will help increase healing, decrease muscle  loss, and increase weight loss. Inadequate protein intake can cause delayed wound healing, hair loss, and muscle break down.    Always eat the foods with the highest protein first. Foods high in protein include lean meat, poultry, fish, seafood, milk, yogurt, cheese, eggs, beans  Eat 3-4 small meals per day (2-4 tbsp each), with protein supplements in between to meet protein needs  Lean meats and seafood can be pureed with a small amount of liquid, such as broth, in a  or  to baby food consistency. No lumps, bumps or stringy bits.    Follow the pureed diet for the next two weeks.      Daily Goals   grams of protein a day through protein supplements and food  64 oz of water or other sugar-free, non-carbonated drink    Tip:  Use a food journal or the [employer] ana maria to track how much you are eating and drinking     Foods and Drinks Allowed Portion size Protein (g)   Sugar-free, non-carbonated, non-caffeinated clear liquids As desired 0   Skim or 1% milk 8 oz 8   Greek yogurt (under 10 g total sugar) 5 oz 12-15   Lean meats, poultry, fish, seafood, pureed 1 oz 6-9   Beans (red, white, black, lima, campos, fat-free refried, hummus) and lentils, soft-cooked ¼ cup 4   Low-fat/fat-free cheese (cottage cheese, mozzarella string cheese, ricotta cheese, Laughing Cow, Baby Bell, cheddar, etc.) ¼ cup 7-8   Scrambled eggs or Egg Beaters 1 or ¼ cup 6   Edamame or tofu, mashed ¼ cup 5   98% fat-free cream soups (add unflavored protein powder if desired) ½ cup 1-2   Sugar-free pudding (add protein or pb powder if desired) ½ cup 0-1   Peanut butter powder* 2 Tbsp 5   * Peanut butter powder contains about 60 calories per serving. Regular peanut butter is 190 calories per serving. Peanut butter powder is sold in the same section as regular peanut butter.       Protein drinks will make up most of your protein for the day  Protein drinks should contain 20-30 g protein and no more than 4 grams of sugar per  serving  Always have protein when you eat  Sip fluids in between meals  If food feels stuck, do not try to push it down with fluids; walk around  *NO MASHED POTATOES, GRITS OR OATMEAL*    Bariatric Pureed Sample Menu  Below is a sample schedule and menu. Note how every hour there is an eating or drinking occasion with a break in between. Adjust this schedule according to your day.     Timing Item Protein (g) Calories   8-8:30am 1 egg or ¼ cup Egg Beaters 6 78   9-9:30am 1 cup water, or decaf coffee or tea 0 0   10-10:30am Protein drink 30 160   11-11:30am 2 tbsp low-fat cottage cheese, and 1 tbsp pureed peaches 3.5 20   12-12:30pm 1 cup water, or sugar-free lemonade  0 0   1-1:30pm 2 tbsp pureed chicken, and 1 tbsp pureed carrots  9 46   2-2:30pm 1 cup water, or sugar-free lemonade 0 0   3-3:30pm Protein drink 30 160   5-5:30pm 1 cup water  0 0   6-6:30pm 1 cup hi-protein creamy chicken soup (see Recipe) 14 120   7-7:30pm 1 cup water, or sugar-free fruit punch  0 0   8-8:30pm 1 cup water 0 0     RECIPE IDEAS for Bariatric Pureed Diet    Hi-Protein Creamy Chicken Soup  (10g protein per 1 cup serving)  Empty 1 can of 98% fat free cream of chicken soup into saucepan. In a bowl, blend 1 scoop of unflavored protein powder with 1 can of skim milk until smooth.  Add protein milk to saucepan and heat to warm. (Note: Do NOT boil. Protein powder may clump if heated too hot).     Hi-Protein Pudding:  (14g protein per ½ cup serving)  Add 2 scoops protein powder to 2 cups cold skim milk and mix well.  Stir in dry Jell-O Sugar-Free Instant Pudding mix.  Chill and Enjoy!    Tuna Mousse   (12g protein per ¼ cup serving) From Eating Well After Weight Loss Surgery.  In a  or , combine all ingredients and pulse until smooth.  2 6-ounce cans tuna packed in water, drained  2 tbsp low-fat mayonnaise  2 tbsp fat-free sour cream  2 tbsp fat-free cream cheese, softened  ½ cup shallots, finely chopped  1 tbsp lemon  juice  ¼ tsp ground pepper  ½ tsp celery seed    Chocolate Peanut Butter Mousse  (28g protein)  Mix 6 oz plain Greek yogurt with 4 tbsp chocolate PB2      Phase 3: Bariatric High Protein Soft Diet   One Month After Surgery    One month after surgery, your stomach may be healed enough to add soft foods to your diet.  Soft foods are those which can be easily mashed with a fork. This diet usually lasts for one month.      Daily Goals   g of protein per day through food and protein supplements   64 ounces of fluids per day from water, sugar-free, non-carbonated drinks, and sugar-free popsicles, and sugar-free Jell-O      No liquids with meals. Do no drink 30 minutes before meals and wait 30 minutes to 1 hour after meals to start drinking  Sip on fluids throughout the day  Chew foods slowly, at least 30 times. A meal should take 20-30 mins to eat  Eat 3-5 small meals per day, without any additional snacking (no grazing)  Stop eating as soon as you feel full  Avoid using sugar of any kind and foods made with sugar, which can cause dumping syndrome and slow down weight loss  Marinating meats with a low sugar marinade, adding low-fat salad dressing, or adding low calorie gravy (made from powder and water) can help meats to digest easier    Adding Vegetables and Fruits  As long as you are consuming at least 80 g total protein daily from combination of foods and protein drinks, you may start adding small bites of fruits and non-starchy vegetables to your meals.     Cooked, tender vegetables and ripe fruits without the peel are tolerated best. Non-starchy vegetables include:    Asparagus  Beets  Beet greens  Broccoli  [place] Sprouts  Cabbage  Carrots  Cauliflower (florets, mashed, riced)  Celery Cucumber  Eggplant  Green Beans  Kale  Lettuce/lettuce mixes  Mushrooms  Mustard greens  Okra  Onions  Peppers Snow peas  Spaghetti Squash  Spinach  Squash  Swiss chard  Tomatoes  Zucchini  Zucchini noodles           Foods to  Eat and Avoid One Month After Surgery    EAT THESE FOODS AVOID THESE FOODS   High in Protein: High in Fat/Sugar:   Canned tuna or chicken packed in water  Ground beef/turkey/pork/chicken (at least 90% lean)  Lean deli meats (turkey, chicken, ham, roast beef)  Skinless turkey or chicken, cooked tender and cut in small pieces  Lean pork or beef, cooked tender and cut in small pieces  Baked, broiled, grilled or boiled fish and seafood (not fried!)  Eggs - scrambled, poached, or boiled  Beans, hummus and lentils  Low-fat or fat-free cottage cheese, soft cheese, mozzarella string cheese, ricotta cheese, Laughing Cow, and Baby Bel cheese  Non-fat/low sugar yogurt (no more than 10 g of sugar per serving)  Sugar-free pudding  Silken tofu, edamame (soybeans) High fat milk (whole, 2%)  Butter, margarine, oil, mayonnaise  Sour cream, cream cheese, salad dressing  Ice Cream and frozen desserts  Baked goods (muffins, doughnuts, sweet rolls, cakes, cookies, pies, desserts)  Candy and chocolates  Luncheon meats and smoked meats (bologna, salami, benitez, sausage)  Breaded and fried foods  Gravy made with a naye    Starchy Carbohydrates*:    All breads (white, wheat, rolls, flat, wraps)  Flour and corn tortillas   All rice, quinoa, and other grains  All pasta and noodles  All cereals (including grits, oatmeal, and granola)   All crackers, pretzels and pretzel crisps  All potatoes, sweet potatoes, and chips  Corn, popcorn, and corn chips, corn puffs  Green/sweet peas   As long as you are getting >80g PRO: Tough/Crunchy   Cooked tender vegetables without peel  Ripe fresh fruits without peel  Frozen fruits with no added sugar  Fruit canned in its own juice or in water Tough or dry meats  Nuts and seeds  Dried fruit and dried coconut  Raw vegetables  Protein bars   Fluids: Always Avoid:   Skim/1% milk, Lactaid, soy milk,   Water and sugar-free beverages (decaf and non-carbonated)  Decaf coffee & decaf tea  Sugary drinks  Carbonated  drinks  Alcohol  Drinking through straws   * At goal weight, you may include whole grains in small amounts.    Sample Menu for Bariatric High Protein Soft Diet    This sample menu provides about 80 g protein, including about 40 g protein from foods and at least 40 g protein from protein drinks.  Drinking protein drinks daily helps decrease muscle loss, increase weight loss, and prevent hair loss.    Time of Day Day 1 Day 2   7:00am-7:30pm    1 egg (or ¼ cup Egg Beaters) ¼ cup low-fat cottage cheese, 1 tbsp berries   8:00am-9:00am Water/SF beverage Water/SF beverage   9:30am-10:30am Protein drink  Protein drink   11:00am-12:00pm Water/SF beverage Water/SF beverage   12:30pm-1:00pm   1-2 oz grilled shrimp, ¼ cup green beans 1-2 oz canned chicken, shredded cheese, 1 tbsp salsa   1:30pm-2:30pm Water/SF beverage Water/SF beverage   3:00pm-4:00pm  Protein drink   Protein drink   4:30-6:30pm Water/SF beverage Water/SF beverage   7:00pm-7:30pm  ½ cup low fat chili, 1oz low-fat cheese, ¼ cup broccoli 2 oz grilled fish,  ¼ cup lima beans   8:00pm-9:00pm Water/SF beverage Water/SF beverage       Sip fluids continuously between meals  No drinking from 30 minutes before meals to 30 minutes after meals  For fluids: 1 cup = 8 oz; For food: ¼ cup = 4 tablespoons   3 oz meat is about the size of a deck of cards  A food scale will help you determine portion size    Tips for Fluids  Avoid sugary drinks  Limit caffeinated beverages to 12 oz per day for the first 2-3 months  Avoid carbonated beverages  Avoid drinking through straws to reduce gas and bloating for at least 3 months      Phase 4: Bariatric High Protein Solid Diet  Two Months After Surgery    Two months after surgery, you may add the following slowly, one by one, back into your diet:    Raw, crunchy non-starchy vegetables  Plain/unsalted nuts and seeds  Dried meats/jerky and meat sticks  Protein bars with no more than 4 g of sugar per serving (see section II for  examples)         g of protein per day, using foods and high protein shakes   800-1000 calories per day  64 ounces of fluids per day from water, sugar-free, non-carbonated drinks, and sugar-free popsicles, and sugar-free Jell-O      Chew foods well. If you have a hard time with certain foods at first, wait a few days and try again  Limit fruit to 2 servings per day. One serving of fruit is 1 small piece of fruit, ½ cup of canned fruit (in juice or water) or ½ cup cubed fruit  Limit nuts and seeds to one serving per day - ¼ cup nuts/seeds or 2 tbsp nut/seed butter    SAMPLE MENU STARTING TWO MONTHS AFTER SURGERY    Time of Day Day 1 Day 2   7am-7:30am:    Egg omelet made with 1 egg and 1 slice low-fat cheese Protein bar   8am-9am:  8 oz water/SF beverage water/SF beverage   9:30am:  1 oz turkey with 1 string cheese ¼ cup unsalted nuts + ½ banana   10:30-11:30am:  8 oz water/SF beverage water/SF beverage   12pm:    Grilled chicken (2 oz) salad with 1 tbsp of low-fat Italian dressing and 1 apple  Taco Lettuce wraps: 1-2 oz of lean ground meat, low fat cheese, salsa wrapped in lettuce leaves   12:30pm-2:30pm:  16 oz water/SF beverage water/SF beverage   3pm:  Protein drink   Light yogurt   3:30-5:30pm:  16 oz water/SF beverage water/SF beverage   6pm:  Grilled shrimp kebobs (2 oz shrimp, pineapple chunks, bell pepper and onions) with ¼ cup sautéed spinach  ½ cup Red Beans (no rice) served over ¼ cup cauliflower rice   6:30pm-8:30pm:  16 oz water/SF beverage water/SF beverage   9pm:  1 tbsp slivered almonds sprinkled over 6 oz Greek yogurt Protein shake     Food to Eat and Avoid Two Months After Surgery    EAT THESE FOODS AVOID THESE FOODS   High in Protein: High in Fat/Sugar:   Poultry: skinless chicken or turkey (light/dark), ground (90% lean)  Fish/Shellfish: catfish, salmon, tilapia, trout, tuna, crab, crawfish, shrimp, etc.  Beef: tenderloin, roast (rib, lyle, rump), steak (sirloin, round, cubed, T-bone,  flank), ground (90% lean)  Pork: lean ham, [place] benitez, tenderloin, center loin chop, ground (90% lean)  Game: venison, rabbit, duck (skin removed)  Deli meats: turkey, roast beef, ham, chicken  Dairy: low fat, part skim, and fat free cheese, Greek or low-fat yogurt (less than 10g sugar per serving)  Eggs: Boiled, omelet, poached, scrambled  Beans and Lentils: any kind, plus hummus and fat free refried beans  Soy: tofu, edamame  Nuts and Seeds: unsalted, ¼ cup or 2 tbsp nut/seed butter per day  Fruits - fresh, frozen, canned in its own juices or water  Vegetables - fresh, frozen, canned without added salt  Protein bars (no more than 4 g sugar)  Sugar-free pudding, popsicles, and Jell-o High fat milk (whole, 2%)  Butter, margarine, oil, mayonnaise  Sour cream, cream cheese, salad dressing  Ice Cream and frozen desserts  Baked goods (muffins, doughnuts, sweet rolls, cakes, cookies, pies, desserts)  Candy and chocolates  Luncheon meats and smoked meats (bologna, salami, benitez, sausage)  Breaded and fried foods  Gravy made with a naye    Starchy Carbohydrates*:    All breads (white, wheat, rolls, flat, wraps)  Flour and corn tortillas   All rice, quinoa, and other grains  All pasta and noodles  All cereals (including grits, oatmeal, and granola)   All crackers, pretzels and pretzel crisps  All potatoes, sweet potatoes, and chips  Corn, popcorn, and corn chips, corn puffs  Green/sweet peas   Fluids: Always Avoid:   Skim/1% cow milk, unsweetened almond and soy milk  Water, sugar-free, and non-carbonated drinks, coffee, tea  Sugary drinks and juice  Carbonated drinks  Alcohol  Drinking through straws   * At goal weight, you may include starchy carbohydrates in small amounts      Phase 5. Bariatric Diet at Goal Weight and Beyond  Follow a high protein, low carb, low fat diet FOR LIFE    Congratulations on reaching your goal weight!  Reaching your goal weight is a great accomplishment. You worked hard to get the weight  off. Keep it off by staying on track with the bariatric lifestyle. Remember the following:    Daily Goals   g of protein per day, through food and high protein shakes.  64 ounces of fluids per day from water, sugar-free, non-carbonated drinks, and sugar-free popsicles and Jell-o.  Calories per day to maintain your weight are based on your age, height, weight, gender, and activity level. Speak with your dietitian for assistance in determining your daily caloric intake.    Adding Grains and Starchy Vegetables  At your goal weight, ask your dietitian about adding grains and starchy vegetables to your diet.  Choose whole grains such as brown rice, whole wheat breads, pastas, tortillas, crackers, old fashioned oatmeal (not instant), and whole grain cereals (Cheerios, Bran Flakes). They provide more fiber, protein, and vitamins than refined (white) grains.  When preparing potatoes (roasted, boiled, mashed, air-fried, etc.), keep the skin on for added fiber.  Keep serving size to ¼ cup.        Protein is the priority. Eat protein foods first before eating vegetables, fruit, or grains.  Aim to eat 3 - 4 oz of protein foods per meal and eat 3 meals per day.  Drink fluids 30 minutes before or 30 minutes after eating.  Snack on high protein foods like Greek yogurt, ¼ cup of unsalted nuts, protein bars, and low-fat cheese  Use protein shakes as a meal replacement or snack.  Limit fruit to 2 per day - small apple, orange, or banana, a handful of strawberries, blueberries, or grapes, or ½ cup of cut fruit.  Limit nuts/seeds to 1 serving per day - ¼ cup of nuts/seeds or 2 tbsp nut/seed butter    Keep Doing the Following  Taking your vitamins - multivitamin with iron, calcium citrate with Vit D, B-1 or Super B-Complex, and B-12  Exercising - cardio and weight resistance or lifting  Checking nutrition labels for protein, fat, sugar, and calories (see Appendix for more info on reading nutrition labels)  Tracking your food,  drink, and exercise with the [employer] maria or on paper  Visiting the bariatric clinic - at least once a year       Sample Menus    Time of Day Food/Drink Protein (g) Calories   7am-7:30am  2 eggs scrambled  12 156   8:00am-9:00am  Water     9:30am-10:00am 3 oz plain nonfat Greek yogurt,   ¼ c blueberries 14  0 83  21   10:30-11:30am Water     12:00pm-12:30pm   3 oz tuna salad with 1 tbsp lite jiang and 6 whole wheat crackers 24  3 146  120   1:00pm-2:00pm Water     2:30pm-3:00pm Protein bar 21 190   3:30-5:30pm Water     6:00pm-6:30pm 3 oz grilled chicken  ¼ cup brown rice  ½ cup steamed broccoli 27  1  0 138  54  10   7:00pm-8:00pm Water     8:30pm-9:00pm Banana nice cream Frozen ½ banana pureed with 1 tbsp PB powder 0  3 55  30   Total  105 1003       Time of Day Food/Drink Protein (g) Calories   7:00am-8:00am  Protein shake 30 160   8:30am-9:30am Water     10:00am-10:30am 1 oz sliced turkey  1 oz mozzarella string cheese 7  7 35  80   11:00am-12:00pm  Water     12:30pm-1:30pm   1 oz whole wheat spaghetti   3 oz ground turkey (90% lean)  ¼ cup marinara sauce no added sugar 4  20  2 90  143  60   2:00pm-3:00pm Water     3:30pm-4:00pm ¼ c unsalted peanuts  ½ banana 7  0 160  55   4:30-5:30pm:  Water     6:00pm-6:30pm  3 oz grilled shrimp skewers  ¼ cup grilled pineapple  ½ cup cauliflower rice 18  0  0 84  20  10   7:00pm-8:00pm  Water     8:30pm-9:00pm 3 oz plain nonfat Greek yogurt,   ¼ c blueberries 14  0 83  21   Total  109 1001         VI. Physical Activity        Physical activity and exercise are essential to achieve and maintain weight loss and to build and maintain lean muscle. Activity and exercise beginning right after your surgery will help you feel better, recover faster and reduce the risk of complications. Staying active promotes mental well-being, relieves stress and reduces feelings of depression and anxiety. You feel good about your body when you exercise regularly, and therefore have a healthier  body image.     Choose a form of activity or exercise that you enjoy   Try mall walking, aerobics, swimming, aqua aerobics, or dancing    Ask a friend or family member to join in an activity or exercise with you. Use the [employer] system!   Lift some weights or use resistance machines and bands to tone muscle and help retain muscles    Join an exercise club or fitness class   When shopping, park farther from the entrance and walk    Stay hydrated by drinking water before and during exercise   Prioritize activity and exercise time into your schedule    Listen to your favorite music or podcase as you exercise   Keep a record or journal of your activity    Use stairs instead of the elevator or escalator   Aim for 30 minutes of physical activity, 3 times per week. It does not have to be 30 mins all at once         VII. Common Nutritional Problems After Surgery and Prevention Tips     Contact the Bariatric Clinic if you need help with any of these problems after surgery.      Gas and Bloating  Constipation    Cause: Digestive tract changes after surgery  Prevention tips: Eat slowly, avoid overeating, avoid carbonated beverages and drinking through straws.  Try switching to lactose-free protein drinks. Try using Gas-X chewables  Causes: Food and fiber intake are reduced following surgery  Prevention tips: Drink at least 48 ounces water daily in addition to protein drinks; Exercise daily; Try a laxative such as MiraLAX or a stool softener like Dulcolax. Do not use a fiber supplement like Metamucil          Nausea and Vomiting  Temporary Hair Loss    Causes: Overeating or eating too quickly  Prevention tips: Eat slowly, chew your food very well, and stop eating as soon as you feel full  Causes: Rapid weight loss and/or lack of protein in the diet  Prevention tips: Eat the amount of protein recommended by your dietitian          Dehydration  Dumping Syndrome    Causes: Not drinking enough fluids; or persistent vomiting and/or  diarrhea  Symptoms: Dark and strong-smelling urine, dry mouth, headache and fatigue  Prevention tips: Take frequent sips of liquid throughout the day, eat sugar free Jell-o and popsicles  Causes: Food emptying too quickly from the stomach, due to excess sugar or fat  Symptoms: Diarrhea, nausea, cold sweats and light-headedness  Prevention tips: Avoid consuming sugary foods or beverages, eating high fat foods, drinking fluids too soon after a meal          Lactose Intolerance  Chronic Malnutrition    Causes: Change in ability to digest lactose  Symptoms: Gas, bloating, cramping and diarrhea after drinking milk or whey-based protein shakes  Prevention tips: Switch to lactose-free milk, unsweetened almond or soy milk. Refer to lactose-free protein supplement suggestions in this booklet  Causes: Changes in nutrient absorption after surgery, insufficient supplementation   Symptoms: Fatigue, aching muscles, tingling feet, calves or hands  Prevention tips: Eat a healthy diet; always take your supplements, check with a Dietitian that you are taking the correct supplements   Page created by 2024 [facility] Dietetic Intern Nguyen Kohler VIII. Frequently Asked Questions    Q: When can I have caffeinated coffee or tea after surgery?  A: When you can comfortably drink 48 oz of clear fluids, you can have one cup of caffeinated coffee or tea    Q: When can I have alcohol after surgery?  A: You may try alcohol one year after surgery. Be aware that alcohol will affect you very quickly after bariatric surgery. If you do, we suggest sticking to single ingredient drinks like beer, wine, and not mixing spirits with juices and carbonated drinks.    Q: Why can't I use a straw after surgery?  A: Three months after surgery, you may attempt to use a straw. You may find that it fills your stomach with air and cause nausea. In that case, stop using the straw.    Q: When can I start drinking carbonated beverages after surgery?  A: 3 months  after surgery, you may attempt to drink a carbonated beverage. You may find that it fills your stomach with air and cause nausea, bloating, or other discomfort. In that case, stop.    Q: Why do I have to take vitamins for the rest of my life?  A: Bariatric surgery can change the way your body absorbs vitamins and minerals. Plus, with a smaller stomach, you will not be able to get enough nutrients through food.     Q: When can I go back to work?  A: We recommend not returning to work for at least 1 week after surgery, preferably 2 weeks. In the first few days after surgery, you may experience symptoms like nausea, vomiting, diarrhea, or constipation. You also want to give your body time to adjust to your new stomach.    Q: Can I have mashed potatoes, grits, or oatmeal during the pureed phase?  A: No. These are starchy carbohydrates. Starchy carbs expand in your stomach and may cause pain. They do not help with weight loss and they are easy to overeat.    Q: How many carbs can I have?  A: There is not a set amount of carbs in the bariatric lifestyle. Rather, it is more important to focus on the type of carbs than the number of grams of carbs per meal or per day.    Q: When can I have surgery to remove the extra skin?  A: 2 years after bariatric surgery. Note that insurance does not pay for skin removal for cosmetic reasons. If excess skin removal is medically necessary (for example, you develop a rash under the extra skin around your belly), insurance may cover it.            Log food and drink and feelings of hunger and fullness before and after consuming    Base meals around lean protein and vegetables    Eat two servings of fruits and 3 servings of vegetables every day    Eat consistently throughout the day, every 3-4 hours    Go out when you want a treat instead of having it in the house    Include protein in all meals and snacks    Consume low fat/fat free dairy products    Drink water and low calorie drinks     Turn off electronics while eating    Plan meals and snacks   Page created by 2024 [facility] Dietetic Intern Paola Mccarthy IX. Lifelong Nutrition Guidelines After Bariatric Surgery     Safe and successful weight loss after bariatric surgery requires you to make a commitment to living the bariatric lifestyle. Remember to do the following:    Prioritize Protein   Eat Protein Foods First.  Protein is necessary to help you maintain lean body mass as you lose weight.    80 g - 120 g of protein per day, through food and protein supplements  Make half your plate protein, followed by non-starchy vegetables, then fruit or a small serving of starchy carbohydrate when you reach your goal weight     Avoid high fat, high sugar foods and drinks  These can cause diarrhea and stomach discomfort, or the dreaded Dumping Syndrome!  Keep protein shakes, powders, drinks, and bars to no more than 4 g of sugar per serving  Diets high in fat and sugar can lead to weight regain    Be Mindful About Eating    Eat slowly, chew foods thoroughly,   eat and drink small amounts at a time    Listen to Your Body for hunger and fullness cues. You may not feel hunger, but you may feel hangry or tiredness.  Stop eating when you feel 80% full. If you are unable to recognize fullness, learn how much you can eat before you start to feel nauseated or vomit.    Consume only the quantity of food recommended. Eating or drinking too much may eventually stretch your pouch and prevent you from achieving optimal weight loss.      Be Active  Engage in some type of physical activity at least 30 minutes, three times per week. The 30 minutes do not all need to be at the same time   Include weightlifting/resistance to help build and preserve lean muscle    Stay Hydrated  Drink 64 oz or more of fluids daily of sugar-free, non-carbonated beverages under 15 calories per 8 oz serving  Drink fluids 30 minutes before or after eating, not at the same time    Take Your  Vitamins and Minerals  Multivitamin with iron, calcium citrate with vitamin D, B-1, and B-12 (include Vit A if you had VIANNEY-S surgery)  Take multivitamin with iron and calcium at least two hours apart from each other        Go to Your Bariatric Follow-up Visits  2 weeks, 8 weeks, 6 months, and every year after surgery  See a dietitian at every time!   Attend monthly bariatric support group meetings. First Tuesday of every month, 5:30 pm - 6:30 pm. Scan this QR code to join!            Contact Your Dietitian Anytime   Questions about the bariatric lifestyle  When weight loss stalls  Questions about foods, drinks, vitamins & minerals, recipes  Things you see on social media that you have questions about  Message your dietitian through the patient portal or call the Bariatric Clinic at [phone] x. Resources for Bariatric Patients        Smart Phone Apps  Practice Management e-Tools. Available for iPhone and Android. After signing in, enter [facility] Code 05378  VersusPal  Diabetes logbook by Sophie (track blood sugar, upload meal pics)      Shopping Websites  https://[email or web address]/ (use verification code TYRONNER for discount)  https://[email or web address]/[email or web address]-vitamins (use discount code UIJHC212 for 20% off your first order)  https://celebratevitamins.[email or web address]  https://www.bariatricfusion.com/  https://unjury.com/    Websites   www.oa.org Overeater's Anonymous is a community of people who support each other in order to recover from compulsive eating and food behaviors. Online, in-person, and telephone meetings available  www.calorieking.com Online food database to find out the calories, protein, sugar, fat, fiber, etc. of any certain food. Helpful when tracking protein and calories on paper.  www.bariatriceating.com A website started by a bariatric surgery patient. Includes recipes, advice, and an online store      Books  Before & After: Living & Eating Well After Weight  Loss Surgery by Michaelle Orlando, 2012  Eating Well After Weight Loss Surgery: Over 150 Delicious Low-Fat, High-Protein Recipes to Enjoy in the Weeks, Months, and Years after Surgery by Kenyetta Haskins & David Gu, 2018  Weight Loss Surgery for Dummies by Rosie Noonan MD, FACS, 2012  Exodus from Obesity: The Guide to Long-Term Success After Weight Loss Surgery by Ivette Bonilla, 2003.  Weight Loss Surgery: Finding the Thin Person Hiding Inside You by Mary Kay Amos, 2008.  The Don't Diet Live It Workbook by Andrea Wachter and Rosa Jackson, 1999  The Overeater's Journal: Exercised for the Heart, Mind, and Soul by Samantha Barber, 2004  Moving Away from Diets: Healing Eating Problems and Exercise Resistance, 2nd Edition by Syl Chavez and Agnes Bowden, 2003  50 Ways to Soothe Yourself Without Food by Michaelle Aj PSY.D., 2009        XI. Appendix    What to Stock    An empty kitchen can be an obstacle to your weight loss journey. When it's mealtime and you don't have healthy options on hand, it's tempting to skip meals or head to the drive-through. Keeping your kitchen stocked with easy-to-prepare meals and snacks will help you make good choices and stay on track with your weight loss goals.     Pantry Valentin   Canned/Benigno Goods Cooking Valentin Snacks/Dry Goods   Non-starchy vegetables (with no salt added, if possible)  Beans and fat free refried beans  Chicken/tuna/salmon packed in water  Soups (98% fat free)  Fruits (in 100% juice or no sugar added)  Olives and pickles Herbs and spices  Seasoning mixes (low sodium taco, Italian, Ranch)  Cinnamon/Pumpkin pie spice  Olive oil and sesame oil  Vinegars and cooking elizabeth   Low sodium broth and stock  Light soy sauce  Sugar substitutes and sugar free syrups Nuts, nut butters, PB powder  Pumpkin/sunflower seeds  Protein powders and bars with 0-4 g sugar  Sugar free pudding and Jell-o mixes  Dry pasta made from chickpeas or lentils  Dry beans and  lentils  Low sugar tomato sauce   Protein chips (such as Quest)     Freezer Staples   Proteins Vegetables Fruits   Fish fillets (not breaded or battered)  Shrimp  Chicken  Turkey or veggie patties  Turkey sausage or chicken sausage  Edamame (soybeans) Broccoli, cauliflower (including cauli-rice and cauli-mash), green beans, squash/zucchini (including zoodles)  Onion/bell pepper/celery seasoning  Spinach/greens  Stir go blends Berries  Clements  Banana chunks  Pineapple  Cherries       Refrigerator Valentin   Proteins Fruit, Vegetables, Drinks Condiments   Protein shakes/cespedes  Chicken/turkey/lean meats  Fish/seafood  90% lean ground beef/turkey   Lean deli meats  Eggs/Egg Beaters  Cottage cheese (low fat/fat free)  Greek yogurt (under 10 g sugar)  Low fat cheeses Fresh fruits  Fresh vegetables  Raw veggies, lettuces - washed and chopped  Avocado  Hummus   Milk (skim or 1%)  Unsweetened almond/soy milk  Pitcher of sugar-free flavored beverage  Low fat jiang  Mustard  Salsa  Sugar free flavored coffee creamer   Sugar free BBQ sauce, ketchup, and Mariposa sauce   Lite/fat-free salad dressing         Pantry Purge    It is important to rid your surroundings of foods that can derail your weight loss journey. They can keep you from losing weight and worsen any medical conditions you may have. A pantry purge includes the cupboards, refrigerator, freezer, garage, deep freezer, shed, and car - anywhere you keep food. Remember: When in doubt, throw it out.    What to Purge    High Fat Foods  Anything that has more than 20% of the daily value for fat should be tossed. These may include:   Meats in a can like meat spreads, SPAM, Kiera sausages, and little smokies  Luncheon/cured/smoked meats like bologna, salami, hot dogs, and sausages  Frozen meals, including pizza  Ground meat less than 90% lean, ribs, benitez, and fatty meats  Bakery items like cakes, brownies, muffins, sweet rolls and buns, doughnuts, pies, and cookies  Whole fat  dairy like milk, cheese, yogurt, pudding, and ice cream.    High Sugar Foods  Anything that has more than 10% of the daily value for added sugar should be tossed. These may include.  Breakfast pastries like muffins, doughnuts, and Danishes  Bakery and desserts items like cakes, brownies, sweet rolls and buns, pies, and cookies, and ice cream  Patricia and chocolates  Fruit juices, sweetened tea, lemonade, cold drinks, flavored coffee creamers, flavored yogurt, jams, jellies, preserves, and dried fruits    High sodium foods  Anything that has more than 20% of the daily value for sodium should be limited, especially if you have hypertension. These may include:   Meals in a box such as jambalaya mix, Rice-A-Dameon, macaroni & cheese  Meals in a can like Chef Rodriguez, canned soups (even the ones that claim to be healthy), canned stews, meat spreads, SPAM, Whiteoak sausages, little smokies, and canned chili  Ramen noodles and Cup O' Soup  Luncheon/cured/smoked meats like bologna, salami, hot dogs, sausages, and pickled pork  Frozen meals, including pizza      Your Handy Portion Size Guide    You can use measure cups, spoons, and a kitchen scale to determine the portion size of your foods. Or, you can use this handy guide to estimate your portion sizes.     Palm of hand ? 1 serving of protein        A closed fist ? 1 serving of soup, salad greens          A cupped hand ? 1 serving of fruit, vegetables, grains          Tip of Thumb ? tsp of oil or sweetener     A thumb ?1 tbsp of cheese, dressings, spreads             Reading Nutrition Facts    When choosing foods, it's always a good idea to look at the nutrition facts label on the back of the food and drink packages. The label will help you choose foods that contain more of the nutrients you want to get more of and less of the nutrients you may want to limit.      Nutrients to get less of: Saturated Fat, Sodium, and Added Sugars.  Eating too much saturated fat and sodium is  associated with an increased risk of developing and worsening some health conditions, like cardiovascular disease and high blood pressure. Consuming too much added sugars can make it hard to meet important nutrient needs while staying within calorie limits.    Total Sugars includes sugars naturally present in many nutritious foods and beverages, such as sugar in milk, fruit, and vegetables as well as any sugars that were added in making in the product.     Added Sugars include any caloric sweeteners that are added during the processing of foods. Added sugars include: sugar, honey, molasses, agave nectar, syrups, malts, and concentrated fruit and vegetable juices (see next page for a longer list).      Nutrients to get more of: Dietary Fiber, Vitamin D, Calcium, Iron, and Potassium.  Eating a diet high in dietary fiber can increase the frequency of bowel movements, lower blood glucose and cholesterol levels, and reduce calorie intake. Diets higher in vitamin D, calcium, iron, and potassium can reduce the risk of developing osteoporosis, anemia, and high blood pressure.      A Word About Sugar and Sweeteners    We know to look at the nutrition facts for sugar. We can also look at the ingredients list to see if there is sugar in a food or drink. Did you know there are dozens of names for sugar?         Anything that is a juice, syrup, or malt is sugar. Anything that ends in -ose is sugar. Anything that ends in -neil is sugar alcohol. Sugar alcohols are fewer in calories than sugar itself. But can also cause diarrhea if too much is eaten at one time.      Added Sugar  In addition to looking at the nutrition facts for added sugar, you can look at the ingredients list to find added sugars. Compare regular Yoplait (red container) and Yoplait Light (blue container).          Sample Nutrition Facts of Protein Bars  Which protein bar is a good choice for a bariatric diet?     Be a food ! Not all protein bars are a  good choice on the bariatric diet. The [place] bar does have protein (10 g). It also has more fat and sugar than the Quest protein bar.         Eat This, Not That    Protein-rich, lower fat, and lower sugar substitutes for commonly eaten foods.            Pre-Operative Sample Meal Plan     Below is a sample 7-day meal plan to give you ideas on the kinds of meals and snacks to include in your diet before surgery. Each day's menu provides  g protein per day and 4136-2667 calories. Remember to drink sugar-free fluids throughout the day.    MENU # 1   Breakfast: 2 eggs, 1 turkey sausage pita, 1 apple   Snack: P3 protein pack (Avoid P3 with dried fruits and chocolate)   Lunch: 2 meat & cheese roll-ups (sliced turkey, low-fat sliced cheese, mustard), 12 baby carrots dipped in 1 Tbsp natural peanut butter   Mid-Day Snack: ¼ cup unsalted almonds, ½ cup fruit   Dinner: 1 chicken thigh simmered in tomato sauce + 2 Tbsp mozzarella cheese, ½ cup black beans, ½ cup steamed carrots   Evening Snack: ½ cup grapes with 4 cubes low-fat cheddar cheese           MENU # 2   Breakfast: 200 calorie protein drink (20-30 g protein, no more than 4 g sugar)  Mid-morning snack: ¼ cup unsalted nuts, medium banana   Lunch: 3 oz tuna or chicken salad made with 2 tbsp light jiang, over salad with tomatoes and cucumbers.   Snack: Low-fat cheese stick   Dinner: 3 oz hamburger pita, slice of low-fat cheese, 1 cup yellow squash and zucchini sauteed in nonstick cook spray  Snack: 6 oz light yogurt           Menu #3   Breakfast: 5-6 oz plain Greek yogurt + fruit (½ banana OR ½ cup fruit - pineapple, blueberries, strawberries, peach), may add Splenda to vincent   Lunch: Grilled chicken breast w/slice low-fat pepper tegan cheese, ½ cup grilled/baked asparagus and small salad with Salad Spritzer.   Mid-Day snack: 200 calorie protein drink (20-30 g protein, no more than 4 g sugar)  Dinner: 4 oz grilled fish, ½ cup white beans, ½ cup cooked  spinach   Evening Snack: Fudgsicle no sugar added           Menu # 4   Breakfast: 1 scoop vanilla protein powder + 4 oz skim milk + 4 oz coffee   Snack: Protein bar under 4 g sugar and under 200 calories   Lunch: 2 lettuce tacos: 3 oz seasoned ground turkey wrapped in solo lettuce leaves with 1 tbsp shredded cheese and dollop low-fat sour cream   Snack: ½ cup cottage cheese, ½ cup pineapple chunks   Dinner: Shrimp omelet: 2 eggs, ½ cup shrimp, green onions, and shredded cheese           Menu #5   Breakfast: 1 cup low-fat cottage cheese, ½ cup peaches (no sugar added)   Snack: 1 apple, 4 cubes cheese   Lunch: 3 oz baked pork chop, 1 cup okra and tomato stew   Snack: ½ cup black beans + salsa + dollop light sour cream   Dinner: Caprese chicken salad: 3 oz chicken breast, 1 oz fresh mozzarella, sliced tomato, 1 tbsp olive oil, torn fresh basil leaves or dried basil   Snack: Sugar-free popsicle           Menu #6   Breakfast: ½ cup part-skim ricotta cheese, 2 tbsp sugar-free strawberry preserves, sprinkle of slivered almonds   Snack: 1 orange + string cheese  Lunch: 3 oz canned chicken, 1 oz shredded cheddar cheese, ½ cup black beans, 2 Tbsp salsa   Snack: 200 calorie protein drink   Dinner: 4 oz grilled salmon steak, over mixed green salad with 2 tbsp light dressing           Menu #7  Breakfast: Crust-less quiche (bake 1 egg mixed w/ low fat cheese, broccoli and low sodium ham in a muffin cup until cooked inside)  Snack: 1 light yogurt  Lunch: Protein shake   Snack: Small apple w/2 tbsp PB2 (powdered peanut butter)  Dinner: 2 ground turkey meatballs w/low sugar marinara sauce, ½ cup spiralized zucchini (sauteed on stove top w/nonstick cook spray)              Tracking Your Nutrition Intake    Studies show that people who track their food intake and physical activity are more successful in their weight loss than those who do not. Tracking takes just a few minutes a day and is very helpful in keeping you on  course.    There's an ana maria for that  We use [employer], a free ana maria available for both Apple and Android. After downloading the ana maria and signing up, use program code [id number] to access the ana maria tailored for [facility] bariatric patients. In addition to the tracking features, on the ana maria you can set reminders, view the bariatric video, and access all of the nutrition information in this booklet.     Paper and Pen  You can also choose to track with paper and pen. You can buy a food journal online, print food log sheets off the internet, or make your own in a notebook. On the last page of this booklet is a sample food log. In filling in the food log, refer to the nutrition label on the food you eat for the calorie and protein info. You may need to look up food items online if a nutrition label is not available.          Protein Foods List  Use these lists as a guide to your protein and calorie intake. You can also look up foods online at websites such as  [email or web address] [email or web address] BANDS/us/en/    Abbreviations: SF=sugar free, RF = Reduced fat, LF=low fat, FF= fat free, g=grams    Meat and Fish*   Food Name Portion Calories Protein (g)   Beef, ground 90% lean 1 oz 50 6   Beef, roast 1 oz 46 8   Beef, steak, sirloin, fat trimmed 1 oz 55 9   Catfish, broiled or baked 1 oz 30 5   Chicken, white breast w/o skin 1 oz 46 9   Chicken, leg w/o skin 1 oz 54 7   Crab, steamed ¼ cup 40 9   Crawfish tails, boiled ¼ cup 35 8   Egg 1 each 78 6   Ham, lean 95% 1 oz 44 7   Pork, tenderloin 1 oz 46 7   [place], baked 1 oz 52 7   Shrimp, steamed 1 oz 28 6   Tilapia, white fish, cooked 1 oz 36 8   Trout 1 oz 48 7   Tuna, canned in water 1 oz 37 8   Turkey, white meat 1 oz 35 7   Turkey, ground 93% lean 1 oz 38 6   Turkey, breakfast sausage link 1 oz 55 5   Tur[place], smoked rope 1 oz 50 4   Veal Loin 1 oz 50 7   *A serving of cooked meat/fish is 3 oz = size of deck of cards    Beans   Food Name Portion Calories Protein (g)   Black beans ¼ cup 60 4   Great  northern (white) beans  ¼ cup 50 3   Red beans ¼ cup 56 4   Refried beans, fat free ¼ cup 65 4   Tofu ¼ cup 47 5     Dairy   Food Name Portion Calories Protein (g)   Baby Bel cheese 1 piece 70 5   Cheese, American FF 1 oz 40 6   Cheese, cottage 1% fat ¼ cup 41 7   Cheese, Fiesta Blend/Mexican, RF ¼ cup 80 7   Cheese, mozzarella, string 1 stick 80 7   Cheese, parmesan, grated 2 tsp 20 2   Cheese, ricotta, part skim ¼ cup 90 8   Cheese, [place] Ultra Thin Cheddar 1 slice 45 3   Laughing Cow cheese 1 wedge 30 2   Yogurt, light 5 oz 80 5   Yogurt, Greek nonfat/light 5 oz  12-15     Snacks   Food Name Portion Calories Protein (g)   Almonds, unsalted ¼ cup 160 6   Cashews, unsalted ¼ cup 160 5   Chickpeas, roasted ½ cup 70 4   Beef jerky, original 1 oz 80 10   [place]amame, shelled ¼ cup 50 4   Hummus ¼ cup 100 5   Peanuts, unsalted ¼ cup 160 7   Peanut butter, unsalted, creamy 2 tbsp 180 8   Pudding, sugar free 1 serving 60-70 1-2   Quest Chips 32 g 140 19-20   Sunflower seeds, unsalted ¼ cup 170 6   Turkey jerky, original 1 oz 70 12     Drinks   Food Name Portion Calories Protein (g)   [place] milk, unsweetened 1 cup 30 1   Cow's milk, skim or 1% 1 cup 90 8   Cow's milk, Fairlife non-fat 1 cup 80 13   Soymilk, unsweetened 1 cup 80 7         Plant Based Meats   Food Name Portion Calories Protein (g)   Beyond Burger ½ pita 115 10   Beyond Beef (ground) 2 oz 115 10   Beyond Sausage 1 link 190 16   Beyond Meatballs 3 each 114 11   Beyond Breakfast Sausage 2 each 180 11   Beyond Beef Crumble ½ cup 90 14   Beyond Beef Jerky ½ cup 90 10   Impossible Beef (ground, burger) 2 oz 115 9.5   Impossible Sausage 2 oz 130 7   Gardein Be'f Burger 1 pita 130 14   Gardein Chick'n Strips 3 each 65 7.5   Gardein Meatballs 3 each 160 15   Gardein Saus'ge Links ½ link 95 8   Gardein Be'f Tips 6 each 105 10.5   Gardein Ground Be'f 1/2 cup 80 12   Morning Star Grillers Original Burger 1 pita 130 16   Morning Star Breakfast Sausage Link 2  each 70 9   Morning Star Breakfast Maryann 1 maryann 80 9   Morning Star Griller Crumbles ½ cup 70 10   Morning Star Chick'n Stips 8 each 100 15       Sample Food Log    DAY/DATE FOOD ITEM QTY/SIZE/WT PROTEIN CALORIES   Mon, 11/27 Scrambled eggs 2 12 g 160    miiCard Core Power 1 - 14 oz 26 g 170    Mozzarella string cheese 1 - 1 oz 8 g 80    Tuna  4 oz 32 g 160    Hummus ¼ cup 5 g 100    Sliced carrots ½ cup .5 g 25    Baked salmon 4 oz 28 g 200    TOTAL  111.5 g 895

## 2024-07-16 ENCOUNTER — OFFICE VISIT (OUTPATIENT)
Dept: PSYCHIATRY | Facility: CLINIC | Age: 39
End: 2024-07-16
Payer: COMMERCIAL

## 2024-07-16 DIAGNOSIS — I10 PRIMARY HYPERTENSION: ICD-10-CM

## 2024-07-16 DIAGNOSIS — E66.9 OBESITY, CLASS II, BMI 35-39.9, NO COMORBIDITY: ICD-10-CM

## 2024-07-16 DIAGNOSIS — G47.33 OSA (OBSTRUCTIVE SLEEP APNEA): Chronic | ICD-10-CM

## 2024-07-16 DIAGNOSIS — Z71.89 ENCOUNTER FOR PSYCHOLOGICAL ASSESSMENT PRIOR TO BARIATRIC SURGERY: Primary | ICD-10-CM

## 2024-07-16 DIAGNOSIS — R73.03 PREDIABETES: ICD-10-CM

## 2024-07-16 PROCEDURE — 96146 PSYCL/NRPSYC TST AUTO RESULT: CPT | Mod: 95,59,, | Performed by: PSYCHOLOGIST

## 2024-07-16 PROCEDURE — 96130 PSYCL TST EVAL PHYS/QHP 1ST: CPT | Mod: 95,,, | Performed by: PSYCHOLOGIST

## 2024-07-16 PROCEDURE — 90791 PSYCH DIAGNOSTIC EVALUATION: CPT | Mod: 95,,, | Performed by: PSYCHOLOGIST

## 2024-07-16 PROCEDURE — 3044F HG A1C LEVEL LT 7.0%: CPT | Mod: CPTII,95,, | Performed by: PSYCHOLOGIST

## 2024-07-16 NOTE — PROGRESS NOTES
PRESURGICAL PSYCHOLOGICAL EVALUATION - BARIATRICS   Psychiatry Initial Visit (PhD/PsyD)    Psychological Intake and Assessment      Site:  The patient location is: home ([place])  The chief complaint leading to consultation is: presurgical eval    Visit type: audiovisual    Face to Face time with patient: 36  45 minutes of total time spent on the encounter, which includes face to face time and non-face to face time preparing to see the patient (eg, review of tests), Obtaining and/or reviewing separately obtained history, Documenting clinical information in the electronic or other health record, Independently interpreting results (not separately reported) and communicating results to the patient/family/caregiver, or Care coordination (not separately reported).         Each patient to whom he or she provides medical services by telemedicine is:  (1) informed of the relationship between the physician and patient and the respective role of any other health care provider with respect to management of the patient; and (2) notified that he or she may decline to receive medical services by telemedicine and may withdraw from such care at any time.    Notes:       CPT Codes:   04998 (1 hour): Psychiatric Diagnostic Evaluation   91186 (1 hour): Integration of patient data, interpretation of standardized test results and clinical data, clinical decision-making, treatment planning and report, and interactive feedback to the patient   87877 (1 hour): Psychological or neuropsychological test administration, with single automated instrument via electronic platform, with automated result only: ?[place] Multiphasic Personality Inventory -?3  (MMPI-3)       NAME:  Joann Banuelos    MRN: [id number]      :  1985     Date:  2024     Referral source:  Inrgid Berman M.D.      Clinical status of patient: Outpatient      Met With: Patient      Chief complaint/reason for encounter: Routine psychological evaluation prior to  bariatric surgery.       Before this evaluation was initiated, the purposes and process of the assessment and the limits of confidentiality were discussed with the patient who expressed understanding of these issues and verbally consented to proceed with the evaluation.      Type of surgery sought: Sleeve gastrectomy        History of present illness:    Ms. Joann Banuelos is a 38-year-old Black or  female who is pursuing bariatric surgery to improve her health and quality of life. She has no history of significant psychological difficulties. She has never taken psychotropic medication, has never been hospitalized for psychiatric reasons and denied any history of suicidality.  She has begun making positive lifestyle changes in anticipation for surgery, with good benefit. The patient has a Body Mass Index of 39.28 as documented by the referring provider.      Ms. Banuelos has struggled with weight for most of her life, but especially after having children. Factors that have contributed to her weight gain over the years include dining out often, overeating, not having the time to work out or meal prep.  She denied a history of emotional eating.  She has tried many weight loss methods on her own (i.e., exercise, phentermine, reduced dining out) with little success, and she believes that her biggest weight loss challenge is dining out frequently.  Her motivation for seeking surgery now is to be healthier.      Ms. Banuelos has met with Ms. Norah RD, bariatric dietician, and reports that she has made the following lifestyle changes since beginning the bariatric program: weight loss, dietary changes, and exercise. She must continue meeting with Ms. Almazan to demonstrate the implementation of lifestyle changes prior to clearance for bariatric surgery.    Co-morbidities:  HTN, GERD, prediabetes, ADA     Knowledge of surgery information:   - Basics of procedure: cut stomach and make it smaller   - Risks:  dehydration, leakage, GERD   - Basics of diet: small portions, high protein, liquids only a first      Current Psychiatric Symptoms:    Depression - Denied depressed mood, loss of interest in pleasurable activities, anhedonia, sleep changes, decreased motivation, decreased concentration, feelings of excessive or irrational guilt, helplessness, hopelessness, increased or decreased appetite, weight changes, increased or decreased motor activity, decreased energy,?suicidal ideations/thoughts of death.   Sandra/Hypomania - Denied increased goal directed activity,?decreased need for sleep, pressured speech or increased talkative,?racing thoughts,?increased risk-taking behavior,?episodic elevated or irritable mood,?flight of ideas, distractibility, inflated self-esteem, grandiosity   Anxiety - Denied excessive worry, difficulty controlling worrying, feeling keyed up, easily fatigued, difficulty concentrating or mind going blank, irritability, muscle tension, sleep disturbance, racing thoughts, being unable to relax, specific phobia.   Panic Attacks: Denied palpitations, sweating, trembling, dyspnea, choking sensation, chest pain/discomfort, nausea, dizziness, chills or hot flashes, tingling, derealization, fear of losing control, fear of dying.   Thoughts - Denied any AVH, paranoia, delusions, ideas of reference, thought insertion or thought broadcasting   Suicidal thoughts/behaviors - denied passive or active SI, denied suicidal plans or intent   Self-injury - denied.   Substance abuse - denied abuse or dependence.    Sleep - Denied increased sleep latency, frequent nighttime awakenings, or early morning awakening with inability to return to sleep.      Current psychiatric treatment:   Medications: Denied   Psychotherapy: Denied      Current Health Behaviors:   Compliant with medical regimens and appointments: Yes  Prescription medication misuse: No   Exercise: Yes- 5 days a week in gym - with  3 / week and two  on her own   Adequate cognitive functioning: Yes     Past Psychiatric history:    Previous diagnosis:   denied  Previous psychiatric hospitalizations/inpatient treatment: none   History of outpatient treatment: individual therapy after having a still birth 7 years ago  Previous suicide attempt: none   Non-suicidal self-injury: none      Trauma history:   Denies.      PTSD: Denies re-experiencing trauma, nightmares, increased awareness of surroundings, hyperexcitability.      History of eating disorders:   History of bulimia: Denies recurrent episodes of eating then engaging in inappropriate compensatory behaviors. ?        History of binge-eating episodes: Denies eating excessive amounts of food within a discrete time period with a lack of control during eating. ?She denied eating more rapidly than normal, eating until uncomfortably full, eating large amounts of food when not physically hungry, eating alone due to embarrassment, or negative emotions (i.e., disgusted, guilty, depressed) afterwards.      Family history of psychiatric illness: None reported.       Social history (marriage, employment, etc.): Ms. Banuelos was born and raised in [place], [place].  She described her childhood as average.   She is currently in school getting an Z[employer].  She is currently employed in .  She is not on disability and finances are stable. She has been  for 16 years.  She has 3 children (ages 14, 9 and 2).  She currently lives with her  and children.     Current psychosocial stressors:  denies      Report of coping skills/recreational activities:  exercise, talk to a friend      Support system:  , friends     Substance use:    Alcohol:   3-4 drinks a month  Drugs: Denied current use; denied history of abuse or dependency.   Tobacco: None.    Caffeine:  2 red bulls a week  and one cup of coffee daily     Current medications and drug reactions (include OTC, herbal): see medication list       PSYCHOLOGICAL  ASSESSMENT/TESTING:    All tests were administered according to standardized procedures and were selected based on the reason for referral.   The MMPI-3 provides an assessment of personality and psychopathology with specific evaluation of psychosocial risk factors associated with outcomes of bariatric surgery.    Ms.  produced a valid and interpretable MMPI-3 protocol. Her test results should be considered a reasonably accurate reflection of her current psychological functioning.    TEST RESULTS. Ms. Banuelos's scores do not indicate any somatic, cognitive, thought, interpersonal, or behavioral dysfunction. Emotionally, she reports elevated worry, particularly about finances.   GROUP COMPARISONS. Compared to other bariatric surgery candidates, Ms. Banuelos reports higher levels of worry and behavior restricting fears.      FEEDBACK. Ms. Banuelos was provided with test results, and offered the opportunity to respond to feedback and clarify results if needed. She acknowledged the results as being largely consistent with her overall functioning and well-being. She acknowledged her  recently had a change in his job so finances have been an adjustment.         Mental Status Exam:    General Appearance:   age appropriate, well dressed, neatly groomed, overweight     Speech:   normal tone, normal rate, normal pitch, normal volume     Level of Cooperation:   cooperative     Thought Processes:   normal and logical     Mood:   euthymic     Thought Content:   normal, no suicidality, no homicidality, delusions, or paranoia     Affect:   congruent and appropriate     Orientation:   oriented x3     Memory:   recent memory intact; immediate and delayed word recall 3/3   remote memory intact; able to recall remote personal events    Attention Span & Concentration:   appropriate    Fund of General Knowledge:   appropriate for education     Abstract Reasoning:   Not directly assessed    Judgment & Insight:   good     Language   intact            SUMMARY AND RECOMMENDATIONS:   Ms. Joann Banuelos is a 38-year-old female referred for presurgical psychological evaluation prior to bariatric surgery. Results of personality testing should be considered valid, and they indicate that she is experiencing no acute psychiatric symptoms or declines in functioning at this time. Test results do not reveal any evidence that psychological difficulties would play a role in her recovery from surgery. Ms. Banuelos's testing profile was largely consistent with her reports in the clinical interview. In the clinical interview, Ms. Banuelos denied past psychiatric history.  There are no overt psychological contraindications for proceeding with bariatric surgery. Overall, Ms. Banuelos is at low risk for adverse postsurgical outcomes based on the following considerations:   There are no indications of disabling psychopathology, substance use/abuse, cognitive problems, or disabilities that would prevent understanding and competence with medical treatment.  There are no reports or major psychosocial stressors that would interfere with her adherence to treatment recommendations.  There is no evidence of suicidality.   She exhibits high social stability and good social support.  She has good coping strategies to deal with stress and the demands of surgery and recovery.   She has good knowledge about the surgical procedure, good knowledge about the required dietary and lifestyle changes, and adequate understanding of possible risks of this treatment option. She reports adequate compliance with prior medical regimens.   There are no recommendations for psychological intervention at this time. This patient is a suitable candidate to proceed with bariatric surgery.    Diagnosis:     ICD-10-CM ICD-9-CM   1. Encounter for psychological assessment prior to bariatric surgery  Z71.89 V65.49   2. Obesity, Class II, BMI 35-39.9, no comorbidity  E66.9 278.00   3. Primary hypertension  I10 401.9    4. Prediabetes  R73.03 790.29   5. ADA (obstructive sleep apnea)  G47.33 327.23       Plan: This report will be sent to the referring provider with impressions and recommendations. It will be the referring team's decision whether the patient proceeds with surgery. Services related to the presurgical psychological evaluation are now concluded.       Evaluation Length (direct face-to-face time): 36  Total Time including report writing, test scoring, chart review, integration of data and feedback: 80          Nicolle Fuentes, PhD  Licensed Clinical Psychologist ([id number])  [facility]

## 2024-07-17 NOTE — PROGRESS NOTES
Established Patient - Audio Only Telehealth Visit     The patient location is: home  The chief complaint leading to consultation is: psychological testing  Visit type: Virtual visit with audio only (telephone)  Total time spent with patient: 1 hour       The reason for the audio only service rather than synchronous audio and video virtual visit was related to technical difficulties or patient preference/necessity.     The patient arrived on time for their scheduled testing appointment. Testing codes and duration will be included in the psychologist's progress note.                        This service was not originating from a related E/M service provided within the previous 7 days nor will  to an E/M service or procedure within the next 24 hours or my soonest available appointment.  Prevailing standard of care was able to be met in this audio-only visit.

## 2024-07-22 ENCOUNTER — PATIENT MESSAGE (OUTPATIENT)
Dept: SLEEP MEDICINE | Facility: CLINIC | Age: 39
End: 2024-07-22
Payer: COMMERCIAL

## 2024-08-12 ENCOUNTER — PATIENT MESSAGE (OUTPATIENT)
Dept: BARIATRICS | Facility: CLINIC | Age: 39
End: 2024-08-12
Payer: COMMERCIAL

## 2024-08-12 ENCOUNTER — PATIENT MESSAGE (OUTPATIENT)
Dept: OBSTETRICS AND GYNECOLOGY | Facility: CLINIC | Age: 39
End: 2024-08-12
Payer: COMMERCIAL

## 2024-08-19 ENCOUNTER — CLINICAL SUPPORT (OUTPATIENT)
Dept: BARIATRICS | Facility: CLINIC | Age: 39
End: 2024-08-19
Payer: COMMERCIAL

## 2024-08-19 DIAGNOSIS — Z71.3 DIETARY COUNSELING: Primary | ICD-10-CM

## 2024-08-19 DIAGNOSIS — E66.9 OBESITY (BMI 30-39.9): ICD-10-CM

## 2024-08-19 PROCEDURE — 97803 MED NUTRITION INDIV SUBSEQ: CPT | Mod: 95,,, | Performed by: DIETITIAN, REGISTERED

## 2024-08-19 NOTE — PROGRESS NOTES
The patient location is: home ([place])  The chief complaint leading to consultation is: morbid obesity    Visit type: audiovisual    Face to Face time with patient: 25 min  30 minutes of total time spent on the encounter, which includes face to face time and non-face to face time preparing to see the patient (eg, review of tests), Obtaining and/or reviewing separately obtained history, Documenting clinical information in the electronic or other health record, Independently interpreting results (not separately reported) and communicating results to the patient/family/caregiver, or Care coordination (not separately reported).         Each patient to whom he or she provides medical services by telemedicine is:  (1) informed of the relationship between the physician and patient and the respective role of any other health care provider with respect to management of the patient; and (2) notified that he or she may decline to receive medical services by telemedicine and may withdraw from such care at any time.    NUTRITIONAL Note     Referring Physician: Ingrid Berman M.D.   Reason for MNT Referral: Follow up assessment for sleeve gastrectomy work-up     38 y.o. female presents with self reported weight stable over the past month; total 4 lbs weight loss by making dietary and lifestyle changes in preparation for bariatric surgery. States she has been consistent with diet plan plus has removed a lot of extra calories coming from sweetened coffee, alcohol, salad dressings and snacking. Consistent with  heavy weight lifting 4-5 days/week, states clothes are fitting looser.             Past Medical History:   Diagnosis Date    Abnormal Pap smear 2007    Abnormal Pap smear of cervix      Allergy      Eczema      HPV (human papilloma virus) anogenital infection      Hypertension      Tinea versicolor        CLINICAL DATA:  38 y.o.-year-old Black or  female.  Height: 5 ft. 6 in.  Weight: 238 lbs  IBW:  144 lbs  BMI: 38.4     DAILY NUTRITIONAL NEEDS: pre-op nutritional bariatric guidelines to promote weight loss  1812-0279 Calories    Grams Protein     NUTRITION & HEALTH HISTORY:  Greatest challenge: dining out frequency, sweets, starchy CHO, portion control, snacking at night, irregular meal patterns, and emotional eating     Current diet recall:      B: [employer] - espresso shot + prot shake chris OR boiled eggs and ice coffee at home with heavy cream   Sn: handful of pistachios  L: working from home (bag salad with boiled egg/tuna/salmon/benitez bits/Rot chicken and avocado with no dressing or balsamic vinaigrette or oil and vinegar) OR dining out [employer] (grilled chicken sandwich or barahona salad with grilled nuggets and   Sn: pistachios or Quest chips bag   Sn: fruit  D: protein shake OR home made salad + lean protein and avocado     Current Diet:  Meal pattern: 2-3 meals + 2-3 snacks + 1-2 protein supplements  Protein supplements: Premier shakes + coffee, Fairlife Core Power  Snacking: nuts, Quest chips. Trying to reduce (junk food and sweets)  Vegetables: Likes a variety. Eats daily.  Fruits: Likes a variety. Eats 1-2 times/week.  Beverages: water, coffee without sugar  Dining out: Daily. Weekly. Mostly fast food, restaurants, and take-out.  Cooking at home: Weekly. Mostly baked, grilled, smothered meat, fish, (limited starchy CHO), and vegetables.     Exercise:  4-5 days/week with  15-30 min cardio and heavy weight lifting      Restrictions to exercise: none     Vitamins / Minerals / Herbs:   Vit D Rx     Labs:   Reviewed     Food Allergies:   None known     Social:  Works regular daytime shifts.  Alcohol: Reduced lately to once every 2 weeks (from wine 1-2 glasses/night)  Smoking: None.     ASSESSMENT:  Patient demonstrated knowledge of healthy eating behaviors and exercise patterns.  Patient demonstrates willingness to change lifestyle and make behavior modifications AEB weight loss, dietary  changes, protein supplements and exercise.           Barriers to Education: none     Stage of change: action     NUTRITION DIAGNOSIS:    Obesity related to Excessive carbohydrate intake and Excessive calorie intake as evidence by BMI.     BARIATRIC DIET DISCUSSION/PLAN:  Discussed diet after surgery and related to patient's food record.  Reviewed nutrition guidelines for before and after surgery.  Answered all questions.  Continue to review Bariatric Nutrition Guidebook at home and call with any questions.  Work on Bariatric Nutrition Checklist.  Work on expanding variety of vegetables.  Continue cutting back on starchy CHO in the diet.  1200-calorie diet.  1500-calorie diet.  5-6 meals per day.     RECOMMENDATIONS:  Pt is a good candidate for bariatric surgery - Sleeve.     Will f/u before/after surgery as needed.     Communicated nutrition plan with bariatric team.     SESSION TIME:  30 minutes

## 2024-08-22 ENCOUNTER — PATIENT MESSAGE (OUTPATIENT)
Dept: BARIATRICS | Facility: CLINIC | Age: 39
End: 2024-08-22
Payer: COMMERCIAL

## 2024-08-23 ENCOUNTER — TELEPHONE (OUTPATIENT)
Dept: BARIATRICS | Facility: CLINIC | Age: 39
End: 2024-08-23
Payer: COMMERCIAL

## 2024-08-23 DIAGNOSIS — R73.03 PREDIABETES: ICD-10-CM

## 2024-08-23 DIAGNOSIS — I10 PRIMARY HYPERTENSION: ICD-10-CM

## 2024-08-23 DIAGNOSIS — E66.01 MORBID OBESITY: Primary | ICD-10-CM

## 2024-08-23 DIAGNOSIS — G47.33 OSA (OBSTRUCTIVE SLEEP APNEA): Chronic | ICD-10-CM

## 2024-08-23 NOTE — LETTER
"  [facility] - Bariatric Surg 2nd Fl  [address]  [facility] [address]  Phone: [phone]  Fax: [phone] 2024       Attn: Pre-determination Dept.    RE:  Joann Banuelos          OC #: [id number]          : 1985    To Whom It May Concern:  I am sending this letter on behalf of Joann Banuelos (a 38 y.o. female) for pre-approval for Bariatric Surgery; specifically the laparoscopic sleeve gastrectomy. Joann  has a past medical history of Class II Morbid Obesity with serious comorbid, Body mass index is 39-39.9, Hypertension, Obstructive sleep apnea, and PreDM. Joann Banuelos  has made numerous attempts at dieting and exercise programs, and has failed to maintain any sustained weight loss.  Weight/Height/BMI  Estimated body mass index is 39.28 kg/m² as calculated from the following:  Height as of 7/10/24: 5' 6" (1.676 m).  Weight as of 7/10/24: 110.2 kg (243 lb).     Joann Banuelos has been evaluated in our bariatric program by myself, the , and the program dietician and is felt to be an excellent candidate for surgery.  In addition, she has undergone a psychological evaluation, from which a letter is enclosed.  Joann Banuelos has undergone extensive pre-operative education and understands all the risks, benefits, and possible complications of surgery.  She has also undergone dietary education and thorough nutritional evaluation via a registered dietician.  Our program provides long term nutritional counseling with unlimited consults with the dietician.    Our team is sending this letter to receive pre-approval for the indicated procedure.  Please let us know if you have any questions or require any further information.  Sincerely,    Ingrid Berman MD FACS  Minimally Invasive General & Bariatric Surgery  [facility] - [place], [place]         "

## 2024-08-23 NOTE — TELEPHONE ENCOUNTER
"Spoke with patient and confirmed the surgical procedure of laparoscopic sleeve gastrectomy with Dr Berman on 10-3-2024 week and 8 week post op appts. All dates and times agreed upon. Pt aware that if they are required to have PCP clearance, it must be within 6 months of surgery, unless their medical history has changed, it should be dated, signed and in chart for preop appointment. All medications have been reviewed regarding the necessity to be crushed or broken into pieces smaller that the tip of a pencil eraser for 2 weeks following gastric sleeve surgery and 4 weeks following gastric bypass surgery. Pt instructed to stop taking all NSAIDS 1 week before surgery and for life after surgery. Pt aware that protein liquid diet start date is 9-26-24. Patient is doing well with their diet. Patient was instructed about the progression of the diet phases. The patient's  weight is 243 lbs, height is 5'6", and BMI is 39.28. Refer to medical letter of necessity from Surgeon. Discussed the importance of increased physical activity and dieting lifestyle changes to improve weight loss and meet goals. Screened patient for history of UTI per protocol. Discussed with patient to avoid antibiotics and elective procedures involving sedation/anesthesia within 30 days of surgery unless cleared by the bariatric department. Patient instructed to call the bariatric clinic post op for any s/s of UTI. Patient's mailing address confirmed and informed to expect a manilla envelop containing bariatric surgery pre op booklet, appointment reminders, protein and fluid log sheets, and liquid diet and vitamin information sheets. Pt aware that all appts can be seen in my [facility] patient portal at this time. Confirmed email address and informed patient that they will be enrolled in the Patient Reported Outcomes program to track their progress and successes. The first email will be sent 2-3 weeks before surgery and then every year on your surgery " anniversary date. Office phone and fax number given to patient for any future questions/concerns. Discussed the pre-surgery complex carbohydrate beverage to purchase in [facility] pharmacy to drink 30 minutes before the surgery arrival time. Reviewed the policy of scheduling a covid test 72 hours prior to surgery if necessary.

## 2024-08-23 NOTE — LETTER
"  [facility] - Bariatric Surg 2nd Fl  [address]  [facility] [address]  Phone: [phone]  Fax: [phone] 2024         Attn: Pre-determination Dept.     RE:  Joann Banuelos          OC #: [id number]          : 1985     To Whom It May Concern:  I am sending this letter on behalf of Joann Banuelos (a 38 y.o. female) for pre-approval for Bariatric Surgery; specifically the laparoscopic sleeve gastrectomy. Joann  has a past medical history of Class II Morbid Obesity with serious comorbid, Body mass index is 39-39.9, Hypertension, Obstructive sleep apnea, and PreDM. Joann Banuelos  has made numerous attempts at dieting and exercise programs, and has failed to maintain any sustained weight loss.    As per her EGD study on 7/10/24:    Findings:       The examined esophagus was normal.        The Z-line was regular and was found 39 cm from the incisors.        The gastroesophageal flap valve was visualized endoscopically and        classified as Hill Grade I (prominent fold, tight to endoscope).        No gross lesions were noted in the entire examined stomach.        The examined duodenum was normal.     Weight/Height/BMI    Estimated body mass index is 39.28 kg/m² as calculated from the following:    Height as of 7/10/24: 5' 6" (1.676 m).    Weight as of 7/10/24: 110.2 kg (243 lb).     Joann Banuelos has been evaluated in our bariatric program by myself, the , and the program dietician and is felt to be an excellent candidate for surgery.  In addition, she has undergone a psychological evaluation, from which a letter is enclosed.  Joann Banuelos has undergone extensive pre-operative education and understands all the risks, benefits, and possible complications of surgery.  She has also undergone dietary education and thorough nutritional evaluation via a registered dietician.  Our program provides long term nutritional counseling with unlimited " consults with the dietician.    Our team is sending this letter to receive pre-approval for the indicated procedure.  Please let us know if you have any questions or require any further information.  Sincerely,    Ingrid Berman MD FACS  Minimally Invasive General & Bariatric Surgery  [facility] - [place], [place]

## 2024-09-03 ENCOUNTER — PATIENT MESSAGE (OUTPATIENT)
Dept: BARIATRICS | Facility: CLINIC | Age: 39
End: 2024-09-03
Payer: COMMERCIAL

## 2024-09-05 ENCOUNTER — PATIENT MESSAGE (OUTPATIENT)
Dept: INTERNAL MEDICINE | Facility: CLINIC | Age: 39
End: 2024-09-05
Payer: COMMERCIAL

## 2024-09-09 ENCOUNTER — PATIENT MESSAGE (OUTPATIENT)
Dept: INTERNAL MEDICINE | Facility: CLINIC | Age: 39
End: 2024-09-09
Payer: COMMERCIAL

## 2024-09-09 ENCOUNTER — OFFICE VISIT (OUTPATIENT)
Dept: SLEEP MEDICINE | Facility: CLINIC | Age: 39
End: 2024-09-09
Payer: COMMERCIAL

## 2024-09-09 VITALS
BODY MASS INDEX: 38.97 KG/M2 | HEIGHT: 66 IN | SYSTOLIC BLOOD PRESSURE: 150 MMHG | WEIGHT: 242.5 LBS | HEART RATE: 72 BPM | DIASTOLIC BLOOD PRESSURE: 100 MMHG

## 2024-09-09 DIAGNOSIS — F51.09 OTHER INSOMNIA NOT DUE TO A SUBSTANCE OR KNOWN PHYSIOLOGICAL CONDITION: ICD-10-CM

## 2024-09-09 DIAGNOSIS — G47.33 OSA (OBSTRUCTIVE SLEEP APNEA): Primary | Chronic | ICD-10-CM

## 2024-09-09 PROCEDURE — 1160F RVW MEDS BY RX/DR IN RCRD: CPT | Mod: CPTII,S$GLB,, | Performed by: PHYSICIAN ASSISTANT

## 2024-09-09 PROCEDURE — 1159F MED LIST DOCD IN RCRD: CPT | Mod: CPTII,S$GLB,, | Performed by: PHYSICIAN ASSISTANT

## 2024-09-09 PROCEDURE — 99214 OFFICE O/P EST MOD 30 MIN: CPT | Mod: S$GLB,,, | Performed by: PHYSICIAN ASSISTANT

## 2024-09-09 PROCEDURE — 3008F BODY MASS INDEX DOCD: CPT | Mod: CPTII,S$GLB,, | Performed by: PHYSICIAN ASSISTANT

## 2024-09-09 PROCEDURE — 3080F DIAST BP >= 90 MM HG: CPT | Mod: CPTII,S$GLB,, | Performed by: PHYSICIAN ASSISTANT

## 2024-09-09 PROCEDURE — 99999 PR PBB SHADOW E&M-EST. PATIENT-LVL III: CPT | Mod: PBBFAC,,, | Performed by: PHYSICIAN ASSISTANT

## 2024-09-09 PROCEDURE — 3077F SYST BP >= 140 MM HG: CPT | Mod: CPTII,S$GLB,, | Performed by: PHYSICIAN ASSISTANT

## 2024-09-09 PROCEDURE — 3044F HG A1C LEVEL LT 7.0%: CPT | Mod: CPTII,S$GLB,, | Performed by: PHYSICIAN ASSISTANT

## 2024-09-09 RX ORDER — AMLODIPINE BESYLATE 5 MG/1
5 TABLET ORAL DAILY
Qty: 90 TABLET | Refills: 3 | Status: SHIPPED | OUTPATIENT
Start: 2024-09-09

## 2024-09-09 NOTE — TELEPHONE ENCOUNTER
No care due was identified.  [facility] Embedded Care Due Messages. Reference number: [id number].   9/09/2024 12:16:18 PM CDT

## 2024-09-09 NOTE — PROGRESS NOTES
Referred by No ref. provider found     ESTABLISHED PATIENT VISIT    Joann Banuelos  is a pleasant 38 y.o. female  with PMH significant for HTN, preDM, BMI 39+, ADA    Here today for: CPAP follow-up / compliance     PLAN last visit 3/18/24:   -recommend sleep testing   -HST ordered  -discussed trial therapy if ADA present and the patient is open to a trial of CPAP therapy  -discussed ADA and PAP with patient in detail, including possible complications of untreated ADA like heart attack/stroke  -advised on strict driving precautions; advised never to drive drowsy      Since last visit:   Using CPAP nightly with improvement in sx. Feels more refreshed and less sleepy on CPAP. Does endorse still having some difficulty and maintenance, which she correlates to increase stressors recently. Reports mask interface and pressure settings are comfortable, but would like to switch to NP with top of head attachment for comfort. Does endorse having covid this month and due to sx of cough and congestion, was unable to use CPAP for about 1-2 weeks. States sx improved now and back on CPAP nightly with good control of sx. No other complaints at this time      PAP history   Problems    Mask Nasal pillows   Pressure 6-12cwp   DME HME   Machine age AirSense 11 5/13/24   Download 9/9/24: 20/30 x 7hrs 22mins, 6-12cwp (8.4/10.9/11.6), leak (0.4/3.3/18.3), AHI 0.7       SLEEP SCHEDULE   Environment    Bed Time 10PM-MN   Sleep Latency 30-60mins   Arousals 2-3   Nocturia 0   Back to sleep 30min   Wake time 4-5AM work day  7-9AM off day   Naps none   Work        Past Medical History:   Diagnosis Date    Abnormal Pap smear 2007    Abnormal Pap smear of cervix     Allergy     Eczema     HPV (human papilloma virus) anogenital infection     Hypertension     Tinea versicolor      Patient Active Problem List   Diagnosis    Obesity, Class II, BMI 35-39.9, no comorbidity    Hypertension    Left hip pain    Chronic hypertension complicating or  "reason for care during pregnancy, third trimester    Irregular bleeding    Heavy menstrual bleeding    S/p IUD removal/hysteroscopy/D&C/endometrial ablation/laparoscopic BS/SHELLI 23    Prediabetes    Snoring    Left wrist pain    ADA (obstructive sleep apnea)       Current Outpatient Medications:     amLODIPine (NORVASC) 5 MG tablet, Take 1 tablet (5 mg total) by mouth once daily., Disp: 90 tablet, Rfl: 3    ciclopirox (LOPROX) 0.77 % Crea, Apply topically 2 (two) times daily., Disp: 90 g, Rfl: 2    ciclopirox (PENLAC) 8 % Soln, Apply topically nightly., Disp: 6.6 mL, Rfl: 11    ergocalciferol (VITAMIN D2) 50,000 unit Cap, Take 1 capsule (50,000 Units total) by mouth twice a week., Disp: 24 capsule, Rfl: 0    meloxicam (MOBIC) 7.5 MG tablet, Take 1 tablet (7.5 mg total) by mouth once daily., Disp: 30 tablet, Rfl: 0       Vitals:    24 1138   BP: (!) 150/100   BP Location: Left arm   Patient Position: Sitting   BP Method: Small (Automatic)   Pulse: 72   Weight: 110 kg (242 lb 8.1 oz)   Height: 5' 6" (1.676 m)       Physical Exam:    GEN:   Well-appearing  Psych:  Appropriate affect, demonstrates insight  SKIN:  No rash on the face or bridge of the nose      LABS:   Lab Results   Component Value Date    HGB 13.4 2024    CO2 22 (L) 2024         RECORDS REVIEWED:    HST 24: AHI 24, RDI 49    Previous sleep note: 3/6/24    CPAP interrogation 24: 20/30 x 7hrs 22mins, 6-12cwp (8.4/10.9/11.6), leak (0.4/3.3/18.3), AHI 0.7    ASSESSMENT    Duvall Sleepiness Scale:  Sitting and readin  Watching TV:    1  Passenger in a car x 1 hr:  0  Sitting quietly after lunch:  1  Lying down to rest in PM:  0  Sitting, inactive in public:  0  Sitting+ talking to someone:  0  Stopped in traffic:   0  Total    3/24    PROBLEM DESCRIPTION/ Sx on Presentation Interval Hx STATUS   ADA   + loud snoring, + snoring/gasping arousals  denies witnessed apneas  + wakes feeling un-refreshed   Mother has ADA on CPAP " Good usage and efficiency (except with recent covid infection)  controlled   Daytime Sx   + sleepiness when inactive   ESS 12/24 on intake (reviewed from 3/6/24) More refreshed when using CPAP improved   Insomnia   Trouble falling asleep: 30-60mins  Arousals:         2-3 x nightly  Hard to get back to sleep?: up to 30mins    Prior pertinent medications:  Current pertinent medications:  Still having issues with sleep onset and maintenance; stress, a lot going on right now persists   Nocturia   x 0 per sleep period 0 x nightly    Other issues:     PLAN     -using and benefiting from CPAP therapy  -continue CPAP nightly  -recommended working on increasing CPAP usage  -discussed goals of CPAP therapy of 7-9 hours nightly with recommended usage >6hrs 100% of nights and minimal usage >4hrs 70% of nights  -CPAP supplies ordered  -discussed ADA with patient in detail, including possible complications of untreated ADA like heart attack/stroke  -advised on strict driving precautions; advised never to drive drowsy  -discussed insomnia and sleep hygiene with patient  -recommended CBTi, patient will consider if sx persist after current stressors improve  -discussed elevated blood pressure reading at today's visit (pt reports drinking coffee and not taking her prescription BP medications this AM); pt denies HA, changes in vision, CP, SOB.   -stressed importance of close follow up with PCP for blood pressure monitoring and management and taking medication daily as prescribed  -advised on strict ER precautions       Advised on plan of care. Answered all patient questions. Patient verbalized understanding and voiced agreement with plan of care.    RTC 12 months or as needed     The patient was given open opportunity to ask questions and/or express concerns about treatment plan. All questions/concerns were discussed.     Two patient identifiers used prior to evaluation.

## 2024-09-10 ENCOUNTER — PATIENT MESSAGE (OUTPATIENT)
Dept: BARIATRICS | Facility: CLINIC | Age: 39
End: 2024-09-10
Payer: COMMERCIAL

## 2024-09-13 ENCOUNTER — PATIENT MESSAGE (OUTPATIENT)
Dept: INTERNAL MEDICINE | Facility: CLINIC | Age: 39
End: 2024-09-13
Payer: COMMERCIAL

## 2024-09-16 ENCOUNTER — DOCUMENTATION ONLY (OUTPATIENT)
Dept: BARIATRICS | Facility: CLINIC | Age: 39
End: 2024-09-16

## 2024-09-16 ENCOUNTER — OFFICE VISIT (OUTPATIENT)
Dept: BARIATRICS | Facility: CLINIC | Age: 39
End: 2024-09-16
Payer: COMMERCIAL

## 2024-09-16 VITALS
OXYGEN SATURATION: 100 % | SYSTOLIC BLOOD PRESSURE: 142 MMHG | HEART RATE: 89 BPM | WEIGHT: 242.31 LBS | DIASTOLIC BLOOD PRESSURE: 82 MMHG | BODY MASS INDEX: 38.94 KG/M2 | HEIGHT: 66 IN

## 2024-09-16 DIAGNOSIS — G47.33 OSA (OBSTRUCTIVE SLEEP APNEA): Chronic | ICD-10-CM

## 2024-09-16 DIAGNOSIS — R73.03 PREDIABETES: ICD-10-CM

## 2024-09-16 DIAGNOSIS — Z91.89 AT MODERATE RISK FOR VENOUS THROMBOEMBOLISM (VTE): ICD-10-CM

## 2024-09-16 DIAGNOSIS — E66.01 CLASS 2 SEVERE OBESITY DUE TO EXCESS CALORIES WITH SERIOUS COMORBIDITY AND BODY MASS INDEX (BMI) OF 39.0 TO 39.9 IN ADULT: Primary | ICD-10-CM

## 2024-09-16 PROCEDURE — 3077F SYST BP >= 140 MM HG: CPT | Mod: CPTII,S$GLB,, | Performed by: SURGERY

## 2024-09-16 PROCEDURE — 3079F DIAST BP 80-89 MM HG: CPT | Mod: CPTII,S$GLB,, | Performed by: SURGERY

## 2024-09-16 PROCEDURE — 1159F MED LIST DOCD IN RCRD: CPT | Mod: CPTII,S$GLB,, | Performed by: SURGERY

## 2024-09-16 PROCEDURE — 99215 OFFICE O/P EST HI 40 MIN: CPT | Mod: S$GLB,,, | Performed by: SURGERY

## 2024-09-16 PROCEDURE — 3044F HG A1C LEVEL LT 7.0%: CPT | Mod: CPTII,S$GLB,, | Performed by: SURGERY

## 2024-09-16 PROCEDURE — 3008F BODY MASS INDEX DOCD: CPT | Mod: CPTII,S$GLB,, | Performed by: SURGERY

## 2024-09-16 PROCEDURE — 99999 PR PBB SHADOW E&M-EST. PATIENT-LVL III: CPT | Mod: PBBFAC,,, | Performed by: SURGERY

## 2024-09-16 RX ORDER — MUPIROCIN 20 MG/G
OINTMENT TOPICAL
OUTPATIENT
Start: 2024-09-16

## 2024-09-16 RX ORDER — GABAPENTIN 300 MG/1
CAPSULE ORAL
Qty: 11 CAPSULE | Refills: 0 | Status: SHIPPED | OUTPATIENT
Start: 2024-09-16

## 2024-09-16 RX ORDER — PROMETHAZINE HYDROCHLORIDE 12.5 MG/1
12.5 SUPPOSITORY RECTAL EVERY 6 HOURS PRN
Qty: 5 SUPPOSITORY | Refills: 0 | Status: SHIPPED | OUTPATIENT
Start: 2024-09-16

## 2024-09-16 RX ORDER — SCOLOPAMINE TRANSDERMAL SYSTEM 1 MG/1
1 PATCH, EXTENDED RELEASE TRANSDERMAL ONCE
OUTPATIENT
Start: 2024-09-16 | End: 2024-09-16

## 2024-09-16 RX ORDER — LIDOCAINE HYDROCHLORIDE 10 MG/ML
1 INJECTION, SOLUTION EPIDURAL; INFILTRATION; INTRACAUDAL; PERINEURAL ONCE
OUTPATIENT
Start: 2024-09-16 | End: 2024-09-16

## 2024-09-16 RX ORDER — ONDANSETRON HYDROCHLORIDE 2 MG/ML
8 INJECTION, SOLUTION INTRAVENOUS EVERY 6 HOURS
OUTPATIENT
Start: 2024-09-16

## 2024-09-16 RX ORDER — DEXTROMETHORPHAN/PSEUDOEPHED 2.5-7.5/.8
40 DROPS ORAL 4 TIMES DAILY PRN
OUTPATIENT
Start: 2024-09-16

## 2024-09-16 RX ORDER — OXYCODONE HCL 5 MG/5 ML
5 SOLUTION, ORAL ORAL EVERY 6 HOURS PRN
OUTPATIENT
Start: 2024-09-16

## 2024-09-16 RX ORDER — URSODIOL 500 MG/1
500 TABLET, FILM COATED ORAL DAILY
Qty: 90 TABLET | Refills: 1 | Status: SHIPPED | OUTPATIENT
Start: 2024-09-16 | End: 2025-03-15

## 2024-09-16 RX ORDER — OMEPRAZOLE 40 MG/1
40 CAPSULE, DELAYED RELEASE ORAL EVERY MORNING
Qty: 30 CAPSULE | Refills: 2 | Status: SHIPPED | OUTPATIENT
Start: 2024-09-16

## 2024-09-16 RX ORDER — ACETAMINOPHEN 10 MG/ML
1000 INJECTION, SOLUTION INTRAVENOUS ONCE
OUTPATIENT
Start: 2024-09-16 | End: 2024-09-16

## 2024-09-16 RX ORDER — ENOXAPARIN SODIUM 100 MG/ML
40 INJECTION SUBCUTANEOUS EVERY 24 HOURS
OUTPATIENT
Start: 2024-09-16

## 2024-09-16 RX ORDER — PROCHLORPERAZINE EDISYLATE 5 MG/ML
5 INJECTION INTRAMUSCULAR; INTRAVENOUS EVERY 6 HOURS PRN
OUTPATIENT
Start: 2024-09-16

## 2024-09-16 RX ORDER — ENOXAPARIN SODIUM 100 MG/ML
40 INJECTION SUBCUTANEOUS DAILY
Qty: 0.8 ML | Refills: 0 | Status: SHIPPED | OUTPATIENT
Start: 2024-09-16 | End: 2024-09-18

## 2024-09-16 RX ORDER — ACETAMINOPHEN 650 MG/20.3ML
1000 LIQUID ORAL EVERY 8 HOURS
OUTPATIENT
Start: 2024-09-16

## 2024-09-16 RX ORDER — KETOROLAC TROMETHAMINE 15 MG/ML
15 INJECTION, SOLUTION INTRAMUSCULAR; INTRAVENOUS ONCE
OUTPATIENT
Start: 2024-09-16 | End: 2024-09-16

## 2024-09-16 RX ORDER — POLYETHYLENE GLYCOL 3350 17 G/17G
17 POWDER, FOR SOLUTION ORAL DAILY
Qty: 510 G | Refills: 0 | Status: SHIPPED | OUTPATIENT
Start: 2024-09-16 | End: 2024-10-16

## 2024-09-16 RX ORDER — PANTOPRAZOLE SODIUM 40 MG/10ML
40 INJECTION, POWDER, LYOPHILIZED, FOR SOLUTION INTRAVENOUS 2 TIMES DAILY
OUTPATIENT
Start: 2024-09-16

## 2024-09-16 RX ORDER — ONDANSETRON 8 MG/1
8 TABLET, ORALLY DISINTEGRATING ORAL EVERY 6 HOURS PRN
Qty: 30 TABLET | Refills: 0 | Status: SHIPPED | OUTPATIENT
Start: 2024-09-16

## 2024-09-16 RX ORDER — ACETAMINOPHEN 650 MG/20.3ML
500 LIQUID ORAL
OUTPATIENT
Start: 2024-09-16

## 2024-09-16 RX ORDER — FAMOTIDINE 10 MG/ML
20 INJECTION INTRAVENOUS ONCE
OUTPATIENT
Start: 2024-09-16 | End: 2024-09-16

## 2024-09-16 RX ORDER — SODIUM CHLORIDE 9 MG/ML
INJECTION, SOLUTION INTRAVENOUS CONTINUOUS
OUTPATIENT
Start: 2024-09-16

## 2024-09-16 RX ORDER — SODIUM CHLORIDE, SODIUM LACTATE, POTASSIUM CHLORIDE, CALCIUM CHLORIDE 600; 310; 30; 20 MG/100ML; MG/100ML; MG/100ML; MG/100ML
INJECTION, SOLUTION INTRAVENOUS CONTINUOUS
OUTPATIENT
Start: 2024-09-16

## 2024-09-16 RX ORDER — GABAPENTIN 250 MG/5ML
300 SOLUTION ORAL 2 TIMES DAILY
OUTPATIENT
Start: 2024-09-16

## 2024-09-16 RX ORDER — HEPARIN SODIUM 5000 [USP'U]/ML
5000 INJECTION, SOLUTION INTRAVENOUS; SUBCUTANEOUS ONCE
OUTPATIENT
Start: 2024-09-16 | End: 2024-09-16

## 2024-09-16 NOTE — H&P (VIEW-ONLY)
Subjective:  The patient is a 38 y.o. obese female who presents for pre op for Laparoscopic Sleeve Gastrectomy.    Doing well overall.  Very motivated for surgery by her diagnosis of sleep apnea. Is using CPAP regularly.  HTN - well controlled on amlodipine.    Pre-diabetes. Well controlled.  Lab Results   Component Value Date    HGBA1C 5.8 (H) 07/09/2024     Wt Readings from Last 3 Encounters:   09/16/24 1335 109.9 kg (242 lb 4.6 oz)   09/09/24 1138 110 kg (242 lb 8.1 oz)   07/10/24 1234 110.2 kg (243 lb)     No GERD - reviewed EGD; no hiatal hernia and no esophagitis.     Exercising most days with .    Has significantly cut back on weekend alcohol as part of weight loss goal.    All workup has been reviewed in clinic today and there is nothing on the review that would prevent us from proceeding with surgery.    All questions were answered in clinic today prior to leaving.    Body mass index is 39.11 kg/m².       Patient Active Problem List    Diagnosis Date Noted    ADA (obstructive sleep apnea) 05/14/2024    Snoring 04/09/2024    Left wrist pain 04/09/2024    Prediabetes 06/07/2023    Heavy menstrual bleeding 05/29/2023    S/p IUD removal/hysteroscopy/D&C/endometrial ablation/laparoscopic BS/SHELLI 5/29/23 05/29/2023    Irregular bleeding 03/16/2023    Chronic hypertension complicating or reason for care during pregnancy, third trimester 04/19/2021    Left hip pain 02/02/2021    Hypertension 04/14/2020    Class 2 severe obesity due to excess calories with serious comorbidity and body mass index (BMI) of 39.0 to 39.9 in adult 03/18/2014     Past Medical History:   Diagnosis Date    Abnormal Pap smear 2007    Abnormal Pap smear of cervix     Allergy     Eczema     HPV (human papilloma virus) anogenital infection     Hypertension     Tinea versicolor       Past Surgical History:   Procedure Laterality Date    CERVICAL BIOPSY  W/ LOOP ELECTRODE EXCISION  2007    DILATION AND CURETTAGE OF UTERUS  2012    retained  IUD/Endometrial polyp    ESOPHAGOGASTRODUODENOSCOPY N/A 7/10/2024    Procedure: EGD (ESOPHAGOGASTRODUODENOSCOPY);  Surgeon: Ingrid Berman MD;  Location: [address]);  Service: Endoscopy;  Laterality: N/A;  Referred by: Dr. Berman, Please schedule with Dr. Ingrid Berman, if possible., instructions sent to portal-  7/2-lvm for pre call-tb  7/9-precall complete-Kpvt    HYDROTHERMAL ABLATION OF ENDOMETRIUM N/A 05/29/2023    Procedure: ABLATION, ENDOMETRIUM, HYDROTHERMAL;  Surgeon: Sushila Dunham MD;  Location: [facility];  Service: OB/GYN;  Laterality: N/A;  NOVASURE    HYSTEROSCOPY W/ POLYPECTOMY      LAPAROSCOPIC LYSIS OF ADHESIONS N/A 05/29/2023    Procedure: LYSIS, ADHESIONS, LAPAROSCOPIC;  Surgeon: Sushila Dunham MD;  Location: [facility];  Service: OB/GYN;  Laterality: N/A;    LAPAROSCOPIC SALPINGECTOMY Bilateral 05/29/2023    Procedure: SALPINGECTOMY, LAPAROSCOPIC;  Surgeon: Sushila Dunham MD;  Location: [facility];  Service: OB/GYN;  Laterality: Bilateral;    REMOVAL OF INTRAUTERINE DEVICE (IUD) N/A 05/29/2023    Procedure: REMOVAL, INTRAUTERINE DEVICE;  Surgeon: Sushila Dunham MD;  Location: [facility];  Service: OB/GYN;  Laterality: N/A;    TUBAL LIGATION  5/29/23      (Not in a hospital admission)    Review of patient's allergies indicates:  No Known Allergies   Social History     Tobacco Use    Smoking status: Never    Smokeless tobacco: Never   Substance Use Topics    Alcohol use: Yes     Alcohol/week: 5.0 standard drinks of alcohol     Types: 5 Glasses of wine per week     Comment: Occasionally ; none 24 hr prior to surgery      Family History   Problem Relation Name Age of Onset    Diabetes Paternal Grandfather Angel Young     Cancer Paternal Grandfather Angel Young     Obesity Paternal Grandfather Angel Young     Diabetes Paternal Grandmother Lois Young     Heart disease Paternal Grandmother Lois Young     Obesity Paternal Grandmother Lois Young     Diabetes  "Maternal Grandmother Coleen Jaffe     Obesity Maternal Grandmother Coleen Jaffe     Diabetes Maternal Grandfather Devin Jaffe     Obesity Maternal Grandfather Devin Jaffe     Stroke Maternal Grandfather Devin Jaffe     Hypertension Father Willis Young     Diabetes Father Willis Young     Obesity Father Willis Young     Eczema Sister Naseem Young     Obesity Sister Naseem Young     Eczema Sister      Diabetes Mother Priyanka Young     Hypertension Mother Priyanka Young     Hyperlipidemia Mother Priyanka Bhattis     Obesity Mother Priyanka Young     Sleep apnea Mother Priyanka Bhattis     Obesity Sister Luli Young     Breast cancer Neg Hx      Colon cancer Neg Hx      Ovarian cancer Neg Hx      Melanoma Neg Hx      Lupus Neg Hx      Psoriasis Neg Hx          Review of Systems  Constitutional: negative for anorexia, chills and fatigue  Eyes: negative for icterus, irritation and redness  Respiratory: negative for cough and dyspnea on exertion  Cardiovascular: negative for chest pain and chest pressure/discomfort  Gastrointestinal: negative for abdominal pain, change in bowel habits, constipation and diarrhea  Musculoskeletal:negative for arthralgias and back pain  Neurological: negative for coordination problems and dizziness  Behavioral/Psych: negative for anxiety and bad mood    Objective:  Vital signs in last 24 hours:  Vitals:    09/16/24 1335   BP: (!) 142/82   Pulse: 89   SpO2: 100%   Weight: 109.9 kg (242 lb 4.6 oz)   Height: 5' 6" (1.676 m)          Weight History Current Weight Total Weight Loss   5/14/2018   1:55  lbs -243 lbs   8/20/2018   1:45  lbs -235 lbs   12/17/2018   8:48  lbs -228 lbs   9/12/2023   8:58 .5 lbs -238.5 lbs   5/13/2024   3:16  lbs -243 lbs       General appearance: alert, appears stated age and cooperative  Head: Normocephalic, without obvious abnormality, atraumatic  Eyes: negative findings: lids and lashes normal and conjunctivae and sclerae normal  Lungs: non " labored breathing  Heart: regular rate and rhythm  Abdomen: soft, non-tender  Extremities: extremities normal, atraumatic, no cyanosis or edema  Skin: Skin color, texture, turgor normal. No rashes or lesions  Neurologic: Grossly normal    Data Review:  Psych: Cleared  Nutrition: Cleared  PCP: Cleared  CXR: WNL  EKG: WNL  Labs: No abnormalities that would prevent proceeding with surgery.    Assessment/Plan:  Joann was seen today for pre-op exam.    Diagnoses and all orders for this visit:    Class 2 severe obesity due to excess calories with serious comorbidity and body mass index (BMI) of 39.0 to 39.9 in adult    Prediabetes    ADA (obstructive sleep apnea)      Morbid obesity with failure of conservative therapy.    I have personally discussed, in appropriate language, the potential complications of a Laparoscopic Sleeve Gastrectomy with Joann Young Banuelos. She is well aware of the serious complications of the surgery that include but are not limited to: bleeding (including possible need for transfusions), deep venous thrombosis, pulmonary embolism, pulmonary complications such as pneumonia, cardiac events, temporary or permanent stroke, damage to the spleen, bowel or other organs during surgery and the risk of death. Joann is also aware of specific complications which apply in particular to bariatric procedures and can include but are not limited to: severe post-operative nausea, dysphagia, the leakage of gastric/ intestinal contents at the staple line and the development of an intra-abdominal abscess requiring drainage, need for additional surgery, severe intra-abdominal infection leading to sepsis and even death, wound complications such as hernias and wound infections, strictures, small bowel obstruction, disfiguring scars and failure to lose weight. Further, once again, it was reinforced that her bariatric procedure is a weight loss tool but ultimate success will rest on lifelong dietary and lifestyle  changes. Inadequate weight loss and weight regain are absolutely possible and therefore she will need to treat obesity as the chronic disease it is and continue to optimize her efforts.    We discussed that our goal is to ameliorate her medical problems and not to obtain a specific body mass index. She understands the risks and benefits and wishes to proceed with the procedure. She has signed a consent form.     Has a planned work trip to [employer] around 2 weeks post-op just after her post-op visit. Given risk of blood clots in first month post-op will given prophylactic lovenox for flight days.      I spent 45 minutes on this patient encounter. This includes times spent with the patient involving coordination of care and counseling, discussion of the patient's condition, delineating the plan of management above, discussing the rationale / recommendations for treatment, chart review and documentation. All time accounted for was spent on the day of service.    Ingrid Berman MD FACS  Minimally Invasive General & Bariatric Surgery

## 2024-09-16 NOTE — PROGRESS NOTES
Subjective:  The patient is a 38 y.o. obese female who presents for pre op for Laparoscopic Sleeve Gastrectomy.    Doing well overall.  Very motivated for surgery by her diagnosis of sleep apnea. Is using CPAP regularly.  HTN - well controlled on amlodipine.    Pre-diabetes. Well controlled.  Lab Results   Component Value Date    HGBA1C 5.8 (H) 07/09/2024     Wt Readings from Last 3 Encounters:   09/16/24 1335 109.9 kg (242 lb 4.6 oz)   09/09/24 1138 110 kg (242 lb 8.1 oz)   07/10/24 1234 110.2 kg (243 lb)     No GERD - reviewed EGD; no hiatal hernia and no esophagitis.     Exercising most days with .    Has significantly cut back on weekend alcohol as part of weight loss goal.    All workup has been reviewed in clinic today and there is nothing on the review that would prevent us from proceeding with surgery.    All questions were answered in clinic today prior to leaving.    Body mass index is 39.11 kg/m².       Patient Active Problem List    Diagnosis Date Noted    ADA (obstructive sleep apnea) 05/14/2024    Snoring 04/09/2024    Left wrist pain 04/09/2024    Prediabetes 06/07/2023    Heavy menstrual bleeding 05/29/2023    S/p IUD removal/hysteroscopy/D&C/endometrial ablation/laparoscopic BS/SHELLI 5/29/23 05/29/2023    Irregular bleeding 03/16/2023    Chronic hypertension complicating or reason for care during pregnancy, third trimester 04/19/2021    Left hip pain 02/02/2021    Hypertension 04/14/2020    Class 2 severe obesity due to excess calories with serious comorbidity and body mass index (BMI) of 39.0 to 39.9 in adult 03/18/2014     Past Medical History:   Diagnosis Date    Abnormal Pap smear 2007    Abnormal Pap smear of cervix     Allergy     Eczema     HPV (human papilloma virus) anogenital infection     Hypertension     Tinea versicolor       Past Surgical History:   Procedure Laterality Date    CERVICAL BIOPSY  W/ LOOP ELECTRODE EXCISION  2007    DILATION AND CURETTAGE OF UTERUS  2012    retained  IUD/Endometrial polyp    ESOPHAGOGASTRODUODENOSCOPY N/A 7/10/2024    Procedure: EGD (ESOPHAGOGASTRODUODENOSCOPY);  Surgeon: Ingrid Berman MD;  Location: [address]);  Service: Endoscopy;  Laterality: N/A;  Referred by: Dr. Berman, Please schedule with Dr. Ingrid Berman, if possible., instructions sent to portal-  7/2-lvm for pre call-tb  7/9-precall complete-Kpvt    HYDROTHERMAL ABLATION OF ENDOMETRIUM N/A 05/29/2023    Procedure: ABLATION, ENDOMETRIUM, HYDROTHERMAL;  Surgeon: Sushila Dunham MD;  Location: [facility];  Service: OB/GYN;  Laterality: N/A;  NOVASURE    HYSTEROSCOPY W/ POLYPECTOMY      LAPAROSCOPIC LYSIS OF ADHESIONS N/A 05/29/2023    Procedure: LYSIS, ADHESIONS, LAPAROSCOPIC;  Surgeon: Sushila Dunham MD;  Location: [facility];  Service: OB/GYN;  Laterality: N/A;    LAPAROSCOPIC SALPINGECTOMY Bilateral 05/29/2023    Procedure: SALPINGECTOMY, LAPAROSCOPIC;  Surgeon: Sushila Dunham MD;  Location: [facility];  Service: OB/GYN;  Laterality: Bilateral;    REMOVAL OF INTRAUTERINE DEVICE (IUD) N/A 05/29/2023    Procedure: REMOVAL, INTRAUTERINE DEVICE;  Surgeon: Sushila Dunham MD;  Location: [facility];  Service: OB/GYN;  Laterality: N/A;    TUBAL LIGATION  5/29/23      (Not in a hospital admission)    Review of patient's allergies indicates:  No Known Allergies   Social History     Tobacco Use    Smoking status: Never    Smokeless tobacco: Never   Substance Use Topics    Alcohol use: Yes     Alcohol/week: 5.0 standard drinks of alcohol     Types: 5 Glasses of wine per week     Comment: Occasionally ; none 24 hr prior to surgery      Family History   Problem Relation Name Age of Onset    Diabetes Paternal Grandfather Angel Young     Cancer Paternal Grandfather Angel Young     Obesity Paternal Grandfather Angel Young     Diabetes Paternal Grandmother Lois Young     Heart disease Paternal Grandmother Lois Young     Obesity Paternal Grandmother Lois Young     Diabetes  "Maternal Grandmother Coleen Jaffe     Obesity Maternal Grandmother Coleen Jaffe     Diabetes Maternal Grandfather Devin Jaffe     Obesity Maternal Grandfather Devin Jaffe     Stroke Maternal Grandfather Devin Jaffe     Hypertension Father Willis Young     Diabetes Father Willis Young     Obesity Father Willis Young     Eczema Sister Naseem Young     Obesity Sister Naseem Young     Eczema Sister      Diabetes Mother Priyanka Young     Hypertension Mother Priyanka Young     Hyperlipidemia Mother Priyanka Bhattis     Obesity Mother Priyanka Young     Sleep apnea Mother Priyanka Bhattis     Obesity Sister Luli Young     Breast cancer Neg Hx      Colon cancer Neg Hx      Ovarian cancer Neg Hx      Melanoma Neg Hx      Lupus Neg Hx      Psoriasis Neg Hx          Review of Systems  Constitutional: negative for anorexia, chills and fatigue  Eyes: negative for icterus, irritation and redness  Respiratory: negative for cough and dyspnea on exertion  Cardiovascular: negative for chest pain and chest pressure/discomfort  Gastrointestinal: negative for abdominal pain, change in bowel habits, constipation and diarrhea  Musculoskeletal:negative for arthralgias and back pain  Neurological: negative for coordination problems and dizziness  Behavioral/Psych: negative for anxiety and bad mood    Objective:  Vital signs in last 24 hours:  Vitals:    09/16/24 1335   BP: (!) 142/82   Pulse: 89   SpO2: 100%   Weight: 109.9 kg (242 lb 4.6 oz)   Height: 5' 6" (1.676 m)          Weight History Current Weight Total Weight Loss   5/14/2018   1:55  lbs -243 lbs   8/20/2018   1:45  lbs -235 lbs   12/17/2018   8:48  lbs -228 lbs   9/12/2023   8:58 .5 lbs -238.5 lbs   5/13/2024   3:16  lbs -243 lbs       General appearance: alert, appears stated age and cooperative  Head: Normocephalic, without obvious abnormality, atraumatic  Eyes: negative findings: lids and lashes normal and conjunctivae and sclerae normal  Lungs: non " labored breathing  Heart: regular rate and rhythm  Abdomen: soft, non-tender  Extremities: extremities normal, atraumatic, no cyanosis or edema  Skin: Skin color, texture, turgor normal. No rashes or lesions  Neurologic: Grossly normal    Data Review:  Psych: Cleared  Nutrition: Cleared  PCP: Cleared  CXR: WNL  EKG: WNL  Labs: No abnormalities that would prevent proceeding with surgery.    Assessment/Plan:  Joann was seen today for pre-op exam.    Diagnoses and all orders for this visit:    Class 2 severe obesity due to excess calories with serious comorbidity and body mass index (BMI) of 39.0 to 39.9 in adult    Prediabetes    ADA (obstructive sleep apnea)      Morbid obesity with failure of conservative therapy.    I have personally discussed, in appropriate language, the potential complications of a Laparoscopic Sleeve Gastrectomy with Joann Young Banuelos. She is well aware of the serious complications of the surgery that include but are not limited to: bleeding (including possible need for transfusions), deep venous thrombosis, pulmonary embolism, pulmonary complications such as pneumonia, cardiac events, temporary or permanent stroke, damage to the spleen, bowel or other organs during surgery and the risk of death. Joann is also aware of specific complications which apply in particular to bariatric procedures and can include but are not limited to: severe post-operative nausea, dysphagia, the leakage of gastric/ intestinal contents at the staple line and the development of an intra-abdominal abscess requiring drainage, need for additional surgery, severe intra-abdominal infection leading to sepsis and even death, wound complications such as hernias and wound infections, strictures, small bowel obstruction, disfiguring scars and failure to lose weight. Further, once again, it was reinforced that her bariatric procedure is a weight loss tool but ultimate success will rest on lifelong dietary and lifestyle  changes. Inadequate weight loss and weight regain are absolutely possible and therefore she will need to treat obesity as the chronic disease it is and continue to optimize her efforts.    We discussed that our goal is to ameliorate her medical problems and not to obtain a specific body mass index. She understands the risks and benefits and wishes to proceed with the procedure. She has signed a consent form.     Has a planned work trip to [place] around 2 weeks post-op just after her post-op visit. Given risk of blood clots in first month post-op will given prophylactic lovenox for flight days.      I spent 45 minutes on this patient encounter. This includes times spent with the patient involving coordination of care and counseling, discussion of the patient's condition, delineating the plan of management above, discussing the rationale / recommendations for treatment, chart review and documentation. All time accounted for was spent on the day of service.    Ingrid Berman MD FACS  Minimally Invasive General & Bariatric Surgery
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
## 2024-09-26 ENCOUNTER — PATIENT MESSAGE (OUTPATIENT)
Dept: BARIATRICS | Facility: CLINIC | Age: 39
End: 2024-09-26

## 2024-09-26 ENCOUNTER — CLINICAL SUPPORT (OUTPATIENT)
Dept: BARIATRICS | Facility: CLINIC | Age: 39
End: 2024-09-26
Payer: COMMERCIAL

## 2024-09-26 DIAGNOSIS — I10 PRIMARY HYPERTENSION: ICD-10-CM

## 2024-09-26 DIAGNOSIS — E66.9 OBESITY (BMI 30-39.9): ICD-10-CM

## 2024-09-26 DIAGNOSIS — G47.33 OSA (OBSTRUCTIVE SLEEP APNEA): ICD-10-CM

## 2024-09-26 DIAGNOSIS — Z71.3 DIETARY COUNSELING AND SURVEILLANCE: Primary | ICD-10-CM

## 2024-09-26 PROCEDURE — 99499 UNLISTED E&M SERVICE: CPT | Mod: 95,,, | Performed by: DIETITIAN, REGISTERED

## 2024-09-26 NOTE — PROGRESS NOTES
Audio Only Telehealth Visit     The patient location is: home ([place])  The chief complaint leading to consultation is: Pre-op liquid diet and nutrition instructions  Visit type: Virtual visit with audio only (telephone)  Total time spent with patient: 15 mins     The reason for the audio only service rather than synchronous audio and video virtual visit was related to technical difficulties or patient preference/necessity.     Each patient to whom I provide medical services by telemedicine is:  (1) informed of the relationship between the physician and patient and the respective role of any other health care provider with respect to management of the patient; and (2) notified that they may decline to receive medical services by telemedicine and may withdraw from such care at any time. Patient verbally consented to receive this service via voice-only telephone call.    This service was not originating from a related E/M service provided within the previous 7 days nor will  to an E/M service or procedure within the next 24 hours or my soonest available appointment.  Prevailing standard of care was able to be met in this audio-only visit.      NUTRITION NOTE    Bariatric Surgeon: Ingrid Berman M.D.  Reason for MNT Referral: Pre-op liquid diet and nutrition instructions  Procedure: sleeve gastrectomy  Date of Surgery: 10/3/24    Pre-op liquid diet  Pt using Premier Protein (with coffee), [employer] for preop liquid diet  Fluids include: Water, broth, SF popsicles, SF Jell-o    Discussion:  -  gms of protein per day  - 600-800 calories per day   - No more than 4 g sugar per protein shake/water/powder  - Sugar-free, decaffeinated, non-carbonated fluids  - No fruits, juices, yogurt or pudding on liquid diet  - No herbal supplements including green tea and fish oils for 2 weeks prior to surgery   - No vitamins for 1 week prior to surgery  - Stop GLP-1 1 week prior to surgery    Post-op instructions  -  Reviewed nutritional guidelines for post-surgery.    - 1 ounce medicine cups and tracking sheet provided for patient to measure and track fluid intake after surgery.  - Start with 1 oz clear liquids every 15 minutes following surgery, and to increase as tolerated. Aim for 48 fl oz clear fluids daily (includes clear protein drinks and protein soups).  - May begin sipping on protein shakes, as long as maintaining 48 fl oz non-caffeinated clear liquids per day.  - Reviewed bariatric vitamin/mineral regimen, starts 1 week after surgery as tolerated.  - Reviewed common nutritional concerns and prevention tips after bariatric surgery.  - Reminded not to lift anything greater than 10 lbs for 6 week post-surgery   - Encouraged PT to walk as much as tolerable after surgery.     Pt has the following vitamins and minerals to start taking one week after being discharged from hospital:    Barinelt Multivitamin with iron  Barimelt B-1   Barimelt Calcium Citrate   Barimelt Vitamin B-12    Reviewed dosage and timing of vitamin/mineral guidelines.    Remind pt per nursing and medical team to inform our department if taking antibiotics within the 30 days post bariatric surgery or it any other surgeries/procedures are scheduled within 30 days after bariatric surgery.    Pt verbalized understanding of information provided with appropriate questions and comments.     SESSION TIME: 15 minutes

## 2024-10-01 ENCOUNTER — PATIENT MESSAGE (OUTPATIENT)
Dept: BARIATRICS | Facility: CLINIC | Age: 39
End: 2024-10-01
Payer: COMMERCIAL

## 2024-10-01 ENCOUNTER — PATIENT MESSAGE (OUTPATIENT)
Dept: INTERNAL MEDICINE | Facility: CLINIC | Age: 39
End: 2024-10-01
Payer: COMMERCIAL

## 2024-10-01 NOTE — PRE-PROCEDURE INSTRUCTIONS
PreOp Instructions given:   - Verbal medication information (what to hold and what to take)   - NPO guidelines given/Bariatric Sx Dept  - Arrival place directions given; time to be given the day before procedure by the   Surgeon's Office [facility]  - Bathing with antibacterial soap   - Don't wear any jewelry or bring any valuables AM of surgery   - No makeup or moisturizer to face   - No perfume/cologne, powder, lotions or aftershave   Pt. verbalized understanding.   Pt denies any h/o Anesthesia/Sedation complications or side effects.  Patient does not know arrival time.  Explained that this information comes from the surgeon's office and if they haven't heard from them by 2 or 3 pm to call the office.  Patient stated an understanding.

## 2024-10-02 ENCOUNTER — ANESTHESIA EVENT (OUTPATIENT)
Dept: SURGERY | Facility: HOSPITAL | Age: 39
End: 2024-10-02
Payer: COMMERCIAL

## 2024-10-02 ENCOUNTER — OFFICE VISIT (OUTPATIENT)
Dept: INTERNAL MEDICINE | Facility: CLINIC | Age: 39
End: 2024-10-02
Payer: COMMERCIAL

## 2024-10-02 ENCOUNTER — TELEPHONE (OUTPATIENT)
Dept: BARIATRICS | Facility: CLINIC | Age: 39
End: 2024-10-02
Payer: COMMERCIAL

## 2024-10-02 VITALS
SYSTOLIC BLOOD PRESSURE: 120 MMHG | DIASTOLIC BLOOD PRESSURE: 72 MMHG | RESPIRATION RATE: 18 BRPM | BODY MASS INDEX: 36.57 KG/M2 | OXYGEN SATURATION: 100 % | HEIGHT: 67 IN | TEMPERATURE: 97 F | WEIGHT: 233 LBS | HEART RATE: 76 BPM

## 2024-10-02 DIAGNOSIS — Z01.818 PRE-OPERATIVE EXAM: Primary | ICD-10-CM

## 2024-10-02 PROCEDURE — 3008F BODY MASS INDEX DOCD: CPT | Mod: CPTII,S$GLB,,

## 2024-10-02 PROCEDURE — 3044F HG A1C LEVEL LT 7.0%: CPT | Mod: CPTII,S$GLB,,

## 2024-10-02 PROCEDURE — 99213 OFFICE O/P EST LOW 20 MIN: CPT | Mod: S$GLB,,,

## 2024-10-02 PROCEDURE — 1159F MED LIST DOCD IN RCRD: CPT | Mod: CPTII,S$GLB,,

## 2024-10-02 PROCEDURE — 3074F SYST BP LT 130 MM HG: CPT | Mod: CPTII,S$GLB,,

## 2024-10-02 PROCEDURE — 99999 PR PBB SHADOW E&M-EST. PATIENT-LVL IV: CPT | Mod: PBBFAC,,,

## 2024-10-02 PROCEDURE — 3078F DIAST BP <80 MM HG: CPT | Mod: CPTII,S$GLB,,

## 2024-10-02 NOTE — PROGRESS NOTES
Subjective     Chief Complaint: Pre-op evaluation    History of Present Illness:  Ms. Joann Banuelos is a 38 y.o. female with hx of ADA, HTN, pre diabetes, and obesity who presents to clinic today for pre operative evaluation. She is planned to undergo gastric sleeve surgery on 10/3/2024. She denies any cardiac, pulmonary or kidney problems or dysfunction. She has well controlled HTN for which she takes amlodipine. She is not taking any anti-coagulants or anti-platelets.        Surgical Risk Assessment   Active cardiac issues:  Active decompensated heart failure? No   Unstable angina?  No   Significant uncontrolled arrhythmias? No   Severe valvular heart disease-Aortic or Mitral Stenosis? No   Recent MI or coronary revascularization < 30 days? No     Cardiac Risk Factors  History of CAD/ischemic heart disease? No   History of cerebrovascular disease? No   History of compensated heart failure? No   Type 2 diabetes requiring insulin? No   Serum Creatinine > 2? No   Total cardiac risk factors 0     Functional mets >4    < 4* METs -unable to walk > 2 blocks on level ground without stopping due to symptoms  - eating, dressing, toileting, walking indoors, light housework. POOR   > 4* METs -climbing > 1 flight of stairs without stopping  -walking up hill > 1-2 blocks  -scrubbing floors  -moving furniture  - golf, bowling, dancing or tennis  -running short distance MODERATE to EXCELLENT   * performance of any one of the activities               Review of Systems   Constitutional:  Negative for chills and fever.   Respiratory:  Negative for cough and shortness of breath.    Cardiovascular:  Negative for chest pain and leg swelling.   Gastrointestinal:  Negative for abdominal pain, nausea and vomiting.   Genitourinary:  Negative for dysuria.   Neurological:  Negative for dizziness and headaches.       PAST HISTORY:     Past Medical History:   Diagnosis Date    Abnormal Pap smear 2007    Abnormal Pap smear of cervix      Allergy     Eczema     HPV (human papilloma virus) anogenital infection     Hypertension     Tinea versicolor        Past Surgical History:   Procedure Laterality Date    CERVICAL BIOPSY  W/ LOOP ELECTRODE EXCISION  2007    DILATION AND CURETTAGE OF UTERUS  2012    retained IUD/Endometrial polyp    ESOPHAGOGASTRODUODENOSCOPY N/A 7/10/2024    Procedure: EGD (ESOPHAGOGASTRODUODENOSCOPY);  Surgeon: Ingrid Berman MD;  Location: [address]);  Service: Endoscopy;  Laterality: N/A;  Referred by: Dr. Berman, Please schedule with Dr. Ingrid Berman, if possible., instructions sent to portal-cf  7/2-lvm for pre call-tb  7/9-precall complete-Kpvt    HYDROTHERMAL ABLATION OF ENDOMETRIUM N/A 05/29/2023    Procedure: ABLATION, ENDOMETRIUM, HYDROTHERMAL;  Surgeon: Sushila Dunham MD;  Location: [facility];  Service: OB/GYN;  Laterality: N/A;  NOVASURE    HYSTEROSCOPY W/ POLYPECTOMY      LAPAROSCOPIC LYSIS OF ADHESIONS N/A 05/29/2023    Procedure: LYSIS, ADHESIONS, LAPAROSCOPIC;  Surgeon: Sushila Dunham MD;  Location: [facility];  Service: OB/GYN;  Laterality: N/A;    LAPAROSCOPIC SALPINGECTOMY Bilateral 05/29/2023    Procedure: SALPINGECTOMY, LAPAROSCOPIC;  Surgeon: Sushila Dunham MD;  Location: [facility];  Service: OB/GYN;  Laterality: Bilateral;    REMOVAL OF INTRAUTERINE DEVICE (IUD) N/A 05/29/2023    Procedure: REMOVAL, INTRAUTERINE DEVICE;  Surgeon: Sushila Dunham MD;  Location: [facility];  Service: OB/GYN;  Laterality: N/A;    TUBAL LIGATION  5/29/23       Family History   Problem Relation Name Age of Onset    Diabetes Paternal Grandfather Angel Young     Cancer Paternal Grandfather Angel Young     Obesity Paternal Grandfather Angel Young     Diabetes Paternal Grandmother Lois Alfonzo Young     Heart disease Paternal Grandmother Lois Mejía Young     Obesity Paternal Grandmother Lois Kiddman Young     Diabetes Maternal Grandmother Coleen Jaffe     Obesity Maternal Grandmother Coleen Jaffe     Diabetes  Maternal Grandfather Devin Jaffe     Obesity Maternal Grandfather Devin Jaffe     Stroke Maternal Grandfather Devin Jaffe     Hypertension Father Willis Young     Diabetes Father Willis Young     Obesity Father Willis Young     Eczema Sister Naseem Young     Obesity Sister Naseem Young     Eczema Sister      Diabetes Mother Priyanka Young     Hypertension Mother Priyanka Young     Hyperlipidemia Mother Priyanka Young     Obesity Mother Priyanka Young     Sleep apnea Mother Priyanka Young     Obesity Sister Luli Young     Breast cancer Neg Hx      Colon cancer Neg Hx      Ovarian cancer Neg Hx      Melanoma Neg Hx      Lupus Neg Hx      Psoriasis Neg Hx         Social History     Socioeconomic History    Marital status:     Number of children: 2   Occupational History    Occupation: Medical sales     Employer: [employer]   Tobacco Use    Smoking status: Never    Smokeless tobacco: Never   Substance and Sexual Activity    Alcohol use: Yes     Alcohol/week: 5.0 standard drinks of alcohol     Types: 5 Glasses of wine per week     Comment: Occasionally ; none 24 hr prior to surgery    Drug use: No    Sexual activity: Yes     Partners: Male     Birth control/protection: See Surgical Hx     Comment: Tubes tied   Other Topics Concern    Are you pregnant or think you may be? No    Breast-feeding No   Social History Narrative    ** Merged History Encounter **          Social Drivers of Health     Financial Resource Strain: Low Risk  (3/4/2024)    Overall Financial Resource Strain (CARDIA)     Difficulty of Paying Living Expenses: Not very hard   Food Insecurity: No Food Insecurity (3/4/2024)    Hunger Vital Sign     Worried About Running Out of Food in the Last Year: Never true     Ran Out of Food in the Last Year: Never true   Transportation Needs: No Transportation Needs (3/4/2024)    PRAPARE - Transportation     Lack of Transportation (Medical): No     Lack of Transportation (Non-Medical): No   Physical Activity:  Insufficiently Active (3/4/2024)    Exercise Vital Sign     Days of Exercise per Week: 3 days     Minutes of Exercise per Session: 40 min   Stress: No Stress Concern Present (3/4/2024)    [employer] [place] [employer] - Occupational Stress Questionnaire     Feeling of Stress : Only a little   Housing Stability: Low Risk  (3/4/2024)    Housing Stability Vital Sign     Unable to Pay for Housing in the Last Year: No     Number of Places Lived in the Last Year: 1     Unstable Housing in the Last Year: No       MEDICATIONS & ALLERGIES:     Current Outpatient Medications on File Prior to Visit   Medication Sig    acetaminophen 500 mg PwPk Mix 2 packets (1,000mg) with liquid and take by mouth THREE TIMES DAILY for at least 3 days and up to 5 days as needed for pain.    amLODIPine (NORVASC) 5 MG tablet Take 1 tablet (5 mg total) by mouth once daily.    enoxaparin (LOVENOX) 40 mg/0.4 mL Syrg Inject 0.4 mLs (40 mg total) into the skin once daily. For use on day of flights to and from [facility] for 2 days    ergocalciferol (VITAMIN D2) 50,000 unit Cap Take 1 capsule (50,000 Units total) by mouth twice a week.    gabapentin (NEURONTIN) 300 MG capsule OPEN capsule into a tablespoon and consume with sip of liquid. Take once the MORNING OF SURGERY. After surgery: take one capsule TWICE DAILY for at least 3 days and up to 5 days as needed for pain.    omeprazole (PRILOSEC) 40 MG capsule Take 1 capsule (40 mg total) by mouth every morning. Open capsule and take with apple sauce    ondansetron (ZOFRAN-ODT) 8 MG TbDL Dissolve 1 tablet (8 mg total) by mouth every 6 (six) hours as needed (Nausea).    polyethylene glycol (GLYCOLAX) 17 gram/dose powder Use cap to measure 17 grams.  Dissolve as directed and take by mouth once daily.    promethazine (PHENERGAN) 12.5 MG Supp Place 1 suppository (12.5 mg total) rectally every 6 (six) hours as needed (nausea, 2nd line).    ursodioL (ACTIGALL) 500 MG tablet Take 1 tablet (500 mg total)  "by mouth once daily. For gallstone prevention.     No current facility-administered medications on file prior to visit.       Review of patient's allergies indicates:  No Known Allergies    OBJECTIVE:     Vital Signs:  Vitals:    10/02/24 1359   BP: 120/72   BP Location: Right arm   Patient Position: Sitting   Pulse: 76   Resp: 18   Temp: 97 °F (36.1 °C)   TempSrc: Temporal   SpO2: 100%   Weight: 105.7 kg (233 lb 0.4 oz)   Height: 5' 6.5" (1.689 m)       Body mass index is 37.05 kg/m².     Physical Exam  Constitutional:       Appearance: She is obese.   HENT:      Head: Normocephalic and atraumatic.      Mouth/Throat:      Mouth: Mucous membranes are moist.      Pharynx: Oropharynx is clear. No oropharyngeal exudate or posterior oropharyngeal erythema.   Eyes:      General: No scleral icterus.  Cardiovascular:      Rate and Rhythm: Normal rate and regular rhythm.      Pulses: Normal pulses.      Heart sounds: Normal heart sounds.   Pulmonary:      Effort: Pulmonary effort is normal. No respiratory distress.      Breath sounds: Normal breath sounds. No wheezing.   Abdominal:      General: Abdomen is flat. There is no distension.      Palpations: Abdomen is soft.      Tenderness: There is no abdominal tenderness.   Musculoskeletal:      Right lower leg: No edema.      Left lower leg: No edema.   Skin:     General: Skin is warm.   Neurological:      Mental Status: She is alert and oriented to person, place, and time.   Psychiatric:         Mood and Affect: Mood normal.         Laboratory  No results found for this or any previous visit (from the past 24 hours).    Diagnostic Results:      Health Maintenance Due   Topic Date Due    Hepatitis C Screening  Never done    Influenza Vaccine (1) 09/01/2024    COVID-19 Vaccine (7 - 2024-25 season) 09/01/2024    Mammogram  12/27/2024         ASSESSMENT & PLAN:     Ms. Joann Banuelos is a 38 y.o. female Who presents for pre operative evaluation.     1. Pre-operative " exam        Cardiovascular Risk Assessment:  Non-emergent surgery.  No active cardiac problems (such as unstable angina, decompensated heart failure, significant uncontrolled arrhythmias or severe valvular disease).  Surgery is moderate risk  Functional Status: able to climb a flight of stairs (> 4 METS)    Revised Cardiac Risk Index   1. History of ischemic heart disease   2. History of congestive heart failure   3. History of cerebrovascular disease (stroke or transient ischemic attack)   4. History of diabetes requiring preoperative insulin use 5. Chronic kidney disease (creatinine > 2 mg/dL)   5. Undergoing suprainguinal vascular, intraperitoneal, or intrathoracic surgery Risk for cardiac death, nonfatal myocardial infarction, and nonfatal cardiac arrest       RCRI Calculator Class and Risk percentage: class 2 risk, 1 point =6% risk of death, MI, or cardiac arrest within 30 days of surgery    -Other Issues:  HTN ( well controlled)      Anticoagulation:   none      Coronary Stenting: none  -EKG this past July showed NSR without any ischemic changes    Recommendation:  1. Will obtain obtain CBC and BMP  2. Proceed to Surgery    RTC as needed    Discussed with Dr. Greene  - staff attestation to follow      Taurus Pierre DO  Internal Medicine PGY-2  [facility]  [address] [place] [address]  [phone]

## 2024-10-02 NOTE — TELEPHONE ENCOUNTER
Notified patient of arrival time to the [facility] 2nd floor [facility] at 1000 with expected surgery start time 1200 on 10/3/24. Instructed patient regarding pre-op instructions including no protein shakes or sugar free clear liquids after midnight but can have a rare sip of water for comfort, showering and preop medications to hold/take per anesthesia/preop. Reminded pt to drink pre-surgery beverage or 8 oz water and take one dose of Gabapentin at 0930. Instructed patient on the s/s of dehydration and for patient to call at the first sign of dehydration. Informed patient that someone from bariatrics will call them 1 week post op to review diet/fluid intake and to ensure adequate hydration. Reminded patient not to take antibiotics for 30 days following surgery or schedule elective procedures involving anesthesia/sedation for 30 days following surgery unless checking with the bariatric clinic first. Pt verbalized understanding. Pt given office phone number for any additional questions/concerns.

## 2024-10-02 NOTE — ANESTHESIA PREPROCEDURE EVALUATION
[facility]  Anesthesia Pre-Operative Evaluation        Patient Name: Joann Banuelos  YOB: 1985  MRN: [id number]    SUBJECTIVE:     Pre-operative Evaluation for Procedure(s) (LRB):  GASTRECTOMY, SLEEVE, LAPAROSCOPIC (N/A)     10/02/2024    Joann Banuelos is a 38 y.o. female with a PMHx significant for obesity (BMI 37), ADA, HTN.     The patient now presents for the above procedure(s).    Previous Airway  Intubation     Date/Time: 5/29/2023 7:09 AM  Performed by: Becky Richey  Authorized by: Jakub Slade MD      Intubation:     Induction:  Intravenous    Intubated:  Postinduction    Mask Ventilation:  Easy mask    Attempts:  1    Attempted By:  CRNA    Method of Intubation:  Video laryngoscopy    Blade:  Ortiz 3    Laryngeal View Grade: Grade I - full view of cords      Difficult Airway Encountered?: No      Complications:  None    Airway Device:  Oral endotracheal tube    Airway Device Size:  7.5    Style/Cuff Inflation:  Cuffed (inflated to minimal occlusive pressure)    Tube secured:  21    Secured at:  The lips    Placement Verified By:  Capnometry    Complicating Factors:  Obesity    Findings Post-Intubation:  BS equal bilateral and atraumatic/condition of teeth unchanged    Patient Active Problem List   Diagnosis    Class 2 severe obesity due to excess calories with serious comorbidity and body mass index (BMI) of 39.0 to 39.9 in adult    Hypertension    Left hip pain    Chronic hypertension complicating or reason for care during pregnancy, third trimester    Irregular bleeding    Heavy menstrual bleeding    S/p IUD removal/hysteroscopy/D&C/endometrial ablation/laparoscopic BS/SHELLI 5/29/23    Prediabetes    Snoring    Left wrist pain    ADA (obstructive sleep apnea)       Past Medical History:   Diagnosis Date    Abnormal Pap smear 2007    Abnormal Pap smear of cervix     Allergy     Eczema     HPV (human papilloma virus) anogenital infection     Hypertension     Tinea  versicolor        Review of patient's allergies indicates:  No Known Allergies    Current Outpatient Medications   Medication Instructions    acetaminophen 500 mg PwPk Mix 2 packets (1,000mg) with liquid and take by mouth THREE TIMES DAILY for at least 3 days and up to 5 days as needed for pain.    amLODIPine (NORVASC) 5 mg, Oral, Daily    enoxaparin (LOVENOX) 40 mg, Subcutaneous, Daily, For use on day of flights to and from [place]    ergocalciferol (VITAMIN D2) 50,000 Units, Oral, Twice weekly    gabapentin (NEURONTIN) 300 MG capsule OPEN capsule into a tablespoon and consume with sip of liquid. Take once the MORNING OF SURGERY. After surgery: take one capsule TWICE DAILY for at least 3 days and up to 5 days as needed for pain.    omeprazole (PRILOSEC) 40 mg, Oral, Every morning, Open capsule and take with apple sauce    ondansetron (ZOFRAN-ODT) 8 MG TbDL Dissolve 1 tablet (8 mg total) by mouth every 6 (six) hours as needed (Nausea).    polyethylene glycol (GLYCOLAX) 17 gram/dose powder Use cap to measure 17 grams.  Dissolve as directed and take by mouth once daily.    promethazine (PHENERGAN) 12.5 mg, Rectal, Every 6 hours PRN    ursodioL (ACTIGALL) 500 mg, Oral, Daily, For gallstone prevention.       Past Surgical History:   Procedure Laterality Date    CERVICAL BIOPSY  W/ LOOP ELECTRODE EXCISION  2007    DILATION AND CURETTAGE OF UTERUS  2012    retained IUD/Endometrial polyp    ESOPHAGOGASTRODUODENOSCOPY N/A 7/10/2024    Procedure: EGD (ESOPHAGOGASTRODUODENOSCOPY);  Surgeon: Ingrid Berman MD;  Location: [address]);  Service: Endoscopy;  Laterality: N/A;  Referred by: Dr. Berman, Please schedule with Dr. Ingrid Berman, if possible., instructions sent to portal-  7/2-[facility] for pre call-tb  7/9-precall complete-Kpvt    HYDROTHERMAL ABLATION OF ENDOMETRIUM N/A 05/29/2023    Procedure: ABLATION, ENDOMETRIUM, HYDROTHERMAL;  Surgeon: Sushila Dunham MD;  Location: [facility];  Service: OB/GYN;  Laterality:  "N/A;  NOVASURE    HYSTEROSCOPY W/ POLYPECTOMY      LAPAROSCOPIC LYSIS OF ADHESIONS N/A 05/29/2023    Procedure: LYSIS, ADHESIONS, LAPAROSCOPIC;  Surgeon: Sushila Dunham MD;  Location: [facility];  Service: OB/GYN;  Laterality: N/A;    LAPAROSCOPIC SALPINGECTOMY Bilateral 05/29/2023    Procedure: SALPINGECTOMY, LAPAROSCOPIC;  Surgeon: Sushila Dunham MD;  Location: [facility] OR;  Service: OB/GYN;  Laterality: Bilateral;    REMOVAL OF INTRAUTERINE DEVICE (IUD) N/A 05/29/2023    Procedure: REMOVAL, INTRAUTERINE DEVICE;  Surgeon: Sushila Dunham MD;  Location: [facility] OR;  Service: OB/GYN;  Laterality: N/A;    TUBAL LIGATION  5/29/23       Social History     Substance and Sexual Activity   Drug Use No     Alcohol Use: Alcohol Misuse (3/4/2024)    AUDIT-C     Frequency of Alcohol Consumption: 2-4 times a month     Average Number of Drinks: 1 or 2     Frequency of Binge Drinking: Monthly     Tobacco Use: Low Risk  (9/16/2024)    Patient History     Smoking Tobacco Use: Never     Smokeless Tobacco Use: Never     Passive Exposure: Not on file       OBJECTIVE:     Vital Signs Range (Last 24H):         Significant Labs    Heme Profile  Lab Results   Component Value Date    WBC 7.90 07/09/2024    HGB 13.4 07/09/2024    HCT 41.6 07/09/2024     07/09/2024       Coagulation Studies  No results found for: "LABPROT", "INR", "APTT"    BMP  Lab Results   Component Value Date     07/09/2024    K 3.9 07/09/2024     07/09/2024    CO2 22 (L) 07/09/2024    BUN 9 07/09/2024    CREATININE 0.8 07/09/2024    MG 2.0 07/09/2024    PHOS 3.3 07/09/2024       Liver Function Tests  Lab Results   Component Value Date    AST 27 07/09/2024    ALT 35 07/09/2024    ALKPHOS 83 07/09/2024    BILITOT 0.4 07/09/2024    PROT 7.5 07/09/2024    ALBUMIN 4.1 07/09/2024       Lipid Profile  Lab Results   Component Value Date    CHOL 236 (H) 07/09/2024    HDL 69 07/09/2024    TRIG 105 07/09/2024       Endocrine Profile  Lab Results   Component " Value Date    HGBA1C 5.8 (H) 07/09/2024    TSH 1.360 07/09/2024         Cardiac Studies    EKG:   Results for orders placed or performed during the hospital encounter of 07/09/24   EKG    Collection Time: 07/09/24  8:51 AM   Result Value Ref Range    QRS Duration 74 ms    [facility] QTC Calculation 449 ms    Narrative    Test Reason : E66.9,I10,R73.03,G47.33,Z01.818,    Vent. Rate : 071 BPM     Atrial Rate : 071 BPM     P-R Int : 180 ms          QRS Dur : 074 ms      QT Int : 414 ms       P-R-T Axes : 048 029 037 degrees     QTc Int : 449 ms    Normal sinus rhythm  Normal ECG  When compared with ECG of 30-JUL-2009 12:25,  Premature ventricular complexes are no longer Present    Confirmed by Lorna Boo MD ([id number]) on 7/9/2024 4:31:48 PM    Referred By: DANIELLE THOMAS           Confirmed By:Lorna Boo MD       AUBREY  No results found for this or any previous visit.      TTE  No results found for this or any previous visit.      Nuclear Stress Test   No results found for this or any previous visit.        ASSESSMENT/PLAN:           Pre-op Assessment    I have reviewed the Patient Summary Reports.     I have reviewed the Nursing Notes. I have reviewed the NPO Status.   I have reviewed the Medications.     Review of Systems  Anesthesia Hx:               Denies Personal Hx of Anesthesia complications.                    Cardiovascular:     Hypertension                                        Pulmonary:        Sleep Apnea                Hepatic/GI:     GERD     Bariatric surgery status          Endocrine:        Morbid Obesity / BMI > 40      Physical Exam  General: Well nourished, Cooperative, Alert and Oriented    Airway:  Mallampati: II   Mouth Opening: Normal  TM Distance: Normal  Tongue: Normal  Neck ROM: Normal ROM    Dental:  Intact    Chest/Lungs:  Normal Respiratory Rate    Heart:  Rate: Normal  Rhythm: Regular Rhythm        Anesthesia Plan  Type of Anesthesia, risks & benefits discussed:    Anesthesia Type: Gen  ETT  Intra-op Monitoring Plan: Standard ASA Monitors  Post Op Pain Control Plan: multimodal analgesia and IV/PO Opioids PRN  Induction:  IV  Airway Plan: Direct and Video, Post-Induction  Informed Consent: Informed consent signed with the Patient and all parties understand the risks and agree with anesthesia plan.  All questions answered.   ASA Score: 2  Day of Surgery Review of History & Physical: H&P Update referred to the surgeon/provider.    Ready For Surgery From Anesthesia Perspective.     .

## 2024-10-03 ENCOUNTER — HOSPITAL ENCOUNTER (INPATIENT)
Facility: HOSPITAL | Age: 39
LOS: 1 days | Discharge: HOME | DRG: 621 | End: 2024-10-04
Attending: SURGERY | Admitting: SURGERY
Payer: COMMERCIAL

## 2024-10-03 ENCOUNTER — ANESTHESIA (OUTPATIENT)
Dept: SURGERY | Facility: HOSPITAL | Age: 39
End: 2024-10-03
Payer: COMMERCIAL

## 2024-10-03 DIAGNOSIS — G47.33 OSA (OBSTRUCTIVE SLEEP APNEA): Chronic | ICD-10-CM

## 2024-10-03 DIAGNOSIS — E66.9 OBESITY: Primary | ICD-10-CM

## 2024-10-03 DIAGNOSIS — E66.01 CLASS 2 SEVERE OBESITY DUE TO EXCESS CALORIES WITH SERIOUS COMORBIDITY AND BODY MASS INDEX (BMI) OF 39.0 TO 39.9 IN ADULT: ICD-10-CM

## 2024-10-03 DIAGNOSIS — E66.812 CLASS 2 SEVERE OBESITY DUE TO EXCESS CALORIES WITH SERIOUS COMORBIDITY AND BODY MASS INDEX (BMI) OF 39.0 TO 39.9 IN ADULT: ICD-10-CM

## 2024-10-03 DIAGNOSIS — R73.03 PREDIABETES: ICD-10-CM

## 2024-10-03 LAB
B-HCG UR QL: NEGATIVE
CTP QC/QA: YES

## 2024-10-03 PROCEDURE — 36000710: Performed by: SURGERY

## 2024-10-03 PROCEDURE — 63600175 PHARM REV CODE 636 W HCPCS: Performed by: SURGERY

## 2024-10-03 PROCEDURE — 0DB64Z3 EXCISION OF STOMACH, PERCUTANEOUS ENDOSCOPIC APPROACH, VERTICAL: ICD-10-PCS | Performed by: SURGERY

## 2024-10-03 PROCEDURE — 88307 TISSUE EXAM BY PATHOLOGIST: CPT | Mod: 26,,, | Performed by: PATHOLOGY

## 2024-10-03 PROCEDURE — 11000001 HC ACUTE MED/SURG PRIVATE ROOM

## 2024-10-03 PROCEDURE — 25000003 PHARM REV CODE 250: Performed by: SURGERY

## 2024-10-03 PROCEDURE — 25000003 PHARM REV CODE 250

## 2024-10-03 PROCEDURE — 63600175 PHARM REV CODE 636 W HCPCS: Performed by: NURSE ANESTHETIST, CERTIFIED REGISTERED

## 2024-10-03 PROCEDURE — 37000008 HC ANESTHESIA 1ST 15 MINUTES: Performed by: SURGERY

## 2024-10-03 PROCEDURE — 36000711: Performed by: SURGERY

## 2024-10-03 PROCEDURE — 71000015 HC POSTOP RECOV 1ST HR: Performed by: SURGERY

## 2024-10-03 PROCEDURE — 81025 URINE PREGNANCY TEST: CPT | Performed by: SURGERY

## 2024-10-03 PROCEDURE — 63600175 PHARM REV CODE 636 W HCPCS

## 2024-10-03 PROCEDURE — 25000003 PHARM REV CODE 250: Performed by: NURSE ANESTHETIST, CERTIFIED REGISTERED

## 2024-10-03 PROCEDURE — 88307 TISSUE EXAM BY PATHOLOGIST: CPT | Performed by: PATHOLOGY

## 2024-10-03 PROCEDURE — 25000242 PHARM REV CODE 250 ALT 637 W/ HCPCS: Performed by: SURGERY

## 2024-10-03 PROCEDURE — 37000009 HC ANESTHESIA EA ADD 15 MINS: Performed by: SURGERY

## 2024-10-03 PROCEDURE — 43775 LAP SLEEVE GASTRECTOMY: CPT | Mod: ,,, | Performed by: SURGERY

## 2024-10-03 PROCEDURE — 27201423 OPTIME MED/SURG SUP & DEVICES STERILE SUPPLY: Performed by: SURGERY

## 2024-10-03 PROCEDURE — 71000033 HC RECOVERY, INTIAL HOUR: Performed by: SURGERY

## 2024-10-03 RX ORDER — LIDOCAINE HYDROCHLORIDE 20 MG/ML
INJECTION, SOLUTION EPIDURAL; INFILTRATION; INTRACAUDAL; PERINEURAL
Status: DISCONTINUED | OUTPATIENT
Start: 2024-10-03 | End: 2024-10-03

## 2024-10-03 RX ORDER — SODIUM CHLORIDE 0.9 % (FLUSH) 0.9 %
3 SYRINGE (ML) INJECTION EVERY 6 HOURS PRN
Status: DISCONTINUED | OUTPATIENT
Start: 2024-10-03 | End: 2024-10-03 | Stop reason: HOSPADM

## 2024-10-03 RX ORDER — LIDOCAINE HYDROCHLORIDE 10 MG/ML
1 INJECTION, SOLUTION EPIDURAL; INFILTRATION; INTRACAUDAL; PERINEURAL ONCE
Status: COMPLETED | OUTPATIENT
Start: 2024-10-03 | End: 2024-10-03

## 2024-10-03 RX ORDER — ACETAMINOPHEN 650 MG/20.3ML
500 LIQUID ORAL
Status: DISCONTINUED | OUTPATIENT
Start: 2024-10-03 | End: 2024-10-04 | Stop reason: HOSPADM

## 2024-10-03 RX ORDER — LIDOCAINE HYDROCHLORIDE 10 MG/ML
INJECTION, SOLUTION EPIDURAL; INFILTRATION; INTRACAUDAL; PERINEURAL
Status: DISCONTINUED | OUTPATIENT
Start: 2024-10-03 | End: 2024-10-03 | Stop reason: HOSPADM

## 2024-10-03 RX ORDER — KETAMINE HCL IN 0.9 % NACL 50 MG/5 ML
SYRINGE (ML) INTRAVENOUS
Status: DISCONTINUED | OUTPATIENT
Start: 2024-10-03 | End: 2024-10-03

## 2024-10-03 RX ORDER — GABAPENTIN 250 MG/5ML
300 SOLUTION ORAL 2 TIMES DAILY
Status: DISCONTINUED | OUTPATIENT
Start: 2024-10-03 | End: 2024-10-04 | Stop reason: HOSPADM

## 2024-10-03 RX ORDER — GLUCAGON 1 MG
1 KIT INJECTION
Status: DISCONTINUED | OUTPATIENT
Start: 2024-10-03 | End: 2024-10-03 | Stop reason: HOSPADM

## 2024-10-03 RX ORDER — PROPOFOL 10 MG/ML
VIAL (ML) INTRAVENOUS
Status: DISCONTINUED | OUTPATIENT
Start: 2024-10-03 | End: 2024-10-03

## 2024-10-03 RX ORDER — ENOXAPARIN SODIUM 100 MG/ML
40 INJECTION SUBCUTANEOUS EVERY 24 HOURS
Status: DISCONTINUED | OUTPATIENT
Start: 2024-10-03 | End: 2024-10-04 | Stop reason: HOSPADM

## 2024-10-03 RX ORDER — ACETAMINOPHEN 650 MG/20.3ML
1000 LIQUID ORAL EVERY 8 HOURS
Status: DISCONTINUED | OUTPATIENT
Start: 2024-10-03 | End: 2024-10-04 | Stop reason: HOSPADM

## 2024-10-03 RX ORDER — FAMOTIDINE 10 MG/ML
INJECTION INTRAVENOUS
Status: DISCONTINUED | OUTPATIENT
Start: 2024-10-03 | End: 2024-10-03

## 2024-10-03 RX ORDER — DEXAMETHASONE SODIUM PHOSPHATE 4 MG/ML
INJECTION, SOLUTION INTRA-ARTICULAR; INTRALESIONAL; INTRAMUSCULAR; INTRAVENOUS; SOFT TISSUE
Status: DISCONTINUED | OUTPATIENT
Start: 2024-10-03 | End: 2024-10-03

## 2024-10-03 RX ORDER — ONDANSETRON HYDROCHLORIDE 2 MG/ML
8 INJECTION, SOLUTION INTRAVENOUS EVERY 6 HOURS
Status: DISCONTINUED | OUTPATIENT
Start: 2024-10-03 | End: 2024-10-04 | Stop reason: HOSPADM

## 2024-10-03 RX ORDER — KETOROLAC TROMETHAMINE 15 MG/ML
15 INJECTION, SOLUTION INTRAMUSCULAR; INTRAVENOUS ONCE
Status: COMPLETED | OUTPATIENT
Start: 2024-10-03 | End: 2024-10-03

## 2024-10-03 RX ORDER — SODIUM CHLORIDE 9 MG/ML
INJECTION, SOLUTION INTRAVENOUS CONTINUOUS
Status: DISCONTINUED | OUTPATIENT
Start: 2024-10-03 | End: 2024-10-04 | Stop reason: HOSPADM

## 2024-10-03 RX ORDER — PROCHLORPERAZINE EDISYLATE 5 MG/ML
5 INJECTION INTRAMUSCULAR; INTRAVENOUS EVERY 6 HOURS PRN
Status: DISCONTINUED | OUTPATIENT
Start: 2024-10-03 | End: 2024-10-04 | Stop reason: HOSPADM

## 2024-10-03 RX ORDER — PANTOPRAZOLE SODIUM 40 MG/10ML
40 INJECTION, POWDER, LYOPHILIZED, FOR SOLUTION INTRAVENOUS 2 TIMES DAILY
Status: DISCONTINUED | OUTPATIENT
Start: 2024-10-03 | End: 2024-10-04 | Stop reason: HOSPADM

## 2024-10-03 RX ORDER — SCOLOPAMINE TRANSDERMAL SYSTEM 1 MG/1
1 PATCH, EXTENDED RELEASE TRANSDERMAL ONCE
Status: DISCONTINUED | OUTPATIENT
Start: 2024-10-03 | End: 2024-10-04 | Stop reason: HOSPADM

## 2024-10-03 RX ORDER — BUPIVACAINE HYDROCHLORIDE AND EPINEPHRINE 2.5; 5 MG/ML; UG/ML
INJECTION, SOLUTION EPIDURAL; INFILTRATION; INTRACAUDAL; PERINEURAL
Status: DISCONTINUED | OUTPATIENT
Start: 2024-10-03 | End: 2024-10-03 | Stop reason: HOSPADM

## 2024-10-03 RX ORDER — MIDAZOLAM HYDROCHLORIDE 1 MG/ML
INJECTION INTRAMUSCULAR; INTRAVENOUS
Status: DISCONTINUED | OUTPATIENT
Start: 2024-10-03 | End: 2024-10-03

## 2024-10-03 RX ORDER — OXYCODONE HCL 5 MG/5 ML
5 SOLUTION, ORAL ORAL EVERY 6 HOURS PRN
Status: DISCONTINUED | OUTPATIENT
Start: 2024-10-03 | End: 2024-10-04 | Stop reason: HOSPADM

## 2024-10-03 RX ORDER — FAMOTIDINE 10 MG/ML
20 INJECTION INTRAVENOUS ONCE
Status: COMPLETED | OUTPATIENT
Start: 2024-10-03 | End: 2024-10-03

## 2024-10-03 RX ORDER — SODIUM CHLORIDE, SODIUM LACTATE, POTASSIUM CHLORIDE, CALCIUM CHLORIDE 600; 310; 30; 20 MG/100ML; MG/100ML; MG/100ML; MG/100ML
INJECTION, SOLUTION INTRAVENOUS CONTINUOUS
Status: DISCONTINUED | OUTPATIENT
Start: 2024-10-03 | End: 2024-10-04 | Stop reason: HOSPADM

## 2024-10-03 RX ORDER — MUPIROCIN 20 MG/G
OINTMENT TOPICAL
Status: DISCONTINUED | OUTPATIENT
Start: 2024-10-03 | End: 2024-10-03 | Stop reason: HOSPADM

## 2024-10-03 RX ORDER — ACETAMINOPHEN 500 MG
1000 TABLET ORAL
Status: DISCONTINUED | OUTPATIENT
Start: 2024-10-03 | End: 2024-10-03 | Stop reason: HOSPADM

## 2024-10-03 RX ORDER — DEXMEDETOMIDINE HYDROCHLORIDE 100 UG/ML
INJECTION, SOLUTION INTRAVENOUS
Status: DISCONTINUED | OUTPATIENT
Start: 2024-10-03 | End: 2024-10-03

## 2024-10-03 RX ORDER — ACETAMINOPHEN 10 MG/ML
1000 INJECTION, SOLUTION INTRAVENOUS ONCE
Status: COMPLETED | OUTPATIENT
Start: 2024-10-03 | End: 2024-10-03

## 2024-10-03 RX ORDER — ROCURONIUM BROMIDE 10 MG/ML
INJECTION, SOLUTION INTRAVENOUS
Status: DISCONTINUED | OUTPATIENT
Start: 2024-10-03 | End: 2024-10-03

## 2024-10-03 RX ORDER — ONDANSETRON HYDROCHLORIDE 2 MG/ML
INJECTION, SOLUTION INTRAVENOUS
Status: DISCONTINUED | OUTPATIENT
Start: 2024-10-03 | End: 2024-10-03

## 2024-10-03 RX ORDER — HALOPERIDOL 5 MG/ML
0.5 INJECTION INTRAMUSCULAR EVERY 10 MIN PRN
Status: COMPLETED | OUTPATIENT
Start: 2024-10-03 | End: 2024-10-03

## 2024-10-03 RX ORDER — HEPARIN SODIUM 5000 [USP'U]/ML
5000 INJECTION, SOLUTION INTRAVENOUS; SUBCUTANEOUS ONCE
Status: COMPLETED | OUTPATIENT
Start: 2024-10-03 | End: 2024-10-03

## 2024-10-03 RX ADMIN — KETOROLAC TROMETHAMINE 15 MG: 15 INJECTION, SOLUTION INTRAMUSCULAR; INTRAVENOUS at 10:10

## 2024-10-03 RX ADMIN — SODIUM CHLORIDE, POTASSIUM CHLORIDE, SODIUM LACTATE AND CALCIUM CHLORIDE: 600; 310; 30; 20 INJECTION, SOLUTION INTRAVENOUS at 05:10

## 2024-10-03 RX ADMIN — OXYCODONE HYDROCHLORIDE 5 MG: 5 SOLUTION ORAL at 05:10

## 2024-10-03 RX ADMIN — SODIUM CHLORIDE: 9 INJECTION, SOLUTION INTRAVENOUS at 11:10

## 2024-10-03 RX ADMIN — PROPOFOL 200 MG: 10 INJECTION, EMULSION INTRAVENOUS at 02:10

## 2024-10-03 RX ADMIN — Medication 20 MG: at 03:10

## 2024-10-03 RX ADMIN — SUGAMMADEX 400 MG: 100 INJECTION, SOLUTION INTRAVENOUS at 04:10

## 2024-10-03 RX ADMIN — LIDOCAINE HYDROCHLORIDE 100 MG: 20 INJECTION, SOLUTION EPIDURAL; INFILTRATION; INTRACAUDAL; PERINEURAL at 02:10

## 2024-10-03 RX ADMIN — LIDOCAINE HYDROCHLORIDE 0.1 MG: 10 INJECTION, SOLUTION EPIDURAL; INFILTRATION; INTRACAUDAL; PERINEURAL at 11:10

## 2024-10-03 RX ADMIN — SODIUM CHLORIDE: 0.9 INJECTION, SOLUTION INTRAVENOUS at 02:10

## 2024-10-03 RX ADMIN — ONDANSETRON 8 MG: 2 INJECTION INTRAMUSCULAR; INTRAVENOUS at 10:10

## 2024-10-03 RX ADMIN — HALOPERIDOL LACTATE 0.5 MG: 5 INJECTION, SOLUTION INTRAMUSCULAR at 05:10

## 2024-10-03 RX ADMIN — ACETAMINOPHEN 1000 MG: 10 INJECTION, SOLUTION INTRAVENOUS at 10:10

## 2024-10-03 RX ADMIN — DEXMEDETOMIDINE 4 MCG: 100 INJECTION, SOLUTION, CONCENTRATE INTRAVENOUS at 04:10

## 2024-10-03 RX ADMIN — MIDAZOLAM HYDROCHLORIDE 2 MG: 2 INJECTION, SOLUTION INTRAMUSCULAR; INTRAVENOUS at 02:10

## 2024-10-03 RX ADMIN — HEPARIN SODIUM 5000 UNITS: 5000 INJECTION INTRAVENOUS; SUBCUTANEOUS at 10:10

## 2024-10-03 RX ADMIN — MUPIROCIN: 20 OINTMENT TOPICAL at 11:10

## 2024-10-03 RX ADMIN — SIMETHICONE 40 MG: 20 SUSPENSION/ DROPS ORAL at 08:10

## 2024-10-03 RX ADMIN — FAMOTIDINE 20 MG: 10 INJECTION, SOLUTION INTRAVENOUS at 04:10

## 2024-10-03 RX ADMIN — ONDANSETRON 8 MG: 2 INJECTION INTRAMUSCULAR; INTRAVENOUS at 05:10

## 2024-10-03 RX ADMIN — DEXTROSE 2 G: 50 INJECTION, SOLUTION INTRAVENOUS at 02:10

## 2024-10-03 RX ADMIN — SODIUM CHLORIDE, SODIUM GLUCONATE, SODIUM ACETATE, POTASSIUM CHLORIDE, MAGNESIUM CHLORIDE, SODIUM PHOSPHATE, DIBASIC, AND POTASSIUM PHOSPHATE: .53; .5; .37; .037; .03; .012; .00082 INJECTION, SOLUTION INTRAVENOUS at 04:10

## 2024-10-03 RX ADMIN — ACETAMINOPHEN 499.51 MG: 650 SOLUTION ORAL at 08:10

## 2024-10-03 RX ADMIN — ROCURONIUM BROMIDE 100 MG: 10 INJECTION, SOLUTION INTRAVENOUS at 02:10

## 2024-10-03 RX ADMIN — SCOPOLAMINE 1 PATCH: 1.5 PATCH, EXTENDED RELEASE TRANSDERMAL at 10:10

## 2024-10-03 RX ADMIN — Medication 30 MG: at 02:10

## 2024-10-03 RX ADMIN — DEXMEDETOMIDINE 12 MCG: 100 INJECTION, SOLUTION, CONCENTRATE INTRAVENOUS at 03:10

## 2024-10-03 RX ADMIN — FAMOTIDINE 20 MG: 10 INJECTION, SOLUTION INTRAVENOUS at 10:10

## 2024-10-03 RX ADMIN — GABAPENTIN 300 MG: 250 SOLUTION ORAL at 08:10

## 2024-10-03 RX ADMIN — DEXAMETHASONE SODIUM PHOSPHATE 8 MG: 4 INJECTION, SOLUTION INTRAMUSCULAR; INTRAVENOUS at 02:10

## 2024-10-03 RX ADMIN — PANTOPRAZOLE SODIUM 40 MG: 40 INJECTION, POWDER, FOR SOLUTION INTRAVENOUS at 08:10

## 2024-10-03 RX ADMIN — ONDANSETRON 4 MG: 2 INJECTION INTRAMUSCULAR; INTRAVENOUS at 04:10

## 2024-10-03 NOTE — PROGRESS NOTES
Patient arrived to [email or web address]. Patient oriented to room. Spouse at bedside. Safety precautions initiated. Will continue to monitor.

## 2024-10-03 NOTE — NURSING TRANSFER
Nursing Transfer Note      10/3/2024   6:03 PM    Nurse giving handoff: HARJINDER Pierre PACU  Nurse receiving handoff: SUMMER Dominguez    Reason patient is being transferred: post anesthesia    Transfer To: 540    Transfer via bed    Transported by PCT    Order for Tele Monitor? No    Any special needs or follow-up needed:  routine care    Patient belongings transferred with patient: No    Chart send with patient: Yes    Notified: spouse    Patient reassessed at: 10/3/24 @ 1800 (date, time)

## 2024-10-03 NOTE — OP NOTE
Pre-operative diagnosis:   Primary hypertension [I10]  ADA (obstructive sleep apnea) [G47.33]  Prediabetes [R73.03]  BMI 39.0-39.9,adult [Z68.39]  Morbid obesity [E66.01]    Post-operative Diagnosis: As above    Procedure Performed:  1. Laparoscopic sleeve gastrectomy  2. Bilateral transversus abdominus plane block for post-operative pain control    Date of Procedure: 10/3/2024    Surgeons and Role:     * Ingrid Berman MD - Primary     * Bhumi Gillette MD    Type of Anesthesia Used: General    Estimated Blood Loss: Minimal, <10ml    Specimen(s) Removed:  Partial gastrectomy (gastric sleeve)    Complications: None    Drains: None    Fluids: See anesthesia flowsheet    Urine Output: Not recorded, no noble    Indications: Joann Banuelos is a 38 y.o. female who is having surgery for morbid obesity and weight related co-morbidities.    Procedure Details:  The patient was seen in the Pre-Operative Holding Area. The risks, benefits, complications, treatment options, non-operative alternatives, expected recovery and outcomes were discussed with the patient. The possibilities of reaction to medication, pulmonary aspiration, injury to surrounding structures, bleeding, recurrent infection, the need for additional procedures, failure to diagnose a condition, and creating a complication requiring transfusion or operation were discussed with the patient. The patient concurred with the proposed plan, giving informed consent. The site of surgery was properly noted/marked if necessary per policy. The patient has been actively warmed in pre-operative area. Pre-operative antibiotics have been ordered and given within 1  hour(s) of incision. Venous thrombosis prophylaxis has been ordered including Bilateral sequential compression devices and Chemical prophylaxis.    The patient was taken the OR and placed supine on the bariatric operating table with her arms placed on either side. Bilateral sequential compression devices were  applied. Following general endotracheal intubation, a Visi G 3D was placed as an orogastric tube. The patient was positioned on a footboard with feet and knees secured. Both arms were secured on arm boards. The abdomen was prepped and draped in normal standard sterile fashion. During the dry time a surgical time out was done confirming the correct patient operation and that all the anticipated supplies and instruments were available in the OR. The site of the first trocar was anesthetized with local anesthesia. A small left subcostal incision was made through which a Veress needle was inserted. The abdomen was insufflated to 15 mmHg at which point a 5 mm visually optic trocar was placed. The scope was inserted to ensure the position was in fact intraabdominal and that no other intraabdominal organs or otherwise were injured. No abnormalities were noted. There were no adhesions to the anterior abdominal wall. As each trocar site was identified it was injected with anesthesia at the skin, fascia and peritoneum. A 12 mm Versastep trocar was then placed in the periumbilical region and then once this position was confirmed, the laparoscope was changed to a 10 mm scope and changed to this trocar site. An additional 5 and 15 mm trocar were placed in the right upper quadrant again under direct visualization. Following this, a Yovanny liver retractor was inserted via an additional epigastric stab incision to retract the left lateral segment of the liver. The GE junction visualization was easy with reverse trendelenburg and no hiatal hernia was present.    The next step was to begin taking down the short gastric vessels. This was started approximately 5cm proximal to the pylorus. The Ligasure was used to take down the short gastric vessel all along the greater omentum. Care was taken to pull the stomach anteriorly and medially so as to clearly visualize the posterior aspect of the stomach as well as clear up the greater  curvature of the stomach. The dissection proceeded to the angle of His until the left ross was visualized and the fundus elevated. The first short gastric was taken as part of the dissection. The 40F Visi-G bougie was placed down the GE junction and along the lesser curvature of the stomach and just proximal to the pylorus. Suction was applied with good visualization. Initially, two black staple loads with staple line reinforcement were used to staple along the stomach being sure to stay close to the sizer without being too tight and staying 3cm from the incisura. This was followed by purple loads with reinforcements, up to the angle of His ensuring the fundus was removed in its entirety and staying off the gastroesophageal fat pad.    The patient was placed in a Trendelenburg position. The upper abdomen was filled with saline, and a leak test was performed by using the bulb on the Visi G 3D. At this point, the stomach was insufflated, and no air was noted to be escaping from the laparoscopic view point - the leak test was negative. A single interrupted 2-0 ethibond gastropexy suture was placed from the left stefano to the posterior aspect of the stomach near the GE junction, to decrease risk of sleeve migration cranially. An additional gastropexy suture was placed from the cut edge of omentum to the stomach lateral to the incisura. The patient was returned to a level position and the liver retractor removed. The resected stomach was then removed via the 15 mm trocar site. I then closed removed the 15 mm port and closed the fascial defect with a 0 vicryl suture and the Ruddy Amos device under direct vision. 10ml of local anesthesia was injected bilaterally in the anterior axillary line under direct vision to perform a transverse abdominis plane block. The remainder of the ports were removed under direct visualization. All trocar sites were closed with subcuticular stitches.    The patient was then awakened by  anesthesia, and tolerated the procedure well. There were no intraoperative complications and the counts were correct at the end of the case. I communicated the intraoperative findings with the patient's contacts.    Disposition: PACU - hemodynamically stable.    Ingrid Berman MD FACS  Minimally Invasive General & Bariatric Surgery

## 2024-10-03 NOTE — TRANSFER OF CARE
Anesthesia Transfer of Care Note    Patient: Joann Banuelos    Procedure(s) Performed: Procedure(s) (LRB):  GASTRECTOMY, SLEEVE, LAPAROSCOPIC (N/A)    Patient location: PACU    Anesthesia Type: general    Transport from OR: Transported from OR on 6-10 L/min O2 by face mask with adequate spontaneous ventilation    Post pain: adequate analgesia    Post assessment: no apparent anesthetic complications and tolerated procedure well    Post vital signs: stable    Level of consciousness: awake and alert    Nausea/Vomiting: no nausea/vomiting    Complications: none    Transfer of care protocol was followedComments: Report given to recovery, RN. All questions answered.      Last vitals: Visit Vitals  /76 (BP Location: Right arm, Patient Position: Lying)   Pulse 81   Temp 36 °C (96.8 °F)   Resp 14   Wt 106 kg (233 lb 11 oz)   LMP 07/28/2024   SpO2 98%   Breastfeeding No   BMI 37.15 kg/m²

## 2024-10-03 NOTE — ANESTHESIA PROCEDURE NOTES
Intubation    Date/Time: 10/3/2024 2:51 PM    Performed by: Quan Sprague MD  Authorized by: Arturo Irwin Jr., MD    Intubation:     Induction:  Intravenous    Intubated:  Postinduction    Mask Ventilation:  Easy with oral airway    Attempts:  1    Attempted By:  Resident anesthesiologist    Method of Intubation:  Direct    Blade:  Jesus 3    Laryngeal View Grade: Grade I - full view of cords      Difficult Airway Encountered?: No      Complications:  None    Airway Device:  Oral endotracheal tube    Airway Device Size:  7.0    Style/Cuff Inflation:  Cuffed (inflated to minimal occlusive pressure)    Secured at:  The teeth    Placement Verified By:  Capnometry    Complicating Factors:  None    Findings Post-Intubation:  BS equal bilateral

## 2024-10-03 NOTE — BRIEF OP NOTE
[facility] ([facility])  Brief Operative Note    SUMMARY     Surgery Date: 10/3/2024     Surgeons and Role:     * Ingrid Berman MD - Primary     * Bhumi Gillette MD    Assisting Surgeon: None    Pre-op Diagnosis:  Primary hypertension [I10]  ADA (obstructive sleep apnea) [G47.33]  Prediabetes [R73.03]  BMI 39.0-39.9,adult [Z68.39]  Morbid obesity [E66.01]    Post-op Diagnosis:  Post-Op Diagnosis Codes:     * Primary hypertension [I10]     * ADA (obstructive sleep apnea) [G47.33]     * Prediabetes [R73.03]     * BMI 39.0-39.9,adult [Z68.39]     * Morbid obesity [E66.01]    Procedure(s) (LRB):  GASTRECTOMY, SLEEVE, LAPAROSCOPIC (N/A)    Anesthesia: General    Implants:  * No implants in log *    Operative Findings:   Lap sleeve gastrectomy with gastropexy     Estimated Blood Loss: Minimal    Estimated Blood Loss has been documented.         Specimens:   Specimen (24h ago, onward)       Start     Ordered    10/03/24 1624  Specimen to Pathology, Surgery General Surgery  Once        Comments: Pre-op Diagnosis: Primary hypertension [I10]ADA (obstructive sleep apnea) [G47.33]Prediabetes [R73.03]BMI 39.0-39.9,adult [Z68.39]Morbid obesity [E66.01]Procedure(s):GASTRECTOMY, SLEEVE, LAPAROSCOPIC 1. Stomach--permanent     References:    Click here for ordering Quick Tip   Question Answer Comment   Procedure Type: General Surgery    Specimen Class: Routine/Screening        10/03/24 2871                    [id number]

## 2024-10-04 VITALS
HEIGHT: 66 IN | DIASTOLIC BLOOD PRESSURE: 87 MMHG | HEART RATE: 74 BPM | SYSTOLIC BLOOD PRESSURE: 156 MMHG | RESPIRATION RATE: 18 BRPM | OXYGEN SATURATION: 100 % | BODY MASS INDEX: 37.6 KG/M2 | TEMPERATURE: 97 F | WEIGHT: 233.94 LBS

## 2024-10-04 LAB
ANION GAP SERPL CALC-SCNC: 9 MMOL/L (ref 8–16)
BASOPHILS # BLD AUTO: 0.01 K/UL (ref 0–0.2)
BASOPHILS NFR BLD: 0.1 % (ref 0–1.9)
BUN SERPL-MCNC: 11 MG/DL (ref 6–20)
CALCIUM SERPL-MCNC: 9.3 MG/DL (ref 8.7–10.5)
CHLORIDE SERPL-SCNC: 103 MMOL/L (ref 95–110)
CO2 SERPL-SCNC: 23 MMOL/L (ref 23–29)
CREAT SERPL-MCNC: 0.8 MG/DL (ref 0.5–1.4)
DIFFERENTIAL METHOD BLD: ABNORMAL
EOSINOPHIL # BLD AUTO: 0 K/UL (ref 0–0.5)
EOSINOPHIL NFR BLD: 0 % (ref 0–8)
ERYTHROCYTE [DISTWIDTH] IN BLOOD BY AUTOMATED COUNT: 12 % (ref 11.5–14.5)
EST. GFR  (NO RACE VARIABLE): >60 ML/MIN/1.73 M^2
GLUCOSE SERPL-MCNC: 129 MG/DL (ref 70–110)
HCT VFR BLD AUTO: 39.6 % (ref 37–48.5)
HGB BLD-MCNC: 13.8 G/DL (ref 12–16)
IMM GRANULOCYTES # BLD AUTO: 0.01 K/UL (ref 0–0.04)
IMM GRANULOCYTES NFR BLD AUTO: 0.1 % (ref 0–0.5)
LYMPHOCYTES # BLD AUTO: 1.1 K/UL (ref 1–4.8)
LYMPHOCYTES NFR BLD: 15.8 % (ref 18–48)
MAGNESIUM SERPL-MCNC: 2.1 MG/DL (ref 1.6–2.6)
MCH RBC QN AUTO: 28.6 PG (ref 27–31)
MCHC RBC AUTO-ENTMCNC: 34.8 G/DL (ref 32–36)
MCV RBC AUTO: 82 FL (ref 82–98)
MONOCYTES # BLD AUTO: 0.3 K/UL (ref 0.3–1)
MONOCYTES NFR BLD: 3.9 % (ref 4–15)
NEUTROPHILS # BLD AUTO: 5.6 K/UL (ref 1.8–7.7)
NEUTROPHILS NFR BLD: 80.1 % (ref 38–73)
NRBC BLD-RTO: 0 /100 WBC
PHOSPHATE SERPL-MCNC: 3.4 MG/DL (ref 2.7–4.5)
PLATELET # BLD AUTO: 262 K/UL (ref 150–450)
PMV BLD AUTO: 10 FL (ref 9.2–12.9)
POTASSIUM SERPL-SCNC: 4.4 MMOL/L (ref 3.5–5.1)
RBC # BLD AUTO: 4.83 M/UL (ref 4–5.4)
SODIUM SERPL-SCNC: 135 MMOL/L (ref 136–145)
WBC # BLD AUTO: 7.01 K/UL (ref 3.9–12.7)

## 2024-10-04 PROCEDURE — 84100 ASSAY OF PHOSPHORUS: CPT | Performed by: SURGERY

## 2024-10-04 PROCEDURE — 85025 COMPLETE CBC W/AUTO DIFF WBC: CPT | Performed by: SURGERY

## 2024-10-04 PROCEDURE — 36415 COLL VENOUS BLD VENIPUNCTURE: CPT | Performed by: SURGERY

## 2024-10-04 PROCEDURE — 99024 POSTOP FOLLOW-UP VISIT: CPT | Mod: ,,, | Performed by: SURGERY

## 2024-10-04 PROCEDURE — 80048 BASIC METABOLIC PNL TOTAL CA: CPT | Performed by: SURGERY

## 2024-10-04 PROCEDURE — 25000003 PHARM REV CODE 250: Performed by: SURGERY

## 2024-10-04 PROCEDURE — 83735 ASSAY OF MAGNESIUM: CPT | Performed by: SURGERY

## 2024-10-04 PROCEDURE — 25000242 PHARM REV CODE 250 ALT 637 W/ HCPCS: Performed by: SURGERY

## 2024-10-04 PROCEDURE — 63600175 PHARM REV CODE 636 W HCPCS: Performed by: SURGERY

## 2024-10-04 RX ORDER — OXYCODONE HCL 5 MG/5 ML
5 SOLUTION, ORAL ORAL EVERY 4 HOURS PRN
Qty: 25 ML | Refills: 0 | Status: SHIPPED | OUTPATIENT
Start: 2024-10-04

## 2024-10-04 RX ADMIN — PANTOPRAZOLE SODIUM 40 MG: 40 INJECTION, POWDER, FOR SOLUTION INTRAVENOUS at 09:10

## 2024-10-04 RX ADMIN — ENOXAPARIN SODIUM 40 MG: 40 INJECTION SUBCUTANEOUS at 04:10

## 2024-10-04 RX ADMIN — PROCHLORPERAZINE EDISYLATE 5 MG: 5 INJECTION INTRAMUSCULAR; INTRAVENOUS at 02:10

## 2024-10-04 RX ADMIN — SODIUM CHLORIDE, POTASSIUM CHLORIDE, SODIUM LACTATE AND CALCIUM CHLORIDE: 600; 310; 30; 20 INJECTION, SOLUTION INTRAVENOUS at 01:10

## 2024-10-04 RX ADMIN — OXYCODONE HYDROCHLORIDE 5 MG: 5 SOLUTION ORAL at 01:10

## 2024-10-04 RX ADMIN — ACETAMINOPHEN 999.01 MG: 650 SOLUTION ORAL at 05:10

## 2024-10-04 RX ADMIN — ONDANSETRON 8 MG: 2 INJECTION INTRAMUSCULAR; INTRAVENOUS at 12:10

## 2024-10-04 RX ADMIN — ACETAMINOPHEN 999.01 MG: 650 SOLUTION ORAL at 02:10

## 2024-10-04 RX ADMIN — ONDANSETRON 8 MG: 2 INJECTION INTRAMUSCULAR; INTRAVENOUS at 05:10

## 2024-10-04 NOTE — NURSING
Upon pt rounding pt had a bought of hiccups with gas and had blood tinge  emesis  notified provider on call

## 2024-10-04 NOTE — ANESTHESIA POSTPROCEDURE EVALUATION
Anesthesia Post Evaluation    Patient: Joann Banuelos    Procedure(s) Performed: Procedure(s) (LRB):  GASTRECTOMY, SLEEVE, LAPAROSCOPIC (N/A)    Final Anesthesia Type: general      Patient location during evaluation: PACU  Patient participation: Yes- Able to Participate  Level of consciousness: awake and alert  Post-procedure vital signs: reviewed and stable  Pain management: adequate  Airway patency: patent    PONV status at discharge: No PONV  Anesthetic complications: no      Cardiovascular status: blood pressure returned to baseline  Respiratory status: unassisted, room air and spontaneous ventilation  Hydration status: euvolemic  Follow-up not needed.              Vitals Value Taken Time   /62 10/04/24 1131   Temp 36.2 °C (97.1 °F) 10/04/24 1106   Pulse 81 10/04/24 1132   Resp 16 10/04/24 1132   SpO2 100 % 10/04/24 1132   Vitals shown include unfiled device data.      Event Time   Out of Recovery 17:15:00         Pain/Alberto Score: Pain Rating Prior to Med Admin: 0 (10/4/2024  5:13 AM)  Pain Rating Post Med Admin: 5 (10/4/2024  6:13 AM)  Alberto Score: 9 (10/3/2024  5:15 PM)

## 2024-10-04 NOTE — PROGRESS NOTES
[facility]  General Surgery  Progress Note    Subjective:     History of Present Illness:  No notes on file    Post-Op Info:  Procedure(s) (LRB):  GASTRECTOMY, SLEEVE, LAPAROSCOPIC (N/A)   1 Day Post-Op     Interval History: POD 1. Patient had 2 episodes of vomiting overnight, slightly blood tinged. She was able to finish her water protocol. States she feels better this morning. Rates pain 6/10. Ambulated to bathroom a few times. Hemodynamically normal. Labs appropriate.     Medications:  Continuous Infusions:   0.9% NaCl   Intravenous Continuous 10 mL/hr at 10/03/24 1112 New Bag at 10/03/24 1112    lactated ringers   Intravenous Continuous 125 mL/hr at 10/04/24 0158 New Bag at 10/04/24 0158     Scheduled Meds:   acetaminophen  999.0148 mg Oral Q8H    enoxparin  40 mg Subcutaneous Q24H (treatment, non-standard time)    gabapentin  300 mg Oral BID    ondansetron  8 mg Intravenous Q6H    pantoprazole  40 mg Intravenous BID    scopolamine  1 patch Transdermal Once     PRN Meds:  Current Facility-Administered Medications:     acetaminophen, 499.5074 mg, Oral, Q24H PRN    oxyCODONE, 5 mg, Oral, Q6H PRN    prochlorperazine, 5 mg, Intravenous, Q6H PRN    simethicone, 40 mg, Oral, QID PRN     Review of patient's allergies indicates:  No Known Allergies  Objective:     Vital Signs (Most Recent):  Temp: 98.1 °F (36.7 °C) (10/04/24 0341)  Pulse: 74 (10/04/24 0341)  Resp: 15 (10/04/24 0341)  BP: 125/65 (10/04/24 0341)  SpO2: 98 % (10/04/24 0341) Vital Signs (24h Range):  Temp:  [96.8 °F (36 °C)-98.1 °F (36.7 °C)] 98.1 °F (36.7 °C)  Pulse:  [65-80] 74  Resp:  [14-20] 15  SpO2:  [93 %-100 %] 98 %  BP: (118-145)/(56-96) 125/65     Weight: 106.1 kg (233 lb 14.5 oz)  Body mass index is 37.75 kg/m².    Intake/Output - Last 3 Shifts         10/02 0700  10/03 0659 10/03 0700  10/04 0659    P.O.  50    I.V. (mL/kg)  843 (7.9)    IV Piggyback  1100    Total Intake(mL/kg)  1993 (18.8)    Emesis/NG output  10    Total Output  10     Net  +1983          Urine Occurrence  1 x             Physical Exam  Vitals reviewed.   Cardiovascular:      Rate and Rhythm: Normal rate.      Pulses: Normal pulses.   Pulmonary:      Effort: Pulmonary effort is normal.   Abdominal:      General: Abdomen is flat. There is no distension.      Palpations: Abdomen is soft.      Tenderness: There is abdominal tenderness (Appropriately tender on palpation).      Comments: Incisions CDI    Skin:     General: Skin is warm.      Capillary Refill: Capillary refill takes less than 2 seconds.   Neurological:      General: No focal deficit present.      Mental Status: She is alert and oriented to person, place, and time.          Significant Labs:  I have reviewed all pertinent lab results within the past 24 hours.  CBC:   Recent Labs   Lab 10/04/24  0429   WBC 7.01   RBC 4.83   HGB 13.8   HCT 39.6      MCV 82   MCH 28.6   MCHC 34.8     CMP:   Recent Labs   Lab 10/04/24  0429   *   CALCIUM 9.3   *   K 4.4   CO2 23      BUN 11   CREATININE 0.8       Significant Diagnostics:  I have reviewed all pertinent imaging results/findings within the past 24 hours.  Assessment/Plan:     * Obesity  S/P lap sleeve gastrectomy and bilateral transversus abdominus plane block. Progressing appropriately.     - Transition to bariatric clear liquids  - Crushed or liquid nausea/pain meds  - mIVF to 75cc  - DVT ppx   - Daily labs  - OOB    Dispo: Likely this afternoon         Bhumi Gillette MD  General Surgery  [facility]

## 2024-10-04 NOTE — DISCHARGE SUMMARY
[facility]  General Surgery  Discharge Summary      Patient Name: Joann Banuelos  MRN: [id number]  Admission Date: 10/3/2024  Hospital Length of Stay: 1 days  Discharge Date and Time:  10/04/2024 4:28 PM  Attending Physician: Ingrid Berman MD   Discharging Provider: Cezar Russell MD  Primary Care Provider: Rogelio Moreau DO    HPI:   No notes on file    Procedure(s) (LRB):  GASTRECTOMY, SLEEVE, LAPAROSCOPIC (N/A)      Indwelling Lines/Drains at time of discharge:   Lines/Drains/Airways       None                 Hospital Course: Ms. Banuelos was admitted to the laparoscopic/bariatric service on 10/03/2024 for a laparoscopic sleeve gastrectomy which she had the same day. She tolerated the operation with no complication and was stable in pacu. She was transferred to the floor for one night of observation, she was also started on a water protocol post-operatively that she tolerated. Today, her pain is well tolerated. She is tolerating her clear liquid bariatric diet without nausea or vomiting and is not medically appropriate for discharge home.    Goals of Care Treatment Preferences:  Code Status: Full Code      Consults:     Significant Diagnostic Studies: Labs: BMP:   Recent Labs   Lab 10/04/24  0429   *   *   K 4.4      CO2 23   BUN 11   CREATININE 0.8   CALCIUM 9.3   MG 2.1    and CMP   Recent Labs   Lab 10/04/24  0429   *   K 4.4      CO2 23   *   BUN 11   CREATININE 0.8   CALCIUM 9.3   ANIONGAP 9       Pending Diagnostic Studies:       Procedure Component Value Units Date/Time    Specimen to Pathology, Surgery General Surgery [[id number]] Collected: 10/03/24 1628    Order Status: Sent Lab Status: In process Updated: 10/03/24 1714    Specimen: Tissue           Final Active Diagnoses:    Diagnosis Date Noted POA    PRINCIPAL PROBLEM:  Obesity [E66.9] 10/03/2024 Yes      Problems Resolved During this Admission:      Discharged Condition: good    Disposition: Home  or Self Care    Follow Up:    Patient Instructions:      Diet Bariatric High Protein Clear Liquid   Order Comments: No soft drinks     Lifting restrictions (nothing greater than 10lbs)   Scheduling Instructions: Lifting restrictions:  nothing greater than 10lbs     No driving until:   Order Comments: No driving until off narcotic pain medication.  Can turn around without pain off narcotics.  At least 1 week.     Notify your health care provider if you experience any of the following:   Order Comments: temperature > 100.4, persistent nausea and vomiting or diarrhea, severe uncontrolled pain, redness, tenderness, or signs of infection (pain, swelling, redness, odor or green/yellow discharge around incision site), difficulty breathing or increased cough, severe persistent headache, worsening rash, persistent dizziness, light-headedness, or visual disturbances, increased confusion or weakness     Notify your health care provider if you experience any of the following:  temperature >100.4     Notify your health care provider if you experience any of the following:  persistent nausea and vomiting or diarrhea     Notify your health care provider if you experience any of the following:  severe uncontrolled pain     Notify your health care provider if you experience any of the following:  redness, tenderness, or signs of infection (pain, swelling, redness, odor or green/yellow discharge around incision site)     Notify your health care provider if you experience any of the following:  difficulty breathing or increased cough     Notify your health care provider if you experience any of the following:  severe persistent headache     Notify your health care provider if you experience any of the following:  worsening rash     Notify your health care provider if you experience any of the following:  persistent dizziness, light-headedness, or visual disturbances     Notify your health care provider if you experience any of the  following:  increased confusion or weakness     Remove dressing in 48 hours     Activity as tolerated     Shower on day two and no bath     Medications:  Reconciled Home Medications:      Medication List        START taking these medications      oxyCODONE 5 mg/5 mL Soln  Commonly known as: ROXICODONE  Take 5 mLs (5 mg total) by mouth every 4 (four) hours as needed.            CONTINUE taking these medications      acetaminophen 500 mg Pwpk  Mix 2 packets (1,000mg) with liquid and take by mouth THREE TIMES DAILY for at least 3 days and up to 5 days as needed for pain.     amLODIPine 5 MG tablet  Commonly known as: NORVASC  Take 1 tablet (5 mg total) by mouth once daily.     ergocalciferol 50,000 unit Cap  Commonly known as: VITAMIN D2  Take 1 capsule (50,000 Units total) by mouth twice a week.     gabapentin 300 MG capsule  Commonly known as: NEURONTIN  OPEN capsule into a tablespoon and consume with sip of liquid. Take once the MORNING OF SURGERY. After surgery: take one capsule TWICE DAILY for at least 3 days and up to 5 days as needed for pain.     omeprazole 40 MG capsule  Commonly known as: PRILOSEC  Take 1 capsule (40 mg total) by mouth every morning. Open capsule and take with apple sauce     ondansetron 8 MG Tbdl  Commonly known as: ZOFRAN-ODT  Dissolve 1 tablet (8 mg total) by mouth every 6 (six) hours as needed (Nausea).     polyethylene glycol 17 gram/dose powder  Commonly known as: GLYCOLAX  Use cap to measure 17 grams.  Dissolve as directed and take by mouth once daily.     promethazine 12.5 MG Supp  Commonly known as: PHENERGAN  Place 1 suppository (12.5 mg total) rectally every 6 (six) hours as needed (nausea, 2nd line).     ursodioL 500 MG tablet  Commonly known as: ACTIGALL  Take 1 tablet (500 mg total) by mouth once daily. For gallstone prevention.            STOP taking these medications      enoxaparin 40 mg/0.4 mL Syrg  Commonly known as: LOVENOX            Time spent on the discharge of  patient: 10 minutes    Cezar Russell MD  General Surgery  [facility]

## 2024-10-04 NOTE — NURSING
Patient has orders to be discharged home. Dc'd IV.. Patient  verbalized understanding of dc instructions given. No sis of acute distress noted.

## 2024-10-04 NOTE — DISCHARGE INSTRUCTIONS
Postoperative General Instructions    What to Expect:  It is normal to experience pain and swelling at the surgical site.  The pain usually decreases significantly after the first week but may last for many weeks.  Each day, the pain should be similar or better to the previous day. If it worsens, call the doctor's office.     Activity:  You should walk beginning on the day of surgery and increase activity slowly over the next two to four weeks.  Do not drive while taking prescription pain medication.  You may return to work when you feel ready.  Do not lift anything heavier than 10 lbs     Wound Care:  You may shower. Let soap and water run over the incisions and pat dry. Do not submerge in a bathtub or pool.     Diet:  Follow the diet recommenced by the dietician. You will be on a high protein, low sugar clear liquid diet after surgery  Take a stool softener such as Miralax to avoid constipation.     Medication:  Pain Control  Take liquid acetaminophen (Tylenol) 650 mg 4 times daily as needed for pain.  Take the prescribed oxycodone as needed if you have pain that is not controlled by acetaminophen.  Bowel Regularity  Take an over-the-counter stool softener/laxative (Colace, Miralax, or Milk of Magnesia) to avoid constipation.  Previous Home Medication  Restart your previous home medication unless otherwise instructed.  All medications should be liquid, 5mm or smaller, or crushed    Call Your Doctor's Office If You Experience:  Fevers greater than 101.3°F.  Vomiting.  Spreading redness or drainage from the incisions.  Opening of the incisions.  Worsening pain not controlled by medication.  Chest pain or shortness of breath.    Follow-Up:  Follow-up with your surgeon as scheduled in 1-2 weeks.  If no follow-up appointment has been scheduled, call to schedule an appointment within 1-2 weeks of discharge.     Contact Information:  During normal business hours call the clinic with any questions or concerns. On weekends  and evenings call [phone] to have the operators page the surgery resident on call after hours with questions or concerns.

## 2024-10-04 NOTE — SUBJECTIVE & OBJECTIVE
Interval History: POD 1. Patient had 2 episodes of vomiting overnight, slightly blood tinged. She was able to finish her water protocol. States she feels better this morning. Rates pain 6/10. Ambulated to bathroom a few times. Hemodynamically normal. Labs appropriate.     Medications:  Continuous Infusions:   0.9% NaCl   Intravenous Continuous 10 mL/hr at 10/03/24 1112 New Bag at 10/03/24 1112    lactated ringers   Intravenous Continuous 125 mL/hr at 10/04/24 0158 New Bag at 10/04/24 0158     Scheduled Meds:   acetaminophen  999.0148 mg Oral Q8H    enoxparin  40 mg Subcutaneous Q24H (treatment, non-standard time)    gabapentin  300 mg Oral BID    ondansetron  8 mg Intravenous Q6H    pantoprazole  40 mg Intravenous BID    scopolamine  1 patch Transdermal Once     PRN Meds:  Current Facility-Administered Medications:     acetaminophen, 499.5074 mg, Oral, Q24H PRN    oxyCODONE, 5 mg, Oral, Q6H PRN    prochlorperazine, 5 mg, Intravenous, Q6H PRN    simethicone, 40 mg, Oral, QID PRN     Review of patient's allergies indicates:  No Known Allergies  Objective:     Vital Signs (Most Recent):  Temp: 98.1 °F (36.7 °C) (10/04/24 0341)  Pulse: 74 (10/04/24 0341)  Resp: 15 (10/04/24 0341)  BP: 125/65 (10/04/24 0341)  SpO2: 98 % (10/04/24 0341) Vital Signs (24h Range):  Temp:  [96.8 °F (36 °C)-98.1 °F (36.7 °C)] 98.1 °F (36.7 °C)  Pulse:  [65-80] 74  Resp:  [14-20] 15  SpO2:  [93 %-100 %] 98 %  BP: (118-145)/(56-96) 125/65     Weight: 106.1 kg (233 lb 14.5 oz)  Body mass index is 37.75 kg/m².    Intake/Output - Last 3 Shifts         10/02 0700  10/03 0659 10/03 0700  10/04 0659    P.O.  50    I.V. (mL/kg)  843 (7.9)    IV Piggyback  1100    Total Intake(mL/kg)  1993 (18.8)    Emesis/NG output  10    Total Output  10    Net  +1983          Urine Occurrence  1 x             Physical Exam  Vitals reviewed.   Cardiovascular:      Rate and Rhythm: Normal rate.      Pulses: Normal pulses.   Pulmonary:      Effort: Pulmonary effort is  normal.   Abdominal:      General: Abdomen is flat. There is no distension.      Palpations: Abdomen is soft.      Tenderness: There is abdominal tenderness (Appropriately tender on palpation).      Comments: Incisions CDI    Skin:     General: Skin is warm.      Capillary Refill: Capillary refill takes less than 2 seconds.   Neurological:      General: No focal deficit present.      Mental Status: She is alert and oriented to person, place, and time.          Significant Labs:  I have reviewed all pertinent lab results within the past 24 hours.  CBC:   Recent Labs   Lab 10/04/24  0429   WBC 7.01   RBC 4.83   HGB 13.8   HCT 39.6      MCV 82   MCH 28.6   MCHC 34.8     CMP:   Recent Labs   Lab 10/04/24  0429   *   CALCIUM 9.3   *   K 4.4   CO2 23      BUN 11   CREATININE 0.8       Significant Diagnostics:  I have reviewed all pertinent imaging results/findings within the past 24 hours.

## 2024-10-04 NOTE — NURSING
Upon pt rounding pt has about 25mlLof green emesis , intake of 30ml of water seems to be source of nausea and emesis, 2nd prn administered

## 2024-10-04 NOTE — ASSESSMENT & PLAN NOTE
S/P lap sleeve gastrectomy and bilateral transversus abdominus plane block. Progressing appropriately.     - Transition to bariatric clear liquids  - Crushed or liquid nausea/pain meds  - mIVF to 75cc  - DVT ppx   - Daily labs  - OOB    Dispo: Likely this afternoon

## 2024-10-04 NOTE — PLAN OF CARE
Problem: Adult Inpatient Plan of Care  Goal: Plan of Care Review  Outcome: Progressing  Goal: Patient-Specific Goal (Individualized)  Outcome: Progressing  Goal: Absence of Hospital-Acquired Illness or Injury  Outcome: Progressing  Goal: Optimal Comfort and Wellbeing  Outcome: Progressing  Intervention: Monitor Pain and Promote Comfort  Flowsheets (Taken 10/4/2024 0412)  Pain Management Interventions: quiet environment facilitated     Problem: Adult Inpatient Plan of Care  Goal: Plan of Care Review  Outcome: Progressing     Problem: Adult Inpatient Plan of Care  Goal: Patient-Specific Goal (Individualized)  Outcome: Progressing     Problem: Adult Inpatient Plan of Care  Goal: Absence of Hospital-Acquired Illness or Injury  Outcome: Progressing     Problem: Adult Inpatient Plan of Care  Goal: Optimal Comfort and Wellbeing  Outcome: Progressing  Intervention: Monitor Pain and Promote Comfort  Flowsheets (Taken 10/4/2024 0412)  Pain Management Interventions: quiet environment facilitated     Problem: Adult Inpatient Plan of Care  Goal: Optimal Comfort and Wellbeing  Intervention: Monitor Pain and Promote Comfort  Flowsheets (Taken 10/4/2024 0412)  Pain Management Interventions: quiet environment facilitated   PRN medication effective for pain 2 episodes of emesis 1 blood tinged and 1 greenish. Explained plan of care, with spouse at bedside  verbalized understanding.  Ambulated to  bathroom Educated pt .on new medicine . No injury during shift, Side rails up x 2, call light by bedside.    Water protocol initiated  however paused to to emesis after not tolerating water, however no emesis  from  pain med

## 2024-10-04 NOTE — HOSPITAL COURSE
Ms. Banuelos was admitted to the laparoscopic/bariatric service on 10/03/2024 for a laparoscopic sleeve gastrectomy which she had the same day. She tolerated the operation with no complication and was stable in pacu. She was transferred to the floor for one night of observation, she was also started on a water protocol post-operatively that she tolerated. Today, her pain is well tolerated. She is tolerating her clear liquid bariatric diet without nausea or vomiting and is not medically appropriate for discharge home.

## 2024-10-08 ENCOUNTER — PATIENT MESSAGE (OUTPATIENT)
Dept: BARIATRICS | Facility: CLINIC | Age: 39
End: 2024-10-08
Payer: COMMERCIAL

## 2024-10-08 ENCOUNTER — PATIENT OUTREACH (OUTPATIENT)
Dept: ADMINISTRATIVE | Facility: CLINIC | Age: 39
End: 2024-10-08
Payer: COMMERCIAL

## 2024-10-08 LAB
FINAL PATHOLOGIC DIAGNOSIS: NORMAL
GROSS: NORMAL
Lab: NORMAL

## 2024-10-08 NOTE — PROGRESS NOTES
The patient does not qualify for a [facility] hospital follow up call due to s/p laparoscopic sleeve gastrectomy.

## 2024-10-10 ENCOUNTER — CLINICAL SUPPORT (OUTPATIENT)
Dept: BARIATRICS | Facility: CLINIC | Age: 39
End: 2024-10-10
Payer: COMMERCIAL

## 2024-10-10 ENCOUNTER — PATIENT MESSAGE (OUTPATIENT)
Dept: BARIATRICS | Facility: CLINIC | Age: 39
End: 2024-10-10

## 2024-10-10 DIAGNOSIS — E66.9 OBESITY (BMI 30-39.9): ICD-10-CM

## 2024-10-10 DIAGNOSIS — G47.33 OSA (OBSTRUCTIVE SLEEP APNEA): ICD-10-CM

## 2024-10-10 DIAGNOSIS — Z98.84 S/P BARIATRIC SURGERY: ICD-10-CM

## 2024-10-10 DIAGNOSIS — Z71.3 DIETARY COUNSELING AND SURVEILLANCE: Primary | ICD-10-CM

## 2024-10-10 DIAGNOSIS — I10 PRIMARY HYPERTENSION: ICD-10-CM

## 2024-10-10 NOTE — PROGRESS NOTES
Audio Only Telehealth Visit     The patient location is: home ([place])  The chief complaint leading to consultation is: Follow-up 1 Week s/p laproscopic sleeve gastrectomy  Visit type: Virtual visit with audio only (telephone)  Total time spent with patient: 15 mins     The reason for the audio only service rather than synchronous audio and video virtual visit was related to technical difficulties or patient preference/necessity.     Each patient to whom I provide medical services by telemedicine is:  (1) informed of the relationship between the physician and patient and the respective role of any other health care provider with respect to management of the patient; and (2) notified that they may decline to receive medical services by telemedicine and may withdraw from such care at any time. Patient verbally consented to receive this service via voice-only telephone call.    This service was not originating from a related E/M service provided within the previous 7 days nor will  to an E/M service or procedure within the next 24 hours or my soonest available appointment.  Prevailing standard of care was able to be met in this audio-only visit.      NUTRITION NOTE    Referring Physician: Ingrid Berman M.D.   Reason for MNT Referral: Follow-up 1 Week s/p laproscopic sleeve gastrectomy    CURRENT DIET:  Bariatric Liquid Diet    Dehydration assessment:  Urine output/color: light yellow  Chest pain: N  Persistent increased heart rate: N  Fatigue: N  Dizzy/weak: N  N/V: N  BM: Y    Protein and fluid intake assessment: (food diary)    Fluids include: Water, Gatorade Zero, Crystal Light. broth, SF popsicles, SF Jell-o   Fluid intake yesterday: 62 oz  Protein supplements: Premier Protein (with coffee), Fairlife Core Power, Isopure clear protein water   Protein intake yesterday: 45 g     Vitamins  Barinelt Multivitamin with iron 2 per day  Barimelt B-1 3 per day  Barimelt Calcium Citrate 2 tablets 3 x day  Barimelt Vitamin  B-12 1 per week    Medications  Omeprazole: Y  How are you tolerating pain at this time? (rate on a scale from 1 to 10; >7 notify PA/MD): 4-5 when lying down  Did you take your acetaminophen and gabapentin for 3 days? Y  Did you have to take additional acetaminophen for break through pain (pain scale 4-6)? N  Did you have any severe pain that required oxycodone? If yes, how much did you use and do you have left? Y, took for one day    How is the support system at home?  Exercise reminder (light exercise at this time, no lifting above 10 lbs)     Questions for nurse/MA/PA:     BARIATRIC DIET DISCUSSION:  Reinforced post-op nutrition guidelines.  Continue to work on fluid and protein intake.    PLAN/RECOMMONDATIONS:  Continue bariatric high protein liquid diet.  Maintain protein intake or increase gradually to goal of 60 g prot/day.  Maintain fluid intake or increase gradually to goal of 64 floz/day.  Begin appropriate vitamins & minerals.  Begin or continue light exercise.     Confirmed date and time for 2 week po labs and clinic visit     SESSION TIME: 15 minutes

## 2024-10-10 NOTE — PLAN OF CARE
[facility] - [facility]  Discharge Final Note    Primary Care Provider: Rogelio Moreau DO    Expected Discharge Date: 10/4/2024    Final Discharge Note (most recent)       Final Note - 10/04/24 1735          Final Note    Assessment Type Final Discharge Note     Anticipated Discharge Disposition Home or Self Care     Hospital Resources/Appts/Education Provided Provided patient/caregiver with written discharge plan information;Provided education on problems/symptoms using teachback                   Future Appointments   Date Time Provider Department Center   10/17/2024  8:45 AM LAB, APPOINTMENT [place] [facility]   10/17/2024  9:00 AM Darby Rodriguez RD [facility]   10/17/2024  9:30 AM Ab Neal PA-C [facility]   12/9/2024 10:00 AM LAB, APPOINTMENT [place] [facility]   12/9/2024 10:30 AM Ab Neal PA-C [facility]   12/9/2024 11:00 AM Darby Rodriguez RD [facility]   4/3/2025  8:40 AM LAB, APPOINTMENT [place] [facility]   4/3/2025  9:00 AM Ab Neal PA-C [facility]

## 2024-10-17 ENCOUNTER — CLINICAL SUPPORT (OUTPATIENT)
Dept: BARIATRICS | Facility: CLINIC | Age: 39
End: 2024-10-17
Payer: COMMERCIAL

## 2024-10-17 ENCOUNTER — OFFICE VISIT (OUTPATIENT)
Dept: BARIATRICS | Facility: CLINIC | Age: 39
End: 2024-10-17
Payer: COMMERCIAL

## 2024-10-17 ENCOUNTER — LAB VISIT (OUTPATIENT)
Dept: LAB | Facility: HOSPITAL | Age: 39
End: 2024-10-17
Payer: COMMERCIAL

## 2024-10-17 VITALS
HEART RATE: 73 BPM | SYSTOLIC BLOOD PRESSURE: 111 MMHG | TEMPERATURE: 98 F | BODY MASS INDEX: 35.82 KG/M2 | WEIGHT: 222.88 LBS | DIASTOLIC BLOOD PRESSURE: 57 MMHG | HEIGHT: 66 IN | RESPIRATION RATE: 16 BRPM | OXYGEN SATURATION: 98 %

## 2024-10-17 VITALS — BODY MASS INDEX: 35.97 KG/M2 | WEIGHT: 222.88 LBS

## 2024-10-17 DIAGNOSIS — Z71.3 DIETARY COUNSELING AND SURVEILLANCE: Primary | ICD-10-CM

## 2024-10-17 DIAGNOSIS — Z98.890 POST-OPERATIVE STATE: Primary | ICD-10-CM

## 2024-10-17 DIAGNOSIS — R73.03 PREDIABETES: ICD-10-CM

## 2024-10-17 DIAGNOSIS — E66.01 CLASS 2 SEVERE OBESITY DUE TO EXCESS CALORIES WITH SERIOUS COMORBIDITY AND BODY MASS INDEX (BMI) OF 39.0 TO 39.9 IN ADULT: ICD-10-CM

## 2024-10-17 DIAGNOSIS — E66.812 CLASS 2 SEVERE OBESITY DUE TO EXCESS CALORIES WITH SERIOUS COMORBIDITY AND BODY MASS INDEX (BMI) OF 39.0 TO 39.9 IN ADULT: ICD-10-CM

## 2024-10-17 DIAGNOSIS — I10 PRIMARY HYPERTENSION: ICD-10-CM

## 2024-10-17 DIAGNOSIS — Z98.84 S/P BARIATRIC SURGERY: ICD-10-CM

## 2024-10-17 DIAGNOSIS — G47.33 OSA (OBSTRUCTIVE SLEEP APNEA): ICD-10-CM

## 2024-10-17 DIAGNOSIS — R63.4 WEIGHT LOSS: ICD-10-CM

## 2024-10-17 DIAGNOSIS — G47.33 OSA (OBSTRUCTIVE SLEEP APNEA): Chronic | ICD-10-CM

## 2024-10-17 DIAGNOSIS — E66.01 MORBID OBESITY: ICD-10-CM

## 2024-10-17 LAB
ALBUMIN SERPL BCP-MCNC: 4 G/DL (ref 3.5–5.2)
ALP SERPL-CCNC: 66 U/L (ref 55–135)
ALT SERPL W/O P-5'-P-CCNC: 18 U/L (ref 10–44)
ANION GAP SERPL CALC-SCNC: 11 MMOL/L (ref 8–16)
AST SERPL-CCNC: 23 U/L (ref 10–40)
BASOPHILS # BLD AUTO: 0.04 K/UL (ref 0–0.2)
BASOPHILS NFR BLD: 0.8 % (ref 0–1.9)
BILIRUB SERPL-MCNC: 0.4 MG/DL (ref 0.1–1)
BUN SERPL-MCNC: 8 MG/DL (ref 6–20)
CALCIUM SERPL-MCNC: 9.5 MG/DL (ref 8.7–10.5)
CHLORIDE SERPL-SCNC: 103 MMOL/L (ref 95–110)
CO2 SERPL-SCNC: 25 MMOL/L (ref 23–29)
CREAT SERPL-MCNC: 0.8 MG/DL (ref 0.5–1.4)
DIFFERENTIAL METHOD BLD: ABNORMAL
EOSINOPHIL # BLD AUTO: 0.3 K/UL (ref 0–0.5)
EOSINOPHIL NFR BLD: 5.8 % (ref 0–8)
ERYTHROCYTE [DISTWIDTH] IN BLOOD BY AUTOMATED COUNT: 12.1 % (ref 11.5–14.5)
EST. GFR  (NO RACE VARIABLE): >60 ML/MIN/1.73 M^2
GLUCOSE SERPL-MCNC: 80 MG/DL (ref 70–110)
HCT VFR BLD AUTO: 38.7 % (ref 37–48.5)
HGB BLD-MCNC: 12.8 G/DL (ref 12–16)
IMM GRANULOCYTES # BLD AUTO: 0.01 K/UL (ref 0–0.04)
IMM GRANULOCYTES NFR BLD AUTO: 0.2 % (ref 0–0.5)
LYMPHOCYTES # BLD AUTO: 2.5 K/UL (ref 1–4.8)
LYMPHOCYTES NFR BLD: 50.6 % (ref 18–48)
MCH RBC QN AUTO: 28.8 PG (ref 27–31)
MCHC RBC AUTO-ENTMCNC: 33.1 G/DL (ref 32–36)
MCV RBC AUTO: 87 FL (ref 82–98)
MONOCYTES # BLD AUTO: 0.4 K/UL (ref 0.3–1)
MONOCYTES NFR BLD: 9.1 % (ref 4–15)
NEUTROPHILS # BLD AUTO: 1.6 K/UL (ref 1.8–7.7)
NEUTROPHILS NFR BLD: 33.5 % (ref 38–73)
NRBC BLD-RTO: 0 /100 WBC
PLATELET # BLD AUTO: 287 K/UL (ref 150–450)
PMV BLD AUTO: 10.6 FL (ref 9.2–12.9)
POTASSIUM SERPL-SCNC: 4.2 MMOL/L (ref 3.5–5.1)
PROT SERPL-MCNC: 6.8 G/DL (ref 6–8.4)
RBC # BLD AUTO: 4.44 M/UL (ref 4–5.4)
SODIUM SERPL-SCNC: 139 MMOL/L (ref 136–145)
VIT B12 SERPL-MCNC: 1965 PG/ML (ref 210–950)
WBC # BLD AUTO: 4.86 K/UL (ref 3.9–12.7)

## 2024-10-17 PROCEDURE — 99999 PR PBB SHADOW E&M-EST. PATIENT-LVL IV: CPT | Mod: PBBFAC,,, | Performed by: PHYSICIAN ASSISTANT

## 2024-10-17 PROCEDURE — 80053 COMPREHEN METABOLIC PANEL: CPT | Performed by: PHYSICIAN ASSISTANT

## 2024-10-17 PROCEDURE — 85025 COMPLETE CBC W/AUTO DIFF WBC: CPT | Performed by: PHYSICIAN ASSISTANT

## 2024-10-17 PROCEDURE — 99999 PR PBB SHADOW E&M-EST. PATIENT-LVL I: CPT | Mod: PBBFAC,,, | Performed by: DIETITIAN, REGISTERED

## 2024-10-17 PROCEDURE — 84425 ASSAY OF VITAMIN B-1: CPT | Performed by: PHYSICIAN ASSISTANT

## 2024-10-17 PROCEDURE — 82607 VITAMIN B-12: CPT | Performed by: PHYSICIAN ASSISTANT

## 2024-10-17 RX ORDER — ENOXAPARIN SODIUM 100 MG/ML
40 INJECTION SUBCUTANEOUS
Qty: 0.8 ML | Refills: 0 | Status: SHIPPED | OUTPATIENT
Start: 2024-10-17

## 2024-10-17 NOTE — PROGRESS NOTES
NUTRITION NOTE    Referring Physician: Ingrid Berman M.D.   Reason for MNT Referral: Follow-up 2 Weeks s/p laparoscopic sleeve gastrectomy    PAST MEDICAL HISTORY:  Denies nausea, vomiting, constipation, and diarrhea.  Reports doing well.    Past Medical History:   Diagnosis Date    Abnormal Pap smear 2007    Abnormal Pap smear of cervix     Allergy     Eczema     HPV (human papilloma virus) anogenital infection     Hypertension     Tinea versicolor          CLINICAL DATA:  38 y.o.-year-old Black or  female.    Current Weight: 222 lbs  BMI: 35.97  Total Weight Loss: 20 lbs    Excess Weight Loss: 20%      CURRENT DIET:  Bariatric Liquid Diet    Diet Recall: 69 grams of protein/day; 70 oz of fluids/day    Diet Includes:  Fluids include: Water, Gatorade Zero, Crystal Light. broth, SF popsicles, SF Jell-o   Protein supplements: Premier Protein (with coffee), Fairlife Core Power, Isopure clear protein water     EXERCISE:  Trainer/gym - walking on treadmill, arms and legs, 10 lbs & 12 lbs     LABS:  None available at time of visit     VITAMINS/MINERALS:  Barinelt Multivitamin with iron 2 per day  Barimelt B-1 3 per day  Barimelt Calcium Citrate 2 tablets 3 x day  Barimelt Vitamin B-12 1 per week    ASSESSMENT:  Doing well overall.  Adequate protein intake.  Adequate fluid intake.    BARIATRIC DIET DISCUSSION:  Instructed and provided written materials on bariatric puree diet plan   Bariatric soft diet plan to start in 2 weeks as neil  Pt may swallow tablets and pills at 2 week post op   Reinforced post-op nutrition guidelines.    PLAN/RECOMMONDATIONS:  Advance to bariatric puree diet  Increase protein intake.  Maintain fluid intake.  Continue light exercise.  Continue appropriate vitamins & minerals.    Return to clinic in 6 weeks.    SESSION TIME: 20 minutes

## 2024-10-17 NOTE — PROGRESS NOTES
BARIATRIC POST-OPERATIVE VISIT:    HPI:  Joann Banuelos is a 38 y.o. year old female presents for 2 week post op visit following s/p sleeve.  she is doing well and tolerating the diet without difficulty.  she has no complaints.    States that overall doing well   States a couple of episodes of diarrhea  Some tenderness over the   Back in the gym last week ( treadmill on incline)       Flew to [place] returned last night   Flying to [place] tomorrow ( per note from Dr Berman need lovenox injections x 2)   Denies: nausea, vomiting, abdominal pain, changes in bowel movement pattern, fever, chills, dysphagia, chest pain, and shortness of breath.    Review of Systems   Constitutional:  Negative for fatigue and fever.   HENT:  Negative for sore throat, tinnitus and trouble swallowing.    Eyes:  Negative for visual disturbance.   Respiratory:  Negative for cough and shortness of breath.    Cardiovascular:  Negative for chest pain, palpitations and leg swelling.   Gastrointestinal:  Negative for abdominal pain, constipation, diarrhea, nausea and vomiting.   Genitourinary:  Negative for decreased urine volume.   Skin:  Negative for rash.   Neurological:  Negative for dizziness, light-headedness and headaches.   Psychiatric/Behavioral:  Negative for dysphoric mood, self-injury, sleep disturbance and suicidal ideas. The patient is not nervous/anxious.        Post Discharge     2 Weeks     Total Opioids Used (Outpatient): 15 tabsml: mLs  Unused Opioids Returned: No Educated on safe opioid disposal [facility].   Additional Opioids Provided: no    EXERCISE & VITAMINS:  See Bariatric Assessment  Adherent to vitamin regimen   MEDICATIONS/ALLERGIES:  Have been reviewed.    DIET: See Dietician note from today for a more detailed assessment.      Physical Exam  Constitutional:       Appearance: She is obese.   HENT:      Head: Normocephalic and atraumatic.      Nose: Nose normal.   Eyes:       Extraocular Movements: Extraocular movements intact.      Conjunctiva/sclera: Conjunctivae normal.      Pupils: Pupils are equal, round, and reactive to light.   Cardiovascular:      Rate and Rhythm: Normal rate and regular rhythm.      Pulses: Normal pulses.      Heart sounds: Normal heart sounds. No murmur heard.  Pulmonary:      Effort: Pulmonary effort is normal. No respiratory distress.      Breath sounds: Normal breath sounds.   Abdominal:      General: Bowel sounds are normal.      Palpations: Abdomen is soft.      Tenderness: There is no abdominal tenderness.   Musculoskeletal:      Cervical back: Normal range of motion and neck supple.   Skin:     General: Skin is warm and dry.      Capillary Refill: Capillary refill takes less than 2 seconds.   Neurological:      General: No focal deficit present.      Mental Status: She is alert and oriented to person, place, and time.   Psychiatric:         Mood and Affect: Mood normal.         Behavior: Behavior normal.         Thought Content: Thought content normal.         Judgment: Judgment normal.         ASSESSMENT:  - Morbid obesity s/p sleeve gastrectomy   - Co-morbidities: hypertension and obstructive sleep apnea  - Food Weight loss, 20#'s and 22% EWL  - Good Exercise routine  - Good Diet  - Good Vitamin regimen    PLAN:  - Lovenox injections   - Ursodiol 500 mg daily for 6 months  - Anti-Acid medication, PPI daily for 3 months  - No lifting more than 10 lbs for 6 weeks  - Miralax daily for constipation  - Emphasized the importance of regular exercise and adherence to bariatric diet to achieve maximum weight loss.  - Encouraged patient to start/continue regular exercise.  - Follow-up with dietician to advance diet.  - Continue daily vitamins and medications.  - RTC per schedule  - Call the office for any issues.  - Check labs today.

## 2024-10-22 ENCOUNTER — TELEPHONE (OUTPATIENT)
Dept: BARIATRICS | Facility: CLINIC | Age: 39
End: 2024-10-22
Payer: COMMERCIAL

## 2024-10-22 NOTE — TELEPHONE ENCOUNTER
----- Message from Corie sent at 10/21/2024  5:04 PM CDT -----  Regarding: Lab test cancellation  There was an issue with the Thiamine test ordered on this patient from [id number].     Unfortunately, the reference lab received received plasma but whole blood is required for testing so the order has been cancelled and will need to be reordered and recollected if clinically indicated.     If there are any questions, please call the [facility] lab at [phone] ext. [phone].  Anyone in the [facility] lab will be able to assist you.

## 2024-10-23 ENCOUNTER — LAB VISIT (OUTPATIENT)
Dept: LAB | Facility: HOSPITAL | Age: 39
End: 2024-10-23
Attending: PHYSICIAN ASSISTANT
Payer: COMMERCIAL

## 2024-10-23 DIAGNOSIS — G47.33 OSA (OBSTRUCTIVE SLEEP APNEA): Chronic | ICD-10-CM

## 2024-10-23 DIAGNOSIS — I10 PRIMARY HYPERTENSION: ICD-10-CM

## 2024-10-23 DIAGNOSIS — R73.03 PREDIABETES: ICD-10-CM

## 2024-10-23 DIAGNOSIS — E66.01 MORBID OBESITY: ICD-10-CM

## 2024-10-23 PROCEDURE — 84425 ASSAY OF VITAMIN B-1: CPT | Performed by: PHYSICIAN ASSISTANT

## 2024-10-23 PROCEDURE — 36415 COLL VENOUS BLD VENIPUNCTURE: CPT | Mod: PN | Performed by: PHYSICIAN ASSISTANT

## 2024-10-23 NOTE — TELEPHONE ENCOUNTER
Called and spoke with the pt.  Confirmed that she was requesting [facility] lab.  Scheduled with her.

## 2024-10-30 LAB — VIT B1 BLD-MCNC: 73 UG/L (ref 38–122)

## 2024-11-01 ENCOUNTER — PATIENT MESSAGE (OUTPATIENT)
Dept: OBSTETRICS AND GYNECOLOGY | Facility: CLINIC | Age: 39
End: 2024-11-01
Payer: COMMERCIAL

## 2024-11-02 ENCOUNTER — PATIENT MESSAGE (OUTPATIENT)
Dept: BARIATRICS | Facility: CLINIC | Age: 39
End: 2024-11-02
Payer: COMMERCIAL

## 2024-11-12 ENCOUNTER — PATIENT MESSAGE (OUTPATIENT)
Dept: BARIATRICS | Facility: CLINIC | Age: 39
End: 2024-11-12
Payer: COMMERCIAL

## 2024-12-03 ENCOUNTER — PATIENT MESSAGE (OUTPATIENT)
Dept: BARIATRICS | Facility: CLINIC | Age: 39
End: 2024-12-03
Payer: COMMERCIAL

## 2024-12-09 ENCOUNTER — CLINICAL SUPPORT (OUTPATIENT)
Dept: BARIATRICS | Facility: CLINIC | Age: 39
End: 2024-12-09
Payer: COMMERCIAL

## 2024-12-09 ENCOUNTER — HOSPITAL ENCOUNTER (OUTPATIENT)
Dept: RADIOLOGY | Facility: HOSPITAL | Age: 39
Discharge: HOME | End: 2024-12-09
Attending: INTERNAL MEDICINE
Payer: COMMERCIAL

## 2024-12-09 ENCOUNTER — OFFICE VISIT (OUTPATIENT)
Dept: BARIATRICS | Facility: CLINIC | Age: 39
End: 2024-12-09
Payer: COMMERCIAL

## 2024-12-09 VITALS
OXYGEN SATURATION: 95 % | DIASTOLIC BLOOD PRESSURE: 70 MMHG | SYSTOLIC BLOOD PRESSURE: 115 MMHG | WEIGHT: 208.75 LBS | BODY MASS INDEX: 33.7 KG/M2 | HEART RATE: 63 BPM

## 2024-12-09 VITALS — BODY MASS INDEX: 33.33 KG/M2 | WEIGHT: 206.5 LBS

## 2024-12-09 DIAGNOSIS — Z12.31 ENCOUNTER FOR SCREENING MAMMOGRAM FOR BREAST CANCER: ICD-10-CM

## 2024-12-09 DIAGNOSIS — Z98.84 S/P BARIATRIC SURGERY: Primary | ICD-10-CM

## 2024-12-09 DIAGNOSIS — Z98.84 S/P BARIATRIC SURGERY: ICD-10-CM

## 2024-12-09 DIAGNOSIS — Z71.3 DIETARY COUNSELING AND SURVEILLANCE: Primary | ICD-10-CM

## 2024-12-09 DIAGNOSIS — I10 PRIMARY HYPERTENSION: ICD-10-CM

## 2024-12-09 DIAGNOSIS — G47.33 OSA (OBSTRUCTIVE SLEEP APNEA): ICD-10-CM

## 2024-12-09 DIAGNOSIS — E66.9 OBESITY (BMI 30-39.9): ICD-10-CM

## 2024-12-09 PROCEDURE — 99999 PR PBB SHADOW E&M-EST. PATIENT-LVL I: CPT | Mod: PBBFAC,,, | Performed by: DIETITIAN, REGISTERED

## 2024-12-09 PROCEDURE — 99024 POSTOP FOLLOW-UP VISIT: CPT | Mod: S$GLB,,, | Performed by: NURSE PRACTITIONER

## 2024-12-09 PROCEDURE — 77067 SCR MAMMO BI INCL CAD: CPT | Mod: TC

## 2024-12-09 PROCEDURE — 3044F HG A1C LEVEL LT 7.0%: CPT | Mod: CPTII,S$GLB,, | Performed by: NURSE PRACTITIONER

## 2024-12-09 PROCEDURE — 1159F MED LIST DOCD IN RCRD: CPT | Mod: CPTII,S$GLB,, | Performed by: NURSE PRACTITIONER

## 2024-12-09 PROCEDURE — 77063 BREAST TOMOSYNTHESIS BI: CPT | Mod: 26,,, | Performed by: RADIOLOGY

## 2024-12-09 PROCEDURE — 1160F RVW MEDS BY RX/DR IN RCRD: CPT | Mod: CPTII,S$GLB,, | Performed by: NURSE PRACTITIONER

## 2024-12-09 PROCEDURE — 99999 PR PBB SHADOW E&M-EST. PATIENT-LVL IV: CPT | Mod: PBBFAC,,, | Performed by: NURSE PRACTITIONER

## 2024-12-09 PROCEDURE — 77067 SCR MAMMO BI INCL CAD: CPT | Mod: 26,,, | Performed by: RADIOLOGY

## 2024-12-09 PROCEDURE — 3074F SYST BP LT 130 MM HG: CPT | Mod: CPTII,S$GLB,, | Performed by: NURSE PRACTITIONER

## 2024-12-09 PROCEDURE — 3078F DIAST BP <80 MM HG: CPT | Mod: CPTII,S$GLB,, | Performed by: NURSE PRACTITIONER

## 2024-12-09 NOTE — PROGRESS NOTES
BARIATRIC POST-OPERATIVE VISIT:    HPI:  Joann Banuelos is a 39 y.o. year old female presents for 8 week post op visit following s/p sleeve.  she is doing well and tolerating the diet without difficulty.  she has no complaints.    Denies: nausea, vomiting, abdominal pain, changes in bowel movement pattern, fever, chills, dysphagia, chest pain, and shortness of breath.    Review of Systems   Constitutional:  Negative for fatigue and fever.   HENT:  Negative for sore throat, tinnitus and trouble swallowing.    Eyes:  Negative for visual disturbance.   Respiratory:  Negative for cough and shortness of breath.    Cardiovascular:  Negative for chest pain, palpitations and leg swelling.   Gastrointestinal:  Negative for abdominal pain, constipation, diarrhea, nausea and vomiting.   Genitourinary:  Negative for decreased urine volume.   Skin:  Negative for rash.   Neurological:  Negative for dizziness, light-headedness and headaches.   Psychiatric/Behavioral:  Negative for dysphoric mood, self-injury, sleep disturbance and suicidal ideas. The patient is not nervous/anxious.        Post Discharge     8 Weeks     Total Opioids Used (Outpatient): 0 tabsml: mLs  Unused Opioids Returned: No Educated on safe opioid disposal location at [facility] outpatient pharmacy.   Additional Opioids Provided: no    EXERCISE & VITAMINS:  See Bariatric Assessment  Adherent to vitamin regimen     MEDICATIONS/ALLERGIES:  Have been reviewed.    DIET: Soft/ Regular:  1 Protein shakes  ~ 80-90 protein  ~64 fluids     See Dietician note from today for a more detailed assessment.      Physical Exam  Constitutional:       Appearance: She is obese.   HENT:      Head: Normocephalic and atraumatic.      Nose: Nose normal.   Eyes:      Extraocular Movements: Extraocular movements intact.      Conjunctiva/sclera: Conjunctivae normal.      Pupils: Pupils are equal, round, and reactive to light.   Cardiovascular:      Rate and Rhythm: Normal rate and  regular rhythm.      Pulses: Normal pulses.      Heart sounds: Normal heart sounds. No murmur heard.  Pulmonary:      Effort: Pulmonary effort is normal. No respiratory distress.      Breath sounds: Normal breath sounds.   Abdominal:      General: Bowel sounds are normal.      Palpations: Abdomen is soft.      Tenderness: There is no abdominal tenderness.   Musculoskeletal:      Cervical back: Normal range of motion and neck supple.   Skin:     General: Skin is warm and dry.      Capillary Refill: Capillary refill takes less than 2 seconds.   Neurological:      General: No focal deficit present.      Mental Status: She is alert and oriented to person, place, and time.   Psychiatric:         Mood and Affect: Mood normal.         Behavior: Behavior normal.         Thought Content: Thought content normal.         Judgment: Judgment normal.         ASSESSMENT:  - Morbid obesity s/p sleeve gastrectomy   - Co-morbidities: hypertension and obstructive sleep apnea  - Good Weight loss, 34#'s and 37% EWL  - Good Exercise routine  4-5 x week   - Good Diet  - Good Vitamin regimen    PLAN:  - Ursodiol 500 mg daily for 6 months  - Anti-Acid medication, PPI daily for 3 months, slow taper  - Miralax daily for constipation  - Emphasized the importance of regular exercise and adherence to bariatric diet to achieve maximum weight loss.  - Encouraged patient to start/continue regular exercise.  - Follow-up with dietician to advance diet.  - Continue daily vitamins and medications.  - RTC per schedule  - Call the office for any issues.  - Check labs today.

## 2024-12-09 NOTE — PATIENT INSTRUCTIONS
Meal Ideas for Regular Bariatric Diet  *Recipes and products available at [email or web address]      Breakfast: (15-20g protein)    - Egg white omelet: 2 egg whites or ½ cup Egg Beaters. (Optional proteins: cheese, shrimp, black beans, chicken, sliced turkey) (Optional veggies: tomatoes, salsa, spinach, mushrooms, onions, green peppers, or small slice avocado)     - Egg and sausage: 1 egg or ¼ cup Egg Beaters (any variety), with 1 pita or 2 links of Turkey sausage or Veggie breakfast sausage (Everest or Cequence Energy)    - Crust-less breakfast quiche: To make a glass pie dish, mix 4oz part skim Ricotta, 1 cup skim milk, and 2 eggs as your base. Add protein: shredded cheese, sliced lean ham or turkey, turkey benitez/sausage. Add veggies: tomato, onion, green onion, mushroom, green pepper, spinach, etc.    - Yogurt parfait: Mix 1 - 6oz container Dannon Light N Fit vanilla yogurt, with ¼ cup crushed unsalted nuts    - Cottage cheese and fruit: ½ cup part-skim cottage cheese or ricotta cheese topped with fresh fruit or sugar free preserves     - Michaelle Swain's Vanilla Egg custard* (add 2 Tbsp instant coffee granules to make Cappuccino Custard*)    - Hi-Protein café latte (skim milk, decaf coffee, 1 scoop protein powder). Optional to add Sugar free syrup or extract flavoring.    - Breakfast Lox: spread fat free cream cheese on slices of smoked salmon. Serve over scrambled or egg over easy (sauteed with nonstick cookspray) OR on a cucumber slice    - Eggwhich: Scramble or cook 1 large egg over easy using nonstick cookspray. Place between 2 slices of Haitian benitez and low fat cheese.     Lunch: (20-30g protein)    - ½ cup Black bean soup (Homemade or Progresso), with ¼ cup shredded low-fat cheese. Top with chopped tomato or fresh salsa.     - Lean deli turkey breast and low-fat sliced cheese, mustard or light jiang to moisten, rolled up together, or wrapped in a Long lettuce leaf    - Chicken salad made from dinner  leftovers, moisten with low-fat salad dressing or light jiang. Also try leftover salmon, shrimp, tuna or boiled eggs. Serve ½ cup over dark green salad    - Fat-free canned refried beans, topped with ¼ cup shredded low-fat cheese. Top with chopped tomato or fresh salsa.     - Greek salad: Top mixed greens with 1-2oz grilled chicken, tomatoes, red onions, 2-3 kalamata olives, and sprinkle lightly with feta cheese. Spritz with Balsamic vinegar to taste.     - Crust-less lunch quiche: To make a glass pie dish, mix 4oz part skim Ricotta, 1 cup skim milk, and 2 eggs as your base. Add protein: shredded cheese, sliced lean ham or turkey, shrimp, chicken. Add veggies: tomato, onion, green onion, mushroom, green pepper, spinach, artichoke, broccoli, etc.    - Pizza bake: spread a  andreas margi mushroom with tomato sauce, low-fat shredded mozzarella and turkey pepperoni or [place] benitez. Add any veggies. Roast for 10-15 minutes, until cheese melted.     - Cucumber crab bites: Spread ¼ cup crab dip (lump crabmeat + light cream cheese and green onions) over sliced cucumber.     - Chicken with light spinach and artichoke dip*: Puree in : 6oz cooked and drained spinach, 2 cloves garlic, 1 can cannelloni beans, ½ cup chopped green onions, 1 can drained artichoke hearts (not marinated in oil), lemon juice and basil. Mix in 2oz chopped up chicken.    Supper: (20-30g protein)    - Serve grilled fish over dark green salad tossed with low-fat dressing, served with grilled asparagus fitzgerald     - Rotisserie chicken salad: served with sliced strawberries, walnuts, fat-free feta cheese crumbles and 1 tbsp Donaldsons Own Light Raspberry [place] Vinaigrette    - Shrimp cocktail: Dip cold boiled shrimp in homemade low-sugar cocktail sauce (1/2 cup Marciano One Carb ketchup, 2 tbsp horseradish, 1/4 tsp hot sauce, 1 tsp Worcestershire sauce, 1 tbsp freshly-squeezed lemon juice). Serve with dark green salad, walnuts, and crumbled blue  cheese drizzled with olive oil and Balsamic vinegar    - Tuna Melt: Spread tuna salad onto 2 thick slices of tomato. Top with low-fat cheese and broil until cheese is melted. May also be made with chicken salad of shrimp salad. [place] with different types of cheeses.    - Chicken or beef fajitas (no tortilla, rice, beans, chips). Top meat and veggies w/ fresh salsa, fat free sour cream.     - Homemade low-fat Chili using extra lean ground beef or ground turkey. Top with shredded cheese and salsa as desired. May add dollop fat-free sour cream if desired    - Chicken parmesan: Top chicken breast w/ low sugar marinara sauce, mozzarella cheese and bake until chicken reaches 165*.  Serve w/ spaghetti SQUASH or [place] cut green beans    - Dinner Omelet with shrimp or chicken and onion, green peppers and chives.    - No noodle lasagna: Use sliced zucchini or eggplant in place of noodles.  Layer with part skim ricotta cheese and low sugar meat sauce (use very lean ground beef or ground turkey).    - Mexican chicken bake: Bake chunks of chicken breast or thigh with taco seasoning, Pace brand enchilada sauce, green onions and low-fat cheese. Serve with ¼ cup black beans or fat free refried beans topped with chopped tomatoes or salsa.    - Karlene frozen meatballs, simmered in Classico Marinara sauce. Different flavors of salsa or spaghetti sauce create different dishes! Sprinkle with parmesan cheese. Serve with grilled or steamed veggies, or a dark green salad.    - Simmer boneless skinless chicken thigh chunks in Classico Marinara sauce or roasted salsa until tender with chopped onion, bell pepper, garlic, mushrooms, spinach, etc.     - Hamburger or veggie burger, without the bun, dressed the way you like. Served with grilled or steamed veggies.    - Eggplant parmesan: Bake slices of eggplant at 350 degrees for 15 minutes. Layer tomato sauce, sliced eggplant and low-fat mozzarella cheese in a baking dish and cover with  foil. Bake 30-40 more minutes or until bubbly. Uncover and bake at 400 degrees for about 15 more minutes, or until top is slightly crisp.    - Fish tacos: grilled/baked white fish, wrapped in Long lettuce leaf, topped with salsa, shredded low-fat cheese, and light coleslaw.    - Chicken afrrukh: Sprinkle chicken w/ 1 tsp of hidden valley ranch dip mix. Then grill chicken and top with black beans, salsa and 1 tsp fat free sour cream.     - Cauliflower pizza crust: Use cauliflower as crust (see recipe on pinterest, no flour!). Top w/ low fat cheese, turkey pepperoni and veggies and bake again    - chicken or turkey crust pizza: use ground chicken or turkey instead of cauliflower, spread in [place] and bake at 350 for about 20-30 minutes(may want to add garlic, black pepper, oregano and other herbs to ground meat mixture).  Remove and top w/ low fat cheese, turkey pepperoni and veggies and bake again for another 10 minutes or until cheese is browned.     Snacks: (100-200 calories; >5g protein)    - 1 low-fat cheese stick with 8 cherry tomatoes or 1 serving fresh fruit  - 4 thin slices fat-free turkey breast and 1 slice low-fat cheese  - 4 thin slices fat-free honey ham with wedge of melon  - 6-8 edamame pods (equivalent to about 1/4 cup edamame without pods).   - 1/4 cup unsalted nuts with ½ cup fruit  - 6-oz container Dannon Light n Fit vanilla yogurt, topped with 1oz unsalted nuts         - apple, celery or baby carrots spread with 2 Tbsp PB2  - apple slices with 1 oz slice low-fat cheese  - Apple slices dipped in 2 Tbsp of PB2  - celery, cucumber, bell pepper or baby carrots dipped in ¼ cup hummus bean spread or light spinach and artichoke dip (*recipe in lunch section)  - celery, cucumber, baby carrots dipped in high protein greek yogurt (Mix 16 oz plain greek yogurt + 1 packet of hidden valley ranch dip mix)  - Luis Miguel Links Beef Steak - 14g protein! (similar to beef jerky)  - 2 wedges [place] - Light Herb  & Garlic Cheese with sliced cucumber or green bell pepper  - 1/2 cup low-fat cottage cheese with ¼ cup fruit or ¼ cup salsa  - RTD Protein drinks: Atkins, Low Carb Slim Fast, EAS light, Muscle Milk Light, etc.  - Homemade Protein drinks: GNC Soy95, Isopure, Nectar, UNJURY, Whey Gourmet, etc. Mix 1 scoop powder with 8oz skim/1% milk or light soymilk.  - Protein bars: Atkins, EAS, Pure Protein, Think Thin, Detour, etc. Must have 0-4 grams sugar - Read the label.    Takeout Options: No more than twice/week  Deli - Salads (no pasta or rice), meats, cheeses. Roasted chicken. Lox (salmon)    Mexican - Platters which don't include tortillas, chips, or rice. Go easy on the beans. Example: Fajitas without the tortillas. Ask the  not to bring chips to the table if they are too tempting.    Greek - Meat or fish and vegetable, but no bread or rice. Including hummus, baba ganoush, etc, is OK. Most sit-down Greek restaurants can provide you with cucumber slices for dipping instead of lex bread.    Fast Food (Avoid as much as possible) - Salads (no croutons and limit salad dressing to 2 tbsp), grilled chicken sandwich without the bun and ask for no jiang. Leticias low fat chili or Taco Bell pintos and cheese.    BBQ - The meats are fine if you ask for sauces on the side, but most of the traditional side dishes are loaded with carbs. Larry slaw, baked beans and BBQ sauce are typically made with sugar.    Chinese - Nothing deep-fried, no rice or noodles. Many Chinese sauces have starch and sugar in them, so you'll have to use your judgement. If you find that these sauces trigger cravings, or cause Dumping, you can ask for the sauce to be made without sugar or just use soy sauce.       How to taper off of Omeprazole:  - Take 1 Tablet every other day for 2 weeks.  If you do not experience any heartburn, indigestion, nausea symptoms, the following week, take 1 tablet every 3 days.  Again if you remain without the above symptoms,  you may completely discontinue the medication.  If symptoms return at any point, please restart the medication.

## 2024-12-09 NOTE — PROGRESS NOTES
NUTRITION NOTE  Referring Physician: Ingrid Berman M.D.   Reason for MNT Referral: Follow-up 8 Weeks s/p laparoscopic sleeve gastrectomy    PAST MEDICAL HISTORY:  Denies nausea, vomiting, constipation, and diarrhea.  Reports doing well.    Past Medical History:   Diagnosis Date    Abnormal Pap smear 2007    Abnormal Pap smear of cervix     Allergy     Eczema     HPV (human papilloma virus) anogenital infection     Hypertension     Tinea versicolor        CLINICAL DATA:  39 y.o.-year-old Black or  female.      Current Weight: 208 lbs  BMI: 33.7  Total Weight Loss: 34 lbs   Excess Weight Loss: 37%      CURRENT DIET:  Bariatric Soft Diet    Diet Recall: 70-80 grams of protein/day; 80-90 oz of fluids/day    B: Premier Protein (with coffee)  L: Smoked salmon with avocado and alfalfa sprouts rolled up or Chick-ysabel-A side of kale salad and grilled nuggets when on the road  S: Protein bar or nuts (almonds and walnuts) and sometimes with dried cranberries  D: Chicken or salmon with raw or sauteed lettuce/spinach     Diet Includes:   Meal Pattern: 3 meal(s) + 1 snack(s) + 1 protein supplement(s)  Adequate protein supplement intake. Premier Protein (with coffee), Quest bar, Fit Crunch  Inadequate dairy intake.   Adequate vegetable intake. Kale, broccoli, spinach, lettuce, green beans  Adequate fruit intake. [place]  Starchy CHO: None  Beverages: Water    EXERCISE:  Trainer/gym - walking on treadmill, arms and legs, 10 lbs & 12 lbs. 5-6 days/week      Restrictions to Exercise: None.    LABS:  None available at time of visit    VITAMINS/MINERALS:  Bar[place]elt Multivitamin with iron 2 per day  Barimelt B-1 3 per day  Barimelt Calcium Citrate 2 tablets 3 x day  Barimelt Vitamin B-12 1 per week    ASSESSMENT:  Doing fairly well overall.  Adequate protein intake.  Adequate fluid intake.  Advancing diet appropriately.  Exercising.  Adequate vitamins & minerals.    BARIATRIC DIET DISCUSSION:  Instructed and provided  written materials on bariatric regular diet plan.  Reinforced post-op nutrition guidelines.  Reminded PT no starchy CHO until goal weight  Discussed tracking intake to ensure protein goals and calorie range are met    PLAN / RECOMMENDATIONS:  Advance to bariatric regular diet.  Increase protein intake.  Maintain fluid intake.  Continue light exercise.  Continue appropriate vitamins & minerals.    Return to clinic in 4 months.    SESSION TIME: 20 minutes

## 2024-12-10 ENCOUNTER — PATIENT MESSAGE (OUTPATIENT)
Dept: BARIATRICS | Facility: CLINIC | Age: 39
End: 2024-12-10
Payer: COMMERCIAL

## 2025-01-06 ENCOUNTER — PATIENT MESSAGE (OUTPATIENT)
Dept: BARIATRICS | Facility: CLINIC | Age: 40
End: 2025-01-06
Payer: COMMERCIAL

## 2025-01-14 ENCOUNTER — PATIENT MESSAGE (OUTPATIENT)
Dept: BARIATRICS | Facility: CLINIC | Age: 40
End: 2025-01-14
Payer: COMMERCIAL

## 2025-02-10 ENCOUNTER — PATIENT MESSAGE (OUTPATIENT)
Dept: BARIATRICS | Facility: CLINIC | Age: 40
End: 2025-02-10
Payer: COMMERCIAL

## 2025-03-05 ENCOUNTER — PATIENT MESSAGE (OUTPATIENT)
Dept: OBSTETRICS AND GYNECOLOGY | Facility: CLINIC | Age: 40
End: 2025-03-05
Payer: COMMERCIAL

## 2025-03-05 DIAGNOSIS — R10.2 PELVIC PAIN: Primary | ICD-10-CM

## 2025-03-07 ENCOUNTER — HOSPITAL ENCOUNTER (OUTPATIENT)
Dept: RADIOLOGY | Facility: HOSPITAL | Age: 40
Discharge: HOME | End: 2025-03-07
Attending: OBSTETRICS & GYNECOLOGY
Payer: COMMERCIAL

## 2025-03-07 DIAGNOSIS — R10.2 PELVIC PAIN: ICD-10-CM

## 2025-03-07 PROCEDURE — 76856 US EXAM PELVIC COMPLETE: CPT | Mod: 26,,, | Performed by: RADIOLOGY

## 2025-03-07 PROCEDURE — 76830 TRANSVAGINAL US NON-OB: CPT | Mod: 26,,, | Performed by: RADIOLOGY

## 2025-03-07 PROCEDURE — 76856 US EXAM PELVIC COMPLETE: CPT | Mod: TC

## 2025-03-10 ENCOUNTER — PATIENT MESSAGE (OUTPATIENT)
Dept: BARIATRICS | Facility: CLINIC | Age: 40
End: 2025-03-10
Payer: COMMERCIAL

## 2025-03-18 ENCOUNTER — OFFICE VISIT (OUTPATIENT)
Dept: OBSTETRICS AND GYNECOLOGY | Facility: CLINIC | Age: 40
End: 2025-03-18
Payer: COMMERCIAL

## 2025-03-18 VITALS
SYSTOLIC BLOOD PRESSURE: 123 MMHG | BODY MASS INDEX: 31.14 KG/M2 | HEART RATE: 70 BPM | WEIGHT: 192.88 LBS | DIASTOLIC BLOOD PRESSURE: 85 MMHG

## 2025-03-18 DIAGNOSIS — N83.202 CYST OF LEFT OVARY: ICD-10-CM

## 2025-03-18 DIAGNOSIS — R10.2 PELVIC PAIN: Primary | ICD-10-CM

## 2025-03-18 PROCEDURE — 3079F DIAST BP 80-89 MM HG: CPT | Mod: CPTII,S$GLB,, | Performed by: OBSTETRICS & GYNECOLOGY

## 2025-03-18 PROCEDURE — 99214 OFFICE O/P EST MOD 30 MIN: CPT | Mod: S$GLB,,, | Performed by: OBSTETRICS & GYNECOLOGY

## 2025-03-18 PROCEDURE — 3074F SYST BP LT 130 MM HG: CPT | Mod: CPTII,S$GLB,, | Performed by: OBSTETRICS & GYNECOLOGY

## 2025-03-18 PROCEDURE — 99999 PR PBB SHADOW E&M-EST. PATIENT-LVL III: CPT | Mod: PBBFAC,,, | Performed by: OBSTETRICS & GYNECOLOGY

## 2025-03-18 PROCEDURE — 3008F BODY MASS INDEX DOCD: CPT | Mod: CPTII,S$GLB,, | Performed by: OBSTETRICS & GYNECOLOGY

## 2025-03-18 PROCEDURE — 1159F MED LIST DOCD IN RCRD: CPT | Mod: CPTII,S$GLB,, | Performed by: OBSTETRICS & GYNECOLOGY

## 2025-03-18 RX ORDER — OXYCODONE AND ACETAMINOPHEN 5; 325 MG/1; MG/1
1 TABLET ORAL EVERY 4 HOURS PRN
Qty: 20 TABLET | Refills: 0 | Status: SHIPPED | OUTPATIENT
Start: 2025-03-18

## 2025-03-18 NOTE — PROGRESS NOTES
"HPI: 39 year old female presents today for follow up of pelvic pain and recent pelvic u/s (3/7/25) showing enlarged left ovary    FINDINGS:  Uterus:     Size: 9.5 x 3.8 x 5.1 cm     Masses: 5 mm myometrial cyst.     Endometrium: Normal in this pre menopausal patient, measuring 3 mm.     Right ovary:     Size: 4.0 x 1.9 x 3.3 cm     Appearance: Normal     Vascular flow: Normal.  Resistive index measures 0.60.     Left ovary:     Size: 5.0 x 3.0 x 3.3 cm     Appearance: 2.5 x 1.9 x 1.7 cm complex echogenic focus demonstrates peripheral vascularity, possible involuting corpus luteum.     Vascular Flow: Normal.  Resistive index measures 0.46     Free Fluid:     None.     Impression:     Enlarged left ovary measuring up to 5.0 cm, nonspecific finding, noting normal vascular flow seen.  Consider correlation for intermittent torsion.       Pt has hx of PCOS. She is s/p Lap BTL and endometrial ablation so periods are not heavy. She reports that for the past 6 months she has been having intermittent pelvic pain on the left side that becomes acutely worse after sex. It will then get better and get worse. Pelvic u/s shows "enlarged" left ovary of 5 cm and right ovary of 4.0 cm.     ROS:  GENERAL: Feeling well overall. Denies fever or chills.   SKIN: Denies rash or lesions.   HEAD: Denies head injury or headache.   NODES: Denies enlarged lymph nodes.   CHEST: Denies chest pain or shortness of breath.   CARDIOVASCULAR: Denies palpitations or left sided chest pain.   ABDOMEN: No abdominal pain, constipation, diarrhea, nausea, vomiting or rectal bleeding.   URINARY: No dysuria, hematuria, or burning on urination.  REPRODUCTIVE: See HPI.   BREASTS: Denies pain, lumps, or nipple discharge.   HEMATOLOGIC: No easy bruisability or excessive bleeding.   MUSCULOSKELETAL: Denies joint pain or swelling.   NEUROLOGIC: Denies syncope or weakness.   PSYCHIATRIC: Denies depression, anxiety or mood swings.    PE:   APPEARANCE: Well nourished, " well developed, Black or  female in no acute distress.      Diagnosis:  1. Pelvic pain    2. Cyst of left ovary        Plan:     Orders Placed This Encounter    oxyCODONE-acetaminophen (PERCOCET) 5-325 mg per tablet     - Long discussion with Joann regarding u/s results and we even looked at the images. She has numerous small follicles on the periphery bilaterally c/w PCOS and this is what is ultimately causing the enlarged ovarian volume on the left side. No distinct cyst to be removed. It is possible that the ovary is moving with intercourse and depending on how it is situated in the pelvis could be the reason for the intermittent pain. Plan will be to watch pain over next few weeks and if no improvement, will move forward with LSO.     Leaving to go to [place] tomorrow so Rx for pain meds given just in case it is needed. She will message me when she returns if no improvement at which time we cna move forward with surgery. No evidence of acute abdomen and no reason to move forward with surgery at this time.    Face to Face time with patient: 20    30 minutes of total time spent on the encounter, which includes face to face time and non-face to face time preparing to see the patient (eg, review of tests), Obtaining and/or reviewing separately obtained history, Documenting clinical information in the electronic or other health record, Independently interpreting results (not separately reported) and communicating results to the patient/family/caregiver, or Care coordination (not separately reported).      Follow-up with me PRN annual exam or sooner if pain persists.

## 2025-04-01 ENCOUNTER — TELEPHONE (OUTPATIENT)
Dept: BARIATRICS | Facility: CLINIC | Age: 40
End: 2025-04-01
Payer: COMMERCIAL

## 2025-04-01 NOTE — TELEPHONE ENCOUNTER
----- Message from Kristel sent at 4/1/2025  2:47 PM CDT -----  Type:   Appointment RequestCaller is requesting a sooner appointment.  Name of Caller:Ms Tovar is the first available appointment?n/aWould the patient rather a call back or a response via [facility]? Call Best Call Back Number:[phone] Information: she wants to see if she can move her appt to a later time

## 2025-04-01 NOTE — TELEPHONE ENCOUNTER
Returned pt's call in regard to rescheduling post op appt with Ab'. Pt's appt and fasting labs have been rescheduled and pt verbalized understanding. Call was disconnected.

## 2025-04-03 ENCOUNTER — OFFICE VISIT (OUTPATIENT)
Dept: BARIATRICS | Facility: CLINIC | Age: 40
End: 2025-04-03
Payer: COMMERCIAL

## 2025-04-03 ENCOUNTER — PATIENT MESSAGE (OUTPATIENT)
Dept: BARIATRICS | Facility: CLINIC | Age: 40
End: 2025-04-03

## 2025-04-03 ENCOUNTER — LAB VISIT (OUTPATIENT)
Dept: LAB | Facility: HOSPITAL | Age: 40
End: 2025-04-03
Attending: PHYSICIAN ASSISTANT
Payer: COMMERCIAL

## 2025-04-03 VITALS
BODY MASS INDEX: 31.32 KG/M2 | HEIGHT: 66 IN | OXYGEN SATURATION: 97 % | SYSTOLIC BLOOD PRESSURE: 123 MMHG | WEIGHT: 194.88 LBS | HEART RATE: 70 BPM | DIASTOLIC BLOOD PRESSURE: 73 MMHG

## 2025-04-03 DIAGNOSIS — Z98.84 S/P BARIATRIC SURGERY: ICD-10-CM

## 2025-04-03 DIAGNOSIS — I10 PRIMARY HYPERTENSION: ICD-10-CM

## 2025-04-03 DIAGNOSIS — E66.01 MORBID OBESITY: ICD-10-CM

## 2025-04-03 DIAGNOSIS — R73.03 PREDIABETES: ICD-10-CM

## 2025-04-03 DIAGNOSIS — G47.33 OSA (OBSTRUCTIVE SLEEP APNEA): Chronic | ICD-10-CM

## 2025-04-03 DIAGNOSIS — R63.4 WEIGHT LOSS: Primary | ICD-10-CM

## 2025-04-03 DIAGNOSIS — G47.33 OSA (OBSTRUCTIVE SLEEP APNEA): ICD-10-CM

## 2025-04-03 LAB
25(OH)D3+25(OH)D2 SERPL-MCNC: 71 NG/ML (ref 30–96)
ALBUMIN SERPL BCP-MCNC: 4.1 G/DL (ref 3.5–5.2)
ALP SERPL-CCNC: 80 UNIT/L (ref 40–150)
ALT SERPL W/O P-5'-P-CCNC: 9 UNIT/L (ref 10–44)
ANION GAP (OHS): 6 MMOL/L (ref 8–16)
AST SERPL-CCNC: 16 UNIT/L (ref 11–45)
BILIRUB SERPL-MCNC: 0.5 MG/DL (ref 0.1–1)
BUN SERPL-MCNC: 15 MG/DL (ref 6–20)
CALCIUM SERPL-MCNC: 9.4 MG/DL (ref 8.7–10.5)
CHLORIDE SERPL-SCNC: 106 MMOL/L (ref 95–110)
CHOLEST SERPL-MCNC: 237 MG/DL (ref 120–199)
CHOLEST/HDLC SERPL: 2.7 {RATIO} (ref 2–5)
CO2 SERPL-SCNC: 26 MMOL/L (ref 23–29)
CREAT SERPL-MCNC: 0.7 MG/DL (ref 0.5–1.4)
GFR SERPLBLD CREATININE-BSD FMLA CKD-EPI: >60 ML/MIN/1.73/M2
GLUCOSE SERPL-MCNC: 85 MG/DL (ref 70–110)
HDLC SERPL-MCNC: 87 MG/DL (ref 40–75)
HDLC SERPL: 36.7 % (ref 20–50)
IRON SATN MFR SERPL: 34 % (ref 20–50)
IRON SERPL-MCNC: 131 UG/DL (ref 30–160)
LDLC SERPL CALC-MCNC: 138.8 MG/DL (ref 63–159)
NONHDLC SERPL-MCNC: 150 MG/DL
POTASSIUM SERPL-SCNC: 4.4 MMOL/L (ref 3.5–5.1)
PROT SERPL-MCNC: 7.3 GM/DL (ref 6–8.4)
SODIUM SERPL-SCNC: 138 MMOL/L (ref 136–145)
TIBC SERPL-MCNC: 383 UG/DL (ref 250–450)
TRANSFERRIN SERPL-MCNC: 259 MG/DL (ref 200–375)
TRIGL SERPL-MCNC: 56 MG/DL (ref 30–150)
VIT B12 SERPL-MCNC: 1206 PG/ML (ref 210–950)

## 2025-04-03 PROCEDURE — 84466 ASSAY OF TRANSFERRIN: CPT

## 2025-04-03 PROCEDURE — 1159F MED LIST DOCD IN RCRD: CPT | Mod: CPTII,S$GLB,, | Performed by: PHYSICIAN ASSISTANT

## 2025-04-03 PROCEDURE — 82607 VITAMIN B-12: CPT

## 2025-04-03 PROCEDURE — 1160F RVW MEDS BY RX/DR IN RCRD: CPT | Mod: CPTII,S$GLB,, | Performed by: PHYSICIAN ASSISTANT

## 2025-04-03 PROCEDURE — 3078F DIAST BP <80 MM HG: CPT | Mod: CPTII,S$GLB,, | Performed by: PHYSICIAN ASSISTANT

## 2025-04-03 PROCEDURE — 99999 PR PBB SHADOW E&M-EST. PATIENT-LVL III: CPT | Mod: PBBFAC,,, | Performed by: PHYSICIAN ASSISTANT

## 2025-04-03 PROCEDURE — 80053 COMPREHEN METABOLIC PANEL: CPT

## 2025-04-03 PROCEDURE — 36415 COLL VENOUS BLD VENIPUNCTURE: CPT | Mod: PN

## 2025-04-03 PROCEDURE — 3008F BODY MASS INDEX DOCD: CPT | Mod: CPTII,S$GLB,, | Performed by: PHYSICIAN ASSISTANT

## 2025-04-03 PROCEDURE — 82306 VITAMIN D 25 HYDROXY: CPT

## 2025-04-03 PROCEDURE — 3074F SYST BP LT 130 MM HG: CPT | Mod: CPTII,S$GLB,, | Performed by: PHYSICIAN ASSISTANT

## 2025-04-03 PROCEDURE — 99213 OFFICE O/P EST LOW 20 MIN: CPT | Mod: S$GLB,,, | Performed by: PHYSICIAN ASSISTANT

## 2025-04-03 PROCEDURE — 80061 LIPID PANEL: CPT

## 2025-04-03 RX ORDER — ALBUTEROL SULFATE AND BUDESONIDE 90; 80 UG/1; UG/1
AEROSOL, METERED RESPIRATORY (INHALATION)
COMMUNITY
Start: 2025-03-13

## 2025-04-03 NOTE — PROGRESS NOTES
BARIATRIC POST-OPERATIVE VISIT:    HPI:  Joann Banuelos is a 39 y.o. year old female presents for 6 month post op visit following s/p sleeve.  she is doing well and tolerating the diet without difficulty.  she has no complaints.    Pt states that she is doing well   Wanted to weigh 193 ( states that at home she is 191)     Denies: nausea, vomiting, abdominal pain, changes in bowel movement pattern, fever, chills, dysphagia, chest pain, and shortness of breath.    Review of Systems   Constitutional:  Negative for fatigue and fever.   HENT:  Negative for sore throat, tinnitus and trouble swallowing.    Eyes:  Negative for visual disturbance.   Respiratory:  Negative for cough and shortness of breath.    Cardiovascular:  Negative for chest pain, palpitations and leg swelling.   Gastrointestinal:  Negative for abdominal pain, constipation, diarrhea, nausea and vomiting.   Genitourinary:  Negative for decreased urine volume.   Skin:  Negative for rash.   Neurological:  Negative for dizziness, light-headedness and headaches.   Psychiatric/Behavioral:  Negative for dysphoric mood, self-injury, sleep disturbance and suicidal ideas. The patient is not nervous/anxious.        EXERCISE & VITAMINS:  See Bariatric Assessment  Adherent to vitamin regimen     MEDICATIONS/ALLERGIES:  Have been reviewed.    DIET: Soft/ Regular:  1 Protein shakes  ~ 80-90 protein  ~64 fluids     See Dietician note from today for a more detailed assessment.      Physical Exam  Constitutional:       Appearance: Normal appearance.   HENT:      Head: Normocephalic and atraumatic.      Nose: Nose normal.   Eyes:      Extraocular Movements: Extraocular movements intact.      Conjunctiva/sclera: Conjunctivae normal.      Pupils: Pupils are equal, round, and reactive to light.   Cardiovascular:      Rate and Rhythm: Normal rate and regular rhythm.      Pulses: Normal pulses.      Heart sounds: Normal heart sounds. No murmur heard.  Pulmonary:       Effort: Pulmonary effort is normal. No respiratory distress.      Breath sounds: Normal breath sounds.   Abdominal:      General: Bowel sounds are normal.      Palpations: Abdomen is soft.      Tenderness: There is no abdominal tenderness.   Musculoskeletal:      Cervical back: Normal range of motion and neck supple.   Skin:     General: Skin is warm and dry.      Capillary Refill: Capillary refill takes less than 2 seconds.   Neurological:      General: No focal deficit present.      Mental Status: She is alert and oriented to person, place, and time.   Psychiatric:         Mood and Affect: Mood normal.         Behavior: Behavior normal.         Thought Content: Thought content normal.         Judgment: Judgment normal.         ASSESSMENT:  - Morbid obesity s/p sleeve gastrectomy   - Co-morbidities: hypertension and obstructive sleep apnea  - Good Weight loss, 48#'s and 52% EWL  - Good Exercise routine  4-5 x week   - Good Diet  - Good Vitamin regimen    PLAN:  - Ursodiol 500 mg daily for 6 months  - Anti-Acid medication, PPI daily for 3 months, slow taper  - Miralax daily for constipation  - Emphasized the importance of regular exercise and adherence to bariatric diet to achieve maximum weight loss.  - Encouraged patient to start/continue regular exercise.  - Follow-up with dietician to advance diet.  - Continue daily vitamins and medications.  - RTC per schedule  - Call the office for any issues.  - Check labs today.

## 2025-04-04 ENCOUNTER — PATIENT MESSAGE (OUTPATIENT)
Dept: BARIATRICS | Facility: CLINIC | Age: 40
End: 2025-04-04
Payer: COMMERCIAL

## 2025-04-08 ENCOUNTER — PATIENT MESSAGE (OUTPATIENT)
Dept: BARIATRICS | Facility: CLINIC | Age: 40
End: 2025-04-08
Payer: COMMERCIAL

## 2025-05-13 ENCOUNTER — PATIENT MESSAGE (OUTPATIENT)
Dept: BARIATRICS | Facility: CLINIC | Age: 40
End: 2025-05-13
Payer: COMMERCIAL

## 2025-06-03 ENCOUNTER — PATIENT MESSAGE (OUTPATIENT)
Dept: BARIATRICS | Facility: CLINIC | Age: 40
End: 2025-06-03
Payer: COMMERCIAL

## 2025-07-07 ENCOUNTER — PATIENT MESSAGE (OUTPATIENT)
Dept: BARIATRICS | Facility: CLINIC | Age: 40
End: 2025-07-07
Payer: COMMERCIAL

## 2025-07-08 ENCOUNTER — PATIENT MESSAGE (OUTPATIENT)
Dept: BARIATRICS | Facility: CLINIC | Age: 40
End: 2025-07-08
Payer: COMMERCIAL

## 2025-08-12 ENCOUNTER — PATIENT MESSAGE (OUTPATIENT)
Dept: BARIATRICS | Facility: CLINIC | Age: 40
End: 2025-08-12
Payer: COMMERCIAL

## 2025-08-19 ENCOUNTER — TELEPHONE (OUTPATIENT)
Dept: OBSTETRICS AND GYNECOLOGY | Facility: CLINIC | Age: 40
End: 2025-08-19
Payer: COMMERCIAL

## 2025-08-28 ENCOUNTER — OFFICE VISIT (OUTPATIENT)
Dept: INTERNAL MEDICINE | Facility: CLINIC | Age: 40
End: 2025-08-28
Payer: COMMERCIAL

## 2025-08-28 VITALS
HEART RATE: 74 BPM | TEMPERATURE: 98 F | WEIGHT: 193.44 LBS | SYSTOLIC BLOOD PRESSURE: 124 MMHG | BODY MASS INDEX: 31.22 KG/M2 | DIASTOLIC BLOOD PRESSURE: 64 MMHG | OXYGEN SATURATION: 99 %

## 2025-08-28 DIAGNOSIS — Z00.00 ANNUAL PHYSICAL EXAM: Primary | ICD-10-CM

## 2025-08-28 DIAGNOSIS — I10 HYPERTENSION, UNSPECIFIED TYPE: ICD-10-CM

## 2025-08-28 DIAGNOSIS — G47.33 OSA (OBSTRUCTIVE SLEEP APNEA): Chronic | ICD-10-CM

## 2025-08-28 DIAGNOSIS — Z11.59 ENCOUNTER FOR HEPATITIS C SCREENING TEST FOR LOW RISK PATIENT: ICD-10-CM

## 2025-08-28 DIAGNOSIS — L28.2 PRURITIC RASH: ICD-10-CM

## 2025-08-28 PROCEDURE — 3078F DIAST BP <80 MM HG: CPT | Mod: CPTII,S$GLB,, | Performed by: NURSE PRACTITIONER

## 2025-08-28 PROCEDURE — 99396 PREV VISIT EST AGE 40-64: CPT | Mod: S$GLB,,, | Performed by: NURSE PRACTITIONER

## 2025-08-28 PROCEDURE — 1160F RVW MEDS BY RX/DR IN RCRD: CPT | Mod: CPTII,S$GLB,, | Performed by: NURSE PRACTITIONER

## 2025-08-28 PROCEDURE — 3008F BODY MASS INDEX DOCD: CPT | Mod: CPTII,S$GLB,, | Performed by: NURSE PRACTITIONER

## 2025-08-28 PROCEDURE — 1159F MED LIST DOCD IN RCRD: CPT | Mod: CPTII,S$GLB,, | Performed by: NURSE PRACTITIONER

## 2025-08-28 PROCEDURE — 99999 PR PBB SHADOW E&M-EST. PATIENT-LVL IV: CPT | Mod: PBBFAC,,, | Performed by: NURSE PRACTITIONER

## 2025-08-28 PROCEDURE — 3074F SYST BP LT 130 MM HG: CPT | Mod: CPTII,S$GLB,, | Performed by: NURSE PRACTITIONER

## 2025-08-28 RX ORDER — ALBUTEROL SULFATE 90 UG/1
2 INHALANT RESPIRATORY (INHALATION) 4 TIMES DAILY PRN
COMMUNITY
Start: 2025-06-19

## 2025-08-28 RX ORDER — AMLODIPINE BESYLATE 5 MG/1
5 TABLET ORAL DAILY
Qty: 90 TABLET | Refills: 3 | Status: SHIPPED | OUTPATIENT
Start: 2025-08-28

## 2025-08-29 ENCOUNTER — LAB VISIT (OUTPATIENT)
Dept: LAB | Facility: HOSPITAL | Age: 40
End: 2025-08-29
Attending: NURSE PRACTITIONER
Payer: COMMERCIAL

## 2025-08-29 DIAGNOSIS — Z11.59 ENCOUNTER FOR HEPATITIS C SCREENING TEST FOR LOW RISK PATIENT: ICD-10-CM

## 2025-08-29 DIAGNOSIS — Z00.00 ANNUAL PHYSICAL EXAM: ICD-10-CM

## 2025-08-29 LAB
ABSOLUTE EOSINOPHIL (OHS): 0.15 K/UL
ABSOLUTE MONOCYTE (OHS): 0.43 K/UL (ref 0.3–1)
ABSOLUTE NEUTROPHIL COUNT (OHS): 1.69 K/UL (ref 1.8–7.7)
ALBUMIN SERPL BCP-MCNC: 4.2 G/DL (ref 3.5–5.2)
ALP SERPL-CCNC: 67 UNIT/L (ref 40–150)
ALT SERPL W/O P-5'-P-CCNC: 17 UNIT/L (ref 0–55)
ANION GAP (OHS): 7 MMOL/L (ref 8–16)
AST SERPL-CCNC: 24 UNIT/L (ref 0–50)
BASOPHILS # BLD AUTO: 0.05 K/UL
BASOPHILS NFR BLD AUTO: 1 %
BILIRUB SERPL-MCNC: 0.4 MG/DL (ref 0.1–1)
BUN SERPL-MCNC: 12 MG/DL (ref 6–20)
CALCIUM SERPL-MCNC: 9.1 MG/DL (ref 8.7–10.5)
CHLORIDE SERPL-SCNC: 107 MMOL/L (ref 95–110)
CHOLEST SERPL-MCNC: 199 MG/DL (ref 120–199)
CHOLEST/HDLC SERPL: 2.6 {RATIO} (ref 2–5)
CO2 SERPL-SCNC: 23 MMOL/L (ref 23–29)
CREAT SERPL-MCNC: 0.7 MG/DL (ref 0.5–1.4)
EAG (OHS): 108 MG/DL (ref 68–131)
ERYTHROCYTE [DISTWIDTH] IN BLOOD BY AUTOMATED COUNT: 12.4 % (ref 11.5–14.5)
GFR SERPLBLD CREATININE-BSD FMLA CKD-EPI: >60 ML/MIN/1.73/M2
GLUCOSE SERPL-MCNC: 91 MG/DL (ref 70–110)
HBA1C MFR BLD: 5.4 % (ref 4–5.6)
HCT VFR BLD AUTO: 39.7 % (ref 37–48.5)
HCV AB SERPL QL IA: NORMAL
HDLC SERPL-MCNC: 77 MG/DL (ref 40–75)
HDLC SERPL: 38.7 % (ref 20–50)
HGB BLD-MCNC: 12.8 GM/DL (ref 12–16)
IMM GRANULOCYTES # BLD AUTO: 0.01 K/UL (ref 0–0.04)
IMM GRANULOCYTES NFR BLD AUTO: 0.2 % (ref 0–0.5)
LDLC SERPL CALC-MCNC: 111.8 MG/DL (ref 63–159)
LYMPHOCYTES # BLD AUTO: 2.48 K/UL (ref 1–4.8)
MCH RBC QN AUTO: 28.5 PG (ref 27–31)
MCHC RBC AUTO-ENTMCNC: 32.2 G/DL (ref 32–36)
MCV RBC AUTO: 88 FL (ref 82–98)
NONHDLC SERPL-MCNC: 122 MG/DL
NUCLEATED RBC (/100WBC) (OHS): 0 /100 WBC
PLATELET # BLD AUTO: 256 K/UL (ref 150–450)
PMV BLD AUTO: 10.8 FL (ref 9.2–12.9)
POTASSIUM SERPL-SCNC: 4.1 MMOL/L (ref 3.5–5.1)
PROT SERPL-MCNC: 6.8 GM/DL (ref 6–8.4)
RBC # BLD AUTO: 4.49 M/UL (ref 4–5.4)
RELATIVE EOSINOPHIL (OHS): 3.1 %
RELATIVE LYMPHOCYTE (OHS): 51.6 % (ref 18–48)
RELATIVE MONOCYTE (OHS): 8.9 % (ref 4–15)
RELATIVE NEUTROPHIL (OHS): 35.2 % (ref 38–73)
SODIUM SERPL-SCNC: 137 MMOL/L (ref 136–145)
TRIGL SERPL-MCNC: 51 MG/DL (ref 30–150)
TSH SERPL-ACNC: 0.84 UIU/ML (ref 0.4–4)
WBC # BLD AUTO: 4.81 K/UL (ref 3.9–12.7)

## 2025-08-29 PROCEDURE — 80053 COMPREHEN METABOLIC PANEL: CPT

## 2025-08-29 PROCEDURE — 83036 HEMOGLOBIN GLYCOSYLATED A1C: CPT

## 2025-08-29 PROCEDURE — 36415 COLL VENOUS BLD VENIPUNCTURE: CPT | Mod: PN

## 2025-08-29 PROCEDURE — 86803 HEPATITIS C AB TEST: CPT

## 2025-08-29 PROCEDURE — 85025 COMPLETE CBC W/AUTO DIFF WBC: CPT

## 2025-08-29 PROCEDURE — 80061 LIPID PANEL: CPT

## 2025-08-29 PROCEDURE — 84443 ASSAY THYROID STIM HORMONE: CPT

## 2025-09-01 PROBLEM — Z11.59 ENCOUNTER FOR HEPATITIS C SCREENING TEST FOR LOW RISK PATIENT: Status: RESOLVED | Noted: 2025-08-28 | Resolved: 2025-09-01

## (undated) DEVICE — SOL NACL IRR 3000ML

## (undated) DEVICE — SOL POVIDONE SCRUB IODINE 4 OZ

## (undated) DEVICE — TROCAR KII FIOS 5MM X 100MM

## (undated) DEVICE — SYS SMOKE EVACUATION LAP

## (undated) DEVICE — ELECTRODE REM PLYHSV RETURN 9

## (undated) DEVICE — ADHESIVE DERMABOND ADVANCED

## (undated) DEVICE — NDL HYPO REG 25G X 1 1/2

## (undated) DEVICE — TUBING HF INSUFFLATION W/ FLTR

## (undated) DEVICE — TOWEL OR DISP STRL BLUE 4/PK

## (undated) DEVICE — NDL INSUF ULTRA VERESS 120MM

## (undated) DEVICE — VIDEO RENTAL SERVICE

## (undated) DEVICE — BOWL STERILE LARGE 32OZ

## (undated) DEVICE — TIP RUMI BLUE DISPOSABLE 5/BX

## (undated) DEVICE — SUT 0 VICRYL / UR6 (J603)

## (undated) DEVICE — DRAPE ABDOMINAL TIBURON 14X11

## (undated) DEVICE — SEALER LIGASURE LAP 37CM 5MM

## (undated) DEVICE — KIT WING PAD POSITIONING

## (undated) DEVICE — DRAPE CORETEMP FLD WRM 56X62IN

## (undated) DEVICE — SOL IRR SOD CHL .9% POUR

## (undated) DEVICE — SET BASIN 48X48IN 6000ML RING

## (undated) DEVICE — PACK LAPAROSCOPY BAPTIST

## (undated) DEVICE — KIT PROCEDURE STER INLET CLOSU

## (undated) DEVICE — TROCAR ENDOPATH XCEL 15MM 10CM

## (undated) DEVICE — VIDEO RENTAL SERVICE CYSTO

## (undated) DEVICE — SEAL LENS SCOPE MYOSURE

## (undated) DEVICE — STAPLE REINF INTLLGNT RELOAD

## (undated) DEVICE — SYR 10CC LUER LOCK

## (undated) DEVICE — Device

## (undated) DEVICE — DEVICE ABLATION NOVASURE DISP

## (undated) DEVICE — SYS SEE SHARP SCP ANTIFG LNG

## (undated) DEVICE — SUT MCRYL PLUS 4-0 PS2 27IN

## (undated) DEVICE — UNDERGLOVE BIOGEL PI SZ 6.5 LF

## (undated) DEVICE — JELLY SURGILUBE 5GR

## (undated) DEVICE — GLOVE BIOGEL SKINSENSE PI 6.5

## (undated) DEVICE — RELOAD TRI-STAPLE XTHICK 60MM

## (undated) DEVICE — SOL NORMAL USPCA 0.9%

## (undated) DEVICE — IRRIGATOR ENDOSCOPY DISP.

## (undated) DEVICE — TROCAR ENDOPATH XCEL 5X100MM

## (undated) DEVICE — GLOVE BIOGEL SKINSENSE PI 7.0

## (undated) DEVICE — NDL INSUFFLATION VERRES 120MM

## (undated) DEVICE — SUT VICRYL+ 27 UR-6 VIOL

## (undated) DEVICE — TRAY GENERAL SURGERY OMC

## (undated) DEVICE — ENDOCATCH 15MM

## (undated) DEVICE — SOL POVIDONE PREP IODINE 4 OZ

## (undated) DEVICE — PACK FLUENT DISPOSABLE

## (undated) DEVICE — CANNULA ENDOPATH XCEL 5X100MM